# Patient Record
Sex: MALE | Race: WHITE | Employment: OTHER | URBAN - NONMETROPOLITAN AREA
[De-identification: names, ages, dates, MRNs, and addresses within clinical notes are randomized per-mention and may not be internally consistent; named-entity substitution may affect disease eponyms.]

---

## 2017-08-09 ENCOUNTER — TELEPHONE (OUTPATIENT)
Dept: NEUROLOGY | Age: 77
End: 2017-08-09

## 2017-09-26 ENCOUNTER — OFFICE VISIT (OUTPATIENT)
Dept: NEUROLOGY | Age: 77
End: 2017-09-26
Payer: MEDICARE

## 2017-09-26 VITALS
BODY MASS INDEX: 28.81 KG/M2 | WEIGHT: 217.38 LBS | DIASTOLIC BLOOD PRESSURE: 72 MMHG | OXYGEN SATURATION: 96 % | HEART RATE: 73 BPM | HEIGHT: 73 IN | SYSTOLIC BLOOD PRESSURE: 121 MMHG

## 2017-09-26 DIAGNOSIS — R20.0 NUMBNESS: ICD-10-CM

## 2017-09-26 DIAGNOSIS — M54.5 LOW BACK PAIN, UNSPECIFIED BACK PAIN LATERALITY, UNSPECIFIED CHRONICITY, WITH SCIATICA PRESENCE UNSPECIFIED: ICD-10-CM

## 2017-09-26 DIAGNOSIS — G62.9 NEUROPATHY: Primary | ICD-10-CM

## 2017-09-26 DIAGNOSIS — H51.0 GAZE PALSY: ICD-10-CM

## 2017-09-26 DIAGNOSIS — R53.1 WEAKNESS: ICD-10-CM

## 2017-09-26 PROBLEM — M54.50 LOW BACK PAIN: Status: ACTIVE | Noted: 2017-09-26

## 2017-09-26 PROCEDURE — 4040F PNEUMOC VAC/ADMIN/RCVD: CPT | Performed by: PSYCHIATRY & NEUROLOGY

## 2017-09-26 PROCEDURE — 1036F TOBACCO NON-USER: CPT | Performed by: PSYCHIATRY & NEUROLOGY

## 2017-09-26 PROCEDURE — 99204 OFFICE O/P NEW MOD 45 MIN: CPT | Performed by: PSYCHIATRY & NEUROLOGY

## 2017-09-26 PROCEDURE — G8427 DOCREV CUR MEDS BY ELIG CLIN: HCPCS | Performed by: PSYCHIATRY & NEUROLOGY

## 2017-09-26 PROCEDURE — 1123F ACP DISCUSS/DSCN MKR DOCD: CPT | Performed by: PSYCHIATRY & NEUROLOGY

## 2017-09-26 PROCEDURE — G8419 CALC BMI OUT NRM PARAM NOF/U: HCPCS | Performed by: PSYCHIATRY & NEUROLOGY

## 2017-09-26 RX ORDER — FINASTERIDE 5 MG/1
5 TABLET, FILM COATED ORAL
COMMUNITY

## 2018-02-08 ENCOUNTER — CARE COORDINATOR VISIT (OUTPATIENT)
Dept: CARE COORDINATION | Age: 78
End: 2018-02-08

## 2019-01-02 ENCOUNTER — HOSPITAL ENCOUNTER (OUTPATIENT)
Dept: GENERAL RADIOLOGY | Facility: HOSPITAL | Age: 79
Discharge: HOME | End: 2019-01-02
Attending: INTERNAL MEDICINE | Admitting: INTERNAL MEDICINE

## 2019-01-02 ENCOUNTER — TRANSCRIBE ORDERS (OUTPATIENT)
Dept: GENERAL RADIOLOGY | Facility: HOSPITAL | Age: 79
End: 2019-01-02

## 2019-01-02 DIAGNOSIS — K52.9 GASTROENTERITIS: Primary | ICD-10-CM

## 2019-01-02 DIAGNOSIS — K52.9 GASTROENTERITIS: ICD-10-CM

## 2019-01-02 PROCEDURE — 74018 RADEX ABDOMEN 1 VIEW: CPT

## 2019-02-07 ENCOUNTER — HOSPITAL ENCOUNTER (OUTPATIENT)
Dept: GENERAL RADIOLOGY | Facility: HOSPITAL | Age: 79
Discharge: HOME | End: 2019-02-07
Admitting: INTERNAL MEDICINE

## 2019-02-07 ENCOUNTER — TRANSCRIBE ORDERS (OUTPATIENT)
Dept: GENERAL RADIOLOGY | Facility: HOSPITAL | Age: 79
End: 2019-02-07

## 2019-02-07 DIAGNOSIS — M65.4 DE QUERVAIN'S TENOSYNOVITIS: Primary | ICD-10-CM

## 2019-02-07 DIAGNOSIS — M65.4 DE QUERVAIN'S TENOSYNOVITIS: ICD-10-CM

## 2019-02-07 PROCEDURE — 73100 X-RAY EXAM OF WRIST: CPT

## 2019-02-07 PROCEDURE — 73120 X-RAY EXAM OF HAND: CPT

## 2019-12-04 ENCOUNTER — TRANSCRIBE ORDERS (OUTPATIENT)
Dept: GENERAL RADIOLOGY | Facility: HOSPITAL | Age: 79
End: 2019-12-04

## 2019-12-04 ENCOUNTER — HOSPITAL ENCOUNTER (OUTPATIENT)
Dept: GENERAL RADIOLOGY | Facility: HOSPITAL | Age: 79
Discharge: HOME | End: 2019-12-04
Admitting: NURSE PRACTITIONER

## 2019-12-04 DIAGNOSIS — R07.89 CHEST WALL PAIN: ICD-10-CM

## 2019-12-04 DIAGNOSIS — R07.89 CHEST WALL PAIN: Primary | ICD-10-CM

## 2019-12-04 PROCEDURE — 71046 X-RAY EXAM CHEST 2 VIEWS: CPT

## 2020-03-10 NOTE — PROGRESS NOTES
Subjective    Mr. Graham is 79 y.o. male    Chief Complaint: Elevated PSA    History of Present Illness     Elevated PSA  Patient is here with an elevated PSA. The PSA was drawn1 month(s). He does not have a family history of prostate cancer. His AUA Symptom Score is 7 /35. Voiding symptoms include Incomplete emptying, Frequency, Intermittency, Urgency, Weakened stream and Nocturia. Denies Straining. Voiding symptoms began several years  . These have been gradual in onset. Negative biopsy X 2 > 10 years ago; history of HOLEP in past  Previous PSA values are : No results found for: PSA  6.6 with a finasteride correction of 13.2  Previously 4.36 with finasteride correction of 8.72      The following portions of the patient's history were reviewed and updated as appropriate: allergies, current medications, past family history, past medical history, past social history, past surgical history and problem list.    Review of Systems   Constitutional: Negative for appetite change, chills and fever.   HENT: Negative for hearing loss and sore throat.    Eyes: Negative for pain and redness.   Respiratory: Negative for cough and shortness of breath.    Cardiovascular: Negative for chest pain and leg swelling.   Gastrointestinal: Negative for abdominal pain, anal bleeding, blood in stool, nausea and vomiting.   Endocrine: Negative for cold intolerance and heat intolerance.   Genitourinary: Positive for frequency and urgency. Negative for dysuria, flank pain and hematuria.   Musculoskeletal: Negative for joint swelling and myalgias.   Skin: Negative for color change and rash.   Allergic/Immunologic: Negative for immunocompromised state.   Neurological: Negative for dizziness and speech difficulty.   Hematological: Negative for adenopathy. Does not bruise/bleed easily.   Psychiatric/Behavioral: Negative for dysphoric mood and suicidal ideas.         Current Outpatient Medications:   •  amLODIPine (NORVASC) 10 MG tablet, Take 10 mg  by mouth daily., Disp: , Rfl:   •  finasteride (PROSCAR) 5 MG tablet, Take 5 mg by mouth daily., Disp: , Rfl:   •  gabapentin (NEURONTIN) 300 MG capsule, Take 300 mg by mouth 3 (three) times a day., Disp: , Rfl:   •  hydroCHLOROthiazide (HYDRODIURIL) 12.5 MG tablet, Take 12.5 mg by mouth Daily., Disp: , Rfl:   •  quinapril (ACCUPRIL) 40 MG tablet, Take 40 mg by mouth daily., Disp: , Rfl:   •  dexlansoprazole (DEXILANT) 30 MG capsule, Take one by mouth daily, Disp: 30 capsule, Rfl: 6  •  diphenhydrAMINE (BENADRYL) 25 mg capsule, Take 25 mg by mouth every 6 (six) hours as needed for itching., Disp: , Rfl:   •  meclizine (ANTIVERT) 25 MG tablet, Take 25 mg by mouth 3 (three) times a day., Disp: , Rfl:   •  pantoprazole (PROTONIX) 40 MG EC tablet, Take 40 mg by mouth 2 (two) times a day., Disp: , Rfl:     Past Medical History:   Diagnosis Date   • Abdominal distention    • Abdominal pain, left upper quadrant    • Allergic rhinitis    • Arthritis    • Bloating    • Change in bowel habits    • Constipation    • Diarrhea    • Erectile dysfunction    • GERD (gastroesophageal reflux disease)    • Hypertension    • Pneumonia    • Shingles    • Weight loss        Past Surgical History:   Procedure Laterality Date   • APPENDECTOMY     • BACK SURGERY     • CARPAL TUNNEL RELEASE Right    • CHOLECYSTECTOMY     • COLONOSCOPY  09/14/2010    5 yr-complete colon not visualized   • COLONOSCOPY  07/27/2004    extremely tortuous redundant colon. BE-mild diverticulosis without diverticulitis. ? Gallstones-Dr Chery.   • ENDOSCOPY N/A 9/26/2016    Procedure: ESOPHAGOGASTRODUODENOSCOPY WITH ANESTHESIA;  Surgeon: Mani Sy DO;  Location: [facility] ENDOSCOPY;  Service:    • HIP SURGERY Right    • KNEE SURGERY     • OTHER SURGICAL HISTORY      Surgery for Spinal Stenosis   • OTHER SURGICAL HISTORY      Laser Prostate Surgery       Social History     Socioeconomic History   • Marital status:      Spouse name: Not on file   • Number  "of children: Not on file   • Years of education: Not on file   • Highest education level: Not on file   Tobacco Use   • Smoking status: Never Smoker   • Smokeless tobacco: Never Used   Substance and Sexual Activity   • Alcohol use: No   • Drug use: No       Family History   Problem Relation Age of Onset   • Cancer Neg Hx         CRD/CP       Objective    Temp 97.8 °F (36.6 °C)   Ht 185.4 cm (73\")   Wt 96.5 kg (212 lb 12.8 oz)   BMI 28.08 kg/m²     Physical Exam   Constitutional: He is oriented to person, place, and time. He appears well-developed and well-nourished. No distress.   HENT:   Nose: Nose normal.   Neck: Normal range of motion. Neck supple. No tracheal deviation present. No thyromegaly present.   Cardiovascular: Normal rate, regular rhythm and intact distal pulses.   No significant edema or tenderness    Pulmonary/Chest: Breath sounds normal. No accessory muscle usage. No respiratory distress.   Abdominal: Soft. Bowel sounds are normal. He exhibits no distension, no ascites and no mass. There is no hepatosplenomegaly. There is no tenderness. There is no rebound, no guarding and no CVA tenderness. No hernia.   Stool specimen is not indicated for my portion of the exam   Genitourinary: Testes normal and penis normal. Rectal exam shows no mass, no tenderness, anal tone normal and guaiac negative stool. Tender: no nodules. Right testis shows no mass, no swelling and no tenderness. Left testis shows no mass, no swelling and no tenderness. No penile tenderness (no lesion or deformities).   Genitourinary Comments:  The urethral meatus normal in position without evidence of stricture. Epididymis without mass or tenderness. Vas Deferens is palpably normal.Anus and perineum without mass or tenderness. The seminal vesicle are without mass or enlargement. The prostate is approximately 35 ml. It is Symmetric, with a Soft consistency. There are no nodules present. . The seminal vesicles are Not palpable due to the " size of the prostate.     Lymphadenopathy:     He has no cervical adenopathy. No inguinal adenopathy noted on the right or left side.        Right: No inguinal adenopathy present.        Left: No inguinal adenopathy present.   Neurological: He is alert and oriented to person, place, and time.   Skin: Skin is warm and dry. No rash noted. He is not diaphoretic. No pallor.   Psychiatric: He has a normal mood and affect. His behavior is normal.   Vitals reviewed.          Results for orders placed or performed in visit on 03/11/20   POC Urinalysis Dipstick, Multipro   Result Value Ref Range    Color Yellow Yellow, Straw, Dark Yellow, [place]    Clarity, UA Clear Clear    Glucose, UA Negative Negative, 1000 mg/dL (3+) mg/dL    Bilirubin Negative Negative    Ketones, UA Negative Negative    Specific Gravity  1.015 1.005 - 1.030    Blood, UA Negative Negative    pH, Urine 6.0 5.0 - 8.0    Protein, POC Negative Negative mg/dL    Urobilinogen, UA Normal Normal    Nitrite, UA Negative Negative    Leukocytes Negative Negative     Assessment and Plan    Adrián was seen today for elevated psa.    Diagnoses and all orders for this visit:    Elevated prostate specific antigen (PSA)  -     POC Urinalysis Dipstick, Multipro    BPH with urinary obstruction            I discussed the following important considerations with the patient. Current American Urologic Association prostate cancer screening recommendations include informed decision making on the pros and cons of prostate cancer between the ages of 50-> 69 years old.   They do not recommend routine PSA screening in men older than 70 or any man with less than a 10 to 15-year life expectancy.  The panel also noted that some men older than 70 years are in excellent health and may benefit from prostate cancer screening.  However the likelihood of over diagnosis increases as men age, and is particularly high for older men with low risk disease.  Men over 70 who wished to be screened  should do so after understanding that the benefit decreases while the harm increases with age, although there is evidence that men with high risk disease in this age group may benefit from early diagnosis and treatment over a decade or less.    I discussed the pros and cons of biopsy with him as follows:  -Screening does not lower the risk of having prostate cancer; it increases the chance you will find out to have it.  -PSA testing can detect early stage cancers that a digital rectal exam would miss.  -A normal PSA level of 4 or below does not guarantee a patient is cancer free; e.g. about 15% of men with a PSA below 4, a biopsy will reveal prostate cancer  -High PSA level may prompt a patient to seek treatment, resulting in possible urinary and sexual side effects.  -Conditions other than cancer-BPH and prostatitis, for example-can elevate PSA    -Screening may lead to a biopsy which may:  *provide a false negative result (approximately 10% of patients diagnosed with cancer have a history of negative biopsy)  *diagnose a prostate cancer that will never lead to symptoms or death even without treatment so some patients have unnecessary treatment  *side effects that can be severe such as sepsis from infection, bleeding, and even transient sexual dysfunction      We also discussed other societal recommendations for cancer screening such as:  -American Cancer Society recommends informed decision on asymptomatic men with greater than 10-year life expectancy.  -The American Academy of family practice recommends informed decision making with men between 55 and 69 years old but there is agreement that men age 70 or older should not be screened for prostate cancer.  -The National Cancer Care Network agrees that PSA testing should only be offered to men with  10 or more year life expectancy.  This panel supported screening in men until age 75.  Continued screening beyond 75 should be only with caution and very healthy  patients with little to no comorbidity to detect a small number of aggressive cancers that pose a significant risk if left untreated.       I reviewed 35 pages of medical records indicating a PSA of 6.6 with a finasteride correction to 13.2.  He has a history of 2- biopsies in the past.  We discussed options in depth today including discontinuing PSA screening continuing PSA screening proceed with MRI with possible fusion biopsy patient does not want to have a biopsy.  We will not proceed with MRI.  Continue finasteride and he will follow-up in 1 year for symptom check.

## 2020-03-11 ENCOUNTER — OFFICE VISIT (OUTPATIENT)
Dept: UROLOGY | Facility: CLINIC | Age: 80
End: 2020-03-11

## 2020-03-11 VITALS — BODY MASS INDEX: 28.2 KG/M2 | WEIGHT: 212.8 LBS | HEIGHT: 73 IN | TEMPERATURE: 97.8 F

## 2020-03-11 DIAGNOSIS — N40.1 BPH WITH URINARY OBSTRUCTION: ICD-10-CM

## 2020-03-11 DIAGNOSIS — N13.8 BPH WITH URINARY OBSTRUCTION: ICD-10-CM

## 2020-03-11 DIAGNOSIS — R97.20 ELEVATED PROSTATE SPECIFIC ANTIGEN (PSA): Primary | ICD-10-CM

## 2020-03-11 LAB
BILIRUB BLD-MCNC: NEGATIVE MG/DL
CLARITY, POC: CLEAR
COLOR UR: YELLOW
GLUCOSE UR STRIP-MCNC: NEGATIVE MG/DL
KETONES UR QL: NEGATIVE
LEUKOCYTE EST, POC: NEGATIVE
NITRITE UR-MCNC: NEGATIVE MG/ML
PH UR: 6 [PH] (ref 5–8)
PROT UR STRIP-MCNC: NEGATIVE MG/DL
RBC # UR STRIP: NEGATIVE /UL
SP GR UR: 1.01 (ref 1–1.03)
UROBILINOGEN UR QL: NORMAL

## 2020-03-11 PROCEDURE — 99203 OFFICE O/P NEW LOW 30 MIN: CPT | Performed by: UROLOGY

## 2020-03-11 PROCEDURE — 81003 URINALYSIS AUTO W/O SCOPE: CPT | Performed by: UROLOGY

## 2020-03-11 RX ORDER — HYDROCHLOROTHIAZIDE 12.5 MG/1
12.5 TABLET ORAL DAILY
COMMUNITY
End: 2022-01-01 | Stop reason: CLARIF

## 2020-09-01 NOTE — H&P (VIEW-ONLY)
Chief Complaint   Patient presents with   • Colonoscopy     had positive cologuard       PCP: James Mooney DO  REFER: James Mooney DO    Subjective     HPI    He presents to office with positive cologaurd test from PCP office in Aug 2020.  Coarse is constant.  Length of time test has been positive is unknown.  Location of positive test-colon/stool.  Testing performed as part of routine physical.  He denies change in bowels.  Bowels described as moving irregular.  he has started to experience diarrhea after eating breakfast.  this is not daily.  . He questions if diarrhea associated with coffee or something he eats at breakfast.  No abdominal pain.  No BRBPR.  No melena.  Weight is stable.  He has undergone previous colonoscopy evaluation.  There is not a family history of colon cancer or colon polyps.  known liver lesion to right hepatic lobe last imaged in 2016 by ultrasound.    Endoscopy (Dr Sy) 2016-normal     CScope (Dr Sy) 2010-tortuous colon - negative BE        Past Medical History:   Diagnosis Date   • Abdominal distention    • Abdominal pain, left upper quadrant    • Allergic rhinitis    • Arthritis    • Bloating    • Change in bowel habits    • Constipation    • Diarrhea    • Erectile dysfunction    • GERD (gastroesophageal reflux disease)    • Hypertension    • Pneumonia    • Shingles    • Weight loss        Past Surgical History:   Procedure Laterality Date   • APPENDECTOMY     • BACK SURGERY     • CARPAL TUNNEL RELEASE Right    • CHOLECYSTECTOMY     • COLONOSCOPY  09/14/2010    5 yr-complete colon not visualized   • COLONOSCOPY  07/27/2004    extremely tortuous redundant colon. BE-mild diverticulosis without diverticulitis. ? Gallstones-Dr Chery.   • ENDOSCOPY N/A 9/26/2016    Procedure: ESOPHAGOGASTRODUODENOSCOPY WITH ANESTHESIA;  Surgeon: Mani Sy DO;  Location: [facility] ENDOSCOPY;  Service:    • HIP SURGERY Right    • KNEE SURGERY     • OTHER SURGICAL HISTORY      Surgery for  Spinal Stenosis   • OTHER SURGICAL HISTORY      Laser Prostate Surgery       Outpatient Medications Marked as Taking for the 9/2/20 encounter (Office Visit) with Yehuda Moraes APRN   Medication Sig Dispense Refill   • amLODIPine (NORVASC) 10 MG tablet Take 10 mg by mouth daily.     • diphenhydrAMINE (BENADRYL) 25 mg capsule Take 25 mg by mouth every 6 (six) hours as needed for itching.     • finasteride (PROSCAR) 5 MG tablet Take 5 mg by mouth daily.     • gabapentin (NEURONTIN) 300 MG capsule Take 300 mg by mouth 3 (three) times a day.     • hydroCHLOROthiazide (HYDRODIURIL) 12.5 MG tablet Take 12.5 mg by mouth Daily.     • quinapril (ACCUPRIL) 40 MG tablet Take 40 mg by mouth daily.         Allergies   Allergen Reactions   • Daypro [Oxaprozin] Nausea Only       Social History     Socioeconomic History   • Marital status:      Spouse name: Not on file   • Number of children: Not on file   • Years of education: Not on file   • Highest education level: Not on file   Tobacco Use   • Smoking status: Never Smoker   • Smokeless tobacco: Never Used   Substance and Sexual Activity   • Alcohol use: No   • Drug use: No       Family History   Problem Relation Age of Onset   • Cancer Neg Hx         CRD/CP   • Colon cancer Neg Hx    • Colon polyps Neg Hx    • Esophageal cancer Neg Hx        Review of Systems   Constitutional: Negative for fatigue, fever and unexpected weight change.   HENT: Negative for hearing loss, sore throat and voice change.    Eyes: Negative for visual disturbance.   Respiratory: Negative for cough, shortness of breath and wheezing.    Cardiovascular: Negative for chest pain and palpitations.   Gastrointestinal: Positive for diarrhea. Negative for abdominal pain, blood in stool and vomiting.   Endocrine: Negative for polydipsia and polyuria.   Genitourinary: Negative for difficulty urinating, dysuria, hematuria and urgency.   Musculoskeletal: Negative for joint swelling and myalgias.  "  Skin: Negative for color change, rash and wound.   Neurological: Negative for dizziness, tremors, seizures and syncope.   Hematological: Does not bruise/bleed easily.   Psychiatric/Behavioral: Negative for agitation and confusion. The patient is not nervous/anxious.        Objective     Vitals:    09/02/20 1503   BP: 134/74   Pulse: 90   Temp: 97.1 °F (36.2 °C)   SpO2: 99%   Weight: 94.8 kg (209 lb)   Height: 185.4 cm (73\")     Body mass index is 27.57 kg/m².    Physical Exam   Constitutional: He is oriented to person, place, and time. He appears well-developed and well-nourished. He is cooperative.   HENT:   Head: Normocephalic and atraumatic.   Eyes: Pupils are equal, round, and reactive to light. Conjunctivae are normal. No scleral icterus.   Neck: Normal range of motion. Neck supple. No JVD present. No thyroid mass and no thyromegaly present.   Cardiovascular: Normal rate, regular rhythm and normal heart sounds. Exam reveals no gallop and no friction rub.   No murmur heard.  Pulmonary/Chest: Effort normal and breath sounds normal. No accessory muscle usage. No respiratory distress. He has no wheezes. He has no rales.   Abdominal: Soft. Normal appearance and bowel sounds are normal. He exhibits no distension, no ascites and no mass. There is no hepatosplenomegaly. There is no tenderness. There is no rebound and no guarding.   Musculoskeletal: Normal range of motion. He exhibits no edema or tenderness.     Vascular Status -  His right foot exhibits normal foot vasculature  and no edema. His left foot exhibits normal foot vasculature  and no edema.  Lymphadenopathy:     He has no cervical adenopathy.   Neurological: He is alert and oriented to person, place, and time. He has normal strength. Gait normal.   Skin: Skin is warm, dry and intact. No rash noted.       Imaging Results (Most Recent)     None          Body mass index is 27.57 kg/m².    Assessment/Plan     Adrián was seen today for colonoscopy.    Diagnoses " and all orders for this visit:    Positive colorectal cancer screening using DNA-based stool test  -     Case Request; Standing  -     Case Request        COLONOSCOPY WITH ANESTHESIA (N/A)    Patient is to continue all blood pressure and cardiac medications prior to procedure and has been advised to take medications morning of procedure   Pt verbalized understanding    Advised pt to stop ASA, use of NSAIDs, Fish Oil, and MV 5 days prior to procedure, per Dr yS protocol.  Tylenol based products are ok to take.  Pt verbalized understanding.    I have explained a positive cologuard indicates the presence of DNA/hgb in stool that is associated with colon polyps or colon cancer.  There is a chance the test is a false positive with that chance being higher for people over the age of 65. This is due to normal changes in DNA associated with getting older (AGA).  Due to the positive result of the Cologuard I recommend pt undergoing colonoscopy.  Colonoscopy remains the gold standard for detection of colon polyps/colon cancer.      All risks, benefits, alternatives, and indications of colonoscopy procedure have been discussed with the patient. Risks to include perforation of the colon requiring possible surgery or colostomy, risk of bleeding from biopsies or removal of colon tissue, possibility of missing a colon polyp or cancer, or adverse drug reaction.  Benefits to include the diagnosis and management of disease of the colon and rectum.  Pt verbalizes understanding and agrees to proceed with procedure.      Patient's Body mass index is 27.57 kg/m². BMI is above normal parameters. Recommendations include: no follow up.          Yehuda Moraes, APRN  09/04/20            There are no Patient Instructions on file for this visit.

## 2020-09-01 NOTE — PROGRESS NOTES
Chief Complaint   Patient presents with   • Colonoscopy     had positive cologuard       PCP: James Mooney DO  REFER: James Moonye DO    Subjective     HPI    He presents to office with positive cologaurd test from PCP office in Aug 2020.  Coarse is constant.  Length of time test has been positive is unknown.  Location of positive test-colon/stool.  Testing performed as part of routine physical.  He denies change in bowels.  Bowels described as moving irregular.  he has started to experience diarrhea after eating breakfast.  this is not daily.  . He questions if diarrhea associated with coffee or something he eats at breakfast.  No abdominal pain.  No BRBPR.  No melena.  Weight is stable.  He has undergone previous colonoscopy evaluation.  There is not a family history of colon cancer or colon polyps.  known liver lesion to right hepatic lobe last imaged in 2016 by ultrasound.    Endoscopy (Dr Sy) 2016-normal     CScope (Dr Sy) 2010-tortuous colon - negative BE        Past Medical History:   Diagnosis Date   • Abdominal distention    • Abdominal pain, left upper quadrant    • Allergic rhinitis    • Arthritis    • Bloating    • Change in bowel habits    • Constipation    • Diarrhea    • Erectile dysfunction    • GERD (gastroesophageal reflux disease)    • Hypertension    • Pneumonia    • Shingles    • Weight loss        Past Surgical History:   Procedure Laterality Date   • APPENDECTOMY     • BACK SURGERY     • CARPAL TUNNEL RELEASE Right    • CHOLECYSTECTOMY     • COLONOSCOPY  09/14/2010    5 yr-complete colon not visualized   • COLONOSCOPY  07/27/2004    extremely tortuous redundant colon. BE-mild diverticulosis without diverticulitis. ? Gallstones-Dr Chery.   • ENDOSCOPY N/A 9/26/2016    Procedure: ESOPHAGOGASTRODUODENOSCOPY WITH ANESTHESIA;  Surgeon: Mani Sy DO;  Location: [facility] ENDOSCOPY;  Service:    • HIP SURGERY Right    • KNEE SURGERY     • OTHER SURGICAL HISTORY      Surgery for  Spinal Stenosis   • OTHER SURGICAL HISTORY      Laser Prostate Surgery       Outpatient Medications Marked as Taking for the 9/2/20 encounter (Office Visit) with Yehuda Moraes APRN   Medication Sig Dispense Refill   • amLODIPine (NORVASC) 10 MG tablet Take 10 mg by mouth daily.     • diphenhydrAMINE (BENADRYL) 25 mg capsule Take 25 mg by mouth every 6 (six) hours as needed for itching.     • finasteride (PROSCAR) 5 MG tablet Take 5 mg by mouth daily.     • gabapentin (NEURONTIN) 300 MG capsule Take 300 mg by mouth 3 (three) times a day.     • hydroCHLOROthiazide (HYDRODIURIL) 12.5 MG tablet Take 12.5 mg by mouth Daily.     • quinapril (ACCUPRIL) 40 MG tablet Take 40 mg by mouth daily.         Allergies   Allergen Reactions   • Daypro [Oxaprozin] Nausea Only       Social History     Socioeconomic History   • Marital status:      Spouse name: Not on file   • Number of children: Not on file   • Years of education: Not on file   • Highest education level: Not on file   Tobacco Use   • Smoking status: Never Smoker   • Smokeless tobacco: Never Used   Substance and Sexual Activity   • Alcohol use: No   • Drug use: No       Family History   Problem Relation Age of Onset   • Cancer Neg Hx         CRD/CP   • Colon cancer Neg Hx    • Colon polyps Neg Hx    • Esophageal cancer Neg Hx        Review of Systems   Constitutional: Negative for fatigue, fever and unexpected weight change.   HENT: Negative for hearing loss, sore throat and voice change.    Eyes: Negative for visual disturbance.   Respiratory: Negative for cough, shortness of breath and wheezing.    Cardiovascular: Negative for chest pain and palpitations.   Gastrointestinal: Positive for diarrhea. Negative for abdominal pain, blood in stool and vomiting.   Endocrine: Negative for polydipsia and polyuria.   Genitourinary: Negative for difficulty urinating, dysuria, hematuria and urgency.   Musculoskeletal: Negative for joint swelling and myalgias.  "  Skin: Negative for color change, rash and wound.   Neurological: Negative for dizziness, tremors, seizures and syncope.   Hematological: Does not bruise/bleed easily.   Psychiatric/Behavioral: Negative for agitation and confusion. The patient is not nervous/anxious.        Objective     Vitals:    09/02/20 1503   BP: 134/74   Pulse: 90   Temp: 97.1 °F (36.2 °C)   SpO2: 99%   Weight: 94.8 kg (209 lb)   Height: 185.4 cm (73\")     Body mass index is 27.57 kg/m².    Physical Exam   Constitutional: He is oriented to person, place, and time. He appears well-developed and well-nourished. He is cooperative.   HENT:   Head: Normocephalic and atraumatic.   Eyes: Pupils are equal, round, and reactive to light. Conjunctivae are normal. No scleral icterus.   Neck: Normal range of motion. Neck supple. No JVD present. No thyroid mass and no thyromegaly present.   Cardiovascular: Normal rate, regular rhythm and normal heart sounds. Exam reveals no gallop and no friction rub.   No murmur heard.  Pulmonary/Chest: Effort normal and breath sounds normal. No accessory muscle usage. No respiratory distress. He has no wheezes. He has no rales.   Abdominal: Soft. Normal appearance and bowel sounds are normal. He exhibits no distension, no ascites and no mass. There is no hepatosplenomegaly. There is no tenderness. There is no rebound and no guarding.   Musculoskeletal: Normal range of motion. He exhibits no edema or tenderness.     Vascular Status -  His right foot exhibits normal foot vasculature  and no edema. His left foot exhibits normal foot vasculature  and no edema.  Lymphadenopathy:     He has no cervical adenopathy.   Neurological: He is alert and oriented to person, place, and time. He has normal strength. Gait normal.   Skin: Skin is warm, dry and intact. No rash noted.       Imaging Results (Most Recent)     None          Body mass index is 27.57 kg/m².    Assessment/Plan     Adrián was seen today for colonoscopy.    Diagnoses " and all orders for this visit:    Positive colorectal cancer screening using DNA-based stool test  -     Case Request; Standing  -     Case Request        COLONOSCOPY WITH ANESTHESIA (N/A)    Patient is to continue all blood pressure and cardiac medications prior to procedure and has been advised to take medications morning of procedure   Pt verbalized understanding    Advised pt to stop ASA, use of NSAIDs, Fish Oil, and MV 5 days prior to procedure, per Dr Sy protocol.  Tylenol based products are ok to take.  Pt verbalized understanding.    I have explained a positive cologuard indicates the presence of DNA/hgb in stool that is associated with colon polyps or colon cancer.  There is a chance the test is a false positive with that chance being higher for people over the age of 65. This is due to normal changes in DNA associated with getting older (AGA).  Due to the positive result of the Cologuard I recommend pt undergoing colonoscopy.  Colonoscopy remains the gold standard for detection of colon polyps/colon cancer.      All risks, benefits, alternatives, and indications of colonoscopy procedure have been discussed with the patient. Risks to include perforation of the colon requiring possible surgery or colostomy, risk of bleeding from biopsies or removal of colon tissue, possibility of missing a colon polyp or cancer, or adverse drug reaction.  Benefits to include the diagnosis and management of disease of the colon and rectum.  Pt verbalizes understanding and agrees to proceed with procedure.      Patient's Body mass index is 27.57 kg/m². BMI is above normal parameters. Recommendations include: no follow up.          Yehuda Moraes, APRN  09/04/20            There are no Patient Instructions on file for this visit.

## 2020-09-02 ENCOUNTER — OFFICE VISIT (OUTPATIENT)
Dept: GASTROENTEROLOGY | Facility: CLINIC | Age: 80
End: 2020-09-02

## 2020-09-02 VITALS
OXYGEN SATURATION: 99 % | WEIGHT: 209 LBS | BODY MASS INDEX: 27.7 KG/M2 | HEART RATE: 90 BPM | TEMPERATURE: 97.1 F | SYSTOLIC BLOOD PRESSURE: 134 MMHG | DIASTOLIC BLOOD PRESSURE: 74 MMHG | HEIGHT: 73 IN

## 2020-09-02 DIAGNOSIS — R19.5 POSITIVE COLORECTAL CANCER SCREENING USING DNA-BASED STOOL TEST: Primary | ICD-10-CM

## 2020-09-02 PROCEDURE — 99203 OFFICE O/P NEW LOW 30 MIN: CPT | Performed by: NURSE PRACTITIONER

## 2020-09-03 ENCOUNTER — TRANSCRIBE ORDERS (OUTPATIENT)
Dept: ADMINISTRATIVE | Facility: HOSPITAL | Age: 80
End: 2020-09-03

## 2020-09-03 DIAGNOSIS — Z01.818 PRE-OP TESTING: Primary | ICD-10-CM

## 2020-09-07 ENCOUNTER — LAB (OUTPATIENT)
Dept: LAB | Facility: HOSPITAL | Age: 80
End: 2020-09-07

## 2020-09-07 PROCEDURE — U0003 INFECTIOUS AGENT DETECTION BY NUCLEIC ACID (DNA OR RNA); SEVERE ACUTE RESPIRATORY SYNDROME CORONAVIRUS 2 (SARS-COV-2) (CORONAVIRUS DISEASE [COVID-19]), AMPLIFIED PROBE TECHNIQUE, MAKING USE OF HIGH THROUGHPUT TECHNOLOGIES AS DESCRIBED BY CMS-2020-01-R: HCPCS | Performed by: INTERNAL MEDICINE

## 2020-09-07 PROCEDURE — C9803 HOPD COVID-19 SPEC COLLECT: HCPCS | Performed by: INTERNAL MEDICINE

## 2020-09-09 LAB
COVID LABCORP PRIORITY: NORMAL
SARS-COV-2 RNA RESP QL NAA+PROBE: NOT DETECTED

## 2020-09-10 ENCOUNTER — HOSPITAL ENCOUNTER (OUTPATIENT)
Facility: HOSPITAL | Age: 80
Setting detail: HOSPITAL OUTPATIENT SURGERY
Discharge: HOME | End: 2020-09-10
Attending: INTERNAL MEDICINE | Admitting: INTERNAL MEDICINE

## 2020-09-10 ENCOUNTER — ANESTHESIA (OUTPATIENT)
Dept: GASTROENTEROLOGY | Facility: HOSPITAL | Age: 80
End: 2020-09-10

## 2020-09-10 ENCOUNTER — ANESTHESIA EVENT (OUTPATIENT)
Dept: GASTROENTEROLOGY | Facility: HOSPITAL | Age: 80
End: 2020-09-10

## 2020-09-10 VITALS
HEART RATE: 62 BPM | OXYGEN SATURATION: 100 % | SYSTOLIC BLOOD PRESSURE: 124 MMHG | DIASTOLIC BLOOD PRESSURE: 67 MMHG | RESPIRATION RATE: 16 BRPM | HEIGHT: 73 IN | WEIGHT: 205 LBS | TEMPERATURE: 96.6 F | BODY MASS INDEX: 27.17 KG/M2

## 2020-09-10 DIAGNOSIS — R19.5 POSITIVE COLORECTAL CANCER SCREENING USING DNA-BASED STOOL TEST: ICD-10-CM

## 2020-09-10 PROCEDURE — 25010000002 PROPOFOL 10 MG/ML EMULSION: Performed by: NURSE ANESTHETIST, CERTIFIED REGISTERED

## 2020-09-10 PROCEDURE — 45378 DIAGNOSTIC COLONOSCOPY: CPT | Performed by: INTERNAL MEDICINE

## 2020-09-10 RX ORDER — PROPOFOL 10 MG/ML
VIAL (ML) INTRAVENOUS AS NEEDED
Status: DISCONTINUED | OUTPATIENT
Start: 2020-09-10 | End: 2020-09-10 | Stop reason: SURG

## 2020-09-10 RX ORDER — SODIUM CHLORIDE 0.9 % (FLUSH) 0.9 %
10 SYRINGE (ML) INJECTION EVERY 12 HOURS SCHEDULED
Status: DISCONTINUED | OUTPATIENT
Start: 2020-09-10 | End: 2020-09-10 | Stop reason: HOSPADM

## 2020-09-10 RX ORDER — SODIUM CHLORIDE 9 MG/ML
100 INJECTION, SOLUTION INTRAVENOUS CONTINUOUS
Status: DISCONTINUED | OUTPATIENT
Start: 2020-09-10 | End: 2020-09-10 | Stop reason: HOSPADM

## 2020-09-10 RX ORDER — SODIUM CHLORIDE 0.9 % (FLUSH) 0.9 %
10 SYRINGE (ML) INJECTION AS NEEDED
Status: DISCONTINUED | OUTPATIENT
Start: 2020-09-10 | End: 2020-09-10 | Stop reason: HOSPADM

## 2020-09-10 RX ADMIN — PROPOFOL 350 MG: 10 INJECTION, EMULSION INTRAVENOUS at 11:13

## 2020-09-10 RX ADMIN — LIDOCAINE HYDROCHLORIDE 50 MG: 20 INJECTION, SOLUTION INTRAVENOUS at 11:05

## 2020-09-10 RX ADMIN — SODIUM CHLORIDE 100 ML/HR: 9 INJECTION, SOLUTION INTRAVENOUS at 10:35

## 2020-09-10 NOTE — ANESTHESIA POSTPROCEDURE EVALUATION
Patient: Adrián Graham    Procedure Summary     Date:  09/10/20 Room / Location:  [facility] ENDOSCOPY 2 / [facility] ENDOSCOPY    Anesthesia Start:  1104 Anesthesia Stop:  1137    Procedure:  COLONOSCOPY WITH ANESTHESIA (N/A ) Diagnosis:       Positive colorectal cancer screening using DNA-based stool test      (Positive colorectal cancer screening using DNA-based stool test [R19.5])    Surgeon:  Mani Sy DO Provider:  Adrián Yin CRNA    Anesthesia Type:  general ASA Status:  2          Anesthesia Type: general    Vitals  Vitals Value Taken Time   BP     Temp     Pulse 64 9/10/2020 11:37 AM   Resp     SpO2     Vitals shown include unvalidated device data.        Post Anesthesia Care and Evaluation    Patient location during evaluation: PACU  Patient participation: complete - patient participated  Level of consciousness: awake and awake and alert  Pain score: 0  Pain management: adequate  Airway patency: patent  Anesthetic complications: No anesthetic complications    Cardiovascular status: acceptable and stable  Respiratory status: acceptable and unassisted  Hydration status: acceptable

## 2020-09-10 NOTE — ANESTHESIA PREPROCEDURE EVALUATION
Anesthesia Evaluation     Patient summary reviewed and Nursing notes reviewed   no history of anesthetic complications:  NPO Solid Status: > 8 hours  NPO Liquid Status: > 4 hours           Airway   Mallampati: I  TM distance: >3 FB  Neck ROM: full  no difficulty expected  Dental      Comment: Edentulous on top      Pulmonary - negative pulmonary ROS and normal exam   (-) COPD, asthma, sleep apnea, not a smoker  Cardiovascular - normal exam  Exercise tolerance: good (4-7 METS)    (+) hypertension well controlled,   (-) angina, BARRETO      Neuro/Psych  (-) seizures, CVA, headaches  GI/Hepatic/Renal/Endo    (+)  GERD,  liver disease (liver cyst),   (-) diabetes, no thyroid disorder    Musculoskeletal     Abdominal    Substance History - negative use     OB/GYN          Other   arthritis,                      Anesthesia Plan    ASA 2     general     intravenous induction     Anesthetic plan, all risks, benefits, and alternatives have been provided, discussed and informed consent has been obtained with: patient.

## 2021-04-13 NOTE — PROGRESS NOTES
Subjective    Mr. Graham is 81 y.o. male    Chief Complaint: Elevated PSA.     History of Present Illness  Elevated PSA  Patient is here with an elevated PSA. The PSA was drawn1 month(s). He does not have a family history of prostate cancer. His AUA Symptom Score is 7 /35. Voiding symptoms include Incomplete emptying, Frequency, Intermittency, Urgency, Weakened stream and Nocturia. Denies Straining. Voiding symptoms began several years  . These have been gradual in onset. Negative biopsy X 2 > 10 years ago; history of HOLEP in past  Previous PSA values are : No results found for: PSA  6.6 with a finasteride correction of 13.2  Previously 4.36 with finasteride correction of 8.72     The following portions of the patient's history were reviewed and updated as appropriate: allergies, current medications, past family history, past medical history, past social history, past surgical history and problem list.    Review of Systems   Constitutional: Negative for chills and fever.   Gastrointestinal: Negative for abdominal pain, anal bleeding and blood in stool.   Genitourinary: Negative for dysuria, frequency, hematuria and urgency.         Current Outpatient Medications:   •  amLODIPine (NORVASC) 10 MG tablet, Take 10 mg by mouth daily., Disp: , Rfl:   •  diphenhydrAMINE (BENADRYL) 25 mg capsule, Take 25 mg by mouth every 6 (six) hours as needed for itching., Disp: , Rfl:   •  finasteride (PROSCAR) 5 MG tablet, Take 5 mg by mouth daily., Disp: , Rfl:   •  gabapentin (NEURONTIN) 300 MG capsule, Take 300 mg by mouth 3 (three) times a day., Disp: , Rfl:   •  hydroCHLOROthiazide (HYDRODIURIL) 12.5 MG tablet, Take 12.5 mg by mouth Daily., Disp: , Rfl:   •  quinapril (ACCUPRIL) 40 MG tablet, Take 40 mg by mouth daily., Disp: , Rfl:     Past Medical History:   Diagnosis Date   • Abdominal distention    • Abdominal pain, left upper quadrant    • Allergic rhinitis    • Arthritis    • Bloating    • Change in bowel habits    •  "Constipation    • Diarrhea    • Erectile dysfunction    • GERD (gastroesophageal reflux disease)    • Hypertension    • Pneumonia    • Shingles    • Weight loss        Past Surgical History:   Procedure Laterality Date   • APPENDECTOMY     • BACK SURGERY     • CARPAL TUNNEL RELEASE Right    • CHOLECYSTECTOMY     • COLONOSCOPY  09/14/2010    5 yr-complete colon not visualized   • COLONOSCOPY  07/27/2004    extremely tortuous redundant colon. BE-mild diverticulosis without diverticulitis. ? Gallstones-Dr Chery.   • COLONOSCOPY N/A 9/10/2020    Procedure: COLONOSCOPY WITH ANESTHESIA;  Surgeon: Mani Sy DO;  Location: [facility] ENDOSCOPY;  Service: Gastroenterology;  Laterality: N/A;  Pre: Positive Colorectal Screening  Post: Diverticulosis  Dr. Mooney  CO2 Inflation Used   • ENDOSCOPY N/A 9/26/2016    Procedure: ESOPHAGOGASTRODUODENOSCOPY WITH ANESTHESIA;  Surgeon: Mani Sy DO;  Location: [facility] ENDOSCOPY;  Service:    • HIP SURGERY Right    • KNEE SURGERY     • OTHER SURGICAL HISTORY      Surgery for Spinal Stenosis   • OTHER SURGICAL HISTORY      Laser Prostate Surgery       Social History     Socioeconomic History   • Marital status:      Spouse name: Not on file   • Number of children: Not on file   • Years of education: Not on file   • Highest education level: Not on file   Tobacco Use   • Smoking status: Never Smoker   • Smokeless tobacco: Never Used   Vaping Use   • Vaping Use: Never used   Substance and Sexual Activity   • Alcohol use: No   • Drug use: No   • Sexual activity: Defer       Family History   Problem Relation Age of Onset   • Cancer Neg Hx         CRD/CP   • Colon cancer Neg Hx    • Colon polyps Neg Hx    • Esophageal cancer Neg Hx        Objective    Temp 96.5 °F (35.8 °C) (Temporal)   Ht 185.4 cm (73\")   Wt 96.2 kg (212 lb)   BMI 27.97 kg/m²     Physical Exam  Vitals reviewed.   Constitutional:       General: He is not in acute distress.     Appearance: He is " well-developed. He is not diaphoretic.   Pulmonary:      Effort: Pulmonary effort is normal.   Abdominal:      General: There is no distension.      Palpations: Abdomen is soft. There is no mass.      Tenderness: There is no abdominal tenderness. There is no guarding or rebound.      Hernia: No hernia is present.   Genitourinary:     Comments: CAITLIN with 30 gm prostate smooth no nodules  Skin:     General: Skin is warm and dry.   Neurological:      Mental Status: He is alert and oriented to person, place, and time.             Results for orders placed or performed in visit on 04/16/21   POC Urinalysis Dipstick, Multipro    Specimen: Urine   Result Value Ref Range    Color Yellow Yellow, Straw, Dark Yellow, [place]    Clarity, UA Clear Clear    Glucose, UA Negative Negative, 1000 mg/dL (3+) mg/dL    Bilirubin Negative Negative    Ketones, UA Negative Negative    Specific Gravity  1.020 1.005 - 1.030    Blood, UA Trace (A) Negative    pH, Urine 5.0 5.0 - 8.0    Protein, POC Negative Negative mg/dL    Urobilinogen, UA Normal Normal    Nitrite, UA Negative Negative    Leukocytes Negative Negative     Assessment and Plan    Diagnoses and all orders for this visit:    1. Elevated prostate specific antigen (PSA) (Primary)  -     POC Urinalysis Dipstick, Multipro    2. BPH with urinary obstruction     PSA of 6.6 with a finasteride correction to 13.2.  He has a history of 2- biopsies in the past.  We have discussed options in past including discontinuing PSA screening continuing PSA screening proceed with MRI with possible fusion biopsy patient does not want to have a biopsy.  We will not proceed with MRI.      Continue finasteride and he will follow-up in 1 year for symptom check.

## 2021-04-16 ENCOUNTER — OFFICE VISIT (OUTPATIENT)
Dept: UROLOGY | Facility: CLINIC | Age: 81
End: 2021-04-16

## 2021-04-16 VITALS — TEMPERATURE: 96.5 F | HEIGHT: 73 IN | BODY MASS INDEX: 28.1 KG/M2 | WEIGHT: 212 LBS

## 2021-04-16 DIAGNOSIS — R97.20 ELEVATED PROSTATE SPECIFIC ANTIGEN (PSA): Primary | ICD-10-CM

## 2021-04-16 DIAGNOSIS — N13.8 BPH WITH URINARY OBSTRUCTION: ICD-10-CM

## 2021-04-16 DIAGNOSIS — N40.1 BPH WITH URINARY OBSTRUCTION: ICD-10-CM

## 2021-04-16 LAB
BILIRUB BLD-MCNC: NEGATIVE MG/DL
CLARITY, POC: CLEAR
COLOR UR: YELLOW
GLUCOSE UR STRIP-MCNC: NEGATIVE MG/DL
KETONES UR QL: NEGATIVE
LEUKOCYTE EST, POC: NEGATIVE
NITRITE UR-MCNC: NEGATIVE MG/ML
PH UR: 5 [PH] (ref 5–8)
PROT UR STRIP-MCNC: NEGATIVE MG/DL
RBC # UR STRIP: ABNORMAL /UL
SP GR UR: 1.02 (ref 1–1.03)
UROBILINOGEN UR QL: NORMAL

## 2021-04-16 PROCEDURE — 99214 OFFICE O/P EST MOD 30 MIN: CPT | Performed by: UROLOGY

## 2021-04-16 PROCEDURE — 81003 URINALYSIS AUTO W/O SCOPE: CPT | Performed by: UROLOGY

## 2021-05-24 ENCOUNTER — HOSPITAL ENCOUNTER (OUTPATIENT)
Dept: GENERAL RADIOLOGY | Facility: HOSPITAL | Age: 81
Discharge: HOME | End: 2021-05-24
Admitting: NURSE PRACTITIONER

## 2021-05-24 ENCOUNTER — TRANSCRIBE ORDERS (OUTPATIENT)
Dept: GENERAL RADIOLOGY | Facility: HOSPITAL | Age: 81
End: 2021-05-24

## 2021-05-24 DIAGNOSIS — M54.2 CERVICALGIA: ICD-10-CM

## 2021-05-24 DIAGNOSIS — M54.2 CERVICALGIA: Primary | ICD-10-CM

## 2021-05-24 PROCEDURE — 72040 X-RAY EXAM NECK SPINE 2-3 VW: CPT

## 2022-01-01 ENCOUNTER — APPOINTMENT (OUTPATIENT)
Dept: GENERAL RADIOLOGY | Facility: HOSPITAL | Age: 82
End: 2022-01-01

## 2022-01-01 ENCOUNTER — HOSPITAL ENCOUNTER (OUTPATIENT)
Dept: GENERAL RADIOLOGY | Facility: HOSPITAL | Age: 82
Discharge: HOME | End: 2022-06-07
Admitting: NURSE PRACTITIONER

## 2022-01-01 ENCOUNTER — HOSPITAL ENCOUNTER (OUTPATIENT)
Dept: ULTRASOUND IMAGING | Facility: HOSPITAL | Age: 82
Discharge: HOME | End: 2022-06-13

## 2022-01-01 ENCOUNTER — OFFICE VISIT (OUTPATIENT)
Dept: INTERNAL MEDICINE | Facility: CLINIC | Age: 82
End: 2022-01-01

## 2022-01-01 ENCOUNTER — APPOINTMENT (OUTPATIENT)
Dept: ULTRASOUND IMAGING | Facility: HOSPITAL | Age: 82
End: 2022-01-01

## 2022-01-01 ENCOUNTER — TRANSITIONAL CARE MANAGEMENT TELEPHONE ENCOUNTER (OUTPATIENT)
Dept: CALL CENTER | Facility: HOSPITAL | Age: 82
End: 2022-01-01

## 2022-01-01 ENCOUNTER — APPOINTMENT (OUTPATIENT)
Dept: CARDIOLOGY | Facility: HOSPITAL | Age: 82
End: 2022-01-01

## 2022-01-01 ENCOUNTER — HOSPITAL ENCOUNTER (OUTPATIENT)
Dept: CARDIOLOGY | Facility: HOSPITAL | Age: 82
Discharge: HOME | End: 2022-06-28

## 2022-01-01 ENCOUNTER — HOSPITAL ENCOUNTER (INPATIENT)
Facility: HOSPITAL | Age: 82
LOS: 1 days | Discharge: HOME | End: 2022-06-27
Attending: EMERGENCY MEDICINE | Admitting: INTERNAL MEDICINE

## 2022-01-01 ENCOUNTER — OFFICE VISIT (OUTPATIENT)
Dept: VASCULAR SURGERY | Facility: CLINIC | Age: 82
End: 2022-01-01

## 2022-01-01 ENCOUNTER — HOSPITAL ENCOUNTER (OUTPATIENT)
Dept: CT IMAGING | Facility: HOSPITAL | Age: 82
Discharge: HOME | End: 2022-06-14
Admitting: NURSE PRACTITIONER

## 2022-01-01 ENCOUNTER — TELEPHONE (OUTPATIENT)
Dept: VASCULAR SURGERY | Facility: CLINIC | Age: 82
End: 2022-01-01

## 2022-01-01 ENCOUNTER — TELEPHONE (OUTPATIENT)
Dept: OTOLARYNGOLOGY | Facility: CLINIC | Age: 82
End: 2022-01-01

## 2022-01-01 ENCOUNTER — READMISSION MANAGEMENT (OUTPATIENT)
Dept: CALL CENTER | Facility: HOSPITAL | Age: 82
End: 2022-01-01

## 2022-01-01 ENCOUNTER — APPOINTMENT (OUTPATIENT)
Dept: CT IMAGING | Facility: HOSPITAL | Age: 82
End: 2022-01-01

## 2022-01-01 ENCOUNTER — OFFICE VISIT (OUTPATIENT)
Dept: CARDIOLOGY | Facility: CLINIC | Age: 82
End: 2022-01-01

## 2022-01-01 ENCOUNTER — PATIENT ROUNDING (BHMG ONLY) (OUTPATIENT)
Dept: INTERNAL MEDICINE | Facility: CLINIC | Age: 82
End: 2022-01-01

## 2022-01-01 ENCOUNTER — OFFICE VISIT (OUTPATIENT)
Dept: UROLOGY | Facility: CLINIC | Age: 82
End: 2022-01-01

## 2022-01-01 ENCOUNTER — LAB (OUTPATIENT)
Dept: LAB | Facility: HOSPITAL | Age: 82
End: 2022-01-01

## 2022-01-01 ENCOUNTER — HOSPITAL ENCOUNTER (INPATIENT)
Facility: HOSPITAL | Age: 82
LOS: 18 days | Discharge: NURSING, REHAB OR LONG-TERM CARE | End: 2022-07-19
Attending: FAMILY MEDICINE | Admitting: FAMILY MEDICINE

## 2022-01-01 ENCOUNTER — LAB (OUTPATIENT)
Dept: UROLOGY | Facility: CLINIC | Age: 82
End: 2022-01-01

## 2022-01-01 ENCOUNTER — TELEPHONE (OUTPATIENT)
Dept: CARDIOLOGY | Facility: CLINIC | Age: 82
End: 2022-01-01

## 2022-01-01 ENCOUNTER — HOSPITAL ENCOUNTER (OUTPATIENT)
Dept: CT IMAGING | Facility: HOSPITAL | Age: 82
Discharge: HOME | End: 2022-06-13

## 2022-01-01 ENCOUNTER — HOSPITAL ENCOUNTER (OUTPATIENT)
Dept: CARDIOLOGY | Facility: HOSPITAL | Age: 82
Discharge: HOME | End: 2022-06-24
Admitting: INTERNAL MEDICINE

## 2022-01-01 ENCOUNTER — HOSPITAL ENCOUNTER (OUTPATIENT)
Facility: HOSPITAL | Age: 82
End: 2022-07-28
Attending: INTERNAL MEDICINE

## 2022-01-01 ENCOUNTER — NURSE TRIAGE (OUTPATIENT)
Dept: CALL CENTER | Facility: HOSPITAL | Age: 82
End: 2022-01-01

## 2022-01-01 VITALS
BODY MASS INDEX: 27.32 KG/M2 | OXYGEN SATURATION: 98 % | HEIGHT: 73 IN | WEIGHT: 206.1 LBS | DIASTOLIC BLOOD PRESSURE: 92 MMHG | TEMPERATURE: 97.6 F | HEART RATE: 97 BPM | SYSTOLIC BLOOD PRESSURE: 152 MMHG | RESPIRATION RATE: 16 BRPM

## 2022-01-01 VITALS
SYSTOLIC BLOOD PRESSURE: 122 MMHG | WEIGHT: 202 LBS | BODY MASS INDEX: 26.77 KG/M2 | HEART RATE: 90 BPM | OXYGEN SATURATION: 93 % | DIASTOLIC BLOOD PRESSURE: 74 MMHG | HEIGHT: 73 IN

## 2022-01-01 VITALS — BODY MASS INDEX: 24.77 KG/M2 | HEIGHT: 73 IN | WEIGHT: 186.9 LBS

## 2022-01-01 VITALS
SYSTOLIC BLOOD PRESSURE: 148 MMHG | HEART RATE: 72 BPM | WEIGHT: 202 LBS | HEIGHT: 73 IN | OXYGEN SATURATION: 100 % | BODY MASS INDEX: 26.77 KG/M2 | DIASTOLIC BLOOD PRESSURE: 72 MMHG

## 2022-01-01 VITALS
TEMPERATURE: 98.6 F | HEIGHT: 73 IN | DIASTOLIC BLOOD PRESSURE: 52 MMHG | WEIGHT: 194.4 LBS | BODY MASS INDEX: 25.76 KG/M2 | HEART RATE: 85 BPM | SYSTOLIC BLOOD PRESSURE: 115 MMHG | OXYGEN SATURATION: 92 % | RESPIRATION RATE: 16 BRPM

## 2022-01-01 VITALS — HEIGHT: 73 IN | TEMPERATURE: 97.8 F | BODY MASS INDEX: 27.17 KG/M2 | WEIGHT: 205 LBS

## 2022-01-01 VITALS
DIASTOLIC BLOOD PRESSURE: 87 MMHG | OXYGEN SATURATION: 93 % | RESPIRATION RATE: 29 BRPM | TEMPERATURE: 98.5 F | WEIGHT: 187.83 LBS | HEART RATE: 102 BPM | SYSTOLIC BLOOD PRESSURE: 152 MMHG | HEIGHT: 73 IN | BODY MASS INDEX: 24.89 KG/M2

## 2022-01-01 VITALS
SYSTOLIC BLOOD PRESSURE: 112 MMHG | OXYGEN SATURATION: 96 % | HEIGHT: 73 IN | DIASTOLIC BLOOD PRESSURE: 56 MMHG | WEIGHT: 198 LBS | HEART RATE: 92 BPM | BODY MASS INDEX: 26.24 KG/M2

## 2022-01-01 VITALS
BODY MASS INDEX: 26.24 KG/M2 | HEIGHT: 73 IN | WEIGHT: 198 LBS | DIASTOLIC BLOOD PRESSURE: 56 MMHG | SYSTOLIC BLOOD PRESSURE: 112 MMHG

## 2022-01-01 DIAGNOSIS — I65.23 BILATERAL CAROTID ARTERY STENOSIS: Primary | ICD-10-CM

## 2022-01-01 DIAGNOSIS — E04.1 THYROID NODULE: ICD-10-CM

## 2022-01-01 DIAGNOSIS — I77.9 BILATERAL CAROTID ARTERY DISEASE, UNSPECIFIED TYPE: ICD-10-CM

## 2022-01-01 DIAGNOSIS — R09.02 HYPOXEMIA: Primary | ICD-10-CM

## 2022-01-01 DIAGNOSIS — U07.1 PNEUMONIA DUE TO COVID-19 VIRUS: ICD-10-CM

## 2022-01-01 DIAGNOSIS — G56.02 CARPAL TUNNEL SYNDROME, LEFT: ICD-10-CM

## 2022-01-01 DIAGNOSIS — I10 ESSENTIAL HYPERTENSION: ICD-10-CM

## 2022-01-01 DIAGNOSIS — J12.82 PNEUMONIA DUE TO COVID-19 VIRUS: ICD-10-CM

## 2022-01-01 DIAGNOSIS — N40.0 BENIGN PROSTATIC HYPERPLASIA WITHOUT LOWER URINARY TRACT SYMPTOMS: ICD-10-CM

## 2022-01-01 DIAGNOSIS — R09.89 BILATERAL CAROTID BRUITS: ICD-10-CM

## 2022-01-01 DIAGNOSIS — E78.5 HYPERLIPIDEMIA, UNSPECIFIED HYPERLIPIDEMIA TYPE: ICD-10-CM

## 2022-01-01 DIAGNOSIS — I26.93 SINGLE SUBSEGMENTAL PULMONARY EMBOLISM WITHOUT ACUTE COR PULMONALE: Primary | ICD-10-CM

## 2022-01-01 DIAGNOSIS — I77.9 BILATERAL CAROTID ARTERY DISEASE, UNSPECIFIED TYPE: Primary | ICD-10-CM

## 2022-01-01 DIAGNOSIS — N40.1 BPH WITH URINARY OBSTRUCTION: ICD-10-CM

## 2022-01-01 DIAGNOSIS — Z01.810 PREOP CARDIOVASCULAR EXAM: Primary | ICD-10-CM

## 2022-01-01 DIAGNOSIS — U07.1 COVID: ICD-10-CM

## 2022-01-01 DIAGNOSIS — I26.99 PULMONARY EMBOLISM ASSOCIATED WITH COVID-19: ICD-10-CM

## 2022-01-01 DIAGNOSIS — R79.89 ELEVATED TSH: ICD-10-CM

## 2022-01-01 DIAGNOSIS — R97.20 ELEVATED PROSTATE SPECIFIC ANTIGEN (PSA): Primary | ICD-10-CM

## 2022-01-01 DIAGNOSIS — R42 DIZZINESS: Primary | ICD-10-CM

## 2022-01-01 DIAGNOSIS — R94.31 ABNORMAL ECG: ICD-10-CM

## 2022-01-01 DIAGNOSIS — Z01.810 PREOP CARDIOVASCULAR EXAM: ICD-10-CM

## 2022-01-01 DIAGNOSIS — I95.1 ORTHOSTATIC HYPOTENSION: ICD-10-CM

## 2022-01-01 DIAGNOSIS — I10 ESSENTIAL HYPERTENSION: Primary | ICD-10-CM

## 2022-01-01 DIAGNOSIS — R53.83 FATIGUE, UNSPECIFIED TYPE: ICD-10-CM

## 2022-01-01 DIAGNOSIS — E44.0 MODERATE MALNUTRITION: ICD-10-CM

## 2022-01-01 DIAGNOSIS — Z74.09 IMPAIRED MOBILITY: ICD-10-CM

## 2022-01-01 DIAGNOSIS — R97.20 ELEVATED PROSTATE SPECIFIC ANTIGEN (PSA): ICD-10-CM

## 2022-01-01 DIAGNOSIS — N13.8 BPH WITH URINARY OBSTRUCTION: ICD-10-CM

## 2022-01-01 DIAGNOSIS — Z78.9 DECREASED ACTIVITIES OF DAILY LIVING (ADL): ICD-10-CM

## 2022-01-01 DIAGNOSIS — R53.83 FATIGUE, UNSPECIFIED TYPE: Primary | ICD-10-CM

## 2022-01-01 DIAGNOSIS — R79.89 ELEVATED TSH: Primary | ICD-10-CM

## 2022-01-01 DIAGNOSIS — G60.9 IDIOPATHIC PERIPHERAL NEUROPATHY: ICD-10-CM

## 2022-01-01 DIAGNOSIS — E04.1 THYROID NODULE: Primary | ICD-10-CM

## 2022-01-01 DIAGNOSIS — I26.94 MULTIPLE SUBSEGMENTAL PULMONARY EMBOLI WITHOUT ACUTE COR PULMONALE: ICD-10-CM

## 2022-01-01 DIAGNOSIS — J06.9 ACUTE URI: ICD-10-CM

## 2022-01-01 DIAGNOSIS — U07.1 PULMONARY EMBOLISM ASSOCIATED WITH COVID-19: ICD-10-CM

## 2022-01-01 DIAGNOSIS — R91.1 LUNG NODULE SEEN ON IMAGING STUDY: ICD-10-CM

## 2022-01-01 DIAGNOSIS — R06.09 DOE (DYSPNEA ON EXERTION): ICD-10-CM

## 2022-01-01 DIAGNOSIS — I26.93 SINGLE SUBSEGMENTAL PULMONARY EMBOLISM WITHOUT ACUTE COR PULMONALE: ICD-10-CM

## 2022-01-01 DIAGNOSIS — E66.3 OVERWEIGHT (BMI 25.0-29.9): ICD-10-CM

## 2022-01-01 DIAGNOSIS — J40 BRONCHITIS: ICD-10-CM

## 2022-01-01 DIAGNOSIS — J96.01 ACUTE RESPIRATORY FAILURE WITH HYPOXIA: ICD-10-CM

## 2022-01-01 LAB
ACETONE BLD QL: NEGATIVE
ALBUMIN SERPL-MCNC: 2.2 G/DL (ref 3.5–5.2)
ALBUMIN SERPL-MCNC: 2.3 G/DL (ref 3.5–5.2)
ALBUMIN SERPL-MCNC: 2.8 G/DL (ref 3.5–5.2)
ALBUMIN SERPL-MCNC: 3.4 G/DL (ref 3.5–5.2)
ALBUMIN SERPL-MCNC: 3.7 G/DL (ref 3.5–5)
ALBUMIN SERPL-MCNC: 4.5 G/DL (ref 3.5–5.2)
ALBUMIN/GLOB SERPL: 0.5 G/DL
ALBUMIN/GLOB SERPL: 0.6 G/DL
ALBUMIN/GLOB SERPL: 0.7 G/DL
ALBUMIN/GLOB SERPL: 0.8 G/DL
ALBUMIN/GLOB SERPL: 1.2 G/DL (ref 1.1–2.5)
ALBUMIN/GLOB SERPL: 1.7 G/DL
ALP SERPL-CCNC: 68 U/L (ref 39–117)
ALP SERPL-CCNC: 71 U/L (ref 24–120)
ALP SERPL-CCNC: 72 U/L (ref 39–117)
ALP SERPL-CCNC: 73 U/L (ref 39–117)
ALP SERPL-CCNC: 73 U/L (ref 39–117)
ALP SERPL-CCNC: 76 U/L (ref 39–117)
ALT SERPL W P-5'-P-CCNC: 15 U/L (ref 1–41)
ALT SERPL W P-5'-P-CCNC: 16 U/L (ref 1–41)
ALT SERPL W P-5'-P-CCNC: 17 U/L (ref 1–41)
ALT SERPL W P-5'-P-CCNC: 17 U/L (ref 1–41)
ALT SERPL W P-5'-P-CCNC: 30 U/L (ref 0–50)
ALT SERPL W P-5'-P-CCNC: 38 U/L (ref 1–41)
ANION GAP SERPL CALCULATED.3IONS-SCNC: 11 MMOL/L (ref 5–15)
ANION GAP SERPL CALCULATED.3IONS-SCNC: 12 MMOL/L (ref 5–15)
ANION GAP SERPL CALCULATED.3IONS-SCNC: 13 MMOL/L (ref 5–15)
ANION GAP SERPL CALCULATED.3IONS-SCNC: 16 MMOL/L (ref 5–15)
ANION GAP SERPL CALCULATED.3IONS-SCNC: 3 MMOL/L (ref 5–15)
ANION GAP SERPL CALCULATED.3IONS-SCNC: 5 MMOL/L (ref 4–13)
ANION GAP SERPL CALCULATED.3IONS-SCNC: 5 MMOL/L (ref 5–15)
ANION GAP SERPL CALCULATED.3IONS-SCNC: 6 MMOL/L (ref 5–15)
ANION GAP SERPL CALCULATED.3IONS-SCNC: 7 MMOL/L (ref 5–15)
APTT PPP: 42 SECONDS (ref 24.1–35)
ARTERIAL PATENCY WRIST A: ABNORMAL
ARTERIAL PATENCY WRIST A: ABNORMAL
ARTERIAL PATENCY WRIST A: POSITIVE
AST SERPL-CCNC: 26 U/L (ref 1–40)
AST SERPL-CCNC: 27 U/L (ref 1–40)
AST SERPL-CCNC: 29 U/L (ref 7–45)
AST SERPL-CCNC: 30 U/L (ref 1–40)
AST SERPL-CCNC: 30 U/L (ref 1–40)
AST SERPL-CCNC: 41 U/L (ref 1–40)
ATMOSPHERIC PRESS: 748 MMHG
ATMOSPHERIC PRESS: 749 MMHG
ATMOSPHERIC PRESS: 749 MMHG
ATMOSPHERIC PRESS: 750 MMHG
AUTO MIXED CELLS #: 1.1 10*3/MM3 (ref 0.1–2.6)
AUTO MIXED CELLS %: 8.9 % (ref 0.1–24)
B PARAPERT DNA SPEC QL NAA+PROBE: NOT DETECTED
B PERT DNA SPEC QL NAA+PROBE: NOT DETECTED
BACTERIA SPEC AEROBE CULT: NORMAL
BACTERIA SPEC RESP CULT: NORMAL
BACTERIA UR QL AUTO: ABNORMAL /HPF
BASE EXCESS BLDA CALC-SCNC: -1 MMOL/L (ref 0–2)
BASE EXCESS BLDA CALC-SCNC: -1.5 MMOL/L (ref 0–2)
BASE EXCESS BLDA CALC-SCNC: 2.3 MMOL/L (ref 0–2)
BASE EXCESS BLDA CALC-SCNC: 6 MMOL/L (ref 0–2)
BASE EXCESS BLDA CALC-SCNC: 9.1 MMOL/L (ref 0–2)
BASE EXCESS BLDA CALC-SCNC: 9.8 MMOL/L (ref 0–2)
BASOPHILS # BLD AUTO: 0.01 10*3/MM3 (ref 0–0.2)
BASOPHILS # BLD AUTO: 0.02 10*3/MM3 (ref 0–0.2)
BASOPHILS # BLD AUTO: 0.03 10*3/MM3 (ref 0–0.2)
BASOPHILS # BLD AUTO: 0.04 10*3/MM3 (ref 0–0.2)
BASOPHILS # BLD AUTO: 0.05 10*3/MM3 (ref 0–0.2)
BASOPHILS # BLD AUTO: 0.05 10*3/MM3 (ref 0–0.2)
BASOPHILS # BLD AUTO: 0.06 10*3/MM3 (ref 0–0.2)
BASOPHILS # BLD AUTO: 0.07 10*3/MM3 (ref 0–0.2)
BASOPHILS NFR BLD AUTO: 0.1 % (ref 0–1.5)
BASOPHILS NFR BLD AUTO: 0.1 % (ref 0–1.5)
BASOPHILS NFR BLD AUTO: 0.2 % (ref 0–1.5)
BASOPHILS NFR BLD AUTO: 0.3 % (ref 0–1.5)
BASOPHILS NFR BLD AUTO: 0.3 % (ref 0–1.5)
BASOPHILS NFR BLD AUTO: 0.4 % (ref 0–1.5)
BASOPHILS NFR BLD AUTO: 0.4 % (ref 0–1.5)
BASOPHILS NFR BLD AUTO: 0.5 % (ref 0–1.5)
BDY SITE: ABNORMAL
BH CV ECHO MEAS - AO MAX PG: 4.4 MMHG
BH CV ECHO MEAS - AO MEAN PG: 2 MMHG
BH CV ECHO MEAS - AO ROOT DIAM: 3.6 CM
BH CV ECHO MEAS - AO V2 MAX: 105 CM/SEC
BH CV ECHO MEAS - AO V2 VTI: 17.9 CM
BH CV ECHO MEAS - AVA(I,D): 3 CM2
BH CV ECHO MEAS - EDV(CUBED): 68.9 ML
BH CV ECHO MEAS - EDV(CUBED): 84.6 ML
BH CV ECHO MEAS - EDV(MOD-SP4): 60 ML
BH CV ECHO MEAS - EDV(MOD-SP4): 81.5 ML
BH CV ECHO MEAS - EF(MOD-SP4): 62.9 %
BH CV ECHO MEAS - EF(MOD-SP4): 70.8 %
BH CV ECHO MEAS - ESV(CUBED): 18.8 ML
BH CV ECHO MEAS - ESV(CUBED): 23.1 ML
BH CV ECHO MEAS - ESV(MOD-SP4): 17.5 ML
BH CV ECHO MEAS - ESV(MOD-SP4): 30.2 ML
BH CV ECHO MEAS - FS: 30.5 %
BH CV ECHO MEAS - FS: 39.4 %
BH CV ECHO MEAS - IVS/LVPW: 0.73 CM
BH CV ECHO MEAS - IVS/LVPW: 0.77 CM
BH CV ECHO MEAS - IVSD: 0.61 CM
BH CV ECHO MEAS - IVSD: 0.75 CM
BH CV ECHO MEAS - LA A4C LENGTH: 39 CM
BH CV ECHO MEAS - LA DIMENSION: 2.8 CM
BH CV ECHO MEAS - LAT PEAK E' VEL: 6.6 CM/SEC
BH CV ECHO MEAS - LV DIASTOLIC VOL/BSA (35-75): 38 CM2
BH CV ECHO MEAS - LV MASS(C)D: 112.7 GRAMS
BH CV ECHO MEAS - LV MASS(C)D: 92.2 GRAMS
BH CV ECHO MEAS - LV MAX PG: 3.8 MMHG
BH CV ECHO MEAS - LV MEAN PG: 2 MMHG
BH CV ECHO MEAS - LV SYSTOLIC VOL/BSA (12-30): 14.1 CM2
BH CV ECHO MEAS - LV V1 MAX: 97.5 CM/SEC
BH CV ECHO MEAS - LV V1 VTI: 17.2 CM
BH CV ECHO MEAS - LVIDD: 4.1 CM
BH CV ECHO MEAS - LVIDD: 4.4 CM
BH CV ECHO MEAS - LVIDS: 2.7 CM
BH CV ECHO MEAS - LVIDS: 2.9 CM
BH CV ECHO MEAS - LVOT AREA: 3.1 CM2
BH CV ECHO MEAS - LVOT DIAM: 2 CM
BH CV ECHO MEAS - LVPWD: 0.79 CM
BH CV ECHO MEAS - LVPWD: 1.03 CM
BH CV ECHO MEAS - MED PEAK E' VEL: 8.1 CM/SEC
BH CV ECHO MEAS - MV A MAX VEL: 110 CM/SEC
BH CV ECHO MEAS - MV DEC SLOPE: 711 CM/SEC2
BH CV ECHO MEAS - MV DEC TIME: 0.11 MSEC
BH CV ECHO MEAS - MV E MAX VEL: 73.7 CM/SEC
BH CV ECHO MEAS - MV E/A: 0.67
BH CV ECHO MEAS - MV P1/2T: 37.1 MSEC
BH CV ECHO MEAS - MVA(P1/2T): 5.9 CM2
BH CV ECHO MEAS - PA V2 MAX: 118 CM/SEC
BH CV ECHO MEAS - RAP SYSTOLE: 5 MMHG
BH CV ECHO MEAS - RVDD: 4.6 CM
BH CV ECHO MEAS - RVSP: 35.9 MMHG
BH CV ECHO MEAS - SI(MOD-SP4): 23.9 ML/M2
BH CV ECHO MEAS - SV(LVOT): 54 ML
BH CV ECHO MEAS - SV(MOD-SP4): 42.5 ML
BH CV ECHO MEAS - SV(MOD-SP4): 51.3 ML
BH CV ECHO MEAS - TR MAX PG: 30.9 MMHG
BH CV ECHO MEAS - TR MAX VEL: 278 CM/SEC
BH CV ECHO MEASUREMENTS AVERAGE E/E' RATIO: 10.03
BILIRUB BLD-MCNC: NEGATIVE MG/DL
BILIRUB SERPL-MCNC: 0.3 MG/DL (ref 0.1–1)
BILIRUB SERPL-MCNC: 0.3 MG/DL (ref 0–1.2)
BILIRUB SERPL-MCNC: 0.3 MG/DL (ref 0–1.2)
BILIRUB SERPL-MCNC: 0.4 MG/DL (ref 0–1.2)
BILIRUB SERPL-MCNC: 0.4 MG/DL (ref 0–1.2)
BILIRUB SERPL-MCNC: 0.5 MG/DL (ref 0–1.2)
BILIRUB UR QL STRIP: NEGATIVE
BILIRUB UR QL STRIP: NEGATIVE
BODY TEMPERATURE: 37 C
BUN SERPL-MCNC: 14 MG/DL (ref 8–23)
BUN SERPL-MCNC: 16 MG/DL (ref 8–23)
BUN SERPL-MCNC: 18 MG/DL (ref 8–23)
BUN SERPL-MCNC: 20 MG/DL (ref 8–23)
BUN SERPL-MCNC: 20 MG/DL (ref 8–23)
BUN SERPL-MCNC: 22 MG/DL (ref 5–21)
BUN SERPL-MCNC: 23 MG/DL (ref 8–23)
BUN SERPL-MCNC: 25 MG/DL (ref 8–23)
BUN SERPL-MCNC: 28 MG/DL (ref 8–23)
BUN SERPL-MCNC: 28 MG/DL (ref 8–23)
BUN SERPL-MCNC: 29 MG/DL (ref 8–23)
BUN SERPL-MCNC: 30 MG/DL (ref 8–23)
BUN SERPL-MCNC: 32 MG/DL (ref 8–23)
BUN SERPL-MCNC: 34 MG/DL (ref 8–23)
BUN/CREAT SERPL: 19.2 (ref 7–25)
BUN/CREAT SERPL: 20.8 (ref 7–25)
BUN/CREAT SERPL: 21.4 (ref 7–25)
BUN/CREAT SERPL: 23.5 (ref 7–25)
BUN/CREAT SERPL: 25.3
BUN/CREAT SERPL: 25.6 (ref 7–25)
BUN/CREAT SERPL: 27.7 (ref 7–25)
BUN/CREAT SERPL: 31.6 (ref 7–25)
BUN/CREAT SERPL: 35.4 (ref 7–25)
BUN/CREAT SERPL: 39.1 (ref 7–25)
BUN/CREAT SERPL: 41.4 (ref 7–25)
BUN/CREAT SERPL: 42.9 (ref 7–25)
BUN/CREAT SERPL: 46.4 (ref 7–25)
BUN/CREAT SERPL: 51.6 (ref 7–25)
BUN/CREAT SERPL: 51.6 (ref 7–25)
BUN/CREAT SERPL: 51.9 (ref 7–25)
BURR CELLS BLD QL SMEAR: ABNORMAL
C PNEUM DNA NPH QL NAA+NON-PROBE: NOT DETECTED
CALCIUM SPEC-SCNC: 8.4 MG/DL (ref 8.6–10.5)
CALCIUM SPEC-SCNC: 8.5 MG/DL (ref 8.6–10.5)
CALCIUM SPEC-SCNC: 8.6 MG/DL (ref 8.6–10.5)
CALCIUM SPEC-SCNC: 8.7 MG/DL (ref 8.6–10.5)
CALCIUM SPEC-SCNC: 8.8 MG/DL (ref 8.6–10.5)
CALCIUM SPEC-SCNC: 8.9 MG/DL (ref 8.6–10.5)
CALCIUM SPEC-SCNC: 8.9 MG/DL (ref 8.6–10.5)
CALCIUM SPEC-SCNC: 9 MG/DL (ref 8.4–10.4)
CALCIUM SPEC-SCNC: 9 MG/DL (ref 8.6–10.5)
CALCIUM SPEC-SCNC: 9.1 MG/DL (ref 8.6–10.5)
CALCIUM SPEC-SCNC: 9.1 MG/DL (ref 8.6–10.5)
CALCIUM SPEC-SCNC: 9.3 MG/DL (ref 8.6–10.5)
CALCIUM SPEC-SCNC: 9.4 MG/DL (ref 8.6–10.5)
CHLORIDE SERPL-SCNC: 100 MMOL/L (ref 98–107)
CHLORIDE SERPL-SCNC: 101 MMOL/L (ref 98–107)
CHLORIDE SERPL-SCNC: 101 MMOL/L (ref 98–107)
CHLORIDE SERPL-SCNC: 102 MMOL/L (ref 98–107)
CHLORIDE SERPL-SCNC: 103 MMOL/L (ref 98–107)
CHLORIDE SERPL-SCNC: 103 MMOL/L (ref 98–110)
CHLORIDE SERPL-SCNC: 104 MMOL/L (ref 98–107)
CHLORIDE SERPL-SCNC: 104 MMOL/L (ref 98–107)
CHLORIDE SERPL-SCNC: 99 MMOL/L (ref 98–107)
CHOLEST SERPL-MCNC: 127 MG/DL (ref 130–200)
CHOLEST SERPL-MCNC: 202 MG/DL (ref 0–200)
CK SERPL-CCNC: 167 U/L (ref 20–200)
CLARITY UR: ABNORMAL
CLARITY UR: CLEAR
CLARITY, POC: CLEAR
CO2 SERPL-SCNC: 16 MMOL/L (ref 22–29)
CO2 SERPL-SCNC: 22 MMOL/L (ref 22–29)
CO2 SERPL-SCNC: 23 MMOL/L (ref 22–29)
CO2 SERPL-SCNC: 25 MMOL/L (ref 22–29)
CO2 SERPL-SCNC: 27 MMOL/L (ref 22–29)
CO2 SERPL-SCNC: 28 MMOL/L (ref 24–31)
CO2 SERPL-SCNC: 30 MMOL/L (ref 22–29)
CO2 SERPL-SCNC: 31 MMOL/L (ref 22–29)
CO2 SERPL-SCNC: 32 MMOL/L (ref 22–29)
CO2 SERPL-SCNC: 33 MMOL/L (ref 22–29)
CO2 SERPL-SCNC: 34 MMOL/L (ref 22–29)
CO2 SERPL-SCNC: 35 MMOL/L (ref 22–29)
CO2 SERPL-SCNC: 37 MMOL/L (ref 22–29)
CO2 SERPL-SCNC: 37 MMOL/L (ref 22–29)
COD CRY URNS QL: ABNORMAL /HPF
COLOR UR: ABNORMAL
COLOR UR: YELLOW
COLOR UR: YELLOW
CREAT BLDA-MCNC: 0.7 MG/DL (ref 0.6–1.3)
CREAT SERPL-MCNC: 0.54 MG/DL (ref 0.76–1.27)
CREAT SERPL-MCNC: 0.62 MG/DL (ref 0.76–1.27)
CREAT SERPL-MCNC: 0.62 MG/DL (ref 0.76–1.27)
CREAT SERPL-MCNC: 0.69 MG/DL (ref 0.76–1.27)
CREAT SERPL-MCNC: 0.7 MG/DL (ref 0.76–1.27)
CREAT SERPL-MCNC: 0.7 MG/DL (ref 0.76–1.27)
CREAT SERPL-MCNC: 0.73 MG/DL (ref 0.76–1.27)
CREAT SERPL-MCNC: 0.77 MG/DL (ref 0.76–1.27)
CREAT SERPL-MCNC: 0.78 MG/DL (ref 0.76–1.27)
CREAT SERPL-MCNC: 0.79 MG/DL (ref 0.76–1.27)
CREAT SERPL-MCNC: 0.79 MG/DL (ref 0.76–1.27)
CREAT SERPL-MCNC: 0.83 MG/DL (ref 0.76–1.27)
CREAT SERPL-MCNC: 0.84 MG/DL (ref 0.76–1.27)
CREAT SERPL-MCNC: 0.85 MG/DL (ref 0.76–1.27)
CREAT SERPL-MCNC: 0.87 MG/DL (ref 0.5–1.4)
CREAT SERPL-MCNC: 0.87 MG/DL (ref 0.76–1.27)
CRP SERPL-MCNC: 1.4 MG/DL (ref 0–0.5)
CRP SERPL-MCNC: 16.21 MG/DL (ref 0–0.5)
CRP SERPL-MCNC: 18.76 MG/DL (ref 0–0.5)
CRP SERPL-MCNC: 18.78 MG/DL (ref 0–0.5)
CRP SERPL-MCNC: 22.42 MG/DL (ref 0–0.5)
CRP SERPL-MCNC: 6.46 MG/DL (ref 0–0.5)
CRP SERPL-MCNC: 6.72 MG/DL (ref 0–0.5)
CRP SERPL-MCNC: 8.16 MG/DL (ref 0–0.5)
D DIMER PPP FEU-MCNC: 14.95 MCGFEU/ML (ref 0–0.5)
D DIMER PPP FEU-MCNC: 4.49 MCGFEU/ML (ref 0–0.5)
D-LACTATE SERPL-SCNC: 1.3 MMOL/L (ref 0.5–2)
D-LACTATE SERPL-SCNC: 1.8 MMOL/L (ref 0.5–2)
DEPRECATED RDW RBC AUTO: 43.1 FL (ref 37–54)
DEPRECATED RDW RBC AUTO: 43.2 FL (ref 37–54)
DEPRECATED RDW RBC AUTO: 44.4 FL (ref 37–54)
DEPRECATED RDW RBC AUTO: 45.1 FL (ref 37–54)
DEPRECATED RDW RBC AUTO: 46.7 FL (ref 37–54)
DEPRECATED RDW RBC AUTO: 47.1 FL (ref 37–54)
DEPRECATED RDW RBC AUTO: 47.4 FL (ref 37–54)
DEPRECATED RDW RBC AUTO: 47.4 FL (ref 37–54)
DEPRECATED RDW RBC AUTO: 47.5 FL (ref 37–54)
DEPRECATED RDW RBC AUTO: 48 FL (ref 37–54)
DEPRECATED RDW RBC AUTO: 48.3 FL (ref 37–54)
DEPRECATED RDW RBC AUTO: 48.6 FL (ref 37–54)
DEPRECATED RDW RBC AUTO: 49.4 FL (ref 37–54)
DEPRECATED RDW RBC AUTO: 49.7 FL (ref 37–54)
DEPRECATED RDW RBC AUTO: 52.5 FL (ref 37–54)
EGFRCR SERPLBLD CKD-EPI 2021: 85.6 ML/MIN/1.73
EGFRCR SERPLBLD CKD-EPI 2021: 85.6 ML/MIN/1.73
EGFRCR SERPLBLD CKD-EPI 2021: 86.2 ML/MIN/1.73
EGFRCR SERPLBLD CKD-EPI 2021: 86.5 ML/MIN/1.73
EGFRCR SERPLBLD CKD-EPI 2021: 86.8 ML/MIN/1.73
EGFRCR SERPLBLD CKD-EPI 2021: 88.1 ML/MIN/1.73
EGFRCR SERPLBLD CKD-EPI 2021: 88.1 ML/MIN/1.73
EGFRCR SERPLBLD CKD-EPI 2021: 88.5 ML/MIN/1.73
EGFRCR SERPLBLD CKD-EPI 2021: 88.8 ML/MIN/1.73
EGFRCR SERPLBLD CKD-EPI 2021: 91.4 ML/MIN/1.73
EGFRCR SERPLBLD CKD-EPI 2021: 91.4 ML/MIN/1.73
EGFRCR SERPLBLD CKD-EPI 2021: 91.8 ML/MIN/1.73
EGFRCR SERPLBLD CKD-EPI 2021: 94.8 ML/MIN/1.73
EGFRCR SERPLBLD CKD-EPI 2021: 94.8 ML/MIN/1.73
EGFRCR SERPLBLD CKD-EPI 2021: 98.9 ML/MIN/1.73
EOSINOPHIL # BLD AUTO: 0 10*3/MM3 (ref 0–0.4)
EOSINOPHIL # BLD AUTO: 0.01 10*3/MM3 (ref 0–0.4)
EOSINOPHIL # BLD AUTO: 0.01 10*3/MM3 (ref 0–0.4)
EOSINOPHIL # BLD AUTO: 0.04 10*3/MM3 (ref 0–0.4)
EOSINOPHIL # BLD AUTO: 0.26 10*3/MM3 (ref 0–0.4)
EOSINOPHIL # BLD AUTO: 0.48 10*3/MM3 (ref 0–0.4)
EOSINOPHIL # BLD MANUAL: 0.44 10*3/MM3 (ref 0–0.4)
EOSINOPHIL NFR BLD AUTO: 0 % (ref 0.3–6.2)
EOSINOPHIL NFR BLD AUTO: 0.1 % (ref 0.3–6.2)
EOSINOPHIL NFR BLD AUTO: 0.6 % (ref 0.3–6.2)
EOSINOPHIL NFR BLD AUTO: 1.6 % (ref 0.3–6.2)
EOSINOPHIL NFR BLD AUTO: 6.3 % (ref 0.3–6.2)
EOSINOPHIL NFR BLD MANUAL: 2 % (ref 0.3–6.2)
EPAP: 7
ERYTHROCYTE [DISTWIDTH] IN BLOOD BY AUTOMATED COUNT: 13.1 % (ref 12.3–15.4)
ERYTHROCYTE [DISTWIDTH] IN BLOOD BY AUTOMATED COUNT: 13.2 % (ref 12.3–15.4)
ERYTHROCYTE [DISTWIDTH] IN BLOOD BY AUTOMATED COUNT: 13.4 % (ref 12.3–15.4)
ERYTHROCYTE [DISTWIDTH] IN BLOOD BY AUTOMATED COUNT: 13.5 % (ref 12.3–15.4)
ERYTHROCYTE [DISTWIDTH] IN BLOOD BY AUTOMATED COUNT: 13.8 % (ref 12.3–15.4)
ERYTHROCYTE [DISTWIDTH] IN BLOOD BY AUTOMATED COUNT: 14.1 % (ref 12.3–15.4)
ERYTHROCYTE [DISTWIDTH] IN BLOOD BY AUTOMATED COUNT: 14.2 % (ref 12.3–15.4)
ERYTHROCYTE [DISTWIDTH] IN BLOOD BY AUTOMATED COUNT: 14.3 % (ref 12.3–15.4)
ERYTHROCYTE [DISTWIDTH] IN BLOOD BY AUTOMATED COUNT: 14.3 % (ref 12.3–15.4)
ERYTHROCYTE [DISTWIDTH] IN BLOOD BY AUTOMATED COUNT: 14.5 % (ref 12.3–15.4)
ERYTHROCYTE [DISTWIDTH] IN BLOOD BY AUTOMATED COUNT: 15 % (ref 12.3–15.4)
ERYTHROCYTE [DISTWIDTH] IN BLOOD BY AUTOMATED COUNT: 15 % (ref 12.3–15.4)
FERRITIN SERPL-MCNC: 1112 NG/ML (ref 30–400)
FERRITIN SERPL-MCNC: 1243 NG/ML (ref 30–400)
FLUAV RNA RESP QL NAA+PROBE: NOT DETECTED
FLUAV SUBTYP SPEC NAA+PROBE: NOT DETECTED
FLUBV RNA ISLT QL NAA+PROBE: NOT DETECTED
FLUBV RNA RESP QL NAA+PROBE: NOT DETECTED
GAS FLOW AIRWAY: 15 LPM
GAS FLOW AIRWAY: 25 LPM
GAS FLOW AIRWAY: 25 LPM
GAS FLOW AIRWAY: 40 LPM
GAS FLOW AIRWAY: 40 LPM
GFR SERPL CREATININE-BSD FRML MDRD: 103 ML/MIN/1.73
GLOBULIN UR ELPH-MCNC: 2.7 GM/DL
GLOBULIN UR ELPH-MCNC: 3.1 GM/DL
GLOBULIN UR ELPH-MCNC: 3.8 GM/DL
GLOBULIN UR ELPH-MCNC: 4.1 GM/DL
GLOBULIN UR ELPH-MCNC: 4.4 GM/DL
GLOBULIN UR ELPH-MCNC: 4.7 GM/DL
GLUCOSE BLDC GLUCOMTR-MCNC: 92 MG/DL (ref 70–130)
GLUCOSE SERPL-MCNC: 101 MG/DL (ref 65–99)
GLUCOSE SERPL-MCNC: 108 MG/DL (ref 65–99)
GLUCOSE SERPL-MCNC: 109 MG/DL (ref 65–99)
GLUCOSE SERPL-MCNC: 111 MG/DL (ref 70–100)
GLUCOSE SERPL-MCNC: 113 MG/DL (ref 65–99)
GLUCOSE SERPL-MCNC: 113 MG/DL (ref 65–99)
GLUCOSE SERPL-MCNC: 114 MG/DL (ref 65–99)
GLUCOSE SERPL-MCNC: 123 MG/DL (ref 65–99)
GLUCOSE SERPL-MCNC: 127 MG/DL (ref 65–99)
GLUCOSE SERPL-MCNC: 147 MG/DL (ref 65–99)
GLUCOSE SERPL-MCNC: 153 MG/DL (ref 65–99)
GLUCOSE SERPL-MCNC: 154 MG/DL (ref 65–99)
GLUCOSE SERPL-MCNC: 159 MG/DL (ref 65–99)
GLUCOSE SERPL-MCNC: 161 MG/DL (ref 65–99)
GLUCOSE SERPL-MCNC: 96 MG/DL (ref 65–99)
GLUCOSE SERPL-MCNC: 99 MG/DL (ref 65–99)
GLUCOSE UR STRIP-MCNC: NEGATIVE MG/DL
GRAM STN SPEC: NORMAL
HADV DNA SPEC NAA+PROBE: NOT DETECTED
HCO3 BLDA-SCNC: 22.1 MMOL/L (ref 20–26)
HCO3 BLDA-SCNC: 23.4 MMOL/L (ref 20–26)
HCO3 BLDA-SCNC: 25.5 MMOL/L (ref 20–26)
HCO3 BLDA-SCNC: 29.8 MMOL/L (ref 20–26)
HCO3 BLDA-SCNC: 33.2 MMOL/L (ref 20–26)
HCO3 BLDA-SCNC: 33.8 MMOL/L (ref 20–26)
HCOV 229E RNA SPEC QL NAA+PROBE: NOT DETECTED
HCOV HKU1 RNA SPEC QL NAA+PROBE: NOT DETECTED
HCOV NL63 RNA SPEC QL NAA+PROBE: NOT DETECTED
HCOV OC43 RNA SPEC QL NAA+PROBE: NOT DETECTED
HCT VFR BLD AUTO: 32.1 % (ref 37.5–51)
HCT VFR BLD AUTO: 32.4 % (ref 37.5–51)
HCT VFR BLD AUTO: 32.9 % (ref 37.5–51)
HCT VFR BLD AUTO: 33.3 % (ref 37.5–51)
HCT VFR BLD AUTO: 33.7 % (ref 37.5–51)
HCT VFR BLD AUTO: 34.4 % (ref 37.5–51)
HCT VFR BLD AUTO: 34.9 % (ref 37.5–51)
HCT VFR BLD AUTO: 35.4 % (ref 37.5–51)
HCT VFR BLD AUTO: 36.3 % (ref 37.5–51)
HCT VFR BLD AUTO: 39.6 % (ref 37.5–51)
HCT VFR BLD AUTO: 39.9 % (ref 37.5–51)
HCT VFR BLD AUTO: 40.9 % (ref 37.5–51)
HCT VFR BLD AUTO: 40.9 % (ref 37.5–51)
HCT VFR BLD AUTO: 42.9 % (ref 37.5–51)
HCT VFR BLD AUTO: 43.9 % (ref 37.5–51)
HCT VFR BLD AUTO: 44.8 % (ref 37.5–51)
HDLC SERPL-MCNC: 43 MG/DL
HDLC SERPL-MCNC: 56 MG/DL (ref 40–60)
HGB BLD-MCNC: 10.3 G/DL (ref 13–17.7)
HGB BLD-MCNC: 10.3 G/DL (ref 13–17.7)
HGB BLD-MCNC: 10.5 G/DL (ref 13–17.7)
HGB BLD-MCNC: 10.5 G/DL (ref 13–17.7)
HGB BLD-MCNC: 10.6 G/DL (ref 13–17.7)
HGB BLD-MCNC: 10.7 G/DL (ref 13–17.7)
HGB BLD-MCNC: 10.9 G/DL (ref 13–17.7)
HGB BLD-MCNC: 11.2 G/DL (ref 13–17.7)
HGB BLD-MCNC: 11.3 G/DL (ref 13–17.7)
HGB BLD-MCNC: 12.2 G/DL (ref 13–17.7)
HGB BLD-MCNC: 13.1 G/DL (ref 13–17.7)
HGB BLD-MCNC: 13.1 G/DL (ref 13–17.7)
HGB BLD-MCNC: 13.2 G/DL (ref 13–17.7)
HGB BLD-MCNC: 13.3 G/DL (ref 13–17.7)
HGB BLD-MCNC: 13.8 G/DL (ref 13–17.7)
HGB BLD-MCNC: 14.7 G/DL (ref 13–17.7)
HGB UR QL STRIP.AUTO: NEGATIVE
HGB UR QL STRIP.AUTO: NEGATIVE
HMPV RNA NPH QL NAA+NON-PROBE: NOT DETECTED
HOLD SPECIMEN: NORMAL
HOLD SPECIMEN: NORMAL
HPIV1 RNA ISLT QL NAA+PROBE: NOT DETECTED
HPIV2 RNA SPEC QL NAA+PROBE: NOT DETECTED
HPIV3 RNA NPH QL NAA+PROBE: NOT DETECTED
HPIV4 P GENE NPH QL NAA+PROBE: NOT DETECTED
HYALINE CASTS UR QL AUTO: ABNORMAL /LPF
IMM GRANULOCYTES # BLD AUTO: 0.03 10*3/MM3 (ref 0–0.05)
IMM GRANULOCYTES # BLD AUTO: 0.04 10*3/MM3 (ref 0–0.05)
IMM GRANULOCYTES # BLD AUTO: 0.19 10*3/MM3 (ref 0–0.05)
IMM GRANULOCYTES # BLD AUTO: 0.23 10*3/MM3 (ref 0–0.05)
IMM GRANULOCYTES # BLD AUTO: 0.31 10*3/MM3 (ref 0–0.05)
IMM GRANULOCYTES # BLD AUTO: 0.31 10*3/MM3 (ref 0–0.05)
IMM GRANULOCYTES # BLD AUTO: 0.33 10*3/MM3 (ref 0–0.05)
IMM GRANULOCYTES # BLD AUTO: 0.47 10*3/MM3 (ref 0–0.05)
IMM GRANULOCYTES # BLD AUTO: 0.55 10*3/MM3 (ref 0–0.05)
IMM GRANULOCYTES # BLD AUTO: 0.64 10*3/MM3 (ref 0–0.05)
IMM GRANULOCYTES NFR BLD AUTO: 0.4 % (ref 0–0.5)
IMM GRANULOCYTES NFR BLD AUTO: 0.6 % (ref 0–0.5)
IMM GRANULOCYTES NFR BLD AUTO: 1.1 % (ref 0–0.5)
IMM GRANULOCYTES NFR BLD AUTO: 1.4 % (ref 0–0.5)
IMM GRANULOCYTES NFR BLD AUTO: 1.5 % (ref 0–0.5)
IMM GRANULOCYTES NFR BLD AUTO: 1.5 % (ref 0–0.5)
IMM GRANULOCYTES NFR BLD AUTO: 1.6 % (ref 0–0.5)
IMM GRANULOCYTES NFR BLD AUTO: 2.5 % (ref 0–0.5)
IMM GRANULOCYTES NFR BLD AUTO: 3.3 % (ref 0–0.5)
IMM GRANULOCYTES NFR BLD AUTO: 3.7 % (ref 0–0.5)
INHALED O2 CONCENTRATION: 100 %
INHALED O2 CONCENTRATION: 40 %
INHALED O2 CONCENTRATION: 70 %
INHALED O2 CONCENTRATION: 70 %
INHALED O2 CONCENTRATION: 90 %
INR PPP: 2.16 (ref 0.91–1.09)
IPAP: 14
KETONES UR QL STRIP: NEGATIVE
KETONES UR QL STRIP: NEGATIVE
KETONES UR QL: NEGATIVE
L PNEUMO1 AG UR QL IA: NEGATIVE
LDLC SERPL CALC-MCNC: 128 MG/DL (ref 0–100)
LDLC SERPL CALC-MCNC: 61 MG/DL (ref 0–99)
LDLC/HDLC SERPL: 1.36 {RATIO}
LDLC/HDLC SERPL: 2.25 {RATIO}
LEFT ATRIUM VOLUME INDEX: 20.5 ML/M2
LEUKOCYTE EST, POC: NEGATIVE
LEUKOCYTE ESTERASE UR QL STRIP.AUTO: NEGATIVE
LEUKOCYTE ESTERASE UR QL STRIP.AUTO: NEGATIVE
LYMPHOCYTES # BLD AUTO: 0.5 10*3/MM3 (ref 0.7–3.1)
LYMPHOCYTES # BLD AUTO: 0.77 10*3/MM3 (ref 0.7–3.1)
LYMPHOCYTES # BLD AUTO: 0.81 10*3/MM3 (ref 0.7–3.1)
LYMPHOCYTES # BLD AUTO: 0.85 10*3/MM3 (ref 0.7–3.1)
LYMPHOCYTES # BLD AUTO: 0.95 10*3/MM3 (ref 0.7–3.1)
LYMPHOCYTES # BLD AUTO: 0.95 10*3/MM3 (ref 0.7–3.1)
LYMPHOCYTES # BLD AUTO: 0.99 10*3/MM3 (ref 0.7–3.1)
LYMPHOCYTES # BLD AUTO: 1.21 10*3/MM3 (ref 0.7–3.1)
LYMPHOCYTES # BLD AUTO: 1.3 10*3/MM3 (ref 0.7–3.1)
LYMPHOCYTES # BLD AUTO: 1.38 10*3/MM3 (ref 0.7–3.1)
LYMPHOCYTES # BLD AUTO: 1.6 10*3/MM3 (ref 0.7–3.1)
LYMPHOCYTES # BLD MANUAL: 0.55 10*3/MM3 (ref 0.7–3.1)
LYMPHOCYTES # BLD MANUAL: 0.78 10*3/MM3 (ref 0.7–3.1)
LYMPHOCYTES # BLD MANUAL: 3.78 10*3/MM3 (ref 0.7–3.1)
LYMPHOCYTES NFR BLD AUTO: 12.4 % (ref 19.6–45.3)
LYMPHOCYTES NFR BLD AUTO: 12.5 % (ref 19.6–45.3)
LYMPHOCYTES NFR BLD AUTO: 13.8 % (ref 19.6–45.3)
LYMPHOCYTES NFR BLD AUTO: 2.6 % (ref 19.6–45.3)
LYMPHOCYTES NFR BLD AUTO: 3.7 % (ref 19.6–45.3)
LYMPHOCYTES NFR BLD AUTO: 4.7 % (ref 19.6–45.3)
LYMPHOCYTES NFR BLD AUTO: 5 % (ref 19.6–45.3)
LYMPHOCYTES NFR BLD AUTO: 5 % (ref 19.6–45.3)
LYMPHOCYTES NFR BLD AUTO: 6.5 % (ref 19.6–45.3)
LYMPHOCYTES NFR BLD AUTO: 7.3 % (ref 19.6–45.3)
LYMPHOCYTES NFR BLD AUTO: 8 % (ref 19.6–45.3)
LYMPHOCYTES NFR BLD MANUAL: 3 % (ref 5–12)
Lab: ABNORMAL
M PNEUMO IGG SER IA-ACNC: NOT DETECTED
MAGNESIUM SERPL-MCNC: 2.3 MG/DL (ref 1.6–2.4)
MAGNESIUM SERPL-MCNC: 2.5 MG/DL (ref 1.6–2.4)
MAXIMAL PREDICTED HEART RATE: 137 BPM
MAXIMAL PREDICTED HEART RATE: 137 BPM
MCH RBC QN AUTO: 28.7 PG (ref 26.6–33)
MCH RBC QN AUTO: 28.8 PG (ref 26.6–33)
MCH RBC QN AUTO: 29.1 PG (ref 26.6–33)
MCH RBC QN AUTO: 29.1 PG (ref 26.6–33)
MCH RBC QN AUTO: 29.3 PG (ref 26.6–33)
MCH RBC QN AUTO: 29.4 PG (ref 26.6–33)
MCH RBC QN AUTO: 29.5 PG (ref 26.6–33)
MCH RBC QN AUTO: 29.6 PG (ref 26.6–33)
MCH RBC QN AUTO: 29.6 PG (ref 26.6–33)
MCH RBC QN AUTO: 29.7 PG (ref 26.6–33)
MCH RBC QN AUTO: 29.9 PG (ref 26.6–33)
MCH RBC QN AUTO: 30.1 PG (ref 26.6–33)
MCHC RBC AUTO-ENTMCNC: 30.5 G/DL (ref 31.5–35.7)
MCHC RBC AUTO-ENTMCNC: 30.5 G/DL (ref 31.5–35.7)
MCHC RBC AUTO-ENTMCNC: 30.6 G/DL (ref 31.5–35.7)
MCHC RBC AUTO-ENTMCNC: 30.6 G/DL (ref 31.5–35.7)
MCHC RBC AUTO-ENTMCNC: 31.1 G/DL (ref 31.5–35.7)
MCHC RBC AUTO-ENTMCNC: 31.2 G/DL (ref 31.5–35.7)
MCHC RBC AUTO-ENTMCNC: 31.4 G/DL (ref 31.5–35.7)
MCHC RBC AUTO-ENTMCNC: 31.6 G/DL (ref 31.5–35.7)
MCHC RBC AUTO-ENTMCNC: 31.8 G/DL (ref 31.5–35.7)
MCHC RBC AUTO-ENTMCNC: 31.8 G/DL (ref 31.5–35.7)
MCHC RBC AUTO-ENTMCNC: 32 G/DL (ref 31.5–35.7)
MCHC RBC AUTO-ENTMCNC: 32.5 G/DL (ref 31.5–35.7)
MCHC RBC AUTO-ENTMCNC: 32.5 G/DL (ref 31.5–35.7)
MCHC RBC AUTO-ENTMCNC: 32.7 G/DL (ref 31.5–35.7)
MCHC RBC AUTO-ENTMCNC: 32.8 G/DL (ref 31.5–35.7)
MCHC RBC AUTO-ENTMCNC: 33.3 G/DL (ref 31.5–35.7)
MCV RBC AUTO: 89.6 FL (ref 79–97)
MCV RBC AUTO: 89.8 FL (ref 79–97)
MCV RBC AUTO: 90.8 FL (ref 79–97)
MCV RBC AUTO: 90.9 FL (ref 79–97)
MCV RBC AUTO: 92.6 FL (ref 79–97)
MCV RBC AUTO: 93.1 FL (ref 79–97)
MCV RBC AUTO: 93.4 FL (ref 79–97)
MCV RBC AUTO: 93.6 FL (ref 79–97)
MCV RBC AUTO: 93.8 FL (ref 79–97)
MCV RBC AUTO: 93.8 FL (ref 79–97)
MCV RBC AUTO: 94.1 FL (ref 79–97)
MCV RBC AUTO: 96.1 FL (ref 79–97)
MCV RBC AUTO: 96.2 FL (ref 79–97)
MCV RBC AUTO: 98.5 FL (ref 79–97)
METAMYELOCYTES NFR BLD MANUAL: 1 % (ref 0–0)
METAMYELOCYTES NFR BLD MANUAL: 1 % (ref 0–0)
MODALITY: ABNORMAL
MONOCYTES # BLD AUTO: 0.32 10*3/MM3 (ref 0.1–0.9)
MONOCYTES # BLD AUTO: 0.41 10*3/MM3 (ref 0.1–0.9)
MONOCYTES # BLD AUTO: 0.43 10*3/MM3 (ref 0.1–0.9)
MONOCYTES # BLD AUTO: 0.46 10*3/MM3 (ref 0.1–0.9)
MONOCYTES # BLD AUTO: 0.48 10*3/MM3 (ref 0.1–0.9)
MONOCYTES # BLD AUTO: 0.57 10*3/MM3 (ref 0.1–0.9)
MONOCYTES # BLD AUTO: 0.58 10*3/MM3 (ref 0.1–0.9)
MONOCYTES # BLD AUTO: 0.61 10*3/MM3 (ref 0.1–0.9)
MONOCYTES # BLD AUTO: 0.62 10*3/MM3 (ref 0.1–0.9)
MONOCYTES # BLD AUTO: 0.65 10*3/MM3 (ref 0.1–0.9)
MONOCYTES # BLD: 0.59 10*3/MM3 (ref 0.1–0.9)
MONOCYTES NFR BLD AUTO: 1.9 % (ref 5–12)
MONOCYTES NFR BLD AUTO: 2.3 % (ref 5–12)
MONOCYTES NFR BLD AUTO: 2.5 % (ref 5–12)
MONOCYTES NFR BLD AUTO: 2.5 % (ref 5–12)
MONOCYTES NFR BLD AUTO: 2.7 % (ref 5–12)
MONOCYTES NFR BLD AUTO: 2.9 % (ref 5–12)
MONOCYTES NFR BLD AUTO: 3.7 % (ref 5–12)
MONOCYTES NFR BLD AUTO: 4 % (ref 5–12)
MONOCYTES NFR BLD AUTO: 5.7 % (ref 5–12)
MONOCYTES NFR BLD AUTO: 8.6 % (ref 5–12)
MRSA DNA SPEC QL NAA+PROBE: NORMAL
MRSA DNA SPEC QL NAA+PROBE: NORMAL
MYELOCYTES NFR BLD MANUAL: 1 % (ref 0–0)
MYELOCYTES NFR BLD MANUAL: 2.1 % (ref 0–0)
NEUTROPHILS # BLD AUTO: 17.3 10*3/MM3 (ref 1.7–7)
NEUTROPHILS # BLD AUTO: 18.14 10*3/MM3 (ref 1.7–7)
NEUTROPHILS # BLD AUTO: 5.13 10*3/MM3 (ref 1.7–7)
NEUTROPHILS NFR BLD AUTO: 10 10*3/MM3 (ref 1.7–7)
NEUTROPHILS NFR BLD AUTO: 13.72 10*3/MM3 (ref 1.7–7)
NEUTROPHILS NFR BLD AUTO: 13.8 10*3/MM3 (ref 1.7–7)
NEUTROPHILS NFR BLD AUTO: 14.75 10*3/MM3 (ref 1.7–7)
NEUTROPHILS NFR BLD AUTO: 15.86 10*3/MM3 (ref 1.7–7)
NEUTROPHILS NFR BLD AUTO: 17.28 10*3/MM3 (ref 1.7–7)
NEUTROPHILS NFR BLD AUTO: 17.58 10*3/MM3 (ref 1.7–7)
NEUTROPHILS NFR BLD AUTO: 17.77 10*3/MM3 (ref 1.7–7)
NEUTROPHILS NFR BLD AUTO: 21.01 10*3/MM3 (ref 1.7–7)
NEUTROPHILS NFR BLD AUTO: 5.42 10*3/MM3 (ref 1.7–7)
NEUTROPHILS NFR BLD AUTO: 5.68 10*3/MM3 (ref 1.7–7)
NEUTROPHILS NFR BLD AUTO: 71.7 % (ref 42.7–76)
NEUTROPHILS NFR BLD AUTO: 78.7 % (ref 42.7–76)
NEUTROPHILS NFR BLD AUTO: 78.9 % (ref 42.7–76)
NEUTROPHILS NFR BLD AUTO: 83.7 % (ref 42.7–76)
NEUTROPHILS NFR BLD AUTO: 85.2 % (ref 42.7–76)
NEUTROPHILS NFR BLD AUTO: 88.9 % (ref 42.7–76)
NEUTROPHILS NFR BLD AUTO: 89.4 % (ref 42.7–76)
NEUTROPHILS NFR BLD AUTO: 90.7 % (ref 42.7–76)
NEUTROPHILS NFR BLD AUTO: 92.2 % (ref 42.7–76)
NEUTROPHILS NFR BLD AUTO: 92.2 % (ref 42.7–76)
NEUTROPHILS NFR BLD AUTO: 92.3 % (ref 42.7–76)
NEUTROPHILS NFR BLD MANUAL: 76.8 % (ref 42.7–76)
NEUTROPHILS NFR BLD MANUAL: 85.4 % (ref 42.7–76)
NEUTROPHILS NFR BLD MANUAL: 93 % (ref 42.7–76)
NEUTS BAND NFR BLD MANUAL: 2 % (ref 0–5)
NEUTS BAND NFR BLD MANUAL: 2.1 % (ref 0–5)
NITRITE UR QL STRIP: NEGATIVE
NITRITE UR QL STRIP: NEGATIVE
NITRITE UR-MCNC: NEGATIVE MG/ML
NOTIFIED BY: ABNORMAL
NOTIFIED WHO: ABNORMAL
NRBC BLD AUTO-RTO: 0 /100 WBC (ref 0–0.2)
NT-PROBNP SERPL-MCNC: 1299 PG/ML (ref 0–1800)
NT-PROBNP SERPL-MCNC: 421.5 PG/ML (ref 0–1800)
NT-PROBNP SERPL-MCNC: 717.8 PG/ML (ref 0–1800)
PCO2 BLDA: 32.8 MM HG (ref 35–45)
PCO2 BLDA: 34.6 MM HG (ref 35–45)
PCO2 BLDA: 37 MM HG (ref 35–45)
PCO2 BLDA: 39.1 MM HG (ref 35–45)
PCO2 BLDA: 42.8 MM HG (ref 35–45)
PCO2 BLDA: 42.9 MM HG (ref 35–45)
PCO2 TEMP ADJ BLD: 32.8 MM HG (ref 35–45)
PCO2 TEMP ADJ BLD: 34.6 MM HG (ref 35–45)
PCO2 TEMP ADJ BLD: 37 MM HG (ref 35–45)
PCO2 TEMP ADJ BLD: 39.1 MM HG (ref 35–45)
PCO2 TEMP ADJ BLD: 42.8 MM HG (ref 35–45)
PCO2 TEMP ADJ BLD: 42.9 MM HG (ref 35–45)
PH BLDA: 7.41 PH UNITS (ref 7.35–7.45)
PH BLDA: 7.44 PH UNITS (ref 7.35–7.45)
PH BLDA: 7.48 PH UNITS (ref 7.35–7.45)
PH BLDA: 7.49 PH UNITS (ref 7.35–7.45)
PH BLDA: 7.5 PH UNITS (ref 7.35–7.45)
PH BLDA: 7.51 PH UNITS (ref 7.35–7.45)
PH UR STRIP.AUTO: 5.5 [PH] (ref 5–8)
PH UR STRIP.AUTO: <=5 [PH] (ref 5–8)
PH UR: 5.5 [PH] (ref 5–8)
PH, TEMP CORRECTED: 7.41 PH UNITS (ref 7.35–7.45)
PH, TEMP CORRECTED: 7.44 PH UNITS (ref 7.35–7.45)
PH, TEMP CORRECTED: 7.48 PH UNITS (ref 7.35–7.45)
PH, TEMP CORRECTED: 7.49 PH UNITS (ref 7.35–7.45)
PH, TEMP CORRECTED: 7.5 PH UNITS (ref 7.35–7.45)
PH, TEMP CORRECTED: 7.51 PH UNITS (ref 7.35–7.45)
PHOSPHATE SERPL-MCNC: 3.2 MG/DL (ref 2.5–4.5)
PHOSPHATE SERPL-MCNC: 3.7 MG/DL (ref 2.5–4.5)
PHOSPHATE SERPL-MCNC: 4.8 MG/DL (ref 2.5–4.5)
PLAT MORPH BLD: NORMAL
PLAT MORPH BLD: NORMAL
PLATELET # BLD AUTO: 214 10*3/MM3 (ref 140–450)
PLATELET # BLD AUTO: 244 10*3/MM3 (ref 140–450)
PLATELET # BLD AUTO: 291 10*3/MM3 (ref 140–450)
PLATELET # BLD AUTO: 301 10*3/MM3 (ref 140–450)
PLATELET # BLD AUTO: 302 10*3/MM3 (ref 140–450)
PLATELET # BLD AUTO: 313 10*3/MM3 (ref 140–450)
PLATELET # BLD AUTO: 317 10*3/MM3 (ref 140–450)
PLATELET # BLD AUTO: 322 10*3/MM3 (ref 140–450)
PLATELET # BLD AUTO: 322 10*3/MM3 (ref 140–450)
PLATELET # BLD AUTO: 325 10*3/MM3 (ref 140–450)
PLATELET # BLD AUTO: 329 10*3/MM3 (ref 140–450)
PLATELET # BLD AUTO: 336 10*3/MM3 (ref 140–450)
PLATELET # BLD AUTO: 360 10*3/MM3 (ref 140–450)
PLATELET # BLD AUTO: 368 10*3/MM3 (ref 140–450)
PLATELET # BLD AUTO: 407 10*3/MM3 (ref 140–450)
PLATELET # BLD AUTO: 553 10*3/MM3 (ref 140–450)
PMV BLD AUTO: 7.7 FL (ref 6–12)
PMV BLD AUTO: 8.4 FL (ref 6–12)
PMV BLD AUTO: 8.6 FL (ref 6–12)
PMV BLD AUTO: 8.7 FL (ref 6–12)
PMV BLD AUTO: 8.8 FL (ref 6–12)
PMV BLD AUTO: 9 FL (ref 6–12)
PMV BLD AUTO: 9 FL (ref 6–12)
PMV BLD AUTO: 9.1 FL (ref 6–12)
PMV BLD AUTO: 9.2 FL (ref 6–12)
PMV BLD AUTO: 9.2 FL (ref 6–12)
PMV BLD AUTO: 9.3 FL (ref 6–12)
PMV BLD AUTO: 9.4 FL (ref 6–12)
PMV BLD AUTO: 9.5 FL (ref 6–12)
PMV BLD AUTO: 9.6 FL (ref 6–12)
PO2 BLDA: 47 MM HG (ref 83–108)
PO2 BLDA: 63.6 MM HG (ref 83–108)
PO2 BLDA: 69.8 MM HG (ref 83–108)
PO2 BLDA: 80.2 MM HG (ref 83–108)
PO2 BLDA: 80.3 MM HG (ref 83–108)
PO2 BLDA: 88.1 MM HG (ref 83–108)
PO2 TEMP ADJ BLD: 47 MM HG (ref 83–108)
PO2 TEMP ADJ BLD: 63.6 MM HG (ref 83–108)
PO2 TEMP ADJ BLD: 69.8 MM HG (ref 83–108)
PO2 TEMP ADJ BLD: 80.2 MM HG (ref 83–108)
PO2 TEMP ADJ BLD: 80.3 MM HG (ref 83–108)
PO2 TEMP ADJ BLD: 88.1 MM HG (ref 83–108)
POIKILOCYTOSIS BLD QL SMEAR: ABNORMAL
POTASSIUM SERPL-SCNC: 3.5 MMOL/L (ref 3.5–5.2)
POTASSIUM SERPL-SCNC: 3.5 MMOL/L (ref 3.5–5.2)
POTASSIUM SERPL-SCNC: 3.6 MMOL/L (ref 3.5–5.2)
POTASSIUM SERPL-SCNC: 3.7 MMOL/L (ref 3.5–5.2)
POTASSIUM SERPL-SCNC: 3.8 MMOL/L (ref 3.5–5.2)
POTASSIUM SERPL-SCNC: 3.9 MMOL/L (ref 3.5–5.2)
POTASSIUM SERPL-SCNC: 4 MMOL/L (ref 3.5–5.2)
POTASSIUM SERPL-SCNC: 4.1 MMOL/L (ref 3.5–5.2)
POTASSIUM SERPL-SCNC: 4.2 MMOL/L (ref 3.5–5.2)
POTASSIUM SERPL-SCNC: 4.4 MMOL/L (ref 3.5–5.2)
POTASSIUM SERPL-SCNC: 4.4 MMOL/L (ref 3.5–5.2)
POTASSIUM SERPL-SCNC: 4.4 MMOL/L (ref 3.5–5.3)
POTASSIUM SERPL-SCNC: 4.5 MMOL/L (ref 3.5–5.2)
POTASSIUM SERPL-SCNC: 4.5 MMOL/L (ref 3.5–5.2)
POTASSIUM SERPL-SCNC: 4.6 MMOL/L (ref 3.5–5.2)
POTASSIUM SERPL-SCNC: 4.6 MMOL/L (ref 3.5–5.2)
PROCALCITONIN SERPL-MCNC: 0.08 NG/ML (ref 0–0.25)
PROCALCITONIN SERPL-MCNC: 0.17 NG/ML (ref 0–0.25)
PROCALCITONIN SERPL-MCNC: 0.2 NG/ML (ref 0–0.25)
PROCALCITONIN SERPL-MCNC: 0.21 NG/ML (ref 0–0.25)
PROCALCITONIN SERPL-MCNC: 0.43 NG/ML (ref 0–0.25)
PROCALCITONIN SERPL-MCNC: 0.77 NG/ML (ref 0–0.25)
PROT SERPL-MCNC: 6 G/DL (ref 6–8.5)
PROT SERPL-MCNC: 6.8 G/DL (ref 6.3–8.7)
PROT SERPL-MCNC: 6.9 G/DL (ref 6–8.5)
PROT SERPL-MCNC: 7 G/DL (ref 6–8.5)
PROT SERPL-MCNC: 7.2 G/DL (ref 6–8.5)
PROT SERPL-MCNC: 7.8 G/DL (ref 6–8.5)
PROT UR QL STRIP: ABNORMAL
PROT UR QL STRIP: ABNORMAL
PROT UR STRIP-MCNC: NEGATIVE MG/DL
PROTHROMBIN TIME: 23.3 SECONDS (ref 11.9–14.6)
PSA SERPL-MCNC: 4.44 NG/ML (ref 0–4)
QT INTERVAL: 364 MS
QT INTERVAL: 376 MS
QT INTERVAL: 402 MS
QTC INTERVAL: 445 MS
QTC INTERVAL: 452 MS
QTC INTERVAL: 462 MS
RBC # BLD AUTO: 3.5 10*6/MM3 (ref 4.14–5.8)
RBC # BLD AUTO: 3.53 10*6/MM3 (ref 4.14–5.8)
RBC # BLD AUTO: 3.55 10*6/MM3 (ref 4.14–5.8)
RBC # BLD AUTO: 3.58 10*6/MM3 (ref 4.14–5.8)
RBC # BLD AUTO: 3.58 10*6/MM3 (ref 4.14–5.8)
RBC # BLD AUTO: 3.62 10*6/MM3 (ref 4.14–5.8)
RBC # BLD AUTO: 3.75 10*6/MM3 (ref 4.14–5.8)
RBC # BLD AUTO: 3.88 10*6/MM3 (ref 4.14–5.8)
RBC # BLD AUTO: 3.9 10*6/MM3 (ref 4.14–5.8)
RBC # BLD AUTO: 4.05 10*6/MM3 (ref 4.14–5.8)
RBC # BLD AUTO: 4.38 10*6/MM3 (ref 4.14–5.8)
RBC # BLD AUTO: 4.41 10*6/MM3 (ref 4.14–5.8)
RBC # BLD AUTO: 4.46 10*6/MM3 (ref 4.14–5.8)
RBC # BLD AUTO: 4.5 10*6/MM3 (ref 4.14–5.8)
RBC # BLD AUTO: 4.68 10*6/MM3 (ref 4.14–5.8)
RBC # BLD AUTO: 5 10*6/MM3 (ref 4.14–5.8)
RBC # UR STRIP: ABNORMAL /HPF
RBC # UR STRIP: NEGATIVE /UL
RBC MORPH BLD: NORMAL
REF LAB TEST METHOD: ABNORMAL
RHINOVIRUS RNA SPEC NAA+PROBE: NOT DETECTED
ROULEAUX BLD QL SMEAR: ABNORMAL
ROULEAUX BLD QL SMEAR: ABNORMAL
RSV RNA NPH QL NAA+NON-PROBE: NOT DETECTED
S PNEUM AG SPEC QL LA: NEGATIVE
SAO2 % BLDCOA: 85.2 % (ref 94–99)
SAO2 % BLDCOA: 93.8 % (ref 94–99)
SAO2 % BLDCOA: 95.7 % (ref 94–99)
SAO2 % BLDCOA: 96.3 % (ref 94–99)
SAO2 % BLDCOA: 97 % (ref 94–99)
SAO2 % BLDCOA: 97.5 % (ref 94–99)
SARS-COV-2 AG RESP QL IA.RAPID: NORMAL
SARS-COV-2 RNA NPH QL NAA+NON-PROBE: DETECTED
SARS-COV-2 RNA PNL SPEC NAA+PROBE: NOT DETECTED
SARS-COV-2 RNA RESP QL NAA+PROBE: DETECTED
SET MECH RESP RATE: 16
SMALL PLATELETS BLD QL SMEAR: ABNORMAL
SODIUM SERPL-SCNC: 133 MMOL/L (ref 136–145)
SODIUM SERPL-SCNC: 134 MMOL/L (ref 136–145)
SODIUM SERPL-SCNC: 136 MMOL/L (ref 135–145)
SODIUM SERPL-SCNC: 137 MMOL/L (ref 136–145)
SODIUM SERPL-SCNC: 137 MMOL/L (ref 136–145)
SODIUM SERPL-SCNC: 138 MMOL/L (ref 136–145)
SODIUM SERPL-SCNC: 139 MMOL/L (ref 136–145)
SODIUM SERPL-SCNC: 141 MMOL/L (ref 136–145)
SODIUM SERPL-SCNC: 141 MMOL/L (ref 136–145)
SODIUM SERPL-SCNC: 142 MMOL/L (ref 136–145)
SODIUM SERPL-SCNC: 143 MMOL/L (ref 136–145)
SODIUM SERPL-SCNC: 145 MMOL/L (ref 136–145)
SP GR UR STRIP: 1.02 (ref 1–1.03)
SP GR UR STRIP: >1.03 (ref 1–1.03)
SP GR UR: 1.02 (ref 1–1.03)
SQUAMOUS #/AREA URNS HPF: ABNORMAL /HPF
STRESS TARGET HR: 116 BPM
STRESS TARGET HR: 116 BPM
T4 FREE SERPL-MCNC: 1.16 NG/DL (ref 0.93–1.7)
TRIGL SERPL-MCNC: 128 MG/DL (ref 0–149)
TRIGL SERPL-MCNC: 99 MG/DL (ref 0–150)
TROPONIN T SERPL-MCNC: <0.01 NG/ML (ref 0–0.03)
TSH SERPL DL<=0.05 MIU/L-ACNC: 5.34 UIU/ML (ref 0.27–4.2)
UROBILINOGEN UR QL STRIP: ABNORMAL
UROBILINOGEN UR QL STRIP: ABNORMAL
UROBILINOGEN UR QL: NORMAL
VARIANT LYMPHS NFR BLD MANUAL: 1 % (ref 0–5)
VARIANT LYMPHS NFR BLD MANUAL: 16.2 % (ref 19.6–45.3)
VARIANT LYMPHS NFR BLD MANUAL: 4 % (ref 19.6–45.3)
VARIANT LYMPHS NFR BLD MANUAL: 9.4 % (ref 19.6–45.3)
VENTILATOR MODE: ABNORMAL
VLDLC SERPL-MCNC: 18 MG/DL (ref 5–40)
VLDLC SERPL-MCNC: 23 MG/DL (ref 5–40)
WBC # UR STRIP: ABNORMAL /HPF
WBC MORPH BLD: NORMAL
WBC NRBC COR # BLD: 12.7 10*3/MM3 (ref 3.4–10.8)
WBC NRBC COR # BLD: 13.24 10*3/MM3 (ref 3.4–10.8)
WBC NRBC COR # BLD: 15.35 10*3/MM3 (ref 3.4–10.8)
WBC NRBC COR # BLD: 16.5 10*3/MM3 (ref 3.4–10.8)
WBC NRBC COR # BLD: 17.2 10*3/MM3 (ref 3.4–10.8)
WBC NRBC COR # BLD: 17.3 10*3/MM3 (ref 3.4–10.8)
WBC NRBC COR # BLD: 19.03 10*3/MM3 (ref 3.4–10.8)
WBC NRBC COR # BLD: 19.06 10*3/MM3 (ref 3.4–10.8)
WBC NRBC COR # BLD: 19.51 10*3/MM3 (ref 3.4–10.8)
WBC NRBC COR # BLD: 20.01 10*3/MM3 (ref 3.4–10.8)
WBC NRBC COR # BLD: 21.96 10*3/MM3 (ref 3.4–10.8)
WBC NRBC COR # BLD: 22.81 10*3/MM3 (ref 3.4–10.8)
WBC NRBC COR # BLD: 5.86 10*3/MM3 (ref 3.4–10.8)
WBC NRBC COR # BLD: 7.19 10*3/MM3 (ref 3.4–10.8)
WBC NRBC COR # BLD: 7.36 10*3/MM3 (ref 3.4–10.8)
WBC NRBC COR # BLD: 7.57 10*3/MM3 (ref 3.4–10.8)
WHOLE BLOOD HOLD COAG: NORMAL
WHOLE BLOOD HOLD SPECIMEN: NORMAL

## 2022-01-01 PROCEDURE — 94799 UNLISTED PULMONARY SVC/PX: CPT

## 2022-01-01 PROCEDURE — 25010000002 DIAZEPAM PER 5 MG: Performed by: INTERNAL MEDICINE

## 2022-01-01 PROCEDURE — 25010000002 METHYLPREDNISOLONE PER 40 MG: Performed by: INTERNAL MEDICINE

## 2022-01-01 PROCEDURE — 85027 COMPLETE CBC AUTOMATED: CPT | Performed by: INTERNAL MEDICINE

## 2022-01-01 PROCEDURE — 99233 SBSQ HOSP IP/OBS HIGH 50: CPT | Performed by: INTERNAL MEDICINE

## 2022-01-01 PROCEDURE — 25010000002 METHYLPREDNISOLONE PER 40 MG: Performed by: NURSE PRACTITIONER

## 2022-01-01 PROCEDURE — 85007 BL SMEAR W/DIFF WBC COUNT: CPT | Performed by: INTERNAL MEDICINE

## 2022-01-01 PROCEDURE — 99232 SBSQ HOSP IP/OBS MODERATE 35: CPT | Performed by: INTERNAL MEDICINE

## 2022-01-01 PROCEDURE — 94664 DEMO&/EVAL PT USE INHALER: CPT

## 2022-01-01 PROCEDURE — 97110 THERAPEUTIC EXERCISES: CPT

## 2022-01-01 PROCEDURE — 94660 CPAP INITIATION&MGMT: CPT

## 2022-01-01 PROCEDURE — 85025 COMPLETE CBC W/AUTO DIFF WBC: CPT | Performed by: EMERGENCY MEDICINE

## 2022-01-01 PROCEDURE — 63710000001 PREDNISONE PER 1 MG: Performed by: FAMILY MEDICINE

## 2022-01-01 PROCEDURE — 63710000001 DIPHENHYDRAMINE PER 50 MG: Performed by: FAMILY MEDICINE

## 2022-01-01 PROCEDURE — 80053 COMPREHEN METABOLIC PANEL: CPT

## 2022-01-01 PROCEDURE — 82009 KETONE BODYS QUAL: CPT | Performed by: INTERNAL MEDICINE

## 2022-01-01 PROCEDURE — 25010000002 MORPHINE SULFATE (PF) 2 MG/ML SOLUTION: Performed by: INTERNAL MEDICINE

## 2022-01-01 PROCEDURE — 25010000002 CEFEPIME PER 500 MG: Performed by: INTERNAL MEDICINE

## 2022-01-01 PROCEDURE — 86140 C-REACTIVE PROTEIN: CPT | Performed by: INTERNAL MEDICINE

## 2022-01-01 PROCEDURE — 25010000002 PIPERACILLIN SOD-TAZOBACTAM PER 1 G: Performed by: INTERNAL MEDICINE

## 2022-01-01 PROCEDURE — 85025 COMPLETE CBC W/AUTO DIFF WBC: CPT | Performed by: INTERNAL MEDICINE

## 2022-01-01 PROCEDURE — 36600 WITHDRAWAL OF ARTERIAL BLOOD: CPT

## 2022-01-01 PROCEDURE — 93000 ELECTROCARDIOGRAM COMPLETE: CPT | Performed by: INTERNAL MEDICINE

## 2022-01-01 PROCEDURE — 25010000002 ENOXAPARIN PER 10 MG: Performed by: INTERNAL MEDICINE

## 2022-01-01 PROCEDURE — 25010000002 AZITHROMYCIN PER 500 MG: Performed by: INTERNAL MEDICINE

## 2022-01-01 PROCEDURE — 97535 SELF CARE MNGMENT TRAINING: CPT | Performed by: OCCUPATIONAL THERAPIST

## 2022-01-01 PROCEDURE — 63710000001 DIPHENHYDRAMINE PER 50 MG: Performed by: INTERNAL MEDICINE

## 2022-01-01 PROCEDURE — 71046 X-RAY EXAM CHEST 2 VIEWS: CPT

## 2022-01-01 PROCEDURE — 0202U NFCT DS 22 TRGT SARS-COV-2: CPT | Performed by: INTERNAL MEDICINE

## 2022-01-01 PROCEDURE — 99223 1ST HOSP IP/OBS HIGH 75: CPT | Performed by: INTERNAL MEDICINE

## 2022-01-01 PROCEDURE — 83605 ASSAY OF LACTIC ACID: CPT | Performed by: EMERGENCY MEDICINE

## 2022-01-01 PROCEDURE — 80061 LIPID PANEL: CPT

## 2022-01-01 PROCEDURE — 82728 ASSAY OF FERRITIN: CPT | Performed by: NURSE PRACTITIONER

## 2022-01-01 PROCEDURE — 97530 THERAPEUTIC ACTIVITIES: CPT

## 2022-01-01 PROCEDURE — 85025 COMPLETE CBC W/AUTO DIFF WBC: CPT | Performed by: FAMILY MEDICINE

## 2022-01-01 PROCEDURE — 97535 SELF CARE MNGMENT TRAINING: CPT

## 2022-01-01 PROCEDURE — 99285 EMERGENCY DEPT VISIT HI MDM: CPT

## 2022-01-01 PROCEDURE — 81003 URINALYSIS AUTO W/O SCOPE: CPT | Performed by: UROLOGY

## 2022-01-01 PROCEDURE — 82803 BLOOD GASES ANY COMBINATION: CPT

## 2022-01-01 PROCEDURE — 85025 COMPLETE CBC W/AUTO DIFF WBC: CPT

## 2022-01-01 PROCEDURE — 85610 PROTHROMBIN TIME: CPT | Performed by: FAMILY MEDICINE

## 2022-01-01 PROCEDURE — 84100 ASSAY OF PHOSPHORUS: CPT | Performed by: INTERNAL MEDICINE

## 2022-01-01 PROCEDURE — 82550 ASSAY OF CK (CPK): CPT | Performed by: FAMILY MEDICINE

## 2022-01-01 PROCEDURE — 93306 TTE W/DOPPLER COMPLETE: CPT

## 2022-01-01 PROCEDURE — 02HV33Z INSERTION OF INFUSION DEVICE INTO SUPERIOR VENA CAVA, PERCUTANEOUS APPROACH: ICD-10-PCS | Performed by: FAMILY MEDICINE

## 2022-01-01 PROCEDURE — 84145 PROCALCITONIN (PCT): CPT | Performed by: INTERNAL MEDICINE

## 2022-01-01 PROCEDURE — 25010000002 VANCOMYCIN 10 G RECONSTITUTED SOLUTION: Performed by: FAMILY MEDICINE

## 2022-01-01 PROCEDURE — 87040 BLOOD CULTURE FOR BACTERIA: CPT | Performed by: FAMILY MEDICINE

## 2022-01-01 PROCEDURE — 93970 EXTREMITY STUDY: CPT

## 2022-01-01 PROCEDURE — 80048 BASIC METABOLIC PNL TOTAL CA: CPT | Performed by: INTERNAL MEDICINE

## 2022-01-01 PROCEDURE — 63710000001 PREDNISONE PER 5 MG: Performed by: NURSE PRACTITIONER

## 2022-01-01 PROCEDURE — 84443 ASSAY THYROID STIM HORMONE: CPT

## 2022-01-01 PROCEDURE — 99204 OFFICE O/P NEW MOD 45 MIN: CPT | Performed by: INTERNAL MEDICINE

## 2022-01-01 PROCEDURE — 97162 PT EVAL MOD COMPLEX 30 MIN: CPT

## 2022-01-01 PROCEDURE — 81001 URINALYSIS AUTO W/SCOPE: CPT | Performed by: INTERNAL MEDICINE

## 2022-01-01 PROCEDURE — 71045 X-RAY EXAM CHEST 1 VIEW: CPT

## 2022-01-01 PROCEDURE — 93308 TTE F-UP OR LMTD: CPT | Performed by: INTERNAL MEDICINE

## 2022-01-01 PROCEDURE — 63710000001 PREDNISONE PER 1 MG: Performed by: INTERNAL MEDICINE

## 2022-01-01 PROCEDURE — 93306 TTE W/DOPPLER COMPLETE: CPT | Performed by: INTERNAL MEDICINE

## 2022-01-01 PROCEDURE — 99497 ADVNCD CARE PLAN 30 MIN: CPT

## 2022-01-01 PROCEDURE — 25010000002 MORPHINE PER 10 MG: Performed by: INTERNAL MEDICINE

## 2022-01-01 PROCEDURE — 80048 BASIC METABOLIC PNL TOTAL CA: CPT | Performed by: FAMILY MEDICINE

## 2022-01-01 PROCEDURE — 87641 MR-STAPH DNA AMP PROBE: CPT | Performed by: FAMILY MEDICINE

## 2022-01-01 PROCEDURE — 71250 CT THORAX DX C-: CPT

## 2022-01-01 PROCEDURE — 82962 GLUCOSE BLOOD TEST: CPT

## 2022-01-01 PROCEDURE — 81003 URINALYSIS AUTO W/O SCOPE: CPT | Performed by: INTERNAL MEDICINE

## 2022-01-01 PROCEDURE — 63710000001 PREDNISONE PER 1 MG: Performed by: NURSE PRACTITIONER

## 2022-01-01 PROCEDURE — 80053 COMPREHEN METABOLIC PANEL: CPT | Performed by: FAMILY MEDICINE

## 2022-01-01 PROCEDURE — 80053 COMPREHEN METABOLIC PANEL: CPT | Performed by: EMERGENCY MEDICINE

## 2022-01-01 PROCEDURE — 82565 ASSAY OF CREATININE: CPT

## 2022-01-01 PROCEDURE — 25010000002 FUROSEMIDE PER 20 MG: Performed by: FAMILY MEDICINE

## 2022-01-01 PROCEDURE — 71275 CT ANGIOGRAPHY CHEST: CPT

## 2022-01-01 PROCEDURE — 25010000002 PERFLUTREN 6.52 MG/ML SUSPENSION: Performed by: INTERNAL MEDICINE

## 2022-01-01 PROCEDURE — 85027 COMPLETE CBC AUTOMATED: CPT

## 2022-01-01 PROCEDURE — 97166 OT EVAL MOD COMPLEX 45 MIN: CPT | Performed by: OCCUPATIONAL THERAPIST

## 2022-01-01 PROCEDURE — U0005 INFEC AGEN DETEC AMPLI PROBE: HCPCS | Performed by: NURSE PRACTITIONER

## 2022-01-01 PROCEDURE — 83735 ASSAY OF MAGNESIUM: CPT | Performed by: INTERNAL MEDICINE

## 2022-01-01 PROCEDURE — 93005 ELECTROCARDIOGRAM TRACING: CPT | Performed by: FAMILY MEDICINE

## 2022-01-01 PROCEDURE — 25010000002 FUROSEMIDE PER 20 MG: Performed by: INTERNAL MEDICINE

## 2022-01-01 PROCEDURE — 87641 MR-STAPH DNA AMP PROBE: CPT | Performed by: INTERNAL MEDICINE

## 2022-01-01 PROCEDURE — 87040 BLOOD CULTURE FOR BACTERIA: CPT | Performed by: INTERNAL MEDICINE

## 2022-01-01 PROCEDURE — 70498 CT ANGIOGRAPHY NECK: CPT

## 2022-01-01 PROCEDURE — 76536 US EXAM OF HEAD AND NECK: CPT

## 2022-01-01 PROCEDURE — 94761 N-INVAS EAR/PLS OXIMETRY MLT: CPT

## 2022-01-01 PROCEDURE — 85379 FIBRIN DEGRADATION QUANT: CPT | Performed by: FAMILY MEDICINE

## 2022-01-01 PROCEDURE — 93010 ELECTROCARDIOGRAM REPORT: CPT | Performed by: INTERNAL MEDICINE

## 2022-01-01 PROCEDURE — 97164 PT RE-EVAL EST PLAN CARE: CPT

## 2022-01-01 PROCEDURE — 83880 ASSAY OF NATRIURETIC PEPTIDE: CPT | Performed by: INTERNAL MEDICINE

## 2022-01-01 PROCEDURE — 83880 ASSAY OF NATRIURETIC PEPTIDE: CPT | Performed by: EMERGENCY MEDICINE

## 2022-01-01 PROCEDURE — 36415 COLL VENOUS BLD VENIPUNCTURE: CPT

## 2022-01-01 PROCEDURE — 93005 ELECTROCARDIOGRAM TRACING: CPT | Performed by: EMERGENCY MEDICINE

## 2022-01-01 PROCEDURE — 25010000002 METHYLPREDNISOLONE PER 125 MG: Performed by: INTERNAL MEDICINE

## 2022-01-01 PROCEDURE — 93308 TTE F-UP OR LMTD: CPT

## 2022-01-01 PROCEDURE — 97168 OT RE-EVAL EST PLAN CARE: CPT | Performed by: OCCUPATIONAL THERAPIST

## 2022-01-01 PROCEDURE — 83605 ASSAY OF LACTIC ACID: CPT | Performed by: INTERNAL MEDICINE

## 2022-01-01 PROCEDURE — 87205 SMEAR GRAM STAIN: CPT | Performed by: INTERNAL MEDICINE

## 2022-01-01 PROCEDURE — 97116 GAIT TRAINING THERAPY: CPT

## 2022-01-01 PROCEDURE — 94618 PULMONARY STRESS TESTING: CPT

## 2022-01-01 PROCEDURE — 87636 SARSCOV2 & INF A&B AMP PRB: CPT | Performed by: EMERGENCY MEDICINE

## 2022-01-01 PROCEDURE — 93970 EXTREMITY STUDY: CPT | Performed by: SURGERY

## 2022-01-01 PROCEDURE — 80053 COMPREHEN METABOLIC PANEL: CPT | Performed by: INTERNAL MEDICINE

## 2022-01-01 PROCEDURE — 99213 OFFICE O/P EST LOW 20 MIN: CPT | Performed by: UROLOGY

## 2022-01-01 PROCEDURE — 63710000001 DEXAMETHASONE PER 0.25 MG: Performed by: INTERNAL MEDICINE

## 2022-01-01 PROCEDURE — 93880 EXTRACRANIAL BILAT STUDY: CPT

## 2022-01-01 PROCEDURE — 93321 DOPPLER ECHO F-UP/LMTD STD: CPT | Performed by: INTERNAL MEDICINE

## 2022-01-01 PROCEDURE — 86140 C-REACTIVE PROTEIN: CPT | Performed by: NURSE PRACTITIONER

## 2022-01-01 PROCEDURE — 93010 ELECTROCARDIOGRAM REPORT: CPT | Performed by: EMERGENCY MEDICINE

## 2022-01-01 PROCEDURE — 85379 FIBRIN DEGRADATION QUANT: CPT | Performed by: EMERGENCY MEDICINE

## 2022-01-01 PROCEDURE — 0 IOPAMIDOL PER 1 ML: Performed by: FAMILY MEDICINE

## 2022-01-01 PROCEDURE — 87899 AGENT NOS ASSAY W/OPTIC: CPT | Performed by: INTERNAL MEDICINE

## 2022-01-01 PROCEDURE — 85007 BL SMEAR W/DIFF WBC COUNT: CPT | Performed by: FAMILY MEDICINE

## 2022-01-01 PROCEDURE — 87426 SARSCOV CORONAVIRUS AG IA: CPT | Performed by: FAMILY MEDICINE

## 2022-01-01 PROCEDURE — 25010000002 MORPHINE (PF) 10 MG/ML SOLUTION: Performed by: INTERNAL MEDICINE

## 2022-01-01 PROCEDURE — 36415 COLL VENOUS BLD VENIPUNCTURE: CPT | Performed by: INTERNAL MEDICINE

## 2022-01-01 PROCEDURE — 99222 1ST HOSP IP/OBS MODERATE 55: CPT

## 2022-01-01 PROCEDURE — 94762 N-INVAS EAR/PLS OXIMTRY CONT: CPT

## 2022-01-01 PROCEDURE — 84484 ASSAY OF TROPONIN QUANT: CPT | Performed by: FAMILY MEDICINE

## 2022-01-01 PROCEDURE — 70450 CT HEAD/BRAIN W/O DYE: CPT

## 2022-01-01 PROCEDURE — 99214 OFFICE O/P EST MOD 30 MIN: CPT | Performed by: NURSE PRACTITIONER

## 2022-01-01 PROCEDURE — 94640 AIRWAY INHALATION TREATMENT: CPT

## 2022-01-01 PROCEDURE — U0004 COV-19 TEST NON-CDC HGH THRU: HCPCS | Performed by: NURSE PRACTITIONER

## 2022-01-01 PROCEDURE — 84484 ASSAY OF TROPONIN QUANT: CPT | Performed by: EMERGENCY MEDICINE

## 2022-01-01 PROCEDURE — 87449 NOS EACH ORGANISM AG IA: CPT | Performed by: INTERNAL MEDICINE

## 2022-01-01 PROCEDURE — 87040 BLOOD CULTURE FOR BACTERIA: CPT | Performed by: EMERGENCY MEDICINE

## 2022-01-01 PROCEDURE — 0 IOPAMIDOL PER 1 ML: Performed by: NURSE PRACTITIONER

## 2022-01-01 PROCEDURE — 93321 DOPPLER ECHO F-UP/LMTD STD: CPT

## 2022-01-01 PROCEDURE — 0 IOPAMIDOL PER 1 ML: Performed by: EMERGENCY MEDICINE

## 2022-01-01 PROCEDURE — 25010000002 MORPHINE SULFATE (PF) 2 MG/ML SOLUTION: Performed by: FAMILY MEDICINE

## 2022-01-01 PROCEDURE — 97110 THERAPEUTIC EXERCISES: CPT | Performed by: OCCUPATIONAL THERAPIST

## 2022-01-01 PROCEDURE — 85730 THROMBOPLASTIN TIME PARTIAL: CPT | Performed by: FAMILY MEDICINE

## 2022-01-01 PROCEDURE — 82728 ASSAY OF FERRITIN: CPT | Performed by: INTERNAL MEDICINE

## 2022-01-01 PROCEDURE — 84439 ASSAY OF FREE THYROXINE: CPT

## 2022-01-01 PROCEDURE — 99204 OFFICE O/P NEW MOD 45 MIN: CPT | Performed by: SURGERY

## 2022-01-01 PROCEDURE — 87635 SARS-COV-2 COVID-19 AMP PRB: CPT | Performed by: FAMILY MEDICINE

## 2022-01-01 RX ORDER — BUSPIRONE HYDROCHLORIDE 10 MG/1
10 TABLET ORAL 3 TIMES DAILY
Status: DISCONTINUED | OUTPATIENT
Start: 2022-01-01 | End: 2022-01-01 | Stop reason: HOSPADM

## 2022-01-01 RX ORDER — PREDNISONE 20 MG/1
20 TABLET ORAL
Status: DISCONTINUED | OUTPATIENT
Start: 2022-01-01 | End: 2022-01-01

## 2022-01-01 RX ORDER — FLUTICASONE PROPIONATE 50 MCG
2 SPRAY, SUSPENSION (ML) NASAL DAILY
Qty: 16 G | Refills: 0 | Status: SHIPPED | OUTPATIENT
Start: 2022-01-01

## 2022-01-01 RX ORDER — GUAIFENESIN 600 MG/1
1200 TABLET, EXTENDED RELEASE ORAL 2 TIMES DAILY
Start: 2022-01-01

## 2022-01-01 RX ORDER — FINASTERIDE 5 MG/1
5 TABLET, FILM COATED ORAL DAILY
Status: DISCONTINUED | OUTPATIENT
Start: 2022-01-01 | End: 2022-07-29 | Stop reason: HOSPADM

## 2022-01-01 RX ORDER — FUROSEMIDE 20 MG/1
20 TABLET ORAL
Status: DISCONTINUED | OUTPATIENT
Start: 2022-01-01 | End: 2022-01-01 | Stop reason: HOSPADM

## 2022-01-01 RX ORDER — QUINAPRIL 40 MG/1
40 TABLET ORAL DAILY
Qty: 90 TABLET | Refills: 1 | Status: CANCELLED | OUTPATIENT
Start: 2022-01-01

## 2022-01-01 RX ORDER — SODIUM CHLORIDE 0.9 % (FLUSH) 0.9 %
10 SYRINGE (ML) INJECTION AS NEEDED
Status: DISCONTINUED | OUTPATIENT
Start: 2022-01-01 | End: 2022-01-01 | Stop reason: HOSPADM

## 2022-01-01 RX ORDER — ASPIRIN 81 MG/1
81 TABLET ORAL DAILY
Qty: 30 TABLET | Refills: 1 | Status: SHIPPED | OUTPATIENT
Start: 2022-01-01

## 2022-01-01 RX ORDER — METHYLPREDNISOLONE SODIUM SUCCINATE 40 MG/ML
40 INJECTION, POWDER, LYOPHILIZED, FOR SOLUTION INTRAMUSCULAR; INTRAVENOUS EVERY 12 HOURS
Status: DISCONTINUED | OUTPATIENT
Start: 2022-01-01 | End: 2022-01-01

## 2022-01-01 RX ORDER — FLUTICASONE PROPIONATE 50 MCG
2 SPRAY, SUSPENSION (ML) NASAL DAILY
Status: DISCONTINUED | OUTPATIENT
Start: 2022-01-01 | End: 2022-01-01 | Stop reason: HOSPADM

## 2022-01-01 RX ORDER — DEXAMETHASONE 2 MG/1
6 TABLET ORAL
Status: DISCONTINUED | OUTPATIENT
Start: 2022-01-01 | End: 2022-01-01

## 2022-01-01 RX ORDER — SODIUM CHLORIDE 0.9 % (FLUSH) 0.9 %
10 SYRINGE (ML) INJECTION EVERY 12 HOURS SCHEDULED
Status: DISCONTINUED | OUTPATIENT
Start: 2022-01-01 | End: 2022-01-01 | Stop reason: HOSPADM

## 2022-01-01 RX ORDER — SODIUM CHLORIDE 9 MG/ML
125 INJECTION, SOLUTION INTRAVENOUS CONTINUOUS
Status: DISCONTINUED | OUTPATIENT
Start: 2022-01-01 | End: 2022-01-01

## 2022-01-01 RX ORDER — METHYLPREDNISOLONE SODIUM SUCCINATE 125 MG/2ML
60 INJECTION, POWDER, LYOPHILIZED, FOR SOLUTION INTRAMUSCULAR; INTRAVENOUS EVERY 8 HOURS
Status: DISCONTINUED | OUTPATIENT
Start: 2022-01-01 | End: 2022-01-01

## 2022-01-01 RX ORDER — GUAIFENESIN 600 MG/1
1200 TABLET, EXTENDED RELEASE ORAL 2 TIMES DAILY
Status: DISCONTINUED | OUTPATIENT
Start: 2022-01-01 | End: 2022-07-29 | Stop reason: HOSPADM

## 2022-01-01 RX ORDER — METHYLPREDNISOLONE SODIUM SUCCINATE 40 MG/ML
40 INJECTION, POWDER, LYOPHILIZED, FOR SOLUTION INTRAMUSCULAR; INTRAVENOUS EVERY 8 HOURS
Status: DISCONTINUED | OUTPATIENT
Start: 2022-01-01 | End: 2022-01-01

## 2022-01-01 RX ORDER — IPRATROPIUM BROMIDE AND ALBUTEROL SULFATE 2.5; .5 MG/3ML; MG/3ML
3 SOLUTION RESPIRATORY (INHALATION)
Qty: 360 ML
Start: 2022-01-01

## 2022-01-01 RX ORDER — ENOXAPARIN SODIUM 100 MG/ML
1 INJECTION SUBCUTANEOUS EVERY 12 HOURS
Status: DISCONTINUED | OUTPATIENT
Start: 2022-01-01 | End: 2022-01-01

## 2022-01-01 RX ORDER — ONDANSETRON 2 MG/ML
4 INJECTION INTRAMUSCULAR; INTRAVENOUS EVERY 6 HOURS PRN
Status: DISCONTINUED | OUTPATIENT
Start: 2022-01-01 | End: 2022-01-01 | Stop reason: HOSPADM

## 2022-01-01 RX ORDER — METHYLPREDNISOLONE SODIUM SUCCINATE 125 MG/2ML
125 INJECTION, POWDER, LYOPHILIZED, FOR SOLUTION INTRAMUSCULAR; INTRAVENOUS ONCE
Status: COMPLETED | OUTPATIENT
Start: 2022-01-01 | End: 2022-01-01

## 2022-01-01 RX ORDER — DIPHENHYDRAMINE HCL 25 MG
25 CAPSULE ORAL NIGHTLY
Start: 2022-01-01

## 2022-01-01 RX ORDER — ASPIRIN 81 MG/1
81 TABLET ORAL DAILY
Status: DISCONTINUED | OUTPATIENT
Start: 2022-01-01 | End: 2022-01-01 | Stop reason: HOSPADM

## 2022-01-01 RX ORDER — AZITHROMYCIN 250 MG/1
TABLET, FILM COATED ORAL
Qty: 6 TABLET | Refills: 0 | Status: SHIPPED | OUTPATIENT
Start: 2022-01-01 | End: 2022-01-01

## 2022-01-01 RX ORDER — DOCUSATE SODIUM 100 MG/1
100 CAPSULE, LIQUID FILLED ORAL 2 TIMES DAILY
Status: DISCONTINUED | OUTPATIENT
Start: 2022-01-01 | End: 2022-07-29 | Stop reason: HOSPADM

## 2022-01-01 RX ORDER — ONDANSETRON 2 MG/ML
4 INJECTION INTRAMUSCULAR; INTRAVENOUS EVERY 6 HOURS PRN
Start: 2022-01-01

## 2022-01-01 RX ORDER — TRAMADOL HYDROCHLORIDE 50 MG/1
50 TABLET ORAL EVERY 8 HOURS PRN
Status: DISCONTINUED | OUTPATIENT
Start: 2022-01-01 | End: 2022-07-29 | Stop reason: HOSPADM

## 2022-01-01 RX ORDER — TRAMADOL HYDROCHLORIDE 50 MG/1
50 TABLET ORAL EVERY 8 HOURS PRN
Status: DISCONTINUED | OUTPATIENT
Start: 2022-01-01 | End: 2022-01-01 | Stop reason: HOSPADM

## 2022-01-01 RX ORDER — DEXAMETHASONE 6 MG/1
6 TABLET ORAL
Start: 2022-01-01

## 2022-01-01 RX ORDER — ASPIRIN 81 MG/1
81 TABLET ORAL DAILY
Status: DISCONTINUED | OUTPATIENT
Start: 2022-01-01 | End: 2022-07-29 | Stop reason: HOSPADM

## 2022-01-01 RX ORDER — GABAPENTIN 400 MG/1
400 CAPSULE ORAL EVERY 12 HOURS SCHEDULED
Start: 2022-01-01

## 2022-01-01 RX ORDER — ACETAMINOPHEN 325 MG/1
650 TABLET ORAL EVERY 4 HOURS PRN
Status: DISCONTINUED | OUTPATIENT
Start: 2022-01-01 | End: 2022-01-01 | Stop reason: HOSPADM

## 2022-01-01 RX ORDER — CETIRIZINE HYDROCHLORIDE 10 MG/1
10 TABLET ORAL DAILY
Status: DISCONTINUED | OUTPATIENT
Start: 2022-01-01 | End: 2022-07-29 | Stop reason: HOSPADM

## 2022-01-01 RX ORDER — BUSPIRONE HYDROCHLORIDE 10 MG/1
10 TABLET ORAL 3 TIMES DAILY
Status: DISCONTINUED | OUTPATIENT
Start: 2022-01-01 | End: 2022-07-29 | Stop reason: HOSPADM

## 2022-01-01 RX ORDER — DIPHENHYDRAMINE HCL 25 MG
25 CAPSULE ORAL NIGHTLY
Status: DISCONTINUED | OUTPATIENT
Start: 2022-01-01 | End: 2022-07-29 | Stop reason: HOSPADM

## 2022-01-01 RX ORDER — FAMOTIDINE 20 MG/1
20 TABLET, FILM COATED ORAL
Status: DISCONTINUED | OUTPATIENT
Start: 2022-01-01 | End: 2022-01-01 | Stop reason: HOSPADM

## 2022-01-01 RX ORDER — MECLIZINE HCL 12.5 MG/1
12.5 TABLET ORAL 3 TIMES DAILY PRN
Qty: 30 TABLET | Refills: 0 | Status: SHIPPED | OUTPATIENT
Start: 2022-01-01 | End: 2022-01-01 | Stop reason: HOSPADM

## 2022-01-01 RX ORDER — DIAZEPAM 5 MG/1
2.5 TABLET ORAL EVERY 6 HOURS PRN
Status: DISCONTINUED | OUTPATIENT
Start: 2022-01-01 | End: 2022-01-01 | Stop reason: HOSPADM

## 2022-01-01 RX ORDER — GABAPENTIN 400 MG/1
400 CAPSULE ORAL EVERY 12 HOURS SCHEDULED
Status: DISCONTINUED | OUTPATIENT
Start: 2022-01-01 | End: 2022-07-29 | Stop reason: HOSPADM

## 2022-01-01 RX ORDER — ALBUTEROL SULFATE 2.5 MG/3ML
2.5 SOLUTION RESPIRATORY (INHALATION) EVERY 6 HOURS PRN
Status: DISCONTINUED | OUTPATIENT
Start: 2022-01-01 | End: 2022-01-01 | Stop reason: HOSPADM

## 2022-01-01 RX ORDER — MECLIZINE HYDROCHLORIDE 25 MG/1
12.5 TABLET ORAL 3 TIMES DAILY PRN
Status: DISCONTINUED | OUTPATIENT
Start: 2022-01-01 | End: 2022-01-01 | Stop reason: HOSPADM

## 2022-01-01 RX ORDER — ALBUTEROL SULFATE 2.5 MG/3ML
2.5 SOLUTION RESPIRATORY (INHALATION) EVERY 6 HOURS PRN
Status: DISCONTINUED | OUTPATIENT
Start: 2022-01-01 | End: 2022-07-29 | Stop reason: HOSPADM

## 2022-01-01 RX ORDER — CETIRIZINE HYDROCHLORIDE 10 MG/1
10 TABLET ORAL DAILY
Start: 2022-01-01

## 2022-01-01 RX ORDER — FINASTERIDE 5 MG/1
5 TABLET, FILM COATED ORAL DAILY
Status: DISCONTINUED | OUTPATIENT
Start: 2022-01-01 | End: 2022-01-01 | Stop reason: HOSPADM

## 2022-01-01 RX ORDER — MORPHINE SULFATE 1 MG/ML
1 INJECTION INTRAVENOUS CONTINUOUS
Status: DISCONTINUED | OUTPATIENT
Start: 2022-01-01 | End: 2022-07-29 | Stop reason: HOSPADM

## 2022-01-01 RX ORDER — FUROSEMIDE 10 MG/ML
40 INJECTION INTRAMUSCULAR; INTRAVENOUS ONCE
Status: DISCONTINUED | OUTPATIENT
Start: 2022-01-01 | End: 2022-01-01

## 2022-01-01 RX ORDER — POTASSIUM CHLORIDE 750 MG/1
40 CAPSULE, EXTENDED RELEASE ORAL ONCE
Status: COMPLETED | OUTPATIENT
Start: 2022-01-01 | End: 2022-01-01

## 2022-01-01 RX ORDER — ACETAMINOPHEN 325 MG/1
650 TABLET ORAL EVERY 4 HOURS PRN
Status: DISCONTINUED | OUTPATIENT
Start: 2022-01-01 | End: 2022-07-29 | Stop reason: HOSPADM

## 2022-01-01 RX ORDER — FUROSEMIDE 10 MG/ML
20 INJECTION INTRAMUSCULAR; INTRAVENOUS ONCE
Status: COMPLETED | OUTPATIENT
Start: 2022-01-01 | End: 2022-01-01

## 2022-01-01 RX ORDER — POLYETHYLENE GLYCOL 3350 17 G/17G
17 POWDER, FOR SOLUTION ORAL DAILY
Status: DISCONTINUED | OUTPATIENT
Start: 2022-01-01 | End: 2022-07-29 | Stop reason: HOSPADM

## 2022-01-01 RX ORDER — ASPIRIN 81 MG/1
81 TABLET ORAL DAILY
Qty: 90 TABLET | Refills: 3 | OUTPATIENT
Start: 2022-01-01

## 2022-01-01 RX ORDER — LANOLIN ALCOHOL/MO/W.PET/CERES
3 CREAM (GRAM) TOPICAL NIGHTLY
Status: DISCONTINUED | OUTPATIENT
Start: 2022-01-01 | End: 2022-01-01

## 2022-01-01 RX ORDER — ZINC SULFATE 50(220)MG
220 CAPSULE ORAL DAILY
Start: 2022-01-01

## 2022-01-01 RX ORDER — FAMOTIDINE 20 MG/1
20 TABLET, FILM COATED ORAL
Start: 2022-01-01

## 2022-01-01 RX ORDER — ATORVASTATIN CALCIUM 10 MG/1
20 TABLET, FILM COATED ORAL DAILY
Status: DISCONTINUED | OUTPATIENT
Start: 2022-01-01 | End: 2022-07-29 | Stop reason: HOSPADM

## 2022-01-01 RX ORDER — FLUTICASONE PROPIONATE 50 MCG
2 SPRAY, SUSPENSION (ML) NASAL DAILY
Status: DISCONTINUED | OUTPATIENT
Start: 2022-01-01 | End: 2022-07-29 | Stop reason: HOSPADM

## 2022-01-01 RX ORDER — ACETAMINOPHEN 650 MG/1
650 SUPPOSITORY RECTAL EVERY 4 HOURS PRN
Status: DISCONTINUED | OUTPATIENT
Start: 2022-01-01 | End: 2022-01-01 | Stop reason: HOSPADM

## 2022-01-01 RX ORDER — ATORVASTATIN CALCIUM 20 MG/1
20 TABLET, FILM COATED ORAL DAILY
Qty: 30 TABLET | Refills: 2 | Status: SHIPPED | OUTPATIENT
Start: 2022-01-01

## 2022-01-01 RX ORDER — IPRATROPIUM BROMIDE AND ALBUTEROL SULFATE 2.5; .5 MG/3ML; MG/3ML
3 SOLUTION RESPIRATORY (INHALATION)
Status: DISCONTINUED | OUTPATIENT
Start: 2022-01-01 | End: 2022-07-29 | Stop reason: HOSPADM

## 2022-01-01 RX ORDER — ENOXAPARIN SODIUM 100 MG/ML
1 INJECTION SUBCUTANEOUS EVERY 12 HOURS
Status: DISCONTINUED | OUTPATIENT
Start: 2022-01-01 | End: 2022-01-01 | Stop reason: ALTCHOICE

## 2022-01-01 RX ORDER — ATORVASTATIN CALCIUM 10 MG/1
20 TABLET, FILM COATED ORAL DAILY
Status: DISCONTINUED | OUTPATIENT
Start: 2022-01-01 | End: 2022-01-01 | Stop reason: HOSPADM

## 2022-01-01 RX ORDER — ZINC SULFATE 50(220)MG
220 CAPSULE ORAL DAILY
Status: DISCONTINUED | OUTPATIENT
Start: 2022-01-01 | End: 2022-01-01 | Stop reason: HOSPADM

## 2022-01-01 RX ORDER — GUAIFENESIN 600 MG/1
1200 TABLET, EXTENDED RELEASE ORAL 2 TIMES DAILY
Status: DISCONTINUED | OUTPATIENT
Start: 2022-01-01 | End: 2022-01-01 | Stop reason: HOSPADM

## 2022-01-01 RX ORDER — OLMESARTAN MEDOXOMIL, AMLODIPINE AND HYDROCHLOROTHIAZIDE TABLET 40/5/12.5 MG 40; 5; 12.5 MG/1; MG/1; MG/1
1 TABLET ORAL DAILY
Qty: 90 TABLET | Refills: 1 | Status: SHIPPED | OUTPATIENT
Start: 2022-01-01 | End: 2022-01-01

## 2022-01-01 RX ORDER — FINASTERIDE 5 MG/1
5 TABLET, FILM COATED ORAL DAILY
Qty: 90 TABLET | Refills: 3 | Status: SHIPPED | OUTPATIENT
Start: 2022-01-01

## 2022-01-01 RX ORDER — CETIRIZINE HYDROCHLORIDE 10 MG/1
10 TABLET ORAL DAILY
Status: DISCONTINUED | OUTPATIENT
Start: 2022-01-01 | End: 2022-01-01 | Stop reason: HOSPADM

## 2022-01-01 RX ORDER — MONTELUKAST SODIUM 10 MG/1
10 TABLET ORAL NIGHTLY
Status: DISCONTINUED | OUTPATIENT
Start: 2022-01-01 | End: 2022-07-29 | Stop reason: HOSPADM

## 2022-01-01 RX ORDER — SODIUM CHLORIDE 9 MG/ML
20 INJECTION, SOLUTION INTRAVENOUS CONTINUOUS
Status: DISCONTINUED | OUTPATIENT
Start: 2022-01-01 | End: 2022-07-29 | Stop reason: HOSPADM

## 2022-01-01 RX ORDER — DIPHENHYDRAMINE HCL 25 MG
25 CAPSULE ORAL NIGHTLY
Status: DISCONTINUED | OUTPATIENT
Start: 2022-01-01 | End: 2022-01-01 | Stop reason: HOSPADM

## 2022-01-01 RX ORDER — ACETAMINOPHEN 650 MG/1
650 SUPPOSITORY RECTAL EVERY 4 HOURS PRN
Status: DISCONTINUED | OUTPATIENT
Start: 2022-01-01 | End: 2022-07-29 | Stop reason: HOSPADM

## 2022-01-01 RX ORDER — ACETAMINOPHEN 325 MG/1
650 TABLET ORAL EVERY 4 HOURS PRN
Start: 2022-01-01

## 2022-01-01 RX ORDER — ENOXAPARIN SODIUM 100 MG/ML
1 INJECTION SUBCUTANEOUS EVERY 12 HOURS
Start: 2022-01-01

## 2022-01-01 RX ORDER — ALBUTEROL SULFATE 2.5 MG/3ML
2.5 SOLUTION RESPIRATORY (INHALATION) EVERY 6 HOURS PRN
Refills: 12
Start: 2022-01-01

## 2022-01-01 RX ORDER — MONTELUKAST SODIUM 10 MG/1
10 TABLET ORAL NIGHTLY
Status: DISCONTINUED | OUTPATIENT
Start: 2022-01-01 | End: 2022-01-01 | Stop reason: HOSPADM

## 2022-01-01 RX ORDER — MORPHINE SULFATE 2 MG/ML
1 INJECTION, SOLUTION INTRAMUSCULAR; INTRAVENOUS EVERY 4 HOURS PRN
Refills: 0
Start: 2022-01-01 | End: 2022-01-01

## 2022-01-01 RX ORDER — DEXAMETHASONE 2 MG/1
6 TABLET ORAL 2 TIMES DAILY WITH MEALS
Status: DISCONTINUED | OUTPATIENT
Start: 2022-01-01 | End: 2022-07-29 | Stop reason: HOSPADM

## 2022-01-01 RX ORDER — MORPHINE SULFATE 2 MG/ML
1 INJECTION, SOLUTION INTRAMUSCULAR; INTRAVENOUS EVERY 4 HOURS PRN
Status: DISCONTINUED | OUTPATIENT
Start: 2022-01-01 | End: 2022-01-01

## 2022-01-01 RX ORDER — ONDANSETRON 4 MG/1
4 TABLET, FILM COATED ORAL EVERY 6 HOURS PRN
Status: DISCONTINUED | OUTPATIENT
Start: 2022-01-01 | End: 2022-07-29 | Stop reason: HOSPADM

## 2022-01-01 RX ORDER — MONTELUKAST SODIUM 10 MG/1
10 TABLET ORAL NIGHTLY
Start: 2022-01-01

## 2022-01-01 RX ORDER — DIAZEPAM 5 MG/1
2.5 TABLET ORAL EVERY 6 HOURS PRN
Start: 2022-01-01 | End: 2022-01-01

## 2022-01-01 RX ORDER — DIAZEPAM 5 MG/ML
2.5 INJECTION, SOLUTION INTRAMUSCULAR; INTRAVENOUS
Status: DISCONTINUED | OUTPATIENT
Start: 2022-01-01 | End: 2022-01-01

## 2022-01-01 RX ORDER — ENOXAPARIN SODIUM 100 MG/ML
1 INJECTION SUBCUTANEOUS EVERY 12 HOURS
Status: DISCONTINUED | OUTPATIENT
Start: 2022-01-01 | End: 2022-01-01 | Stop reason: HOSPADM

## 2022-01-01 RX ORDER — SODIUM BICARBONATE 650 MG/1
325 TABLET ORAL ONCE
Status: COMPLETED | OUTPATIENT
Start: 2022-01-01 | End: 2022-01-01

## 2022-01-01 RX ORDER — MORPHINE SULFATE 2 MG/ML
1 INJECTION, SOLUTION INTRAMUSCULAR; INTRAVENOUS EVERY 4 HOURS PRN
Status: DISCONTINUED | OUTPATIENT
Start: 2022-01-01 | End: 2022-01-01 | Stop reason: HOSPADM

## 2022-01-01 RX ORDER — GABAPENTIN 400 MG/1
400 CAPSULE ORAL 3 TIMES DAILY
Qty: 270 CAPSULE | Refills: 1 | Status: SHIPPED | OUTPATIENT
Start: 2022-01-01 | End: 2022-01-01 | Stop reason: HOSPADM

## 2022-01-01 RX ORDER — ASCORBIC ACID 500 MG
500 TABLET ORAL DAILY
Start: 2022-01-01

## 2022-01-01 RX ORDER — MELATONIN
1000 DAILY
Status: DISCONTINUED | OUTPATIENT
Start: 2022-01-01 | End: 2022-01-01 | Stop reason: HOSPADM

## 2022-01-01 RX ORDER — GUAIFENESIN 600 MG/1
1200 TABLET, EXTENDED RELEASE ORAL EVERY 12 HOURS SCHEDULED
Status: DISCONTINUED | OUTPATIENT
Start: 2022-01-01 | End: 2022-01-01 | Stop reason: HOSPADM

## 2022-01-01 RX ORDER — LOPERAMIDE HYDROCHLORIDE 2 MG/1
2 CAPSULE ORAL 3 TIMES DAILY PRN
Status: DISPENSED | OUTPATIENT
Start: 2022-01-01 | End: 2022-01-01

## 2022-01-01 RX ORDER — OLMESARTAN MEDOXOMIL / AMLODIPINE BESYLATE / HYDROCHLOROTHIAZIDE 40; 10; 12.5 MG/1; MG/1; MG/1
1 TABLET, FILM COATED ORAL DAILY
Qty: 90 TABLET | Refills: 3 | Status: SHIPPED | OUTPATIENT
Start: 2022-01-01 | End: 2022-01-01 | Stop reason: CLARIF

## 2022-01-01 RX ORDER — FINASTERIDE 5 MG/1
5 TABLET, FILM COATED ORAL DAILY
Qty: 90 TABLET | Refills: 3 | Status: SHIPPED | OUTPATIENT
Start: 2022-01-01 | End: 2022-01-01 | Stop reason: SDUPTHER

## 2022-01-01 RX ORDER — ASCORBIC ACID 500 MG
500 TABLET ORAL DAILY
Status: DISCONTINUED | OUTPATIENT
Start: 2022-01-01 | End: 2022-07-29 | Stop reason: HOSPADM

## 2022-01-01 RX ORDER — DIAZEPAM 5 MG/1
2.5 TABLET ORAL EVERY 6 HOURS PRN
Status: DISCONTINUED | OUTPATIENT
Start: 2022-01-01 | End: 2022-01-01 | Stop reason: SDUPTHER

## 2022-01-01 RX ORDER — TRAMADOL HYDROCHLORIDE 50 MG/1
50 TABLET ORAL 2 TIMES DAILY
Status: DISCONTINUED | OUTPATIENT
Start: 2022-01-01 | End: 2022-01-01 | Stop reason: HOSPADM

## 2022-01-01 RX ORDER — ZINC SULFATE 50(220)MG
220 CAPSULE ORAL DAILY
Status: DISCONTINUED | OUTPATIENT
Start: 2022-01-01 | End: 2022-07-29 | Stop reason: HOSPADM

## 2022-01-01 RX ORDER — FAMOTIDINE 20 MG/1
20 TABLET, FILM COATED ORAL
Status: DISCONTINUED | OUTPATIENT
Start: 2022-01-01 | End: 2022-07-29 | Stop reason: HOSPADM

## 2022-01-01 RX ORDER — GABAPENTIN 400 MG/1
400 CAPSULE ORAL 3 TIMES DAILY
Status: DISCONTINUED | OUTPATIENT
Start: 2022-01-01 | End: 2022-01-01 | Stop reason: HOSPADM

## 2022-01-01 RX ORDER — ACETAMINOPHEN 160 MG/5ML
650 SOLUTION ORAL EVERY 4 HOURS PRN
Status: DISCONTINUED | OUTPATIENT
Start: 2022-01-01 | End: 2022-01-01 | Stop reason: HOSPADM

## 2022-01-01 RX ORDER — DIAZEPAM 5 MG/1
2.5 TABLET ORAL EVERY 6 HOURS PRN
Status: DISCONTINUED | OUTPATIENT
Start: 2022-01-01 | End: 2022-07-29 | Stop reason: HOSPADM

## 2022-01-01 RX ORDER — SODIUM CHLORIDE 0.9 % (FLUSH) 0.9 %
10 SYRINGE (ML) INJECTION EVERY 12 HOURS
Status: DISCONTINUED | OUTPATIENT
Start: 2022-01-01 | End: 2022-07-29 | Stop reason: HOSPADM

## 2022-01-01 RX ORDER — IPRATROPIUM BROMIDE AND ALBUTEROL SULFATE 2.5; .5 MG/3ML; MG/3ML
3 SOLUTION RESPIRATORY (INHALATION)
Status: DISCONTINUED | OUTPATIENT
Start: 2022-01-01 | End: 2022-01-01 | Stop reason: HOSPADM

## 2022-01-01 RX ORDER — PREDNISONE 20 MG/1
40 TABLET ORAL
Status: DISCONTINUED | OUTPATIENT
Start: 2022-01-01 | End: 2022-01-01

## 2022-01-01 RX ORDER — OLMESARTAN MEDOXOMIL / AMLODIPINE BESYLATE / HYDROCHLOROTHIAZIDE 40; 10; 12.5 MG/1; MG/1; MG/1
1 TABLET, FILM COATED ORAL DAILY
Qty: 90 TABLET | Refills: 3 | Status: SHIPPED | OUTPATIENT
Start: 2022-01-01 | End: 2022-01-01 | Stop reason: HOSPADM

## 2022-01-01 RX ORDER — ASCORBIC ACID 500 MG
500 TABLET ORAL DAILY
Status: DISCONTINUED | OUTPATIENT
Start: 2022-01-01 | End: 2022-01-01 | Stop reason: HOSPADM

## 2022-01-01 RX ORDER — FUROSEMIDE 10 MG/ML
40 INJECTION INTRAMUSCULAR; INTRAVENOUS ONCE
Status: COMPLETED | OUTPATIENT
Start: 2022-01-01 | End: 2022-01-01

## 2022-01-01 RX ORDER — ONDANSETRON 2 MG/ML
4 INJECTION INTRAMUSCULAR; INTRAVENOUS EVERY 6 HOURS PRN
Status: DISCONTINUED | OUTPATIENT
Start: 2022-01-01 | End: 2022-07-29 | Stop reason: HOSPADM

## 2022-01-01 RX ORDER — BUSPIRONE HYDROCHLORIDE 10 MG/1
10 TABLET ORAL 3 TIMES DAILY
Start: 2022-01-01

## 2022-01-01 RX ORDER — FUROSEMIDE 20 MG/1
20 TABLET ORAL 2 TIMES DAILY
Start: 2022-01-01

## 2022-01-01 RX ORDER — ALBUTEROL SULFATE 90 UG/1
2 AEROSOL, METERED RESPIRATORY (INHALATION)
Status: DISCONTINUED | OUTPATIENT
Start: 2022-01-01 | End: 2022-01-01

## 2022-01-01 RX ORDER — FUROSEMIDE 20 MG/1
20 TABLET ORAL
Status: DISCONTINUED | OUTPATIENT
Start: 2022-01-01 | End: 2022-07-29 | Stop reason: HOSPADM

## 2022-01-01 RX ORDER — SODIUM CHLORIDE 0.9 % (FLUSH) 0.9 %
10 SYRINGE (ML) INJECTION AS NEEDED
Status: DISCONTINUED | OUTPATIENT
Start: 2022-01-01 | End: 2022-07-29 | Stop reason: HOSPADM

## 2022-01-01 RX ADMIN — GUAIFENESIN 1200 MG: 600 TABLET, EXTENDED RELEASE ORAL at 08:14

## 2022-01-01 RX ADMIN — TAZOBACTAM SODIUM AND PIPERACILLIN SODIUM 4.5 G: 500; 4 INJECTION, SOLUTION INTRAVENOUS at 05:02

## 2022-01-01 RX ADMIN — BUSPIRONE HYDROCHLORIDE 10 MG: 10 TABLET ORAL at 17:20

## 2022-01-01 RX ADMIN — FAMOTIDINE 20 MG: 20 TABLET, FILM COATED ORAL at 17:02

## 2022-01-01 RX ADMIN — FAMOTIDINE 20 MG: 20 TABLET, FILM COATED ORAL at 17:42

## 2022-01-01 RX ADMIN — ALBUTEROL SULFATE 2 PUFF: 90 AEROSOL, METERED RESPIRATORY (INHALATION) at 14:16

## 2022-01-01 RX ADMIN — METHYLPREDNISOLONE SODIUM SUCCINATE 125 MG: 125 INJECTION, POWDER, FOR SOLUTION INTRAMUSCULAR; INTRAVENOUS at 17:34

## 2022-01-01 RX ADMIN — OXYCODONE HYDROCHLORIDE AND ACETAMINOPHEN 500 MG: 500 TABLET ORAL at 08:41

## 2022-01-01 RX ADMIN — IPRATROPIUM BROMIDE AND ALBUTEROL SULFATE 3 ML: .5; 3 SOLUTION RESPIRATORY (INHALATION) at 11:53

## 2022-01-01 RX ADMIN — FINASTERIDE 5 MG: 5 TABLET, FILM COATED ORAL at 08:53

## 2022-01-01 RX ADMIN — ZINC SULFATE 220 MG (50 MG) CAPSULE 220 MG: CAPSULE at 08:34

## 2022-01-01 RX ADMIN — GABAPENTIN 400 MG: 400 CAPSULE ORAL at 08:12

## 2022-01-01 RX ADMIN — Medication 3 MG: at 21:10

## 2022-01-01 RX ADMIN — GUAIFENESIN 1200 MG: 600 TABLET, EXTENDED RELEASE ORAL at 09:14

## 2022-01-01 RX ADMIN — Medication 10 ML: at 20:10

## 2022-01-01 RX ADMIN — Medication 10 ML: at 09:17

## 2022-01-01 RX ADMIN — TAZOBACTAM SODIUM AND PIPERACILLIN SODIUM 4.5 G: 500; 4 INJECTION, SOLUTION INTRAVENOUS at 05:48

## 2022-01-01 RX ADMIN — GABAPENTIN 400 MG: 400 CAPSULE ORAL at 09:19

## 2022-01-01 RX ADMIN — BUSPIRONE HYDROCHLORIDE 10 MG: 10 TABLET ORAL at 20:04

## 2022-01-01 RX ADMIN — ZINC SULFATE 220 MG (50 MG) CAPSULE 220 MG: CAPSULE at 09:19

## 2022-01-01 RX ADMIN — FAMOTIDINE 20 MG: 20 TABLET, FILM COATED ORAL at 06:33

## 2022-01-01 RX ADMIN — TAZOBACTAM SODIUM AND PIPERACILLIN SODIUM 4.5 G: 500; 4 INJECTION, SOLUTION INTRAVENOUS at 13:13

## 2022-01-01 RX ADMIN — BUSPIRONE HYDROCHLORIDE 10 MG: 10 TABLET ORAL at 16:46

## 2022-01-01 RX ADMIN — ALBUTEROL SULFATE 2 PUFF: 90 AEROSOL, METERED RESPIRATORY (INHALATION) at 10:23

## 2022-01-01 RX ADMIN — FAMOTIDINE 20 MG: 20 TABLET, FILM COATED ORAL at 08:06

## 2022-01-01 RX ADMIN — Medication 10 ML: at 21:10

## 2022-01-01 RX ADMIN — ENOXAPARIN SODIUM 90 MG: 100 INJECTION SUBCUTANEOUS at 17:16

## 2022-01-01 RX ADMIN — OXYCODONE HYDROCHLORIDE AND ACETAMINOPHEN 500 MG: 500 TABLET ORAL at 09:18

## 2022-01-01 RX ADMIN — CEFEPIME HYDROCHLORIDE 2 G: 2 INJECTION, POWDER, FOR SOLUTION INTRAVENOUS at 21:24

## 2022-01-01 RX ADMIN — PREDNISONE 30 MG: 20 TABLET ORAL at 09:37

## 2022-01-01 RX ADMIN — ACETAMINOPHEN 650 MG: 325 TABLET, FILM COATED ORAL at 21:32

## 2022-01-01 RX ADMIN — ALBUTEROL SULFATE 2 PUFF: 90 AEROSOL, METERED RESPIRATORY (INHALATION) at 06:16

## 2022-01-01 RX ADMIN — OXYCODONE HYDROCHLORIDE AND ACETAMINOPHEN 500 MG: 500 TABLET ORAL at 09:15

## 2022-01-01 RX ADMIN — MORPHINE SULFATE 1 MG: 2 INJECTION, SOLUTION INTRAMUSCULAR; INTRAVENOUS at 16:51

## 2022-01-01 RX ADMIN — GUAIFENESIN 1200 MG: 600 TABLET, EXTENDED RELEASE ORAL at 20:17

## 2022-01-01 RX ADMIN — ALBUTEROL SULFATE 2 PUFF: 90 AEROSOL, METERED RESPIRATORY (INHALATION) at 10:00

## 2022-01-01 RX ADMIN — GABAPENTIN 400 MG: 400 CAPSULE ORAL at 21:32

## 2022-01-01 RX ADMIN — GABAPENTIN 400 MG: 400 CAPSULE ORAL at 09:08

## 2022-01-01 RX ADMIN — ACETAMINOPHEN 650 MG: 325 TABLET, FILM COATED ORAL at 09:49

## 2022-01-01 RX ADMIN — ALBUTEROL SULFATE 2 PUFF: 90 AEROSOL, METERED RESPIRATORY (INHALATION) at 06:55

## 2022-01-01 RX ADMIN — IPRATROPIUM BROMIDE AND ALBUTEROL SULFATE 3 ML: .5; 3 SOLUTION RESPIRATORY (INHALATION) at 06:12

## 2022-01-01 RX ADMIN — OXYCODONE HYDROCHLORIDE AND ACETAMINOPHEN 500 MG: 500 TABLET ORAL at 09:37

## 2022-01-01 RX ADMIN — ENOXAPARIN SODIUM 90 MG: 100 INJECTION SUBCUTANEOUS at 17:46

## 2022-01-01 RX ADMIN — FAMOTIDINE 20 MG: 20 TABLET, FILM COATED ORAL at 18:45

## 2022-01-01 RX ADMIN — ASPIRIN 81 MG: 81 TABLET, COATED ORAL at 08:10

## 2022-01-01 RX ADMIN — GABAPENTIN 400 MG: 400 CAPSULE ORAL at 08:32

## 2022-01-01 RX ADMIN — GABAPENTIN 400 MG: 400 CAPSULE ORAL at 08:42

## 2022-01-01 RX ADMIN — Medication 10 ML: at 22:21

## 2022-01-01 RX ADMIN — ENOXAPARIN SODIUM 90 MG: 100 INJECTION SUBCUTANEOUS at 06:07

## 2022-01-01 RX ADMIN — ENOXAPARIN SODIUM 90 MG: 100 INJECTION SUBCUTANEOUS at 05:13

## 2022-01-01 RX ADMIN — BUSPIRONE HYDROCHLORIDE 10 MG: 10 TABLET ORAL at 16:49

## 2022-01-01 RX ADMIN — FAMOTIDINE 20 MG: 20 TABLET, FILM COATED ORAL at 09:16

## 2022-01-01 RX ADMIN — GUAIFENESIN 1200 MG: 600 TABLET, EXTENDED RELEASE ORAL at 08:22

## 2022-01-01 RX ADMIN — ASPIRIN 81 MG: 81 TABLET, COATED ORAL at 08:08

## 2022-01-01 RX ADMIN — IPRATROPIUM BROMIDE AND ALBUTEROL SULFATE 3 ML: .5; 3 SOLUTION RESPIRATORY (INHALATION) at 14:22

## 2022-01-01 RX ADMIN — FINASTERIDE 5 MG: 5 TABLET, FILM COATED ORAL at 08:36

## 2022-01-01 RX ADMIN — TRAMADOL HYDROCHLORIDE 50 MG: 50 TABLET, COATED ORAL at 20:11

## 2022-01-01 RX ADMIN — Medication 10 ML: at 21:25

## 2022-01-01 RX ADMIN — ENOXAPARIN SODIUM 90 MG: 100 INJECTION SUBCUTANEOUS at 17:42

## 2022-01-01 RX ADMIN — OXYCODONE HYDROCHLORIDE AND ACETAMINOPHEN 500 MG: 500 TABLET ORAL at 08:53

## 2022-01-01 RX ADMIN — Medication 5000 UNITS: at 08:53

## 2022-01-01 RX ADMIN — ENOXAPARIN SODIUM 90 MG: 100 INJECTION SUBCUTANEOUS at 18:45

## 2022-01-01 RX ADMIN — MONTELUKAST SODIUM 10 MG: 10 TABLET, FILM COATED ORAL at 21:32

## 2022-01-01 RX ADMIN — ATORVASTATIN CALCIUM 20 MG: 10 TABLET, FILM COATED ORAL at 08:36

## 2022-01-01 RX ADMIN — ENOXAPARIN SODIUM 90 MG: 100 INJECTION SUBCUTANEOUS at 17:35

## 2022-01-01 RX ADMIN — TRAMADOL HYDROCHLORIDE 50 MG: 50 TABLET, COATED ORAL at 23:43

## 2022-01-01 RX ADMIN — Medication 5000 UNITS: at 10:27

## 2022-01-01 RX ADMIN — IPRATROPIUM BROMIDE AND ALBUTEROL SULFATE 3 ML: .5; 3 SOLUTION RESPIRATORY (INHALATION) at 15:10

## 2022-01-01 RX ADMIN — ALBUTEROL SULFATE 2 PUFF: 90 AEROSOL, METERED RESPIRATORY (INHALATION) at 15:25

## 2022-01-01 RX ADMIN — CEFEPIME 2 G: 2 INJECTION, POWDER, FOR SOLUTION INTRAVENOUS at 06:36

## 2022-01-01 RX ADMIN — ENOXAPARIN SODIUM 90 MG: 100 INJECTION SUBCUTANEOUS at 05:30

## 2022-01-01 RX ADMIN — FUROSEMIDE 20 MG: 20 TABLET ORAL at 17:04

## 2022-01-01 RX ADMIN — CEFEPIME HYDROCHLORIDE 2 G: 2 INJECTION, POWDER, FOR SOLUTION INTRAVENOUS at 15:41

## 2022-01-01 RX ADMIN — BUSPIRONE HYDROCHLORIDE 10 MG: 10 TABLET ORAL at 20:09

## 2022-01-01 RX ADMIN — ENOXAPARIN SODIUM 90 MG: 100 INJECTION SUBCUTANEOUS at 17:47

## 2022-01-01 RX ADMIN — ALBUTEROL SULFATE 2 PUFF: 90 AEROSOL, METERED RESPIRATORY (INHALATION) at 06:47

## 2022-01-01 RX ADMIN — BUSPIRONE HYDROCHLORIDE 10 MG: 10 TABLET ORAL at 16:19

## 2022-01-01 RX ADMIN — ATORVASTATIN CALCIUM 20 MG: 10 TABLET, FILM COATED ORAL at 08:11

## 2022-01-01 RX ADMIN — Medication 10 ML: at 22:27

## 2022-01-01 RX ADMIN — FUROSEMIDE 20 MG: 20 TABLET ORAL at 17:16

## 2022-01-01 RX ADMIN — Medication 3 MG: at 21:24

## 2022-01-01 RX ADMIN — FUROSEMIDE 20 MG: 20 TABLET ORAL at 08:11

## 2022-01-01 RX ADMIN — DIPHENHYDRAMINE HYDROCHLORIDE 25 MG: 25 CAPSULE ORAL at 20:51

## 2022-01-01 RX ADMIN — FINASTERIDE 5 MG: 5 TABLET, FILM COATED ORAL at 09:19

## 2022-01-01 RX ADMIN — FUROSEMIDE 20 MG: 20 TABLET ORAL at 17:38

## 2022-01-01 RX ADMIN — GABAPENTIN 400 MG: 400 CAPSULE ORAL at 20:28

## 2022-01-01 RX ADMIN — GUAIFENESIN 1200 MG: 600 TABLET, EXTENDED RELEASE ORAL at 08:54

## 2022-01-01 RX ADMIN — CEFEPIME 2 G: 2 INJECTION, POWDER, FOR SOLUTION INTRAVENOUS at 05:08

## 2022-01-01 RX ADMIN — GABAPENTIN 400 MG: 400 CAPSULE ORAL at 09:14

## 2022-01-01 RX ADMIN — GUAIFENESIN 1200 MG: 600 TABLET, EXTENDED RELEASE ORAL at 21:54

## 2022-01-01 RX ADMIN — FAMOTIDINE 20 MG: 20 TABLET, FILM COATED ORAL at 17:35

## 2022-01-01 RX ADMIN — IPRATROPIUM BROMIDE AND ALBUTEROL SULFATE 3 ML: .5; 3 SOLUTION RESPIRATORY (INHALATION) at 07:17

## 2022-01-01 RX ADMIN — IPRATROPIUM BROMIDE AND ALBUTEROL SULFATE 3 ML: .5; 3 SOLUTION RESPIRATORY (INHALATION) at 18:40

## 2022-01-01 RX ADMIN — GABAPENTIN 400 MG: 400 CAPSULE ORAL at 21:09

## 2022-01-01 RX ADMIN — FLUTICASONE PROPIONATE 2 SPRAY: 50 SPRAY, METERED NASAL at 08:33

## 2022-01-01 RX ADMIN — ZINC SULFATE 220 MG (50 MG) CAPSULE 220 MG: CAPSULE at 08:21

## 2022-01-01 RX ADMIN — ATORVASTATIN CALCIUM 20 MG: 10 TABLET, FILM COATED ORAL at 08:32

## 2022-01-01 RX ADMIN — ENOXAPARIN SODIUM 90 MG: 100 INJECTION SUBCUTANEOUS at 05:57

## 2022-01-01 RX ADMIN — Medication 10 ML: at 08:12

## 2022-01-01 RX ADMIN — ALBUTEROL SULFATE 2 PUFF: 90 AEROSOL, METERED RESPIRATORY (INHALATION) at 14:21

## 2022-01-01 RX ADMIN — IPRATROPIUM BROMIDE AND ALBUTEROL SULFATE 3 ML: .5; 3 SOLUTION RESPIRATORY (INHALATION) at 14:23

## 2022-01-01 RX ADMIN — AZITHROMYCIN DIHYDRATE 500 MG: 500 INJECTION, POWDER, LYOPHILIZED, FOR SOLUTION INTRAVENOUS at 08:42

## 2022-01-01 RX ADMIN — FUROSEMIDE 20 MG: 20 TABLET ORAL at 08:15

## 2022-01-01 RX ADMIN — GABAPENTIN 400 MG: 400 CAPSULE ORAL at 20:18

## 2022-01-01 RX ADMIN — ATORVASTATIN CALCIUM 20 MG: 10 TABLET, FILM COATED ORAL at 08:53

## 2022-01-01 RX ADMIN — ALBUTEROL SULFATE 2 PUFF: 90 AEROSOL, METERED RESPIRATORY (INHALATION) at 11:14

## 2022-01-01 RX ADMIN — ENOXAPARIN SODIUM 90 MG: 100 INJECTION SUBCUTANEOUS at 17:56

## 2022-01-01 RX ADMIN — IPRATROPIUM BROMIDE AND ALBUTEROL SULFATE 3 ML: .5; 3 SOLUTION RESPIRATORY (INHALATION) at 05:55

## 2022-01-01 RX ADMIN — PERFLUTREN 8.48 MG: 6.52 INJECTION, SUSPENSION INTRAVENOUS at 15:55

## 2022-01-01 RX ADMIN — GUAIFENESIN 1200 MG: 600 TABLET, EXTENDED RELEASE ORAL at 20:10

## 2022-01-01 RX ADMIN — FUROSEMIDE 20 MG: 20 TABLET ORAL at 08:27

## 2022-01-01 RX ADMIN — CETIRIZINE HYDROCHLORIDE 10 MG: 10 TABLET ORAL at 20:35

## 2022-01-01 RX ADMIN — IPRATROPIUM BROMIDE AND ALBUTEROL SULFATE 3 ML: .5; 3 SOLUTION RESPIRATORY (INHALATION) at 14:15

## 2022-01-01 RX ADMIN — Medication 10 ML: at 12:20

## 2022-01-01 RX ADMIN — DIAZEPAM 2.5 MG: 5 INJECTION, SOLUTION INTRAMUSCULAR; INTRAVENOUS at 20:09

## 2022-01-01 RX ADMIN — FAMOTIDINE 20 MG: 20 TABLET, FILM COATED ORAL at 08:22

## 2022-01-01 RX ADMIN — GABAPENTIN 400 MG: 400 CAPSULE ORAL at 08:13

## 2022-01-01 RX ADMIN — Medication 10 ML: at 09:38

## 2022-01-01 RX ADMIN — ATORVASTATIN CALCIUM 20 MG: 10 TABLET, FILM COATED ORAL at 09:18

## 2022-01-01 RX ADMIN — CEFEPIME HYDROCHLORIDE 2 G: 2 INJECTION, POWDER, FOR SOLUTION INTRAVENOUS at 14:04

## 2022-01-01 RX ADMIN — TRAMADOL HYDROCHLORIDE 50 MG: 50 TABLET, COATED ORAL at 21:36

## 2022-01-01 RX ADMIN — ENOXAPARIN SODIUM 80 MG: 100 INJECTION SUBCUTANEOUS at 05:27

## 2022-01-01 RX ADMIN — TAZOBACTAM SODIUM AND PIPERACILLIN SODIUM 4.5 G: 500; 4 INJECTION, SOLUTION INTRAVENOUS at 21:18

## 2022-01-01 RX ADMIN — TAZOBACTAM SODIUM AND PIPERACILLIN SODIUM 4.5 G: 500; 4 INJECTION, SOLUTION INTRAVENOUS at 20:10

## 2022-01-01 RX ADMIN — GABAPENTIN 400 MG: 400 CAPSULE ORAL at 21:17

## 2022-01-01 RX ADMIN — FLUTICASONE PROPIONATE 2 SPRAY: 50 SPRAY, METERED NASAL at 09:07

## 2022-01-01 RX ADMIN — GUAIFENESIN 1200 MG: 600 TABLET, EXTENDED RELEASE ORAL at 08:06

## 2022-01-01 RX ADMIN — CETIRIZINE HYDROCHLORIDE 10 MG: 10 TABLET ORAL at 10:27

## 2022-01-01 RX ADMIN — ALBUTEROL SULFATE 2 PUFF: 90 AEROSOL, METERED RESPIRATORY (INHALATION) at 06:48

## 2022-01-01 RX ADMIN — FAMOTIDINE 20 MG: 20 TABLET, FILM COATED ORAL at 08:41

## 2022-01-01 RX ADMIN — ACETAMINOPHEN 650 MG: 325 TABLET, FILM COATED ORAL at 07:48

## 2022-01-01 RX ADMIN — TAZOBACTAM SODIUM AND PIPERACILLIN SODIUM 4.5 G: 500; 4 INJECTION, SOLUTION INTRAVENOUS at 14:17

## 2022-01-01 RX ADMIN — METHYLPREDNISOLONE SODIUM SUCCINATE 40 MG: 40 INJECTION, POWDER, FOR SOLUTION INTRAMUSCULAR; INTRAVENOUS at 17:16

## 2022-01-01 RX ADMIN — ASPIRIN 81 MG: 81 TABLET, COATED ORAL at 08:40

## 2022-01-01 RX ADMIN — BUSPIRONE HYDROCHLORIDE 10 MG: 10 TABLET ORAL at 21:33

## 2022-01-01 RX ADMIN — TRAMADOL HYDROCHLORIDE 50 MG: 50 TABLET, COATED ORAL at 21:35

## 2022-01-01 RX ADMIN — Medication 3 MG: at 21:32

## 2022-01-01 RX ADMIN — ASPIRIN 81 MG: 81 TABLET, COATED ORAL at 08:53

## 2022-01-01 RX ADMIN — CEFEPIME 2 G: 2 INJECTION, POWDER, FOR SOLUTION INTRAVENOUS at 05:24

## 2022-01-01 RX ADMIN — GUAIFENESIN 1200 MG: 600 TABLET, EXTENDED RELEASE ORAL at 21:32

## 2022-01-01 RX ADMIN — ENOXAPARIN SODIUM 90 MG: 100 INJECTION SUBCUTANEOUS at 06:38

## 2022-01-01 RX ADMIN — OXYCODONE HYDROCHLORIDE AND ACETAMINOPHEN 500 MG: 500 TABLET ORAL at 08:54

## 2022-01-01 RX ADMIN — FUROSEMIDE 20 MG: 20 TABLET ORAL at 08:09

## 2022-01-01 RX ADMIN — FAMOTIDINE 20 MG: 20 TABLET, FILM COATED ORAL at 08:40

## 2022-01-01 RX ADMIN — DIPHENHYDRAMINE HYDROCHLORIDE 25 MG: 25 CAPSULE ORAL at 20:28

## 2022-01-01 RX ADMIN — TAZOBACTAM SODIUM AND PIPERACILLIN SODIUM 4.5 G: 500; 4 INJECTION, SOLUTION INTRAVENOUS at 06:07

## 2022-01-01 RX ADMIN — AZITHROMYCIN DIHYDRATE 500 MG: 500 INJECTION, POWDER, LYOPHILIZED, FOR SOLUTION INTRAVENOUS at 15:17

## 2022-01-01 RX ADMIN — FINASTERIDE 5 MG: 5 TABLET, FILM COATED ORAL at 08:10

## 2022-01-01 RX ADMIN — GABAPENTIN 400 MG: 400 CAPSULE ORAL at 10:27

## 2022-01-01 RX ADMIN — CEFEPIME HYDROCHLORIDE 2 G: 2 INJECTION, POWDER, FOR SOLUTION INTRAVENOUS at 21:16

## 2022-01-01 RX ADMIN — CEFEPIME 2 G: 2 INJECTION, POWDER, FOR SOLUTION INTRAVENOUS at 22:49

## 2022-01-01 RX ADMIN — FAMOTIDINE 20 MG: 20 TABLET, FILM COATED ORAL at 17:04

## 2022-01-01 RX ADMIN — FINASTERIDE 5 MG: 5 TABLET, FILM COATED ORAL at 08:44

## 2022-01-01 RX ADMIN — METHYLPREDNISOLONE SODIUM SUCCINATE 125 MG: 125 INJECTION, POWDER, FOR SOLUTION INTRAMUSCULAR; INTRAVENOUS at 10:56

## 2022-01-01 RX ADMIN — FLUTICASONE PROPIONATE 2 SPRAY: 50 SPRAY, METERED NASAL at 10:27

## 2022-01-01 RX ADMIN — FLUTICASONE PROPIONATE 2 SPRAY: 50 SPRAY, METERED NASAL at 08:53

## 2022-01-01 RX ADMIN — FLUTICASONE PROPIONATE 2 SPRAY: 50 SPRAY, METERED NASAL at 09:18

## 2022-01-01 RX ADMIN — TAZOBACTAM SODIUM AND PIPERACILLIN SODIUM 4.5 G: 500; 4 INJECTION, SOLUTION INTRAVENOUS at 14:20

## 2022-01-01 RX ADMIN — BUSPIRONE HYDROCHLORIDE 10 MG: 10 TABLET ORAL at 08:07

## 2022-01-01 RX ADMIN — GABAPENTIN 400 MG: 400 CAPSULE ORAL at 21:16

## 2022-01-01 RX ADMIN — GABAPENTIN 400 MG: 400 CAPSULE ORAL at 20:37

## 2022-01-01 RX ADMIN — IOPAMIDOL 100 ML: 755 INJECTION, SOLUTION INTRAVENOUS at 13:42

## 2022-01-01 RX ADMIN — GUAIFENESIN 1200 MG: 600 TABLET, EXTENDED RELEASE ORAL at 21:09

## 2022-01-01 RX ADMIN — CEFEPIME HYDROCHLORIDE 2 G: 2 INJECTION, POWDER, FOR SOLUTION INTRAVENOUS at 06:34

## 2022-01-01 RX ADMIN — SODIUM CHLORIDE 125 ML/HR: 9 INJECTION, SOLUTION INTRAVENOUS at 14:42

## 2022-01-01 RX ADMIN — MONTELUKAST SODIUM 10 MG: 10 TABLET, FILM COATED ORAL at 21:24

## 2022-01-01 RX ADMIN — FINASTERIDE 5 MG: 5 TABLET, FILM COATED ORAL at 09:37

## 2022-01-01 RX ADMIN — GABAPENTIN 400 MG: 400 CAPSULE ORAL at 20:10

## 2022-01-01 RX ADMIN — IPRATROPIUM BROMIDE AND ALBUTEROL SULFATE 3 ML: .5; 3 SOLUTION RESPIRATORY (INHALATION) at 15:01

## 2022-01-01 RX ADMIN — ENOXAPARIN SODIUM 80 MG: 100 INJECTION SUBCUTANEOUS at 06:33

## 2022-01-01 RX ADMIN — OXYCODONE HYDROCHLORIDE AND ACETAMINOPHEN 500 MG: 500 TABLET ORAL at 10:27

## 2022-01-01 RX ADMIN — FINASTERIDE 5 MG: 5 TABLET, FILM COATED ORAL at 09:17

## 2022-01-01 RX ADMIN — FINASTERIDE 5 MG: 5 TABLET, FILM COATED ORAL at 08:14

## 2022-01-01 RX ADMIN — ACETAMINOPHEN 650 MG: 325 TABLET, FILM COATED ORAL at 10:14

## 2022-01-01 RX ADMIN — Medication 10 ML: at 20:43

## 2022-01-01 RX ADMIN — ACETAMINOPHEN 650 MG: 325 TABLET, FILM COATED ORAL at 12:19

## 2022-01-01 RX ADMIN — GABAPENTIN 400 MG: 400 CAPSULE ORAL at 21:35

## 2022-01-01 RX ADMIN — CETIRIZINE HYDROCHLORIDE 10 MG: 10 TABLET ORAL at 09:08

## 2022-01-01 RX ADMIN — ENOXAPARIN SODIUM 80 MG: 100 INJECTION SUBCUTANEOUS at 18:10

## 2022-01-01 RX ADMIN — GABAPENTIN 400 MG: 400 CAPSULE ORAL at 20:04

## 2022-01-01 RX ADMIN — ENOXAPARIN SODIUM 90 MG: 100 INJECTION SUBCUTANEOUS at 16:51

## 2022-01-01 RX ADMIN — Medication 5000 UNITS: at 09:08

## 2022-01-01 RX ADMIN — ALBUTEROL SULFATE 2 PUFF: 90 AEROSOL, METERED RESPIRATORY (INHALATION) at 18:30

## 2022-01-01 RX ADMIN — AZITHROMYCIN DIHYDRATE 500 MG: 500 INJECTION, POWDER, LYOPHILIZED, FOR SOLUTION INTRAVENOUS at 10:26

## 2022-01-01 RX ADMIN — ASPIRIN 81 MG: 81 TABLET, COATED ORAL at 09:16

## 2022-01-01 RX ADMIN — BUSPIRONE HYDROCHLORIDE 10 MG: 10 TABLET ORAL at 21:09

## 2022-01-01 RX ADMIN — BUSPIRONE HYDROCHLORIDE 10 MG: 10 TABLET ORAL at 21:35

## 2022-01-01 RX ADMIN — ENOXAPARIN SODIUM 90 MG: 100 INJECTION SUBCUTANEOUS at 17:04

## 2022-01-01 RX ADMIN — FAMOTIDINE 20 MG: 20 TABLET, FILM COATED ORAL at 16:50

## 2022-01-01 RX ADMIN — Medication 10 ML: at 20:18

## 2022-01-01 RX ADMIN — PREDNISONE 40 MG: 20 TABLET ORAL at 08:14

## 2022-01-01 RX ADMIN — ZINC SULFATE 220 MG (50 MG) CAPSULE 220 MG: CAPSULE at 17:35

## 2022-01-01 RX ADMIN — Medication 10 ML: at 20:25

## 2022-01-01 RX ADMIN — IPRATROPIUM BROMIDE AND ALBUTEROL SULFATE 3 ML: .5; 3 SOLUTION RESPIRATORY (INHALATION) at 06:29

## 2022-01-01 RX ADMIN — ENOXAPARIN SODIUM 90 MG: 100 INJECTION SUBCUTANEOUS at 05:49

## 2022-01-01 RX ADMIN — Medication 1000 UNITS: at 17:46

## 2022-01-01 RX ADMIN — TRAMADOL HYDROCHLORIDE 50 MG: 50 TABLET ORAL at 20:17

## 2022-01-01 RX ADMIN — METHYLPREDNISOLONE SODIUM SUCCINATE 60 MG: 125 INJECTION, POWDER, FOR SOLUTION INTRAMUSCULAR; INTRAVENOUS at 11:11

## 2022-01-01 RX ADMIN — CEFEPIME 2 G: 2 INJECTION, POWDER, FOR SOLUTION INTRAVENOUS at 14:44

## 2022-01-01 RX ADMIN — DIPHENHYDRAMINE HYDROCHLORIDE 25 MG: 25 CAPSULE ORAL at 21:41

## 2022-01-01 RX ADMIN — ATORVASTATIN CALCIUM 20 MG: 10 TABLET, FILM COATED ORAL at 08:27

## 2022-01-01 RX ADMIN — ATORVASTATIN CALCIUM 20 MG: 10 TABLET, FILM COATED ORAL at 10:27

## 2022-01-01 RX ADMIN — TAZOBACTAM SODIUM AND PIPERACILLIN SODIUM 4.5 G: 500; 4 INJECTION, SOLUTION INTRAVENOUS at 06:09

## 2022-01-01 RX ADMIN — ALBUTEROL SULFATE 2 PUFF: 90 AEROSOL, METERED RESPIRATORY (INHALATION) at 06:34

## 2022-01-01 RX ADMIN — FAMOTIDINE 20 MG: 20 TABLET, FILM COATED ORAL at 08:42

## 2022-01-01 RX ADMIN — MONTELUKAST SODIUM 10 MG: 10 TABLET, FILM COATED ORAL at 21:17

## 2022-01-01 RX ADMIN — CEFEPIME 2 G: 2 INJECTION, POWDER, FOR SOLUTION INTRAVENOUS at 13:34

## 2022-01-01 RX ADMIN — CEFEPIME 2 G: 2 INJECTION, POWDER, FOR SOLUTION INTRAVENOUS at 15:20

## 2022-01-01 RX ADMIN — IPRATROPIUM BROMIDE AND ALBUTEROL SULFATE 3 ML: .5; 3 SOLUTION RESPIRATORY (INHALATION) at 10:26

## 2022-01-01 RX ADMIN — GABAPENTIN 400 MG: 400 CAPSULE ORAL at 21:10

## 2022-01-01 RX ADMIN — FUROSEMIDE 20 MG: 20 TABLET ORAL at 16:43

## 2022-01-01 RX ADMIN — GUAIFENESIN 1200 MG: 600 TABLET, EXTENDED RELEASE ORAL at 08:53

## 2022-01-01 RX ADMIN — DIAZEPAM 2.5 MG: 5 INJECTION, SOLUTION INTRAMUSCULAR; INTRAVENOUS at 00:38

## 2022-01-01 RX ADMIN — GABAPENTIN 400 MG: 400 CAPSULE ORAL at 17:46

## 2022-01-01 RX ADMIN — GUAIFENESIN 1200 MG: 600 TABLET, EXTENDED RELEASE ORAL at 08:40

## 2022-01-01 RX ADMIN — ASPIRIN 81 MG: 81 TABLET, COATED ORAL at 08:33

## 2022-01-01 RX ADMIN — BUSPIRONE HYDROCHLORIDE 10 MG: 10 TABLET ORAL at 08:36

## 2022-01-01 RX ADMIN — DIPHENHYDRAMINE HYDROCHLORIDE 25 MG: 25 CAPSULE ORAL at 21:32

## 2022-01-01 RX ADMIN — ASPIRIN 81 MG: 81 TABLET, COATED ORAL at 08:11

## 2022-01-01 RX ADMIN — IPRATROPIUM BROMIDE AND ALBUTEROL SULFATE 3 ML: .5; 3 SOLUTION RESPIRATORY (INHALATION) at 06:39

## 2022-01-01 RX ADMIN — IPRATROPIUM BROMIDE AND ALBUTEROL SULFATE 3 ML: .5; 3 SOLUTION RESPIRATORY (INHALATION) at 19:24

## 2022-01-01 RX ADMIN — ENOXAPARIN SODIUM 90 MG: 100 INJECTION SUBCUTANEOUS at 05:04

## 2022-01-01 RX ADMIN — FAMOTIDINE 20 MG: 20 TABLET, FILM COATED ORAL at 06:36

## 2022-01-01 RX ADMIN — FUROSEMIDE 20 MG: 20 TABLET ORAL at 08:08

## 2022-01-01 RX ADMIN — IPRATROPIUM BROMIDE AND ALBUTEROL SULFATE 3 ML: .5; 3 SOLUTION RESPIRATORY (INHALATION) at 07:05

## 2022-01-01 RX ADMIN — CETIRIZINE HYDROCHLORIDE 10 MG: 10 TABLET ORAL at 08:10

## 2022-01-01 RX ADMIN — CETIRIZINE HYDROCHLORIDE 10 MG: 10 TABLET ORAL at 08:36

## 2022-01-01 RX ADMIN — TRAMADOL HYDROCHLORIDE 50 MG: 50 TABLET, COATED ORAL at 08:41

## 2022-01-01 RX ADMIN — MONTELUKAST SODIUM 10 MG: 10 TABLET, FILM COATED ORAL at 20:25

## 2022-01-01 RX ADMIN — FAMOTIDINE 20 MG: 20 TABLET, FILM COATED ORAL at 17:22

## 2022-01-01 RX ADMIN — FAMOTIDINE 20 MG: 20 TABLET, FILM COATED ORAL at 16:46

## 2022-01-01 RX ADMIN — GABAPENTIN 400 MG: 400 CAPSULE ORAL at 09:35

## 2022-01-01 RX ADMIN — ALBUTEROL SULFATE 2 PUFF: 90 AEROSOL, METERED RESPIRATORY (INHALATION) at 19:52

## 2022-01-01 RX ADMIN — TRAMADOL HYDROCHLORIDE 50 MG: 50 TABLET, COATED ORAL at 09:14

## 2022-01-01 RX ADMIN — Medication 5000 UNITS: at 08:14

## 2022-01-01 RX ADMIN — FUROSEMIDE 20 MG: 10 INJECTION, SOLUTION INTRAMUSCULAR; INTRAVENOUS at 12:38

## 2022-01-01 RX ADMIN — CEFEPIME HYDROCHLORIDE 2 G: 2 INJECTION, POWDER, FOR SOLUTION INTRAVENOUS at 05:13

## 2022-01-01 RX ADMIN — ATORVASTATIN CALCIUM 20 MG: 10 TABLET, FILM COATED ORAL at 09:08

## 2022-01-01 RX ADMIN — Medication 10 ML: at 09:15

## 2022-01-01 RX ADMIN — ENOXAPARIN SODIUM 90 MG: 100 INJECTION SUBCUTANEOUS at 17:20

## 2022-01-01 RX ADMIN — IPRATROPIUM BROMIDE AND ALBUTEROL SULFATE 3 ML: .5; 3 SOLUTION RESPIRATORY (INHALATION) at 19:22

## 2022-01-01 RX ADMIN — TAZOBACTAM SODIUM AND PIPERACILLIN SODIUM 4.5 G: 500; 4 INJECTION, SOLUTION INTRAVENOUS at 21:54

## 2022-01-01 RX ADMIN — ZINC SULFATE 220 MG (50 MG) CAPSULE 220 MG: CAPSULE at 08:36

## 2022-01-01 RX ADMIN — FUROSEMIDE 20 MG: 20 TABLET ORAL at 17:02

## 2022-01-01 RX ADMIN — CEFEPIME 2 G: 2 INJECTION, POWDER, FOR SOLUTION INTRAVENOUS at 21:10

## 2022-01-01 RX ADMIN — ENOXAPARIN SODIUM 90 MG: 100 INJECTION SUBCUTANEOUS at 05:09

## 2022-01-01 RX ADMIN — GABAPENTIN 400 MG: 400 CAPSULE ORAL at 20:45

## 2022-01-01 RX ADMIN — ZINC SULFATE 220 MG (50 MG) CAPSULE 220 MG: CAPSULE at 09:17

## 2022-01-01 RX ADMIN — ALBUTEROL SULFATE 2 PUFF: 90 AEROSOL, METERED RESPIRATORY (INHALATION) at 19:48

## 2022-01-01 RX ADMIN — Medication 10 ML: at 20:51

## 2022-01-01 RX ADMIN — ENOXAPARIN SODIUM 90 MG: 100 INJECTION SUBCUTANEOUS at 05:18

## 2022-01-01 RX ADMIN — Medication 5000 UNITS: at 08:09

## 2022-01-01 RX ADMIN — Medication 5000 UNITS: at 08:21

## 2022-01-01 RX ADMIN — ENOXAPARIN SODIUM 90 MG: 100 INJECTION SUBCUTANEOUS at 05:02

## 2022-01-01 RX ADMIN — FAMOTIDINE 20 MG: 20 TABLET, FILM COATED ORAL at 09:08

## 2022-01-01 RX ADMIN — FINASTERIDE 5 MG: 5 TABLET, FILM COATED ORAL at 09:08

## 2022-01-01 RX ADMIN — TRAMADOL HYDROCHLORIDE 50 MG: 50 TABLET, COATED ORAL at 15:40

## 2022-01-01 RX ADMIN — IPRATROPIUM BROMIDE AND ALBUTEROL SULFATE 3 ML: .5; 3 SOLUTION RESPIRATORY (INHALATION) at 14:17

## 2022-01-01 RX ADMIN — FAMOTIDINE 20 MG: 20 TABLET, FILM COATED ORAL at 09:18

## 2022-01-01 RX ADMIN — TAZOBACTAM SODIUM AND PIPERACILLIN SODIUM 4.5 G: 500; 4 INJECTION, SOLUTION INTRAVENOUS at 05:33

## 2022-01-01 RX ADMIN — FINASTERIDE 5 MG: 5 TABLET, FILM COATED ORAL at 08:21

## 2022-01-01 RX ADMIN — GABAPENTIN 400 MG: 400 CAPSULE ORAL at 08:21

## 2022-01-01 RX ADMIN — IPRATROPIUM BROMIDE AND ALBUTEROL SULFATE 3 ML: .5; 3 SOLUTION RESPIRATORY (INHALATION) at 14:21

## 2022-01-01 RX ADMIN — DIPHENHYDRAMINE HYDROCHLORIDE 25 MG: 25 CAPSULE ORAL at 21:09

## 2022-01-01 RX ADMIN — ASPIRIN 81 MG: 81 TABLET, COATED ORAL at 08:23

## 2022-01-01 RX ADMIN — DEXAMETHASONE 6 MG: 4 TABLET ORAL at 20:44

## 2022-01-01 RX ADMIN — POTASSIUM CHLORIDE 40 MEQ: 10 CAPSULE, COATED, EXTENDED RELEASE ORAL at 11:05

## 2022-01-01 RX ADMIN — CETIRIZINE HYDROCHLORIDE 10 MG: 10 TABLET ORAL at 08:14

## 2022-01-01 RX ADMIN — ENOXAPARIN SODIUM 90 MG: 100 INJECTION SUBCUTANEOUS at 05:24

## 2022-01-01 RX ADMIN — FINASTERIDE 5 MG: 5 TABLET, FILM COATED ORAL at 10:54

## 2022-01-01 RX ADMIN — FLUTICASONE PROPIONATE 2 SPRAY: 50 SPRAY, METERED NASAL at 09:16

## 2022-01-01 RX ADMIN — GUAIFENESIN 1200 MG: 600 TABLET, EXTENDED RELEASE ORAL at 08:09

## 2022-01-01 RX ADMIN — TRAMADOL HYDROCHLORIDE 50 MG: 50 TABLET, COATED ORAL at 05:48

## 2022-01-01 RX ADMIN — FAMOTIDINE 20 MG: 20 TABLET, FILM COATED ORAL at 16:45

## 2022-01-01 RX ADMIN — ALBUTEROL SULFATE 2 PUFF: 90 AEROSOL, METERED RESPIRATORY (INHALATION) at 19:14

## 2022-01-01 RX ADMIN — ATORVASTATIN CALCIUM 20 MG: 10 TABLET, FILM COATED ORAL at 08:23

## 2022-01-01 RX ADMIN — MONTELUKAST SODIUM 10 MG: 10 TABLET, FILM COATED ORAL at 21:35

## 2022-01-01 RX ADMIN — Medication 10 ML: at 21:42

## 2022-01-01 RX ADMIN — IPRATROPIUM BROMIDE AND ALBUTEROL SULFATE 3 ML: .5; 3 SOLUTION RESPIRATORY (INHALATION) at 06:36

## 2022-01-01 RX ADMIN — CETIRIZINE HYDROCHLORIDE 10 MG: 10 TABLET ORAL at 08:23

## 2022-01-01 RX ADMIN — IPRATROPIUM BROMIDE AND ALBUTEROL SULFATE 3 ML: .5; 3 SOLUTION RESPIRATORY (INHALATION) at 19:50

## 2022-01-01 RX ADMIN — Medication 5000 UNITS: at 09:14

## 2022-01-01 RX ADMIN — Medication 10 ML: at 08:23

## 2022-01-01 RX ADMIN — ASPIRIN 81 MG: 81 TABLET, COATED ORAL at 08:36

## 2022-01-01 RX ADMIN — OXYCODONE HYDROCHLORIDE AND ACETAMINOPHEN 500 MG: 500 TABLET ORAL at 08:33

## 2022-01-01 RX ADMIN — FAMOTIDINE 20 MG: 20 TABLET, FILM COATED ORAL at 09:35

## 2022-01-01 RX ADMIN — AZITHROMYCIN DIHYDRATE 500 MG: 500 INJECTION, POWDER, LYOPHILIZED, FOR SOLUTION INTRAVENOUS at 09:16

## 2022-01-01 RX ADMIN — IPRATROPIUM BROMIDE AND ALBUTEROL SULFATE 3 ML: .5; 3 SOLUTION RESPIRATORY (INHALATION) at 10:48

## 2022-01-01 RX ADMIN — FUROSEMIDE 20 MG: 20 TABLET ORAL at 17:20

## 2022-01-01 RX ADMIN — BUSPIRONE HYDROCHLORIDE 10 MG: 10 TABLET ORAL at 08:31

## 2022-01-01 RX ADMIN — Medication 5000 UNITS: at 09:38

## 2022-01-01 RX ADMIN — OXYCODONE HYDROCHLORIDE AND ACETAMINOPHEN 500 MG: 500 TABLET ORAL at 08:14

## 2022-01-01 RX ADMIN — FLUTICASONE PROPIONATE 2 SPRAY: 50 SPRAY, METERED NASAL at 08:09

## 2022-01-01 RX ADMIN — ACETAMINOPHEN 650 MG: 325 TABLET, FILM COATED ORAL at 02:07

## 2022-01-01 RX ADMIN — METHYLPREDNISOLONE SODIUM SUCCINATE 40 MG: 40 INJECTION, POWDER, FOR SOLUTION INTRAMUSCULAR; INTRAVENOUS at 02:24

## 2022-01-01 RX ADMIN — FINASTERIDE 5 MG: 5 TABLET, FILM COATED ORAL at 09:14

## 2022-01-01 RX ADMIN — TRAMADOL HYDROCHLORIDE 50 MG: 50 TABLET, COATED ORAL at 21:09

## 2022-01-01 RX ADMIN — TAZOBACTAM SODIUM AND PIPERACILLIN SODIUM 4.5 G: 500; 4 INJECTION, SOLUTION INTRAVENOUS at 12:38

## 2022-01-01 RX ADMIN — ATORVASTATIN CALCIUM 20 MG: 10 TABLET, FILM COATED ORAL at 08:41

## 2022-01-01 RX ADMIN — IPRATROPIUM BROMIDE AND ALBUTEROL SULFATE 3 ML: .5; 3 SOLUTION RESPIRATORY (INHALATION) at 10:35

## 2022-01-01 RX ADMIN — MONTELUKAST SODIUM 10 MG: 10 TABLET, FILM COATED ORAL at 20:10

## 2022-01-01 RX ADMIN — METHYLPREDNISOLONE SODIUM SUCCINATE 40 MG: 40 INJECTION, POWDER, FOR SOLUTION INTRAMUSCULAR; INTRAVENOUS at 11:27

## 2022-01-01 RX ADMIN — ALBUTEROL SULFATE 2 PUFF: 90 AEROSOL, METERED RESPIRATORY (INHALATION) at 11:07

## 2022-01-01 RX ADMIN — FLUTICASONE PROPIONATE 2 SPRAY: 50 SPRAY, METERED NASAL at 08:23

## 2022-01-01 RX ADMIN — FAMOTIDINE 20 MG: 20 TABLET, FILM COATED ORAL at 08:35

## 2022-01-01 RX ADMIN — FAMOTIDINE 20 MG: 20 TABLET, FILM COATED ORAL at 08:39

## 2022-01-01 RX ADMIN — TAZOBACTAM SODIUM AND PIPERACILLIN SODIUM 4.5 G: 500; 4 INJECTION, SOLUTION INTRAVENOUS at 14:34

## 2022-01-01 RX ADMIN — FINASTERIDE 5 MG: 5 TABLET, FILM COATED ORAL at 08:42

## 2022-01-01 RX ADMIN — FLUTICASONE PROPIONATE 2 SPRAY: 50 SPRAY, METERED NASAL at 11:53

## 2022-01-01 RX ADMIN — BUSPIRONE HYDROCHLORIDE 10 MG: 10 TABLET ORAL at 20:42

## 2022-01-01 RX ADMIN — ATORVASTATIN CALCIUM 20 MG: 10 TABLET, FILM COATED ORAL at 09:17

## 2022-01-01 RX ADMIN — CETIRIZINE HYDROCHLORIDE 10 MG: 10 TABLET ORAL at 08:53

## 2022-01-01 RX ADMIN — PREDNISONE 40 MG: 20 TABLET ORAL at 08:40

## 2022-01-01 RX ADMIN — ZINC SULFATE 220 MG (50 MG) CAPSULE 220 MG: CAPSULE at 08:42

## 2022-01-01 RX ADMIN — ACETAMINOPHEN 650 MG: 325 TABLET, FILM COATED ORAL at 15:27

## 2022-01-01 RX ADMIN — MONTELUKAST SODIUM 10 MG: 10 TABLET, FILM COATED ORAL at 20:28

## 2022-01-01 RX ADMIN — FINASTERIDE 5 MG: 5 TABLET, FILM COATED ORAL at 08:09

## 2022-01-01 RX ADMIN — IPRATROPIUM BROMIDE AND ALBUTEROL SULFATE 3 ML: .5; 3 SOLUTION RESPIRATORY (INHALATION) at 18:18

## 2022-01-01 RX ADMIN — FAMOTIDINE 20 MG: 20 TABLET, FILM COATED ORAL at 17:20

## 2022-01-01 RX ADMIN — METHYLPREDNISOLONE SODIUM SUCCINATE 40 MG: 40 INJECTION, POWDER, LYOPHILIZED, FOR SOLUTION INTRAMUSCULAR; INTRAVENOUS at 04:17

## 2022-01-01 RX ADMIN — Medication 10 ML: at 08:36

## 2022-01-01 RX ADMIN — TRAMADOL HYDROCHLORIDE 50 MG: 50 TABLET, COATED ORAL at 21:50

## 2022-01-01 RX ADMIN — FAMOTIDINE 20 MG: 20 TABLET, FILM COATED ORAL at 17:47

## 2022-01-01 RX ADMIN — GUAIFENESIN 1200 MG: 600 TABLET, EXTENDED RELEASE ORAL at 21:35

## 2022-01-01 RX ADMIN — FUROSEMIDE 20 MG: 20 TABLET ORAL at 11:58

## 2022-01-01 RX ADMIN — OXYCODONE HYDROCHLORIDE AND ACETAMINOPHEN 500 MG: 500 TABLET ORAL at 20:35

## 2022-01-01 RX ADMIN — GUAIFENESIN 1200 MG: 600 TABLET, EXTENDED RELEASE ORAL at 08:36

## 2022-01-01 RX ADMIN — ENOXAPARIN SODIUM 90 MG: 100 INJECTION SUBCUTANEOUS at 06:06

## 2022-01-01 RX ADMIN — TRAMADOL HYDROCHLORIDE 50 MG: 50 TABLET, COATED ORAL at 20:40

## 2022-01-01 RX ADMIN — DEXAMETHASONE 6 MG: 4 TABLET ORAL at 10:54

## 2022-01-01 RX ADMIN — ATORVASTATIN CALCIUM 20 MG: 10 TABLET, FILM COATED ORAL at 08:14

## 2022-01-01 RX ADMIN — PREDNISONE 40 MG: 20 TABLET ORAL at 08:54

## 2022-01-01 RX ADMIN — GUAIFENESIN 1200 MG: 600 TABLET, EXTENDED RELEASE ORAL at 21:16

## 2022-01-01 RX ADMIN — BUSPIRONE HYDROCHLORIDE 10 MG: 10 TABLET ORAL at 08:23

## 2022-01-01 RX ADMIN — Medication 3 MG: at 21:17

## 2022-01-01 RX ADMIN — Medication 5000 UNITS: at 08:32

## 2022-01-01 RX ADMIN — ZINC SULFATE 220 MG (50 MG) CAPSULE 220 MG: CAPSULE at 08:11

## 2022-01-01 RX ADMIN — CETIRIZINE HYDROCHLORIDE 10 MG: 10 TABLET ORAL at 08:33

## 2022-01-01 RX ADMIN — PREDNISONE 40 MG: 20 TABLET ORAL at 08:11

## 2022-01-01 RX ADMIN — ALBUTEROL SULFATE 2 PUFF: 90 AEROSOL, METERED RESPIRATORY (INHALATION) at 14:51

## 2022-01-01 RX ADMIN — SODIUM CHLORIDE 500 ML: 9 INJECTION, SOLUTION INTRAVENOUS at 12:15

## 2022-01-01 RX ADMIN — TRAMADOL HYDROCHLORIDE 50 MG: 50 TABLET, COATED ORAL at 06:10

## 2022-01-01 RX ADMIN — BUSPIRONE HYDROCHLORIDE 10 MG: 10 TABLET ORAL at 17:04

## 2022-01-01 RX ADMIN — Medication 10 ML: at 08:53

## 2022-01-01 RX ADMIN — FLUTICASONE PROPIONATE 2 SPRAY: 50 SPRAY, METERED NASAL at 09:38

## 2022-01-01 RX ADMIN — GABAPENTIN 400 MG: 400 CAPSULE ORAL at 08:09

## 2022-01-01 RX ADMIN — ZINC SULFATE 220 MG (50 MG) CAPSULE 220 MG: CAPSULE at 08:44

## 2022-01-01 RX ADMIN — ZINC SULFATE 220 MG (50 MG) CAPSULE 220 MG: CAPSULE at 08:31

## 2022-01-01 RX ADMIN — IOPAMIDOL 100 ML: 755 INJECTION, SOLUTION INTRAVENOUS at 13:54

## 2022-01-01 RX ADMIN — DIAZEPAM 2.5 MG: 5 INJECTION, SOLUTION INTRAMUSCULAR; INTRAVENOUS at 22:27

## 2022-01-01 RX ADMIN — CEFEPIME 2 G: 2 INJECTION, POWDER, FOR SOLUTION INTRAVENOUS at 15:09

## 2022-01-01 RX ADMIN — FAMOTIDINE 20 MG: 20 TABLET, FILM COATED ORAL at 08:08

## 2022-01-01 RX ADMIN — GABAPENTIN 400 MG: 400 CAPSULE ORAL at 09:17

## 2022-01-01 RX ADMIN — METHYLPREDNISOLONE SODIUM SUCCINATE 40 MG: 40 INJECTION, POWDER, FOR SOLUTION INTRAMUSCULAR; INTRAVENOUS at 11:05

## 2022-01-01 RX ADMIN — Medication 10 ML: at 06:34

## 2022-01-01 RX ADMIN — FAMOTIDINE 20 MG: 20 TABLET, FILM COATED ORAL at 16:49

## 2022-01-01 RX ADMIN — ENOXAPARIN SODIUM 90 MG: 100 INJECTION SUBCUTANEOUS at 17:21

## 2022-01-01 RX ADMIN — Medication 10 ML: at 20:45

## 2022-01-01 RX ADMIN — Medication 10 ML: at 08:42

## 2022-01-01 RX ADMIN — GABAPENTIN 400 MG: 400 CAPSULE ORAL at 21:24

## 2022-01-01 RX ADMIN — IOPAMIDOL 100 ML: 755 INJECTION, SOLUTION INTRAVENOUS at 12:56

## 2022-01-01 RX ADMIN — Medication 10 ML: at 20:05

## 2022-01-01 RX ADMIN — METHYLPREDNISOLONE SODIUM SUCCINATE 40 MG: 40 INJECTION, POWDER, FOR SOLUTION INTRAMUSCULAR; INTRAVENOUS at 03:19

## 2022-01-01 RX ADMIN — MONTELUKAST SODIUM 10 MG: 10 TABLET, FILM COATED ORAL at 20:35

## 2022-01-01 RX ADMIN — CETIRIZINE HYDROCHLORIDE 10 MG: 10 TABLET ORAL at 08:41

## 2022-01-01 RX ADMIN — GABAPENTIN 400 MG: 400 CAPSULE ORAL at 08:53

## 2022-01-01 RX ADMIN — ENOXAPARIN SODIUM 90 MG: 100 INJECTION SUBCUTANEOUS at 17:22

## 2022-01-01 RX ADMIN — TAZOBACTAM SODIUM AND PIPERACILLIN SODIUM 4.5 G: 500; 4 INJECTION, SOLUTION INTRAVENOUS at 20:51

## 2022-01-01 RX ADMIN — FLUTICASONE PROPIONATE 2 SPRAY: 50 SPRAY, METERED NASAL at 09:17

## 2022-01-01 RX ADMIN — DIAZEPAM 2.5 MG: 5 INJECTION, SOLUTION INTRAMUSCULAR; INTRAVENOUS at 22:20

## 2022-01-01 RX ADMIN — GABAPENTIN 400 MG: 400 CAPSULE ORAL at 08:39

## 2022-01-01 RX ADMIN — GUAIFENESIN 1200 MG: 600 TABLET, EXTENDED RELEASE ORAL at 20:44

## 2022-01-01 RX ADMIN — BUSPIRONE HYDROCHLORIDE 10 MG: 10 TABLET ORAL at 08:09

## 2022-01-01 RX ADMIN — FLUTICASONE PROPIONATE 2 SPRAY: 50 SPRAY, METERED NASAL at 08:13

## 2022-01-01 RX ADMIN — FINASTERIDE 5 MG: 5 TABLET, FILM COATED ORAL at 10:28

## 2022-01-01 RX ADMIN — FAMOTIDINE 20 MG: 20 TABLET, FILM COATED ORAL at 08:07

## 2022-01-01 RX ADMIN — METHYLPREDNISOLONE SODIUM SUCCINATE 40 MG: 40 INJECTION, POWDER, FOR SOLUTION INTRAMUSCULAR; INTRAVENOUS at 02:50

## 2022-01-01 RX ADMIN — PREDNISONE 30 MG: 20 TABLET ORAL at 08:35

## 2022-01-01 RX ADMIN — FAMOTIDINE 20 MG: 20 TABLET, FILM COATED ORAL at 17:16

## 2022-01-01 RX ADMIN — ATORVASTATIN CALCIUM 20 MG: 10 TABLET, FILM COATED ORAL at 08:09

## 2022-01-01 RX ADMIN — GUAIFENESIN 1200 MG: 600 TABLET, EXTENDED RELEASE ORAL at 08:31

## 2022-01-01 RX ADMIN — DIAZEPAM 2.5 MG: 5 INJECTION, SOLUTION INTRAMUSCULAR; INTRAVENOUS at 21:08

## 2022-01-01 RX ADMIN — CETIRIZINE HYDROCHLORIDE 10 MG: 10 TABLET ORAL at 08:27

## 2022-01-01 RX ADMIN — FAMOTIDINE 20 MG: 20 TABLET, FILM COATED ORAL at 08:59

## 2022-01-01 RX ADMIN — MONTELUKAST SODIUM 10 MG: 10 TABLET, FILM COATED ORAL at 21:09

## 2022-01-01 RX ADMIN — GUAIFENESIN 1200 MG: 600 TABLET, EXTENDED RELEASE ORAL at 21:40

## 2022-01-01 RX ADMIN — GUAIFENESIN 1200 MG: 600 TABLET, EXTENDED RELEASE ORAL at 20:08

## 2022-01-01 RX ADMIN — GUAIFENESIN 1200 MG: 600 TABLET, EXTENDED RELEASE ORAL at 20:27

## 2022-01-01 RX ADMIN — Medication 10 ML: at 21:33

## 2022-01-01 RX ADMIN — CEFEPIME 2 G: 2 INJECTION, POWDER, FOR SOLUTION INTRAVENOUS at 21:11

## 2022-01-01 RX ADMIN — GABAPENTIN 400 MG: 400 CAPSULE ORAL at 08:31

## 2022-01-01 RX ADMIN — ASPIRIN 81 MG: 81 TABLET, COATED ORAL at 09:35

## 2022-01-01 RX ADMIN — FAMOTIDINE 20 MG: 20 TABLET, FILM COATED ORAL at 08:32

## 2022-01-01 RX ADMIN — ALBUTEROL SULFATE 2 PUFF: 90 AEROSOL, METERED RESPIRATORY (INHALATION) at 18:40

## 2022-01-01 RX ADMIN — CETIRIZINE HYDROCHLORIDE 10 MG: 10 TABLET ORAL at 08:09

## 2022-01-01 RX ADMIN — METHYLPREDNISOLONE SODIUM SUCCINATE 60 MG: 125 INJECTION, POWDER, FOR SOLUTION INTRAMUSCULAR; INTRAVENOUS at 20:37

## 2022-01-01 RX ADMIN — FLUTICASONE PROPIONATE 2 SPRAY: 50 SPRAY, METERED NASAL at 08:37

## 2022-01-01 RX ADMIN — OXYCODONE HYDROCHLORIDE AND ACETAMINOPHEN 500 MG: 500 TABLET ORAL at 08:09

## 2022-01-01 RX ADMIN — GABAPENTIN 400 MG: 400 CAPSULE ORAL at 20:51

## 2022-01-01 RX ADMIN — ENOXAPARIN SODIUM 90 MG: 100 INJECTION SUBCUTANEOUS at 05:16

## 2022-01-01 RX ADMIN — MONTELUKAST SODIUM 10 MG: 10 TABLET, FILM COATED ORAL at 21:16

## 2022-01-01 RX ADMIN — IPRATROPIUM BROMIDE AND ALBUTEROL SULFATE 3 ML: .5; 3 SOLUTION RESPIRATORY (INHALATION) at 10:54

## 2022-01-01 RX ADMIN — Medication 10 ML: at 20:36

## 2022-01-01 RX ADMIN — OXYCODONE HYDROCHLORIDE AND ACETAMINOPHEN 500 MG: 500 TABLET ORAL at 08:30

## 2022-01-01 RX ADMIN — IPRATROPIUM BROMIDE AND ALBUTEROL SULFATE 3 ML: .5; 3 SOLUTION RESPIRATORY (INHALATION) at 14:04

## 2022-01-01 RX ADMIN — TAZOBACTAM SODIUM AND PIPERACILLIN SODIUM 4.5 G: 500; 4 INJECTION, SOLUTION INTRAVENOUS at 21:19

## 2022-01-01 RX ADMIN — GABAPENTIN 400 MG: 400 CAPSULE ORAL at 21:54

## 2022-01-01 RX ADMIN — CEFEPIME 2 G: 2 INJECTION, POWDER, FOR SOLUTION INTRAVENOUS at 23:04

## 2022-01-01 RX ADMIN — MORPHINE SULFATE 1 MG: 2 INJECTION, SOLUTION INTRAMUSCULAR; INTRAVENOUS at 10:54

## 2022-01-01 RX ADMIN — FUROSEMIDE 20 MG: 20 TABLET ORAL at 08:36

## 2022-01-01 RX ADMIN — ALBUTEROL SULFATE 2 PUFF: 90 AEROSOL, METERED RESPIRATORY (INHALATION) at 07:07

## 2022-01-01 RX ADMIN — SODIUM BICARBONATE 325 MG: 650 TABLET ORAL at 14:19

## 2022-01-01 RX ADMIN — FAMOTIDINE 20 MG: 20 TABLET, FILM COATED ORAL at 17:37

## 2022-01-01 RX ADMIN — Medication 10 ML: at 06:36

## 2022-01-01 RX ADMIN — FUROSEMIDE 20 MG: 20 TABLET ORAL at 08:41

## 2022-01-01 RX ADMIN — ENOXAPARIN SODIUM 90 MG: 100 INJECTION SUBCUTANEOUS at 05:33

## 2022-01-01 RX ADMIN — TAZOBACTAM SODIUM AND PIPERACILLIN SODIUM 4.5 G: 500; 4 INJECTION, SOLUTION INTRAVENOUS at 05:18

## 2022-01-01 RX ADMIN — GUAIFENESIN 1200 MG: 600 TABLET, EXTENDED RELEASE ORAL at 20:25

## 2022-01-01 RX ADMIN — IPRATROPIUM BROMIDE AND ALBUTEROL SULFATE 3 ML: .5; 3 SOLUTION RESPIRATORY (INHALATION) at 14:08

## 2022-01-01 RX ADMIN — IPRATROPIUM BROMIDE AND ALBUTEROL SULFATE 3 ML: .5; 3 SOLUTION RESPIRATORY (INHALATION) at 07:35

## 2022-01-01 RX ADMIN — ACETAMINOPHEN 650 MG: 325 TABLET, FILM COATED ORAL at 14:27

## 2022-01-01 RX ADMIN — FINASTERIDE 5 MG: 5 TABLET, FILM COATED ORAL at 08:31

## 2022-01-01 RX ADMIN — CETIRIZINE HYDROCHLORIDE 10 MG: 10 TABLET ORAL at 09:18

## 2022-01-01 RX ADMIN — IPRATROPIUM BROMIDE AND ALBUTEROL SULFATE 3 ML: .5; 3 SOLUTION RESPIRATORY (INHALATION) at 10:47

## 2022-01-01 RX ADMIN — Medication 10 ML: at 20:08

## 2022-01-01 RX ADMIN — TRAMADOL HYDROCHLORIDE 50 MG: 50 TABLET, COATED ORAL at 20:09

## 2022-01-01 RX ADMIN — ATORVASTATIN CALCIUM 20 MG: 10 TABLET, FILM COATED ORAL at 09:15

## 2022-01-01 RX ADMIN — OXYCODONE HYDROCHLORIDE AND ACETAMINOPHEN 500 MG: 500 TABLET ORAL at 09:08

## 2022-01-01 RX ADMIN — IPRATROPIUM BROMIDE AND ALBUTEROL SULFATE 3 ML: .5; 3 SOLUTION RESPIRATORY (INHALATION) at 19:30

## 2022-01-01 RX ADMIN — IPRATROPIUM BROMIDE AND ALBUTEROL SULFATE 3 ML: .5; 3 SOLUTION RESPIRATORY (INHALATION) at 10:11

## 2022-01-01 RX ADMIN — CEFEPIME HYDROCHLORIDE 2 G: 2 INJECTION, POWDER, FOR SOLUTION INTRAVENOUS at 05:02

## 2022-01-01 RX ADMIN — FUROSEMIDE 40 MG: 10 INJECTION, SOLUTION INTRAVENOUS at 10:56

## 2022-01-01 RX ADMIN — BUSPIRONE HYDROCHLORIDE 10 MG: 10 TABLET ORAL at 20:29

## 2022-01-01 RX ADMIN — METHYLPREDNISOLONE SODIUM SUCCINATE 40 MG: 40 INJECTION, POWDER, FOR SOLUTION INTRAMUSCULAR; INTRAVENOUS at 17:42

## 2022-01-01 RX ADMIN — ZINC SULFATE 220 MG (50 MG) CAPSULE 220 MG: CAPSULE at 09:08

## 2022-01-01 RX ADMIN — BUSPIRONE HYDROCHLORIDE 10 MG: 10 TABLET ORAL at 21:38

## 2022-01-01 RX ADMIN — Medication 3 MG: at 20:35

## 2022-01-01 RX ADMIN — ZINC SULFATE 220 MG (50 MG) CAPSULE 220 MG: CAPSULE at 10:29

## 2022-01-01 RX ADMIN — DEXAMETHASONE 6 MG: 4 TABLET ORAL at 08:35

## 2022-01-01 RX ADMIN — Medication 10 ML: at 11:29

## 2022-01-01 RX ADMIN — BUSPIRONE HYDROCHLORIDE 10 MG: 10 TABLET ORAL at 15:28

## 2022-01-01 RX ADMIN — PREDNISONE 40 MG: 20 TABLET ORAL at 08:07

## 2022-01-01 RX ADMIN — TRAMADOL HYDROCHLORIDE 50 MG: 50 TABLET, COATED ORAL at 20:25

## 2022-01-01 RX ADMIN — FUROSEMIDE 20 MG: 20 TABLET ORAL at 18:10

## 2022-01-01 RX ADMIN — FINASTERIDE 5 MG: 5 TABLET, FILM COATED ORAL at 08:11

## 2022-01-01 RX ADMIN — GUAIFENESIN 1200 MG: 600 TABLET, EXTENDED RELEASE ORAL at 09:17

## 2022-01-01 RX ADMIN — GUAIFENESIN 1200 MG: 600 TABLET, EXTENDED RELEASE ORAL at 20:51

## 2022-01-01 RX ADMIN — DIPHENHYDRAMINE HYDROCHLORIDE 25 MG: 25 CAPSULE ORAL at 20:10

## 2022-01-01 RX ADMIN — TAZOBACTAM SODIUM AND PIPERACILLIN SODIUM 4.5 G: 500; 4 INJECTION, SOLUTION INTRAVENOUS at 21:20

## 2022-01-01 RX ADMIN — ACETAMINOPHEN 650 MG: 325 TABLET, FILM COATED ORAL at 10:13

## 2022-01-01 RX ADMIN — IPRATROPIUM BROMIDE AND ALBUTEROL SULFATE 3 ML: .5; 3 SOLUTION RESPIRATORY (INHALATION) at 10:34

## 2022-01-01 RX ADMIN — Medication 10 ML: at 21:17

## 2022-01-01 RX ADMIN — Medication 3 MG: at 21:54

## 2022-01-01 RX ADMIN — GABAPENTIN 400 MG: 400 CAPSULE ORAL at 08:36

## 2022-01-01 RX ADMIN — CETIRIZINE HYDROCHLORIDE 10 MG: 10 TABLET ORAL at 08:54

## 2022-01-01 RX ADMIN — FAMOTIDINE 20 MG: 20 TABLET, FILM COATED ORAL at 17:38

## 2022-01-01 RX ADMIN — Medication 5000 UNITS: at 08:44

## 2022-01-01 RX ADMIN — ALBUTEROL SULFATE 2 PUFF: 90 AEROSOL, METERED RESPIRATORY (INHALATION) at 21:56

## 2022-01-01 RX ADMIN — MONTELUKAST SODIUM 10 MG: 10 TABLET, FILM COATED ORAL at 21:54

## 2022-01-01 RX ADMIN — METHYLPREDNISOLONE SODIUM SUCCINATE 40 MG: 40 INJECTION, POWDER, FOR SOLUTION INTRAMUSCULAR; INTRAVENOUS at 10:01

## 2022-01-01 RX ADMIN — ENOXAPARIN SODIUM 80 MG: 100 INJECTION SUBCUTANEOUS at 17:22

## 2022-01-01 RX ADMIN — OXYCODONE HYDROCHLORIDE AND ACETAMINOPHEN 500 MG: 500 TABLET ORAL at 08:36

## 2022-01-01 RX ADMIN — OXYCODONE HYDROCHLORIDE AND ACETAMINOPHEN 500 MG: 500 TABLET ORAL at 08:08

## 2022-01-01 RX ADMIN — ALBUTEROL SULFATE 2 PUFF: 90 AEROSOL, METERED RESPIRATORY (INHALATION) at 21:11

## 2022-01-01 RX ADMIN — CEFEPIME 2 G: 2 INJECTION, POWDER, FOR SOLUTION INTRAVENOUS at 22:20

## 2022-01-01 RX ADMIN — MONTELUKAST SODIUM 10 MG: 10 TABLET, FILM COATED ORAL at 20:51

## 2022-01-01 RX ADMIN — MONTELUKAST SODIUM 10 MG: 10 TABLET, FILM COATED ORAL at 21:40

## 2022-01-01 RX ADMIN — FLUTICASONE PROPIONATE 2 SPRAY: 50 SPRAY, METERED NASAL at 08:19

## 2022-01-01 RX ADMIN — ALBUTEROL SULFATE 2 PUFF: 90 AEROSOL, METERED RESPIRATORY (INHALATION) at 10:48

## 2022-01-01 RX ADMIN — ASPIRIN 81 MG: 81 TABLET, COATED ORAL at 10:27

## 2022-01-01 RX ADMIN — BUSPIRONE HYDROCHLORIDE 10 MG: 10 TABLET ORAL at 08:11

## 2022-01-01 RX ADMIN — Medication 5000 UNITS: at 11:39

## 2022-01-01 RX ADMIN — DEXAMETHASONE 6 MG: 4 TABLET ORAL at 20:09

## 2022-01-01 RX ADMIN — FUROSEMIDE 20 MG: 10 INJECTION, SOLUTION INTRAMUSCULAR; INTRAVENOUS at 17:35

## 2022-01-01 RX ADMIN — ZINC SULFATE 220 MG (50 MG) CAPSULE 220 MG: CAPSULE at 08:53

## 2022-01-01 RX ADMIN — IPRATROPIUM BROMIDE AND ALBUTEROL SULFATE 3 ML: .5; 3 SOLUTION RESPIRATORY (INHALATION) at 18:16

## 2022-01-01 RX ADMIN — ASPIRIN 81 MG: 81 TABLET, COATED ORAL at 08:54

## 2022-01-01 RX ADMIN — MONTELUKAST SODIUM 10 MG: 10 TABLET, FILM COATED ORAL at 21:10

## 2022-01-01 RX ADMIN — Medication 10 ML: at 21:35

## 2022-01-01 RX ADMIN — TAZOBACTAM SODIUM AND PIPERACILLIN SODIUM 4.5 G: 500; 4 INJECTION, SOLUTION INTRAVENOUS at 12:42

## 2022-01-01 RX ADMIN — ATORVASTATIN CALCIUM 20 MG: 10 TABLET, FILM COATED ORAL at 09:36

## 2022-01-01 RX ADMIN — ALBUTEROL SULFATE 2 PUFF: 90 AEROSOL, METERED RESPIRATORY (INHALATION) at 14:39

## 2022-01-01 RX ADMIN — Medication 5000 UNITS: at 08:36

## 2022-01-01 RX ADMIN — DIPHENHYDRAMINE HYDROCHLORIDE 25 MG: 25 CAPSULE ORAL at 20:09

## 2022-01-01 RX ADMIN — Medication 10 ML: at 21:54

## 2022-01-01 RX ADMIN — OXYCODONE HYDROCHLORIDE AND ACETAMINOPHEN 500 MG: 500 TABLET ORAL at 08:11

## 2022-01-01 RX ADMIN — BUSPIRONE HYDROCHLORIDE 10 MG: 10 TABLET ORAL at 08:14

## 2022-01-01 RX ADMIN — MORPHINE SULFATE 1 MG: 2 INJECTION, SOLUTION INTRAMUSCULAR; INTRAVENOUS at 10:16

## 2022-01-01 RX ADMIN — AZITHROMYCIN DIHYDRATE 500 MG: 500 INJECTION, POWDER, LYOPHILIZED, FOR SOLUTION INTRAVENOUS at 09:40

## 2022-01-01 RX ADMIN — OXYCODONE HYDROCHLORIDE AND ACETAMINOPHEN 500 MG: 500 TABLET ORAL at 08:43

## 2022-01-01 RX ADMIN — GUAIFENESIN 1200 MG: 600 TABLET, EXTENDED RELEASE ORAL at 12:43

## 2022-01-01 RX ADMIN — Medication 10 ML: at 21:19

## 2022-01-01 RX ADMIN — CEFEPIME 2 G: 2 INJECTION, POWDER, FOR SOLUTION INTRAVENOUS at 05:30

## 2022-01-01 RX ADMIN — MONTELUKAST SODIUM 10 MG: 10 TABLET, FILM COATED ORAL at 21:33

## 2022-01-01 RX ADMIN — TRAMADOL HYDROCHLORIDE 50 MG: 50 TABLET, COATED ORAL at 06:32

## 2022-01-01 RX ADMIN — BUSPIRONE HYDROCHLORIDE 10 MG: 10 TABLET ORAL at 08:21

## 2022-01-01 RX ADMIN — VANCOMYCIN HYDROCHLORIDE 1500 MG: 10 INJECTION, POWDER, LYOPHILIZED, FOR SOLUTION INTRAVENOUS at 11:10

## 2022-01-01 RX ADMIN — GABAPENTIN 400 MG: 400 CAPSULE ORAL at 20:09

## 2022-01-01 RX ADMIN — ZINC SULFATE 220 MG (50 MG) CAPSULE 220 MG: CAPSULE at 09:36

## 2022-01-01 RX ADMIN — FUROSEMIDE 20 MG: 20 TABLET ORAL at 17:22

## 2022-01-01 RX ADMIN — ZINC SULFATE 220 MG (50 MG) CAPSULE 220 MG: CAPSULE at 08:14

## 2022-01-01 RX ADMIN — ENOXAPARIN SODIUM 90 MG: 100 INJECTION SUBCUTANEOUS at 16:49

## 2022-01-01 RX ADMIN — PREDNISONE 20 MG: 20 TABLET ORAL at 09:16

## 2022-01-01 RX ADMIN — FUROSEMIDE 20 MG: 20 TABLET ORAL at 08:23

## 2022-01-01 RX ADMIN — FUROSEMIDE 20 MG: 20 TABLET ORAL at 08:53

## 2022-01-01 RX ADMIN — ASPIRIN 81 MG: 81 TABLET, COATED ORAL at 08:41

## 2022-01-01 RX ADMIN — ASPIRIN 81 MG: 81 TABLET, COATED ORAL at 09:08

## 2022-01-01 RX ADMIN — GABAPENTIN 400 MG: 400 CAPSULE ORAL at 21:50

## 2022-01-01 RX ADMIN — ASPIRIN 81 MG: 81 TABLET, COATED ORAL at 08:15

## 2022-01-01 RX ADMIN — IPRATROPIUM BROMIDE AND ALBUTEROL SULFATE 3 ML: .5; 3 SOLUTION RESPIRATORY (INHALATION) at 14:59

## 2022-01-01 RX ADMIN — OXYCODONE HYDROCHLORIDE AND ACETAMINOPHEN 500 MG: 500 TABLET ORAL at 08:23

## 2022-01-01 RX ADMIN — GABAPENTIN 400 MG: 400 CAPSULE ORAL at 08:14

## 2022-01-01 RX ADMIN — FAMOTIDINE 20 MG: 20 TABLET, FILM COATED ORAL at 17:57

## 2022-01-01 RX ADMIN — Medication 10 ML: at 08:15

## 2022-01-01 RX ADMIN — Medication 10 ML: at 08:45

## 2022-01-01 RX ADMIN — ATORVASTATIN CALCIUM 20 MG: 10 TABLET, FILM COATED ORAL at 08:40

## 2022-01-01 RX ADMIN — METHYLPREDNISOLONE SODIUM SUCCINATE 40 MG: 40 INJECTION, POWDER, FOR SOLUTION INTRAMUSCULAR; INTRAVENOUS at 18:26

## 2022-01-01 RX ADMIN — Medication 5000 UNITS: at 20:35

## 2022-01-01 RX ADMIN — Medication 5000 UNITS: at 09:19

## 2022-01-01 RX ADMIN — ASPIRIN 81 MG: 81 TABLET, COATED ORAL at 09:19

## 2022-01-01 RX ADMIN — ALBUTEROL SULFATE 2 PUFF: 90 AEROSOL, METERED RESPIRATORY (INHALATION) at 15:09

## 2022-01-01 RX ADMIN — ENOXAPARIN SODIUM 90 MG: 100 INJECTION SUBCUTANEOUS at 17:37

## 2022-01-01 RX ADMIN — DIPHENHYDRAMINE HYDROCHLORIDE 25 MG: 25 CAPSULE ORAL at 20:42

## 2022-01-01 RX ADMIN — ASPIRIN 81 MG: 81 TABLET, COATED ORAL at 08:31

## 2022-01-01 RX ADMIN — ZINC SULFATE 220 MG (50 MG) CAPSULE 220 MG: CAPSULE at 17:46

## 2022-01-01 RX ADMIN — CETIRIZINE HYDROCHLORIDE 10 MG: 10 TABLET ORAL at 08:40

## 2022-01-01 RX ADMIN — GUAIFENESIN 1200 MG: 600 TABLET, EXTENDED RELEASE ORAL at 20:04

## 2022-01-01 RX ADMIN — BUSPIRONE HYDROCHLORIDE 10 MG: 10 TABLET ORAL at 16:43

## 2022-01-01 RX ADMIN — MONTELUKAST SODIUM 10 MG: 10 TABLET, FILM COATED ORAL at 20:04

## 2022-01-01 RX ADMIN — IPRATROPIUM BROMIDE AND ALBUTEROL SULFATE 3 ML: .5; 3 SOLUTION RESPIRATORY (INHALATION) at 18:23

## 2022-01-01 RX ADMIN — Medication 10 ML: at 08:43

## 2022-01-01 RX ADMIN — PREDNISONE 30 MG: 20 TABLET ORAL at 11:38

## 2022-01-01 RX ADMIN — ALBUTEROL SULFATE 2 PUFF: 90 AEROSOL, METERED RESPIRATORY (INHALATION) at 11:46

## 2022-01-01 RX ADMIN — FUROSEMIDE 20 MG: 20 TABLET ORAL at 08:32

## 2022-01-01 RX ADMIN — Medication 10 ML: at 21:21

## 2022-01-01 RX ADMIN — DEXAMETHASONE 6 MG: 4 TABLET ORAL at 11:30

## 2022-01-01 RX ADMIN — FLUTICASONE PROPIONATE 2 SPRAY: 50 SPRAY, METERED NASAL at 08:36

## 2022-01-01 RX ADMIN — Medication 10 ML: at 10:27

## 2022-01-01 RX ADMIN — CETIRIZINE HYDROCHLORIDE 10 MG: 10 TABLET ORAL at 09:15

## 2022-01-01 RX ADMIN — Medication 10 ML: at 08:11

## 2022-01-01 RX ADMIN — METHYLPREDNISOLONE SODIUM SUCCINATE 40 MG: 40 INJECTION, POWDER, LYOPHILIZED, FOR SOLUTION INTRAMUSCULAR; INTRAVENOUS at 18:45

## 2022-01-01 RX ADMIN — ALBUTEROL SULFATE 2 PUFF: 90 AEROSOL, METERED RESPIRATORY (INHALATION) at 10:10

## 2022-01-01 RX ADMIN — MONTELUKAST SODIUM 10 MG: 10 TABLET, FILM COATED ORAL at 20:44

## 2022-01-01 RX ADMIN — IPRATROPIUM BROMIDE AND ALBUTEROL SULFATE 3 ML: .5; 3 SOLUTION RESPIRATORY (INHALATION) at 10:36

## 2022-01-01 RX ADMIN — METHYLPREDNISOLONE SODIUM SUCCINATE 60 MG: 125 INJECTION, POWDER, FOR SOLUTION INTRAMUSCULAR; INTRAVENOUS at 03:59

## 2022-01-01 RX ADMIN — Medication 5000 UNITS: at 08:12

## 2022-01-01 RX ADMIN — GUAIFENESIN 1200 MG: 600 TABLET, EXTENDED RELEASE ORAL at 08:30

## 2022-01-01 RX ADMIN — IPRATROPIUM BROMIDE AND ALBUTEROL SULFATE 3 ML: .5; 3 SOLUTION RESPIRATORY (INHALATION) at 19:14

## 2022-01-01 RX ADMIN — ATORVASTATIN CALCIUM 20 MG: 10 TABLET, FILM COATED ORAL at 08:54

## 2022-01-01 RX ADMIN — FLUTICASONE PROPIONATE 2 SPRAY: 50 SPRAY, METERED NASAL at 08:42

## 2022-01-01 RX ADMIN — GABAPENTIN 400 MG: 400 CAPSULE ORAL at 20:25

## 2022-01-01 RX ADMIN — MONTELUKAST SODIUM 10 MG: 10 TABLET, FILM COATED ORAL at 20:08

## 2022-01-01 RX ADMIN — IPRATROPIUM BROMIDE AND ALBUTEROL SULFATE 3 ML: .5; 3 SOLUTION RESPIRATORY (INHALATION) at 06:48

## 2022-01-01 RX ADMIN — DEXAMETHASONE 6 MG: 4 TABLET ORAL at 08:09

## 2022-01-01 RX ADMIN — ASPIRIN 81 MG: 81 TABLET, COATED ORAL at 09:18

## 2022-01-01 RX ADMIN — Medication 5000 UNITS: at 08:34

## 2022-01-01 RX ADMIN — ENOXAPARIN SODIUM 90 MG: 100 INJECTION SUBCUTANEOUS at 06:09

## 2022-01-01 RX ADMIN — TAZOBACTAM SODIUM AND PIPERACILLIN SODIUM 4.5 G: 500; 4 INJECTION, SOLUTION INTRAVENOUS at 06:06

## 2022-01-01 RX ADMIN — TAZOBACTAM SODIUM AND PIPERACILLIN SODIUM 4.5 G: 500; 4 INJECTION, SOLUTION INTRAVENOUS at 21:32

## 2022-01-01 RX ADMIN — Medication 10 ML: at 09:08

## 2022-01-01 RX ADMIN — IPRATROPIUM BROMIDE AND ALBUTEROL SULFATE 3 ML: .5; 3 SOLUTION RESPIRATORY (INHALATION) at 06:33

## 2022-01-01 RX ADMIN — ENOXAPARIN SODIUM 90 MG: 100 INJECTION SUBCUTANEOUS at 06:19

## 2022-01-01 RX ADMIN — FLUTICASONE PROPIONATE 2 SPRAY: 50 SPRAY, METERED NASAL at 08:11

## 2022-01-01 RX ADMIN — METHYLPREDNISOLONE SODIUM SUCCINATE 40 MG: 40 INJECTION, POWDER, FOR SOLUTION INTRAMUSCULAR; INTRAVENOUS at 21:23

## 2022-01-01 RX ADMIN — CEFEPIME 2 G: 2 INJECTION, POWDER, FOR SOLUTION INTRAVENOUS at 14:39

## 2022-01-01 RX ADMIN — GUAIFENESIN 1200 MG: 600 TABLET, EXTENDED RELEASE ORAL at 08:11

## 2022-01-01 RX ADMIN — FUROSEMIDE 20 MG: 20 TABLET ORAL at 16:49

## 2022-01-01 RX ADMIN — ZINC SULFATE 220 MG (50 MG) CAPSULE 220 MG: CAPSULE at 08:09

## 2022-01-01 RX ADMIN — IPRATROPIUM BROMIDE AND ALBUTEROL SULFATE 3 ML: .5; 3 SOLUTION RESPIRATORY (INHALATION) at 09:56

## 2022-01-01 RX ADMIN — TAZOBACTAM SODIUM AND PIPERACILLIN SODIUM 4.5 G: 500; 4 INJECTION, SOLUTION INTRAVENOUS at 14:27

## 2022-01-01 RX ADMIN — BUSPIRONE HYDROCHLORIDE 10 MG: 10 TABLET ORAL at 17:21

## 2022-01-01 RX ADMIN — TRAMADOL HYDROCHLORIDE 50 MG: 50 TABLET, COATED ORAL at 19:52

## 2022-01-01 RX ADMIN — IPRATROPIUM BROMIDE AND ALBUTEROL SULFATE 3 ML: .5; 3 SOLUTION RESPIRATORY (INHALATION) at 10:50

## 2022-01-01 RX ADMIN — CETIRIZINE HYDROCHLORIDE 10 MG: 10 TABLET ORAL at 09:36

## 2022-02-11 PROBLEM — G56.02 CARPAL TUNNEL SYNDROME, LEFT: Status: ACTIVE | Noted: 2022-01-01

## 2022-02-11 PROBLEM — R19.5 POSITIVE COLORECTAL CANCER SCREENING USING DNA-BASED STOOL TEST: Status: RESOLVED | Noted: 2020-09-02 | Resolved: 2022-01-01

## 2022-02-11 PROBLEM — E66.3 OVERWEIGHT (BMI 25.0-29.9): Status: ACTIVE | Noted: 2022-01-01

## 2022-02-11 NOTE — PROGRESS NOTES
CC: establish care for hypertension    History:  Adrián Graham is a 81 y.o. male who presents today for evaluation of the above problems.  Records provided from previous PCP are reviewed and high points have been selected for scanning into the chart including his most recent office visits, annual wellness visit, laboratories, and pertinent imaging, though much of his imaging is reviewed and is present within our [facility] medical record.  He notes history of hypertension, for which she has been on 3 medications, but he does wish to try to cut down on the number of pills he is taking.  He has idiopathic peripheral neuropathy and has been on gabapentin, but he does not always take it 3 times daily.  BPH is well controlled on Proscar per Dr. Angulo.  He is also seeing pain management with prescription of tramadol, but he maintains his function well enough and he is not sure he wants to continue taking this in the long run.  He is still grieving the death of his wife in November, but feels he has been progressing through this fairly well.      ROS:  Review of Systems   Constitutional: Negative for chills and fever.   HENT: Negative for congestion and sore throat.    Eyes: Negative for visual disturbance.   Respiratory: Negative for cough and shortness of breath.    Cardiovascular: Negative for chest pain and palpitations.   Gastrointestinal: Negative for abdominal pain, constipation and nausea.   Endocrine: Negative for cold intolerance and heat intolerance.   Genitourinary: Negative for difficulty urinating and frequency.   Musculoskeletal: Negative for arthralgias and back pain.   Skin: Negative for rash.   Neurological: Negative for dizziness and headaches.   Psychiatric/Behavioral: Negative for dysphoric mood. The patient is not nervous/anxious.        No Known Allergies  Past Medical History:   Diagnosis Date   • Abdominal distention    • Abdominal pain, left upper quadrant    • Allergic rhinitis    • Arthritis    •  Bloating    • BPH (benign prostatic hyperplasia)    • Carpal tunnel syndrome    • Change in bowel habits    • Constipation    • Diarrhea    • Erectile dysfunction    • GERD (gastroesophageal reflux disease)    • History of shingles    • Hypertension    • Idiopathic peripheral neuropathy    • Neuropathy    • Pneumonia    • Shingles    • Weight loss      Past Surgical History:   Procedure Laterality Date   • APPENDECTOMY     • BACK SURGERY     • CARPAL TUNNEL RELEASE Right    • CHOLECYSTECTOMY     • COLONOSCOPY  09/14/2010    5 yr-complete colon not visualized   • COLONOSCOPY  07/27/2004    extremely tortuous redundant colon. BE-mild diverticulosis without diverticulitis. ? Gallstones-Dr Chery.   • COLONOSCOPY N/A 9/10/2020    Procedure: COLONOSCOPY WITH ANESTHESIA;  Surgeon: Mani Sy DO;  Location: [facility] ENDOSCOPY;  Service: Gastroenterology;  Laterality: N/A;  Pre: Positive Colorectal Screening  Post: Diverticulosis  Dr. Mooney  CO2 Inflation Used   • ENDOSCOPY N/A 9/26/2016    Procedure: ESOPHAGOGASTRODUODENOSCOPY WITH ANESTHESIA;  Surgeon: Mani Sy DO;  Location: [facility] ENDOSCOPY;  Service:    • HIP SURGERY Right    • KNEE SURGERY     • OTHER SURGICAL HISTORY      Surgery for Spinal Stenosis   • OTHER SURGICAL HISTORY      Laser Prostate Surgery     Family History   Problem Relation Age of Onset   • Diabetes Mother    • Cancer Mother    • Osteoporosis Mother    • Heart disease Brother    • Colon cancer Neg Hx    • Colon polyps Neg Hx    • Esophageal cancer Neg Hx       reports that he has never smoked. He has never used smokeless tobacco. He reports that he does not drink alcohol and does not use drugs.      Current Outpatient Medications:   •  diphenhydrAMINE (BENADRYL) 25 mg capsule, Take 25 mg by mouth every 6 (six) hours as needed for itching., Disp: , Rfl:   •  finasteride (PROSCAR) 5 MG tablet, Take 5 mg by mouth daily., Disp: , Rfl:   •  gabapentin (NEURONTIN) 400 MG capsule, Take 1  "capsule by mouth 3 (Three) Times a Day., Disp: 270 capsule, Rfl: 1  •  traMADol (ULTRAM) 50 MG tablet, 2 (Two) Times a Day., Disp: , Rfl:   Quinapril 40mg daily  HCTZ 12.5mg daily  Amlodipine 10mg daily    OBJECTIVE:  /92 (BP Location: Left arm, Patient Position: Sitting, Cuff Size: Adult)   Pulse 97   Temp 97.6 °F (36.4 °C)   Resp 16   Ht 185.4 cm (73\")   Wt 93.5 kg (206 lb 1.6 oz)   SpO2 98%   BMI 27.19 kg/m²    Physical Exam  Constitutional:       General: He is not in acute distress.     Appearance: Normal appearance.   HENT:      Head: Normocephalic and atraumatic.      Right Ear: External ear normal.      Left Ear: External ear normal.   Eyes:      General: No scleral icterus.     Extraocular Movements: Extraocular movements intact.   Cardiovascular:      Rate and Rhythm: Normal rate and regular rhythm.      Heart sounds: Normal heart sounds. No murmur heard.      Pulmonary:      Effort: Pulmonary effort is normal. No respiratory distress.      Breath sounds: Normal breath sounds. No wheezing.   Abdominal:      General: There is no distension.      Palpations: Abdomen is soft.      Tenderness: There is no abdominal tenderness.   Musculoskeletal:         General: Normal range of motion.      Cervical back: Normal range of motion and neck supple.      Right lower leg: No edema.      Left lower leg: No edema.   Skin:     General: Skin is warm and dry.   Neurological:      Mental Status: He is alert and oriented to person, place, and time.      Gait: Gait normal.   Psychiatric:         Mood and Affect: Mood normal.         Behavior: Behavior normal.         Assessment/Plan     Diagnoses and all orders for this visit:    1. Essential hypertension (Primary)  -     Olmesartan-amLODIPine-HCTZ 40-10-12.5 MG tablet; Take 1 tablet by mouth Daily.  Dispense: 90 tablet; Refill: 1  -     Comprehensive Metabolic Panel; Future  -     Lipid Panel; Future  -     CBC (No Diff); Future  Fair control, BP goal for age " is <150/90 per JNC 8 guidelines and combine medicaitons into single pill if covered by insurance.    2. Idiopathic peripheral neuropathy  -     gabapentin (NEURONTIN) 400 MG capsule; Take 1 capsule by mouth 3 (Three) Times a Day.  Dispense: 270 capsule; Refill: 1  PDMP data reviewed and gabapentin is refilled. Could consider dose decrease if needed or desired in the future.     3. Benign prostatic hyperplasia without lower urinary tract symptoms  Fair control on Proscar and followed by Dr. Angulo.     4. Overweight (BMI 25.0-29.9)  -     Lipid Panel; Future  Patient's Body mass index is 27.19 kg/m². indicating that he is overweight (BMI 25-29.9). Patient's (Body mass index is 27.19 kg/m².) indicates that they are overweight with health conditions that include hypertension . Weight is newly identified. BMI is is above average; BMI management plan is completed. We discussed portion control and increasing exercise. .         An After Visit Summary was printed and given to the patient at discharge.  Return in about 6 months (around 8/11/2022) for Medicare Wellness.         Mauro Irizarry D.O. 2/11/2022   Electronically signed.

## 2022-02-11 NOTE — PROGRESS NOTES
February 11, 2022    Hello, may I speak with Adrián Graham?    My name is ARABELLA    I am  with [facility]  [address], [address]  [place] [address] [phone].    Before we get started may I verify your date of birth? 1940    I am calling to officially welcome you to our practice and ask about your recent visit. Is this a good time to talk? yes    Tell me about your visit with us. What things went well?  he is a great doctor he took care of my wife       We're always looking for ways to make our patients' experiences even better. Do you have recommendations on ways we may improve?  no    Overall were you satisfied with your first visit to our practice? yes       I appreciate you taking the time to speak with me today. Is there anything else I can do for you? no      Thank you, and have a great day.

## 2022-02-11 NOTE — PATIENT INSTRUCTIONS
Carpal Tunnel Syndrome    Carpal tunnel syndrome is a condition that causes pain, numbness, and weakness in your hand and fingers. The carpal tunnel is a narrow area located on the palm side of your wrist. Repeated wrist motion or certain diseases may cause swelling within the tunnel. This swelling pinches the main nerve in the wrist. The main nerve in the wrist is called the median nerve.  What are the causes?  This condition may be caused by:  · Repeated and forceful wrist and hand motions.  · Wrist injuries.  · Arthritis.  · A cyst or tumor in the carpal tunnel.  · Fluid buildup during pregnancy.  · Use of tools that vibrate.  Sometimes the cause of this condition is not known.  What increases the risk?  The following factors may make you more likely to develop this condition:  · Having a job that requires you to repeatedly or forcefully move your wrist or hand or requires you to use tools that vibrate. This may include jobs that involve using computers, working on an assembly line, or working with power tools such as drills or lyn.  · Being a woman.  · Having certain conditions, such as:  ? Diabetes.  ? Obesity.  ? An underactive thyroid (hypothyroidism).  ? Kidney failure.  ? Rheumatoid arthritis.  What are the signs or symptoms?  Symptoms of this condition include:  · A tingling feeling in your fingers, especially in your thumb, index, and middle fingers.  · Tingling or numbness in your hand.  · An aching feeling in your entire arm, especially when your wrist and elbow are bent for a long time.  · Wrist pain that goes up your arm to your shoulder.  · Pain that goes down into your palm or fingers.  · A weak feeling in your hands. You may have trouble grabbing and holding items.  Your symptoms may feel worse during the night.  How is this diagnosed?  This condition is diagnosed with a medical history and physical exam. You may also have tests, including:  · Electromyogram (EMG). This test measures electrical  signals sent by your nerves into the muscles.  · Nerve conduction study. This test measures how well electrical signals pass through your nerves.  · Imaging tests, such as X-rays, ultrasound, and MRI. These tests check for possible causes of your condition.  How is this treated?  This condition may be treated with:  · Lifestyle changes. It is important to stop or change the activity that caused your condition.  · Doing exercise and activities to strengthen and stretch your muscles and tendons (physical therapy).  · Making lifestyle changes to help with your condition and learning how to do your daily activities safely (occupational therapy).  · Medicines for pain and inflammation. This may include medicine that is injected into your wrist.  · A wrist splint or brace.  · Surgery.  Follow these instructions at home:  If you have a splint or brace:  · Wear the splint or brace as told by your health care provider. Remove it only as told by your health care provider.  · Loosen the splint or brace if your fingers tingle, become numb, or turn cold and blue.  · Keep the splint or brace clean.  · If the splint or brace is not waterproof:  ? Do not let it get wet.  ? Cover it with a watertight covering when you take a bath or shower.  Managing pain, stiffness, and swelling  If directed, put ice on the painful area. To do this:  · If you have a removeable splint or brace, remove it as told by your health care provider.  · Put ice in a plastic bag.  · Place a towel between your skin and the bag or between the splint or brace and the bag.  · Leave the ice on for 20 minutes, 2-3 times a day. Do not fall asleep with the cold pack on your skin.  · Remove the ice if your skin turns bright red. This is very important. If you cannot feel pain, heat, or cold, you have a greater risk of damage to the area.  Move your fingers often to reduce stiffness and swelling.  General instructions  · Take over-the-counter and prescription medicines  only as told by your health care provider.  · Rest your wrist and hand from any activity that may be causing your pain. If your condition is work related, talk with your employer about changes that can be made, such as getting a wrist pad to use while typing.  · Do any exercises as told by your health care provider, physical therapist, or occupational therapist.  · Keep all follow-up visits. This is important.  Contact a health care provider if:  · You have new symptoms.  · Your pain is not controlled with medicines.  · Your symptoms get worse.  Get help right away if:  · You have severe numbness or tingling in your wrist or hand.  Summary  · Carpal tunnel syndrome is a condition that causes pain, numbness, and weakness in your hand and fingers.  · It is usually caused by repeated wrist motions.  · Lifestyle changes and medicines are used to treat carpal tunnel syndrome. Surgery may be recommended.  · Follow your health care provider's instructions about wearing a splint, resting from activity, keeping follow-up visits, and calling for help.  This information is not intended to replace advice given to you by your health care provider. Make sure you discuss any questions you have with your health care provider.  Document Revised: 2021 Document Reviewed: 2021  [employer] Patient Education ©  [employer]    Nonsurgical Treatment for Carpal Tunnel Syndrome, Adult  Patient will learn several ways to treat Carpal Tunnel in its early stages: splints, edema glove, re-posturing.  To view the content, go to this web address:  [email or web address]    This video will  on: 2023. If you need access to this video following this date, please reach out to the healthcare provider who assigned it to you.  This information is not intended to replace advice given to you by your health care provider. Make sure you discuss any questions you have with your health care provider.  [employer]’s editorial  and clinical teams regularly review and update content to ensure it is up-to-date with changing practice standards and recognized medical guidelines.  Document Revised: 03/19/2021  Elsevier Patient Education © 2021 Elsevier Inc.

## 2022-04-05 NOTE — TELEPHONE ENCOUNTER
Caller: Adrián Graham    Relationship: Self    Best call back number:[phone]  Requested Prescriptions:   Requested Prescriptions     Pending Prescriptions Disp Refills   • finasteride (PROSCAR) 5 MG tablet       Sig: Take 1 tablet by mouth Daily.        Pharmacy where request should be sent: [facility]/PHARMACY #[id number] - [place], [place] - [address] AT ACROSS FROM [facility] [phone] [facility] [phone]      Additional details provided by patient: PATIENT WAS TOLD BY THE  PHARMACY THAT THEY FAXED A PAPER OVER STATING MEDICATION IS OUT OF REFILLS. PATIENT WAS CHECKING TO SEE IF WAS RECEIVED     Does the patient have less than a 3 day supply:  [x] Yes  [] No    Benito Lindsay Rep   04/05/22 09:34 CDT

## 2022-04-19 NOTE — PROGRESS NOTES
Subjective    Mr. Graham is 82 y.o. male    Chief Complaint: Elevated PSA    History of Present Illness    Elevated PSA  Patient is here with an elevated PSA. The PSA was drawn1 month(s). He does not have a family history of prostate cancer. His AUA Symptom Score is 12 /35. Voiding symptoms include Incomplete emptying, Frequency, Intermittency, Urgency, Weakened stream and Nocturia. Denies Straining. Voiding symptoms began several years  . These have been gradual in onset. Negative biopsy X 2 > 10 years ago; history of HOLEP in past  Previous PSA values are : 4.4 correction for Finasteride 8.8    Lab Results   Component Value Date    PSA 4.440 (H) 04/19/2022         The following portions of the patient's history were reviewed and updated as appropriate: allergies, current medications, past family history, past medical history, past social history, past surgical history and problem list.    Review of Systems   Constitutional: Negative for chills and fever.   Gastrointestinal: Negative for abdominal pain, anal bleeding and blood in stool.   Genitourinary: Positive for frequency and urgency. Negative for dysuria and hematuria.         Current Outpatient Medications:   •  diphenhydrAMINE (BENADRYL) 25 mg capsule, Take 25 mg by mouth every 6 (six) hours as needed for itching., Disp: , Rfl:   •  finasteride (PROSCAR) 5 MG tablet, Take 1 tablet by mouth Daily., Disp: 90 tablet, Rfl: 3  •  gabapentin (NEURONTIN) 400 MG capsule, Take 1 capsule by mouth 3 (Three) Times a Day., Disp: 270 capsule, Rfl: 1  •  Olmesartan-amLODIPine-HCTZ 40-10-12.5 MG tablet, Take 1 tablet by mouth Daily., Disp: 90 tablet, Rfl: 3  •  traMADol (ULTRAM) 50 MG tablet, 2 (Two) Times a Day., Disp: , Rfl:     Past Medical History:   Diagnosis Date   • Abdominal distention    • Abdominal pain, left upper quadrant    • Allergic rhinitis    • Arthritis    • Bloating    • BPH (benign prostatic hyperplasia)    • Carpal tunnel syndrome    • Change in bowel  "habits    • Constipation    • Diarrhea    • Erectile dysfunction    • GERD (gastroesophageal reflux disease)    • History of shingles    • Hypertension    • Idiopathic peripheral neuropathy    • Neuropathy    • Pneumonia    • Shingles    • Weight loss        Past Surgical History:   Procedure Laterality Date   • APPENDECTOMY     • BACK SURGERY     • CARPAL TUNNEL RELEASE Right    • CHOLECYSTECTOMY     • COLONOSCOPY  09/14/2010    5 yr-complete colon not visualized   • COLONOSCOPY  07/27/2004    extremely tortuous redundant colon. BE-mild diverticulosis without diverticulitis. ? Gallstones-Dr Chery.   • COLONOSCOPY N/A 9/10/2020    Procedure: COLONOSCOPY WITH ANESTHESIA;  Surgeon: Mani Sy DO;  Location: [facility] ENDOSCOPY;  Service: Gastroenterology;  Laterality: N/A;  Pre: Positive Colorectal Screening  Post: Diverticulosis  Dr. Mooney  CO2 Inflation Used   • ENDOSCOPY N/A 9/26/2016    Procedure: ESOPHAGOGASTRODUODENOSCOPY WITH ANESTHESIA;  Surgeon: Mani Sy DO;  Location:  [facility] ENDOSCOPY;  Service:    • HIP SURGERY Right    • KNEE SURGERY     • OTHER SURGICAL HISTORY      Surgery for Spinal Stenosis   • OTHER SURGICAL HISTORY      Laser Prostate Surgery       Social History     Socioeconomic History   • Marital status:    Tobacco Use   • Smoking status: Never Smoker   • Smokeless tobacco: Never Used   Vaping Use   • Vaping Use: Never used   Substance and Sexual Activity   • Alcohol use: No   • Drug use: No   • Sexual activity: Defer       Family History   Problem Relation Age of Onset   • Diabetes Mother    • Cancer Mother    • Osteoporosis Mother    • Heart disease Brother    • Colon cancer Neg Hx    • Colon polyps Neg Hx    • Esophageal cancer Neg Hx        Objective    Temp 97.8 °F (36.6 °C) (Temporal)   Ht 185.4 cm (73\")   Wt 93 kg (205 lb)   BMI 27.05 kg/m²     Physical Exam        Results for orders placed or performed in visit on 04/20/22   POC Urinalysis Dipstick, Multipro    " Specimen: Urine   Result Value Ref Range    Color Yellow Yellow, Straw, Dark Yellow, [place]    Clarity, UA Clear Clear    Glucose, UA Negative Negative, 1000 mg/dL (3+) mg/dL    Bilirubin Negative Negative    Ketones, UA Negative Negative    Specific Gravity  1.020 1.005 - 1.030    Blood, UA Negative Negative    pH, Urine 5.5 5.0 - 8.0    Protein, POC Negative Negative mg/dL    Urobilinogen, UA Normal Normal    Nitrite, UA Negative Negative    Leukocytes Negative Negative     Assessment and Plan    Diagnoses and all orders for this visit:    1. Elevated prostate specific antigen (PSA) (Primary)  -     POC Urinalysis Dipstick, Multipro    2. BPH with urinary obstruction  -     finasteride (PROSCAR) 5 MG tablet; Take 1 tablet by mouth Daily.  Dispense: 90 tablet; Refill: 3  -     PSA DIAGNOSTIC; Future     PSA of 4.4 with a finasteride correction to 8.8.  He has a history of 2- biopsies in the past.  We have discussed options in past including discontinuing PSA screening continuing PSA screening proceed with MRI with possible fusion biopsy patient does not want to have a biopsy.       Continue finasteride and he will follow-up in 1 year for symptom check.

## 2022-06-07 NOTE — PROGRESS NOTES
"Chief Complaint   Patient presents with   • Results     Pt has questions about chest XR results   • Dizziness     For \"several weeks\"         History:  Adrián Graham is a 82 y.o. male who presents today for evaluation of the above problems.          1) Cough and congestion 1 month.  Seen in UC this AM and ill-defined nodular density noted RLL, new since CXR 2019.  I have reviewed the UC note and the xray report from today. Getting thick mucous from nose and chest.  Was given Augmentin and medrol dose pack today but hasn't gotten yet.     2) Dizzy. Off and on for years, but worse over last 3 weeks. Worse with turning head side to side and also with standing up.  Having to hold on to daughter over the last week when walking to keep balance.  No TIA/CVA symptoms. No pre-syncope or syncope.    3)Reviewed MRI C-spine because he tells me he has been dealing with neck pain and seeing pain management.  Incidental note of right thyroid nodule on last year's MRI.      ROS:  Review of Systems   as above      No Known Allergies  Past Medical History:   Diagnosis Date   • Abdominal distention    • Abdominal pain, left upper quadrant    • Allergic rhinitis    • Arthritis    • Bloating    • BPH (benign prostatic hyperplasia)    • Carpal tunnel syndrome    • Change in bowel habits    • Constipation    • Diarrhea    • Erectile dysfunction    • GERD (gastroesophageal reflux disease)    • History of shingles    • Hypertension    • Idiopathic peripheral neuropathy    • Neuropathy    • Pneumonia    • Shingles    • Weight loss      Past Surgical History:   Procedure Laterality Date   • APPENDECTOMY     • BACK SURGERY     • CARPAL TUNNEL RELEASE Right    • CHOLECYSTECTOMY     • COLONOSCOPY  09/14/2010    5 yr-complete colon not visualized   • COLONOSCOPY  07/27/2004    extremely tortuous redundant colon. BE-mild diverticulosis without diverticulitis. ? Gallstones-Dr Chery.   • COLONOSCOPY N/A 9/10/2020    Procedure: COLONOSCOPY WITH " ANESTHESIA;  Surgeon: Mani Sy DO;  Location: [facility] ENDOSCOPY;  Service: Gastroenterology;  Laterality: N/A;  Pre: Positive Colorectal Screening  Post: Diverticulosis  Dr. Mooney  CO2 Inflation Used   • ENDOSCOPY N/A 9/26/2016    Procedure: ESOPHAGOGASTRODUODENOSCOPY WITH ANESTHESIA;  Surgeon: Mani Sy DO;  Location: [facility] ENDOSCOPY;  Service:    • HIP SURGERY Right    • KNEE SURGERY     • OTHER SURGICAL HISTORY      Surgery for Spinal Stenosis   • OTHER SURGICAL HISTORY      Laser Prostate Surgery     Family History   Problem Relation Age of Onset   • Diabetes Mother    • Cancer Mother    • Osteoporosis Mother    • Heart disease Brother    • Colon cancer Neg Hx    • Colon polyps Neg Hx    • Esophageal cancer Neg Hx      Adrián  reports that he has never smoked. He has never used smokeless tobacco. He reports that he does not drink alcohol and does not use drugs.    I have reviewed and updated the above documentation (if necessary) including but not limited to chief complaint, ROS, PFSH, allergies and medications        Current Outpatient Medications:   •  diphenhydrAMINE (BENADRYL) 25 mg capsule, Take 25 mg by mouth every 6 (six) hours as needed for itching., Disp: , Rfl:   •  finasteride (PROSCAR) 5 MG tablet, Take 1 tablet by mouth Daily., Disp: 90 tablet, Rfl: 3  •  gabapentin (NEURONTIN) 400 MG capsule, Take 1 capsule by mouth 3 (Three) Times a Day., Disp: 270 capsule, Rfl: 1  •  Olmesartan-amLODIPine-HCTZ 40-10-12.5 MG tablet, Take 1 tablet by mouth Daily., Disp: 90 tablet, Rfl: 3  •  traMADol (ULTRAM) 50 MG tablet, 2 (Two) Times a Day., Disp: , Rfl:   •  amoxicillin-clavulanate (AUGMENTIN) 875-125 MG per tablet, Take 1 tablet by mouth 2 (Two) Times a Day for 10 days., Disp: 20 tablet, Rfl: 0  •  fluticasone (Flonase) 50 MCG/ACT nasal spray, 2 sprays into the nostril(s) as directed by provider Daily., Disp: 16 g, Rfl: 0  •  meclizine (ANTIVERT) 12.5 MG tablet, Take 1 tablet by mouth 3 (Three)  "Times a Day As Needed for Dizziness., Disp: 30 tablet, Rfl: 0  •  methylPREDNISolone (MEDROL) 4 MG dose pack, Take as directed on package instructions., Disp: 1 each, Rfl: 0  No current facility-administered medications for this visit.    OBJECTIVE:  Visit Vitals  /72 (BP Location: Right arm, Patient Position: Sitting, Cuff Size: Adult)   Pulse 72   Ht 185.4 cm (73\")   Wt 91.6 kg (202 lb)   SpO2 100%   BMI 26.65 kg/m²      Physical Exam  Vitals and nursing note reviewed.   Constitutional:       General: He is not in acute distress.     Appearance: Normal appearance. He is not ill-appearing, toxic-appearing or diaphoretic.      Comments: Daughter here with patient.  He appears tired.   HENT:      Head: Normocephalic and atraumatic.      Right Ear: Ear canal and external ear normal. There is no impacted cerumen.      Left Ear: Ear canal and external ear normal. There is no impacted cerumen.      Ears:      Comments: TM's dull bilaterally     Nose: Nose normal. No congestion or rhinorrhea.      Mouth/Throat:      Mouth: Mucous membranes are moist.      Pharynx: Oropharynx is clear. No oropharyngeal exudate or posterior oropharyngeal erythema.   Eyes:      General: No scleral icterus.        Right eye: No discharge.         Left eye: No discharge.      Conjunctiva/sclera: Conjunctivae normal.      Pupils: Pupils are equal, round, and reactive to light.   Neck:      Vascular: Carotid bruit (bilateral carotid bruit noted) present.   Cardiovascular:      Rate and Rhythm: Normal rate and regular rhythm.      Heart sounds: Normal heart sounds. No murmur (I cannot appreciate murmur on today's exam) heard.  Pulmonary:      Effort: Pulmonary effort is normal. No respiratory distress.      Breath sounds: Normal breath sounds. No wheezing.   Abdominal:      General: There is no distension.      Palpations: Abdomen is soft.      Tenderness: There is no abdominal tenderness.   Musculoskeletal:      Right lower leg: No edema. "      Left lower leg: No edema.   Skin:     General: Skin is warm and dry.   Neurological:      Mental Status: He is alert and oriented to person, place, and time.   Psychiatric:         Mood and Affect: Mood normal.         Behavior: Behavior normal.         Thought Content: Thought content normal.         Judgment: Judgment normal.         [facility]    Assessment/Plan    Diagnoses and all orders for this visit:    1. Dizziness (Primary)  -     meclizine (ANTIVERT) 12.5 MG tablet; Take 1 tablet by mouth 3 (Three) Times a Day As Needed for Dizziness.  Dispense: 30 tablet; Refill: 0    2. Bilateral carotid bruits  -     US Carotid Bilateral; Future    3. Lung nodule seen on imaging study  -     CT Chest Without Contrast; Future    4. Acute URI  -     fluticasone (Flonase) 50 MCG/ACT nasal spray; 2 sprays into the nostril(s) as directed by provider Daily.  Dispense: 16 g; Refill: 0    5. Bronchitis    6. Thyroid nodule  -     US Thyroid    Work up as above.  Trial of Antivert for dizziness with change of position.  Reviewed importance of staying well-hydrated.    Education materials and an After Visit Summary were printed and given to the patient at discharge.  Return for Next scheduled follow up. Sooner if dizziness is worsening.         MEKHI Tatum   14:43 CDT  6/7/2022

## 2022-06-13 PROBLEM — E78.5 HYPERLIPIDEMIA: Status: ACTIVE | Noted: 2022-01-01

## 2022-06-13 PROBLEM — I77.9 BILATERAL CAROTID ARTERY DISEASE (HCC): Status: ACTIVE | Noted: 2022-01-01

## 2022-06-14 NOTE — PROGRESS NOTES
I have several labs in for him to come check when he can. I have also sent in a Z-pack to his pharmacy. I know we have been working up several things.  Let's get him on the schedule for a follow up with Dr. Irizarry in 1-2 weeks if possible. Thanks!

## 2022-06-15 NOTE — TELEPHONE ENCOUNTER
Left message reminding Mr Graham of his appointment for Thursday, June 16th, 2022 at 915 am with Dr Brown.

## 2022-06-16 NOTE — PROGRESS NOTES
06/16/2022      Fernanda Lockhart APRN  [address]  [address]  [place] [address]    Adrián Graham  1940    Chief Complaint   Patient presents with   • Establish Care     Referred over by Fernanda GAMING for Bilateral Carotid Artery Stenosis. Test [id number] CT pad angiogram carotids. Patient denies any stroke like symptoms.    • NOn Smoker     Patient is a Non Smoker    • Med Management     Verbally verified medications with patient        Dear MEKHI Tatum    HPI  I had the pleasure of seeing your patient Adrián Graham in the office today.  Thank you kindly for this consultation.  As you recall, Adrián Graham is a 82 y.o.  male who you are currently following for routine health maintenance.  Upon exam, he ws found to have carotid bruit.  He also had complaints of dizziness, which is not new.  He denies any strokelike symptoms.  He is maintained on aspirin and Lipitor.  He did have noninvasive testing performed today, which I did review in office.       Past Medical History:   Diagnosis Date   • Abdominal distention    • Abdominal pain, left upper quadrant    • Allergic rhinitis    • Arthritis    • Bloating    • BPH (benign prostatic hyperplasia)    • Carpal tunnel syndrome    • Change in bowel habits    • Constipation    • Diarrhea    • Erectile dysfunction    • GERD (gastroesophageal reflux disease)    • History of shingles    • Hypertension    • Idiopathic peripheral neuropathy    • Neuropathy    • Pneumonia    • Shingles    • Weight loss        Past Surgical History:   Procedure Laterality Date   • APPENDECTOMY     • BACK SURGERY     • CARPAL TUNNEL RELEASE Right    • CARPAL TUNNEL RELEASE Left 06/2022   • CHOLECYSTECTOMY     • COLONOSCOPY  09/14/2010    5 yr-complete colon not visualized   • COLONOSCOPY  07/27/2004    extremely tortuous redundant colon. BE-mild diverticulosis without diverticulitis. ? Gallstones-Dr Chery.   • COLONOSCOPY N/A 09/10/2020    Procedure: COLONOSCOPY WITH  ANESTHESIA;  Surgeon: Mani Sy DO;  Location: [facility] ENDOSCOPY;  Service: Gastroenterology;  Laterality: N/A;  Pre: Positive Colorectal Screening  Post: Diverticulosis  Dr. Mooney  CO2 Inflation Used   • ENDOSCOPY N/A 09/26/2016    Procedure: ESOPHAGOGASTRODUODENOSCOPY WITH ANESTHESIA;  Surgeon: Mani Sy DO;  Location: [facility] ENDOSCOPY;  Service:    • HIP SURGERY Right    • KNEE SURGERY     • OTHER SURGICAL HISTORY      Surgery for Spinal Stenosis   • OTHER SURGICAL HISTORY      Laser Prostate Surgery       Family History   Problem Relation Age of Onset   • Diabetes Mother    • Cancer Mother    • Osteoporosis Mother    • Heart disease Brother    • Colon cancer Neg Hx    • Colon polyps Neg Hx    • Esophageal cancer Neg Hx        Social History     Socioeconomic History   • Marital status:    Tobacco Use   • Smoking status: Never Smoker   • Smokeless tobacco: Never Used   Vaping Use   • Vaping Use: Never used   Substance and Sexual Activity   • Alcohol use: No   • Drug use: No   • Sexual activity: Defer       No Known Allergies      Current Outpatient Medications:   •  amoxicillin-clavulanate (AUGMENTIN) 875-125 MG per tablet, Take 1 tablet by mouth 2 (Two) Times a Day for 10 days., Disp: 20 tablet, Rfl: 0  •  aspirin (aspirin) 81 MG EC tablet, Take 1 tablet by mouth Daily., Disp: 30 tablet, Rfl: 1  •  atorvastatin (Lipitor) 20 MG tablet, Take 1 tablet by mouth Daily., Disp: 30 tablet, Rfl: 2  •  diphenhydrAMINE (BENADRYL) 25 mg capsule, Take 25 mg by mouth every 6 (six) hours as needed for itching., Disp: , Rfl:   •  finasteride (PROSCAR) 5 MG tablet, Take 1 tablet by mouth Daily., Disp: 90 tablet, Rfl: 3  •  fluticasone (Flonase) 50 MCG/ACT nasal spray, 2 sprays into the nostril(s) as directed by provider Daily., Disp: 16 g, Rfl: 0  •  gabapentin (NEURONTIN) 400 MG capsule, Take 1 capsule by mouth 3 (Three) Times a Day., Disp: 270 capsule, Rfl: 1  •  meclizine (ANTIVERT) 12.5 MG tablet, Take  "1 tablet by mouth 3 (Three) Times a Day As Needed for Dizziness., Disp: 30 tablet, Rfl: 0  •  Olmesartan-amLODIPine-HCTZ 40-10-12.5 MG tablet, Take 1 tablet by mouth Daily., Disp: 90 tablet, Rfl: 3  •  traMADol (ULTRAM) 50 MG tablet, 2 (Two) Times a Day., Disp: , Rfl:     Review of Systems   Constitutional: Negative.    HENT: Negative.    Eyes: Negative.    Respiratory: Negative.    Cardiovascular: Negative.    Gastrointestinal: Negative.    Endocrine: Negative.    Genitourinary: Negative.    Musculoskeletal: Negative.    Skin: Negative.    Allergic/Immunologic: Negative.    Neurological: Positive for dizziness.   Hematological: Negative.    Psychiatric/Behavioral: Negative.    All other systems reviewed and are negative.    /74 (BP Location: Right arm, Patient Position: Sitting, Cuff Size: Adult)   Pulse 90   Ht 185.4 cm (73\")   Wt 91.6 kg (202 lb)   SpO2 93%   BMI 26.65 kg/m²     Physical Exam  Vitals and nursing note reviewed.   Constitutional:       Appearance: Normal appearance. He is well-developed.   HENT:      Head: Normocephalic and atraumatic.   Eyes:      General: No scleral icterus.     Pupils: Pupils are equal, round, and reactive to light.   Neck:      Thyroid: No thyromegaly.      Vascular: No carotid bruit or JVD.   Cardiovascular:      Rate and Rhythm: Normal rate and regular rhythm.      Pulses:           Carotid pulses are 2+ on the right side and 2+ on the left side.       Femoral pulses are 2+ on the right side and 2+ on the left side.       Popliteal pulses are 2+ on the right side and 2+ on the left side.        Dorsalis pedis pulses are 2+ on the right side and 2+ on the left side.        Posterior tibial pulses are 2+ on the right side and 2+ on the left side.      Heart sounds: Normal heart sounds.   Pulmonary:      Effort: Pulmonary effort is normal.      Breath sounds: Normal breath sounds.   Abdominal:      General: Bowel sounds are normal. There is no distension or abdominal " bruit.      Palpations: Abdomen is soft. There is no mass.      Tenderness: There is no abdominal tenderness.   Musculoskeletal:         General: Normal range of motion.      Cervical back: Neck supple.   Lymphadenopathy:      Cervical: No cervical adenopathy.   Skin:     General: Skin is warm and dry.   Neurological:      General: No focal deficit present.      Mental Status: He is alert and oriented to person, place, and time.      Cranial Nerves: No cranial nerve deficit.      Sensory: No sensory deficit.   Psychiatric:         Mood and Affect: Mood normal.         Behavior: Behavior normal.         Thought Content: Thought content normal.         Judgment: Judgment normal.       Diagnostic Data:  XR Chest 2 View    Result Date: 6/7/2022  Narrative: EXAMINATION: XR CHEST 2 VW-  6/7/2022 8:02 AM CDT  HISTORY: cough and congestion x 1 month, worsening; R05.3-Chronic cough  The frontal and lateral views of the chest are obtained. Comparison is made with the previous study dated 12/4/2019.  The lungs are moderately well-expanded.  An ill-defined nodular density seen in the right lower lung laterally which was not noted in the previous study.  There is no pleural effusion, pulmonary congestion or pneumothorax.  Heart size is within normal range. Atheromatous change of the thoracic aorta are noted.  No acute bony abnormality.      Impression: 1. Small ill-defined nodular density in the right lower lung laterally may need further evaluation. A follow-up examination or a CT scan of the chest may be obtained for further evaluation. 2. No pulmonary congestion or pneumothorax.   This report was finalized on 06/07/2022 08:11 by Dr. Colton Helms MD.    CT Chest Without Contrast Diagnostic    Result Date: 6/13/2022  Narrative: EXAMINATION:  CT CHEST WO CONTRAST DIAGNOSTIC-  6/13/2022 11:42 AM CDT  HISTORY: RLL nodularity on CXR; R91.1-Solitary pulmonary nodule.  COMPARISON : Chest x-ray on 6/7/2022.  DLP: 410 mGy-cm.  Automated dosage reduction technique was utilized to reduce patient dosage.  TECHNIQUE: Spiral CT was performed of the chest without contrast. Sagittal and coronal images were reconstructed.  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 There is atheromatous disease of the thoracic aorta. Heart size is normal. Right pulmonary artery measures 3 cm and left pulmonary artery measures 2.6 cm. There are small mediastinal lymph nodes that are not pathologically enlarged.  LUNGS: There are patchy groundglass infiltrates in both lungs. The infiltrates are predominantly peripherally oriented. No pulmonary nodules are appreciated.  UPPER ABDOMEN: Prior cholecystectomy. Densely calcified lesion in the right hepatic lobe of the liver. Unenhanced spleen unremarkable. Unenhanced pancreas and visualized portions of the kidneys are unremarkable. Adrenal glands normal in size.  BONES: There are degenerative changes of the spine with syndesmophytes.      Impression: 1. Patchy groundglass parenchymal infiltrates bilaterally. Covid 19 infection is in the differential. Other infectious and inflammatory etiologies are also included in the differential diagnosis. Interstitial lung disease is felt to be less likely. 2. Atheromatous disease of the thoracic aorta. Aberrant origin of the right subclavian artery. Dilated pulmonary arteries. 3. No other acute findings.    This report was finalized on 06/13/2022 15:08 by Dr. Hardik Batres MD.    US Thyroid    Result Date: 6/13/2022  Narrative: US THYROID- 6/13/2022 10:53 AM CDT  REASON FOR EXAM: thyroid nodule on MRI c-spine; E04.1-Nontoxic single thyroid nodule   COMPARISON: NONE.  TECHNIQUE: Multiple longitudinal and transverse real-time sonographic images of the thyroid are obtained.  FINDINGS: The right lobe of the thyroid measures 3.9 x 1.5 x 1.1 cm. The left lobe of the thyroid measures 4.1 x 1.4 x 1.1 cm. The thyroid isthmus  measures 0.3 cm in thickness.  Normal background thyroid echotexture. There is a 1 cm round isoechoic solid nodule identified in the right lobe. There are 2 additional benign-appearing right lobe subcentimeter cystic nodules. There are 2 benign-appearing left lobe subcentimeter cystic nodules as well.      Impression: 1. 1 cm round isoechoic right lobe solid nodule, TI RADS category 3 (mildly suspicious). No indication for needle aspiration of this time. 2. Additional bilateral subcentimeter benign-appearing cystic nodules, TI RADS category 1 (benign).  This report was finalized on 06/13/2022 15:22 by Dr Prem Mclaughlin, .    US Carotid Bilateral    Result Date: 6/13/2022  Narrative: EXAMINATION: US CAROTID BILATERAL- 6/13/2022 12:33 PM CDT  HISTORY: bilateral carotid bruit, dizziness; R09.89-Other specified symptoms and signs involving the circulatory and respiratory systems.  REPORT: Sonographic images of the carotid arteries were obtained bilaterally, using color, gray scale and Doppler technique for a duplex study.  COMPARISON: There are no correlative imaging studies for comparison.  On the right, mixed calcified noncalcified plaque is seen at the carotid bulb, the peak systolic velocity at the origin of the right ICA measures 198 cm/s, with an end-diastolic velocity of 72 cm/s. The right maximum ICA/CCA ratio equals 2.5. There is antegrade flow in the right vertebral artery. The right external carotid artery is patent.  On the left, there is mixed calcified and noncalcified plaque at the carotid bifurcation, the PSV at the origin of the left ICA measures 289 cm/s with an EDV of 75 cm/s. At the left mid ICA the peak systolic velocity measures 350 cm/s with an EDV of 128 cm/s. The maximum left ICA/CCA ratio equals 3.9. There is antegrade flow in the left vertebral artery. The left external carotid artery is patent.      Impression: 1. Greater than 70% stenosis of the left internal carotid artery based on flow  velocities, follow-up with CTA is suggested. 2. 50-69% stenosis of the right ICA. 3. Antegrade flow is demonstrated within both vertebral arteries. This report was finalized on 06/13/2022 12:38 by Dr. Aureliano Fallon MD.    CT Angiogram Carotids    Result Date: 6/14/2022  Narrative: EXAMINATION: CT ANGIOGRAM CAROTIDS-   6/14/2022 1:25 PM CDT  HISTORY: stenosis noted on US; I77.9-Disorder of arteries and arterioles, unspecified  In order to have a CT radiation dose as low as reasonably achievable Automated Exposure Control was utilized for adjustment of the mA and/or KV according to patient size.  DLP in mGycm= 323  The CT angiography of the neck is performed after intravenous contrast enhancement. Images are acquired in axial plane and subsequent 2-D reconstructions in coronal and sagittal planes and 3-D maximum intensity projection reconstruction.  There is no previous study for comparison.  Correlation made with CT scan of the chest dated 6/13/2022.  Limited visualized thoracic aorta show atheromatous changes. No aneurysmal dilatation or dissection.  There is aberrant origin of the right subclavian artery from the left side of the aortic arch distal to the origin of the left subclavian artery. It passes posterior to the esophagus and subsequent normal course in the right side of the base of the neck and axilla.  Normal origin of the left common carotid artery. Normal origin of the left subclavian artery.  There is a large calcified plaque in the distal right common carotid artery without significant stenosis. The plaque extends into the carotid bulb and proximal right internal carotid artery with a high-grade, 80% stenosis. There is subsequent normal size right common carotid artery towards the base of the skull.  There is a large calcific plaque in the left distal common carotid artery with 50% diameter stenosis. The plaque extends into the internal and external carotid arteries with high-grade, 70% stenosis of  the left internal carotid artery. There is less than 50% stenosis of the left external carotid artery. The remaining left internal carotid artery is significantly smaller and attenuated throughout the neck.  Normal size right internal carotid artery seen in the right carotid canal, right cavernous sinus and right suprasellar region. A tiny an attenuated left internal carotid artery seen in the left carotid canal and cavernous sinus. It is small and attenuated left internal carotid artery seen in the suprasellar region.  The right internal carotid artery divides into normal-appearing anterior middle cerebral arteries. A small and attenuated left internal carotid artery divides into normal-appearing anterior middle cerebral arteries. There is subsequent normal insular and opercular branches.  Normal sized vertebral arteries are seen throughout the neck. Right-sided basal dominant. Both enter the foramen magnum. The left vertebral artery gets significantly attenuated after origin of PICA. Subsequently both join to make a normal-sized basilar artery which divides into normal-appearing posterior cerebral arteries. No focal stenosis or aneurysmal dilatation.  The soft tissues of the neck appear unremarkable. Thyroid gland appear normal. No mass or lymphadenopathy.  Chronic degenerative changes of the cervical spine are seen more superiorly involving the lower cervical spine. No focal bony lesion or acute abnormality.  Limited visualized lung show multifocal groundglass infiltrate in both lungs which was also noted in the previous study dated 6/13/2022.      Impression: 1. The high-grade, stenosis of the proximal internal carotid arteries bilaterally, right worse than the left. 2. Diffusely attenuated left internal carotid artery in the neck, carotid canal and the left cavernous sinus. Normal size middle and anterior cerebral arteries bilaterally. 3. The NASCET criteria was utilized for estimation of the carotid arterial  stenosis.      This report was finalized on 06/14/2022 14:44 by Dr. Colton Helms MD.         Patient Active Problem List   Diagnosis   • Idiopathic peripheral neuropathy   • Essential hypertension   • Benign prostatic hyperplasia without lower urinary tract symptoms   • Overweight (BMI 25.0-29.9)   • Carpal tunnel syndrome, left   • Bilateral carotid artery disease (HCC)   • Hyperlipidemia        Diagnosis Plan   1. Bilateral carotid artery stenosis  Ambulatory Referral to Cardiology   2. Essential hypertension     3. Hyperlipidemia, unspecified hyperlipidemia type         Plan: After thoroughly evaluating Adrián Graham, I believe the best course of action is to proceed with a left carotid endarterectomy followed by the right shortly after.  I did review his testing closely which does show about 90% stenosis bilaterally.  Risks of carotid endarterectomy include, but are not limited to, bleeding, infection, vessel damage, nerve damage, MI, stroke, and death.  The patient understands these risks and would like to proceed with the procedure.  I will refer him to cardiology for cardiac risk stratification.  Once I receive clearance, I will schedule him appropriately.  The patient is to continue taking their medications as previously discussed.   This was all discussed in full with complete understanding.  Thank you for allowing me to participate in the care of your patient.  Please do not hesitate to call with any questions or concerns.  We will keep you aware of any further encounters with Adrián Graham.        Sincerely yours,         Albert Brown, Mauro Beasley, DO

## 2022-06-17 NOTE — TELEPHONE ENCOUNTER
I left message for the patient with details of his referral appt on 8-15-22t 3:15 with Dr Thurston

## 2022-06-22 PROBLEM — Z01.810 PREOP CARDIOVASCULAR EXAM: Status: ACTIVE | Noted: 2022-01-01

## 2022-06-22 NOTE — PROGRESS NOTES
Adrián Graham  [id number]  1940  82 y.o.  male    Referring Provider: Mauro Irizarry DO    Reason for  Visit:  Initial visit for  preoperative cardiovascular clearance under general anesthesia for carotid endarterectomy   under follow up Dr Brown for carotid stenosis       Subjective    Moderate  chronic exertional shortness of breath on exertion relieved with rest  No significant cough or wheezing    No palpitations  Has been told irregular heart beats   No associated chest pain  No significant pedal edema    No fever or chills  No significant expectoration    No hemoptysis  No presyncope or syncope    Tolerating current medications well with no untoward side effects   Compliant with prescribed medication regimen. Tries to adhere to cardiac diet.     Joint pain in small, medium and large joints   No bleeding, excessive bruising, gait instability or fall risks      Neuropathy both feet   Non smoker   Strong family history of early coronary artery disease        History of present illness:  Adrián Graham is a 82 y.o. yo male with carotid artery stenosis    who presents today for   Chief Complaint   Patient presents with   • Establish Care     PATIENT NEEDS A CARDIAC CLEARANCE FOR     .    History  Past Medical History:   Diagnosis Date   • Abdominal distention    • Abdominal pain, left upper quadrant    • Allergic rhinitis    • Arthritis    • Bloating    • BPH (benign prostatic hyperplasia)    • Carpal tunnel syndrome    • Change in bowel habits    • Constipation    • Diarrhea    • Erectile dysfunction    • GERD (gastroesophageal reflux disease)    • History of shingles    • Hypertension    • Idiopathic peripheral neuropathy    • Neuropathy    • Pneumonia    • Shingles    • Weight loss    ,   Past Surgical History:   Procedure Laterality Date   • APPENDECTOMY     • BACK SURGERY     • CARPAL TUNNEL RELEASE Right    • CARPAL TUNNEL RELEASE Left 06/2022   • CHOLECYSTECTOMY     • COLONOSCOPY   09/14/2010    5 yr-complete colon not visualized   • COLONOSCOPY  07/27/2004    extremely tortuous redundant colon. BE-mild diverticulosis without diverticulitis. ? Gallstones-Dr Chery.   • COLONOSCOPY N/A 09/10/2020    Procedure: COLONOSCOPY WITH ANESTHESIA;  Surgeon: Mani Sy DO;  Location: [facility] ENDOSCOPY;  Service: Gastroenterology;  Laterality: N/A;  Pre: Positive Colorectal Screening  Post: Diverticulosis  Dr. Mooney  CO2 Inflation Used   • ENDOSCOPY N/A 09/26/2016    Procedure: ESOPHAGOGASTRODUODENOSCOPY WITH ANESTHESIA;  Surgeon: Mani Sy DO;  Location: [facility] ENDOSCOPY;  Service:    • HIP SURGERY Right    • KNEE SURGERY     • OTHER SURGICAL HISTORY      Surgery for Spinal Stenosis   • OTHER SURGICAL HISTORY      Laser Prostate Surgery   ,   Family History   Problem Relation Age of Onset   • Diabetes Mother    • Cancer Mother    • Osteoporosis Mother    • Heart disease Brother    • Colon cancer Neg Hx    • Colon polyps Neg Hx    • Esophageal cancer Neg Hx    ,   Social History     Tobacco Use   • Smoking status: Never Smoker   • Smokeless tobacco: Never Used   Vaping Use   • Vaping Use: Never used   Substance Use Topics   • Alcohol use: No   • Drug use: No   ,     Medications  Current Outpatient Medications   Medication Sig Dispense Refill   • aspirin (aspirin) 81 MG EC tablet Take 1 tablet by mouth Daily. 30 tablet 1   • atorvastatin (Lipitor) 20 MG tablet Take 1 tablet by mouth Daily. 30 tablet 2   • diphenhydrAMINE (BENADRYL) 25 mg capsule Take 25 mg by mouth every 6 (six) hours as needed for itching.     • finasteride (PROSCAR) 5 MG tablet Take 1 tablet by mouth Daily. 90 tablet 3   • fluticasone (Flonase) 50 MCG/ACT nasal spray 2 sprays into the nostril(s) as directed by provider Daily. 16 g 0   • gabapentin (NEURONTIN) 400 MG capsule Take 1 capsule by mouth 3 (Three) Times a Day. 270 capsule 1   • meclizine (ANTIVERT) 12.5 MG tablet Take 1 tablet by mouth 3 (Three) Times a Day As  "Needed for Dizziness. 30 tablet 0   • Olmesartan-amLODIPine-HCTZ 40-10-12.5 MG tablet Take 1 tablet by mouth Daily. 90 tablet 3   • traMADol (ULTRAM) 50 MG tablet 2 (Two) Times a Day.       No current facility-administered medications for this visit.       Allergies:  Patient has no known allergies.    Review of Systems  Review of Systems   Constitutional: Negative.   HENT: Negative.    Eyes: Negative.    Cardiovascular: Positive for dyspnea on exertion. Negative for chest pain, claudication, cyanosis, irregular heartbeat, leg swelling, near-syncope, orthopnea, palpitations, paroxysmal nocturnal dyspnea and syncope.   Respiratory: Negative.    Endocrine: Negative.    Hematologic/Lymphatic: Negative.    Skin: Negative.    Gastrointestinal: Negative for anorexia.   Genitourinary: Negative.    Neurological: Positive for dizziness and loss of balance.   Psychiatric/Behavioral: Negative.        Objective     Physical Exam:  /56   Pulse 92   Ht 185.4 cm (73\")   Wt 89.8 kg (198 lb)   SpO2 96%   BMI 26.12 kg/m²     Physical Exam  Constitutional:       Appearance: He is well-developed.   HENT:      Head: Normocephalic.   Neck:      Vascular: Normal carotid pulses. No carotid bruit or JVD.      Trachea: No tracheal tenderness or tracheal deviation.   Cardiovascular:      Rate and Rhythm: Regular rhythm.      Pulses: Normal pulses.      Heart sounds: Murmur heard.    Systolic murmur is present with a grade of 2/6.  Pulmonary:      Effort: Pulmonary effort is normal.      Breath sounds: No stridor.   Abdominal:      Palpations: Abdomen is soft.   Musculoskeletal:      Cervical back: No edema.   Skin:     General: Skin is warm.   Neurological:      Mental Status: He is alert.      Cranial Nerves: No cranial nerve deficit.      Sensory: No sensory deficit.   Psychiatric:         Speech: Speech normal.         Behavior: Behavior normal.         Results Review:        "   ____________________________________________________________________________________________________________________________________________  Health maintenance and recommendations    Low salt/ HTN/ Heart healthy carbohydrate restricted cardiac diet   The patient is advised to reduce or avoid caffeine or other cardiac stimulants.   Minimize or avoid  NSAID-type medications      Monitor for any signs of bleeding including red or dark stools. Fall precautions.   Advised staying uptodate with immunizations per established standard guidelines.    Offered to give patient  a copy of my notes     Questions were encouraged, asked and answered to the patient's  understanding and satisfaction. Questions if any regarding current medications and side effects, need for refills and importance of compliance to medications stressed.    Reviewed available prior notes, consults, prior visits, laboratory findings, radiology and cardiology relevant reports. Updated chart as applicable. I have reviewed the patient's medical history in detail and updated the computerized patient record as relevant.      Updated patient regarding any new or relevant abnormalities on review of records or any new findings on physical exam. Mentioned to patient about purpose of visit and desirable health short and long term goals and objectives.    Primary to monitor CBC CMP Lipid panel and TSH as applicable    ___________________________________________________________________________________________________________________________________________     ECG 12 Lead    Date/Time: 6/22/2022 9:41 AM  Performed by: Ruben Moncada MD  Authorized by: Ruben Moncada MD   Comparison: not compared with previous ECG   Rhythm: sinus rhythm  Conduction: incomplete right bundle branch block and 1st degree AV block  Other findings: low voltage    Clinical impression: abnormal EKG            Assessment & Plan   Diagnoses and all orders for this visit:    1. Preop  "cardiovascular exam [facility] Dr Brown (Primary)    2. Hyperlipidemia, unspecified hyperlipidemia type    3. Essential hypertension    4. Bilateral carotid artery disease, unspecified type (HCC)    5. BARRETO (dyspnea on exertion)  -     Stress Test With Myocardial Perfusion (1 Day); Future    6. Abnormal ECG  -     Adult Transthoracic Echo Complete w/ Color, Spectral and Contrast if necessary per protocol; Future          Plan    Orders Placed This Encounter   Procedures   • Stress Test With Myocardial Perfusion (1 Day)     Standing Status:   Future     Standing Expiration Date:   6/22/2023     Order Specific Question:   What stress agent will be used?     Answer:   Regadenoson (Lexiscan)     Order Specific Question:   Difficulty walking criteria?     Answer:   Musculoskeletal (hips, knees, feet, back, amputee)     Order Specific Question:   Reason for exam?     Answer:   Preoperative Cardiac Assessmemt for Vascular Surgery     Order Specific Question:   Release to patient     Answer:   Immediate   • Adult Transthoracic Echo Complete w/ Color, Spectral and Contrast if necessary per protocol     Standing Status:   Future     Standing Expiration Date:   6/22/2023     Scheduling Instructions:      Myocardial strain to be performed during echocardiogram as long as technically feasible     Order Specific Question:   Reason for exam?     Answer:   Dyspnea     Order Specific Question:   Release to patient     Answer:   Immediate      Call for results of cardiac tests after done for clearance for surgery     He declined cardiac monitor   He states has had \"irregular heart beats in the past but no palpitations\"     Keep LDL below 70 mg/dl. Monitor liver and renal functions.   Monitor CBC, CMP, TSH (as indicated) and Lipid Panel by primary     Check BP and heart rates twice daily initially till blood pressures and heart rates under good control and then at least 3x / week,   If blood pressures continue to be well-controlled then can " check week a month  at home and bring a recording for review next visit  If BP >130/85 or < 100/60 persistently over 3 reading 30 mins apart or if heart rates persistently above 100 bpm or less than 55 bpm call sooner for evaluation and advise     Monitor for any signs of bleeding including red or dark stools as well as easy bruisabilty. Fall precautions.       Follow up with  Maite Oliveira PA-C             Return in about 2 months (around 8/22/2022).

## 2022-06-26 PROBLEM — I26.93 SINGLE SUBSEGMENTAL PULMONARY EMBOLISM WITHOUT ACUTE COR PULMONALE (HCC): Status: ACTIVE | Noted: 2022-01-01

## 2022-06-27 PROBLEM — U07.1 COVID-19 VIRUS DETECTED: Status: ACTIVE | Noted: 2022-01-01

## 2022-06-27 NOTE — TELEPHONE ENCOUNTER
Provider: DR. MORA   Caller: SANKET ANDRADE   Relationship to Patient: SELF  Phone Number: [phone]  Reason for Call: PT WAS SCHEDULED FOR SCAN, LABS AND STRESS TEST FOR 6.28.22 BUT IS CURRENTLY IN THE HOSPITAL WITH COVID AND WOULD LIKE TO RESCHEDULE WHEN HE'S OUT OF THE HOSPITAL.

## 2022-06-27 NOTE — OUTREACH NOTE
Prep Survey    Flowsheet Row Responses   [facility] facility patient discharged from? [place]   Is LACE score < 7 ? Yes   Emergency Room discharge w/ pulse ox? No   Eligibility [facility]   Date of Admission 06/26/22   Date of Discharge 06/27/22   Discharge Disposition Home or Self Care   Discharge diagnosis pulmonary embolism, recent COVID-19   Does the patient have one of the following disease processes/diagnoses(primary or secondary)? Other   Does the patient have Home health ordered? No   Is there a DME ordered? No   Prep survey completed? Yes          MINO ARRIETA - Registered Nurse

## 2022-06-28 NOTE — OUTREACH NOTE
Call Center TCM Note    Flowsheet Row Responses   [facility] patient discharged from? [place]   Does the patient have one of the following disease processes/diagnoses(primary or secondary)? Other   TCM attempt successful? Yes  [daughter Pat on release]   Call start time 1402   Call end time 1418   Discharge diagnosis pulmonary embolism, recent COVID-19   Meds reviewed with patient/caregiver? Yes   Is the patient having any side effects they believe may be caused by any medication additions or changes? No   Does the patient have all medications ordered at discharge? Yes   Is the patient taking all medications as directed (includes completed medication regime)? Yes   Does the patient have a primary care provider?  Yes   Does the patient have an appointment with their PCP within 7 days of discharge? Greater than 7 days   Comments regarding PCP Has a hospital  followup with Dr Irizarry on 7/6/2022   What is preventing the patient from scheduling follow up appointments within 7 days of discharge? Earlier appointment not available   Nursing Interventions Advised patient to make appointment, Verified appointment date/time/provider   Has the patient kept scheduled appointments due by today? N/A   Psychosocial issues? No   Comments Patient does not have a pulse ox. Gets winded with activity.    Did the patient receive a copy of their discharge instructions? Yes   Nursing interventions Reviewed instructions with patient   What is the patient's perception of their health status since discharge? Same   Is the patient/caregiver able to teach back signs and symptoms related to disease process for when to call PCP? Yes   Is the patient/caregiver able to teach back signs and symptoms related to disease process for when to call 911? Yes   Is the patient/caregiver able to teach back the hierarchy of who to call/visit for symptoms/problems? PCP, Specialist, Home health nurse, Urgent Care, ED, 911 Yes   If the patient is a current  smoker, are they able to teach back resources for cessation? Not a smoker   TCM call completed? Yes   Wrap up additional comments Patient is very weak. He also had some dizzy periods when he first woke up today. He reports he is not eating or drinking well.  Encouraged patient to monitor blood presssure daily, rise slowly and call MD to report low readings and hold dosage for further instructions if reading low .  Also encouraged him to increase fluids. He verbalized understanding.           Juan Mccoy RN    6/28/2022, 14:18 CDT

## 2022-06-30 NOTE — TELEPHONE ENCOUNTER
Patient encouraged to drink fluids and to take his time when going from sitting to standing position to avoid falls.  Patient verbalized understanding.  There is not anyone in the home that can help him if needed but he does have a daughter who checks on him.  He said he will be careful until his OV next week.      Patient reported the meclizine does not work at all.

## 2022-06-30 NOTE — TELEPHONE ENCOUNTER
"Reviewed guideline with caller, advises he go to ED for evaluation of his dizziness. States he has been to ED and they were unable to help. Advised to call his PCP about this. States he has an appointment next week.   Reason for Disposition  • [1] Dizziness (vertigo) present now AND [2] one or more STROKE RISK FACTORS (i.e., hypertension, diabetes, prior stroke/TIA, heart attack)  (Exception: prior physician evaluation for this AND no different/worse than usual)    Additional Information  • Negative: [1] Weakness (i.e., paralysis, loss of muscle strength) of the face, arm or leg on one side of the body AND [2] sudden onset AND [3] present now  • Negative: [1] Numbness (i.e., loss of sensation) of the face, arm or leg on one side of the body AND [2] sudden onset AND [3] present now  • Negative: [1] Loss of speech or garbled speech AND [2] sudden onset AND [3] present now  • Negative: Difficult to awaken or acting confused (e.g., disoriented, slurred speech)  • Negative: Sounds like a life-threatening emergency to the triager  • Negative: Followed a head injury  • Negative: Followed an ear injury  • Negative: Localized weakness or numbness is main symptom  • Negative: Dizziness relates to riding in a car, going to an amusement park, etc.  • Negative: [1] Dizziness is main symptom AND [2] NO spinning sensation (i.e., vertigo)  • Negative: SEVERE dizziness (vertigo) (e.g., unable to walk without assistance)  • Negative: Severe headache (e.g., excruciating)  (Exception: similar to previous migraines)    Answer Assessment - Initial Assessment Questions  1. DESCRIPTION: \"Describe your dizziness.\"      Can't stand up due to dizziness  2. VERTIGO: \"Do you feel like either you or the room is spinning or tilting?\"       no  3. LIGHTHEADED: \"Do you feel lightheaded?\" (e.g., somewhat faint, woozy, weak upon standing)      weak  4. SEVERITY: \"How bad is it?\"  \"Can you walk?\"    - MILD: Feels unsteady but walking normally.    - " "MODERATE: Feels very unsteady when walking, but not falling; interferes with normal activities (e.g., school, work) .    - SEVERE: Unable to walk without falling, or requires assistance to walk without falling.      moderate  5. ONSET:  \"When did the dizziness begin?\"      Last 3-4 days  6. AGGRAVATING FACTORS: \"Does anything make it worse?\" (e.g., standing, change in head position)      Standing up  7. CAUSE: \"What do you think is causing the dizziness?\"      COVID  8. RECURRENT SYMPTOM: \"Have you had dizziness before?\" If Yes, ask: \"When was the last time?\" \"What happened that time?\"      Yes, not this bad  9. OTHER SYMPTOMS: \"Do you have any other symptoms?\" (e.g., headache, weakness, numbness, vomiting, earache)      no  10. PREGNANCY: \"Is there any chance you are pregnant?\" \"When was your last menstrual period?\"        na    Protocols used: DIZZINESS - VERTIGO-ADULT-AH      "

## 2022-07-01 PROBLEM — J96.01 ACUTE RESPIRATORY FAILURE WITH HYPOXIA (HCC): Status: ACTIVE | Noted: 2022-01-01

## 2022-07-01 PROBLEM — R09.02 HYPOXEMIA: Status: ACTIVE | Noted: 2022-01-01

## 2022-07-01 PROBLEM — I26.99 PULMONARY EMBOLISM ASSOCIATED WITH COVID-19 (HCC): Status: ACTIVE | Noted: 2022-01-01

## 2022-07-01 NOTE — H&P
[facility] Medicine Services  HISTORY AND PHYSICAL    Date of Admission: 7/1/2022  Primary Care Physician: Mauro Irizarry DO    Subjective     Chief Complaint:   Fall, sob    History of Present Illness  Patient is an 82-year-old male with known history of carotid disease has been having intermittent episodes of dizziness as an outpatient.  He is following with vascular surgery.  He was also recently admitted to the hospital on 6/26 and discharged on 6/27 and at that time was positive for COVID-19 with right lower lobe segmental and subsegmental pulmonary embolisms.  He had been sick prior to the hospitalization for several weeks failing outpatient therapies and was felt to likely have prior COVID and subsequent PE. He was discharged on Eliquis starter pack which he states he has been taking since discharge.  He was on room air at time of discharge.  Other medical history includes hypertension and BPH.  Last evening he had a dizzy spell and fell on the floor and could not get up.  Denies head strike.  Was also feeling a little short of breath.  He arrives to the ER here for evaluation and was found to be hypoxic and put on 2 L O2.  Eventually titrated up to Vapotherm which she was on when I saw him.  25 L 40%.  His COVID swab is now negative but he had a repeat CTA of his chest which showed questionable mild increase in his prior PEs but diffuse bilateral worsening groundglass opacities consistent with worsening COVID-pneumonia.  He is afebrile.  He has normal CBC.  He was given a small fluid bolus and we were asked to admit for further care.  He was seen with daughter at bedside.  Again looks very comfortable on Vapotherm.  Denies any other recent chest pain, nausea, vomiting, diarrhea.  No focal numbness tingling or weakness.  No urinary symptoms.      Review of Systems   Otherwise complete ROS reviewed and negative except as mentioned in the HPI.    Past Medical History:    Past Medical History:   Diagnosis Date   • Abdominal distention    • Abdominal pain, left upper quadrant    • Allergic rhinitis    • Arthritis    • Bloating    • BPH (benign prostatic hyperplasia)    • Carpal tunnel syndrome    • Change in bowel habits    • Constipation    • Diarrhea    • Erectile dysfunction    • GERD (gastroesophageal reflux disease)    • History of shingles    • Hypertension    • Idiopathic peripheral neuropathy    • Neuropathy    • Pneumonia    • Shingles    • Weight loss      Past Surgical History:  Past Surgical History:   Procedure Laterality Date   • APPENDECTOMY     • BACK SURGERY     • CARPAL TUNNEL RELEASE Right    • CARPAL TUNNEL RELEASE Left 06/2022   • CHOLECYSTECTOMY     • COLONOSCOPY  09/14/2010    5 yr-complete colon not visualized   • COLONOSCOPY  07/27/2004    extremely tortuous redundant colon. BE-mild diverticulosis without diverticulitis. ? Gallstones-Dr Chery.   • COLONOSCOPY N/A 09/10/2020    Procedure: COLONOSCOPY WITH ANESTHESIA;  Surgeon: Mani Sy DO;  Location: [facility] ENDOSCOPY;  Service: Gastroenterology;  Laterality: N/A;  Pre: Positive Colorectal Screening  Post: Diverticulosis  Dr. Mooney  CO2 Inflation Used   • ENDOSCOPY N/A 09/26/2016    Procedure: ESOPHAGOGASTRODUODENOSCOPY WITH ANESTHESIA;  Surgeon: Mani Sy DO;  Location: [facility] ENDOSCOPY;  Service:    • HIP SURGERY Right    • JOINT REPLACEMENT     • KNEE SURGERY     • OTHER SURGICAL HISTORY      Surgery for Spinal Stenosis   • OTHER SURGICAL HISTORY      Laser Prostate Surgery     Social History:  reports that he has never smoked. He has never used smokeless tobacco. He reports that he does not drink alcohol and does not use drugs.    Family History: family history includes Cancer in his mother; Diabetes in his mother; Heart disease in his brother; Osteoporosis in his mother.      Allergies:  No Known Allergies    Medications:  Prior to Admission medications    Medication Sig Start Date End  "Date Taking? Authorizing Provider   Apixaban Starter Pack tablet therapy pack Take two 5 mg tablets by mouth every 12 hours for 7 days. Followed by one 5 mg tablet every 12 hours. (Dispense starter pack if available) 6/27/22  Yes Steven Ross DO   aspirin (aspirin) 81 MG EC tablet Take 1 tablet by mouth Daily. 6/13/22  Yes Fernanda Lockhart APRN   atorvastatin (Lipitor) 20 MG tablet Take 1 tablet by mouth Daily. 6/15/22  Yes Fernanda Lockhart APRN   finasteride (PROSCAR) 5 MG tablet Take 1 tablet by mouth Daily. 4/20/22  Yes Steven Angulo MD   fluticasone (Flonase) 50 MCG/ACT nasal spray 2 sprays into the nostril(s) as directed by provider Daily. 6/7/22  Yes Fernanda Lockhart APRN   gabapentin (NEURONTIN) 400 MG capsule Take 1 capsule by mouth 3 (Three) Times a Day. 2/11/22  Yes Mauro Irizarry DO   meclizine (ANTIVERT) 12.5 MG tablet Take 1 tablet by mouth 3 (Three) Times a Day As Needed for Dizziness. 6/7/22  Yes Fernanda Lockhart APRN   Olmesartan-amLODIPine-HCTZ 40-10-12.5 MG tablet Take 1 tablet by mouth Daily. 5/15/22  Yes Mauro Irizarry DO   traMADol (ULTRAM) 50 MG tablet Take 50 mg by mouth Every 8 (Eight) Hours As Needed. 10/23/21  Yes Emergency, Nurse Emma, RN   acetaminophen (TYLENOL) 325 MG tablet Take 2 tablets by mouth Every 4 (Four) Hours As Needed for Mild Pain  or Fever. 6/27/22   Steven Ross DO     I have utilized all available immediate resources to obtain, update, and review the patient's current medications.    Objective     Vital Signs: /61 (BP Location: Left arm, Patient Position: Sitting)   Pulse 88   Temp 97.1 °F (36.2 °C) (Oral)   Resp 24   Ht 185.4 cm (73\")   Wt 86.2 kg (190 lb)   SpO2 100%   BMI 25.07 kg/m²   Physical Exam   GEN: Awake, alert, interactive, in NAD  HEENT: PERRLA, EOMI, Anicteric, Trachea midline  Lungs: normal respiratory effort, normal chest rise, no distress  Heart: RRR, +S1/s2, no rub  ABD: soft, nt/nd, +BS, no " guarding/rebound  Extremities: atraumatic, no cyanosis, no edema  Skin: no rashes or petechiae  Neuro: AAOx3, no focal deficits  Psych: normal mood & affect        Results Reviewed:  Lab Results (last 24 hours)     Procedure Component Value Units Date/Time    BNP [[id number]] Collected: 07/01/22 1429    Specimen: Blood Updated: 07/01/22 1437    Troponin [[id number]] Collected: 07/01/22 1429    Specimen: Blood Updated: 07/01/22 1435    COVID-19,[facility] IN-HOUSE,Nasal Swab No Transport Media 3-4 HR TAT - Swab, Nasal Cavity [[id number]]  (Normal) Collected: 07/01/22 1214    Specimen: Swab from Nasal Cavity Updated: 07/01/22 1343     COVID19 Not Detected    Narrative:      Fact sheet for providers: https://www.fda.gov/media/264805/download     Fact sheet for patients: https://www.fda.gov/media/999922/download    Test performed by PCR.    Consider negative results in combination with clinical observations, patient history, and epidemiological information.    [place] Draw [[id number]] Collected: 07/01/22 1134    Specimen: Blood Updated: 07/01/22 1247    Narrative:      The following orders were created for panel order [place] Draw.  Procedure                               Abnormality         Status                     ---------                               -----------         ------                     Green Top (Gel)[[id number]]                                  Final result               Lavender Top[[id number]]                                     Final result               Prince Top[[id number]]                                         In process                 Light Blue Top[[id number]]                                   Final result                 Please view results for these tests on the individual orders.    Green Top (Gel) [[id number]] Collected: 07/01/22 1134    Specimen: Blood Updated: 07/01/22 1247     Extra Tube Hold for add-ons.     Comment: Auto resulted.       Lavender Top [[id number]] Collected: 07/01/22 1134     Specimen: Blood Updated: 07/01/22 1247     Extra Tube hold for add-on     Comment: Auto resulted       Light Blue Top [[id number]] Collected: 07/01/22 1134    Specimen: Blood Updated: 07/01/22 1247     Extra Tube Hold for add-ons.     Comment: Auto resulted       D-dimer, Quantitative [[id number]]  (Abnormal) Collected: 07/01/22 1134    Specimen: Blood Updated: 07/01/22 1230     D-Dimer, Quantitative 4.49 MCGFEU/mL     Narrative:      Reference Range is 0-0.50 MCGFEU/mL. However, results <0.50 MCGFEU/mL tends to rule out DVT or PE. Results >0.50 MCGFEU/mL are not useful in predicting absence or presence of DVT or PE.      Troponin [[id number]]  (Normal) Collected: 07/01/22 1134    Specimen: Blood Updated: 07/01/22 1229     Troponin T <0.010 ng/mL     Narrative:      Troponin T Reference Range:  <= 0.03 ng/mL-   Negative for AMI  >0.03 ng/mL-     Abnormal for myocardial necrosis.  Clinicians would have to utilize clinical acumen, EKG, Troponin and serial changes to determine if it is an Acute Myocardial Infarction or myocardial injury due to an underlying chronic condition.       Results may be falsely decreased if patient taking Biotin.      Comprehensive Metabolic Panel [[id number]]  (Abnormal) Collected: 07/01/22 1134    Specimen: Blood Updated: 07/01/22 1209     Glucose 123 mg/dL      BUN 20 mg/dL      Creatinine 0.85 mg/dL      Sodium 133 mmol/L      Potassium 3.8 mmol/L      Chloride 100 mmol/L      CO2 22.0 mmol/L      Calcium 8.4 mg/dL      Total Protein 6.9 g/dL      Albumin 2.80 g/dL      ALT (SGPT) 17 U/L      AST (SGOT) 41 U/L      Alkaline Phosphatase 73 U/L      Total Bilirubin 0.4 mg/dL      Globulin 4.1 gm/dL      A/G Ratio 0.7 g/dL      BUN/Creatinine Ratio 23.5     Anion Gap 11.0 mmol/L      eGFR 86.2 mL/min/1.73      Comment: National Kidney Foundation and American Society of Nephrology (ASN) Task Force recommended calculation based on the Chronic Kidney Disease Epidemiology Collaboration (CKD-EPI)  equation refit without adjustment for race.       Narrative:      GFR Normal >60  Chronic Kidney Disease <60  Kidney Failure <15      CK [[id number]]  (Normal) Collected: 07/01/22 1134    Specimen: Blood Updated: 07/01/22 1208     Creatine Kinase 167 U/L     aPTT [[id number]]  (Abnormal) Collected: 07/01/22 1134    Specimen: Blood Updated: 07/01/22 1204     PTT 42.0 seconds     Protime-INR [[id number]]  (Abnormal) Collected: 07/01/22 1134    Specimen: Blood Updated: 07/01/22 1204     Protime 23.3 Seconds      INR 2.16    CBC & Differential [[id number]]  (Abnormal) Collected: 07/01/22 1134    Specimen: Blood Updated: 07/01/22 1156    Narrative:      The following orders were created for panel order CBC & Differential.  Procedure                               Abnormality         Status                     ---------                               -----------         ------                     CBC Auto Differential[[id number]]        Abnormal            Final result                 Please view results for these tests on the individual orders.    CBC Auto Differential [[id number]]  (Abnormal) Collected: 07/01/22 1134    Specimen: Blood Updated: 07/01/22 1156     WBC 7.19 10*3/mm3      RBC 3.90 10*6/mm3      Hemoglobin 11.2 g/dL      Hematocrit 35.4 %      MCV 90.8 fL      MCH 28.7 pg      MCHC 31.6 g/dL      RDW 13.2 %      RDW-SD 43.1 fl      MPV 8.7 fL      Platelets 302 10*3/mm3      Neutrophil % 78.9 %      Lymphocyte % 13.8 %      Monocyte % 5.7 %      Eosinophil % 0.6 %      Basophil % 0.4 %      Immature Grans % 0.6 %      Neutrophils, Absolute 5.68 10*3/mm3      Lymphocytes, Absolute 0.99 10*3/mm3      Monocytes, Absolute 0.41 10*3/mm3      Eosinophils, Absolute 0.04 10*3/mm3      Basophils, Absolute 0.03 10*3/mm3      Immature Grans, Absolute 0.04 10*3/mm3      nRBC 0.0 /100 WBC     Prince Top [[id number]] Collected: 07/01/22 1134    Specimen: Blood Updated: 07/01/22 1138        Imaging Results (Last 24 Hours)      Procedure Component Value Units Date/Time    CT Angiogram Chest [[id number]] Collected: 07/01/22 1402     Updated: 07/01/22 1411    Narrative:      CT ANGIOGRAM CHEST- 7/1/2022 1:40 PM CDT      HISTORY: Recent history of pulmonary embolus now with syncope      COMPARISON: CT scan dated 6/26/2022.      DOSE LENGTH PRODUCT: 763 mGy cm. Automated exposure control was also  utilized to decrease patient radiation dose.     TECHNIQUE: Helical tomographic images of the chest were obtained after  the administration of intravenous contrast following angiogram protocol.  Additionally, 3D and multiplanar reformatted images were provided.        FINDINGS:    Pulmonary arteries: There is adequate enhancement of the pulmonary  arteries to evaluate for central and segmental pulmonary emboli. There  are multiple pulmonary emboli identified in the segmental and  subsegmental pulmonary arteries of the right lower lobe.     Aorta and great vessels: Thoracic aorta is normal in caliber. No  dissection identified. Minimal calcified plaque in the arch.  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      Visualized neck base: The imaged portion of the base of the neck appears  unremarkable.      Lungs: Increasing multifocal bilateral airspace opacities, mixed  groundglass and consolidative and most notably in the lung periphery and  bases. This is increased from the recent CT scan from June 26. No  pleural effusion. Airways are clear.     Heart: The heart is normal in size. There is no pericardial effusion.     Mediastinum and lymph nodes: Mild reactive adenopathy in the tonya and  mediastinum..     Skeletal and soft tissues: Chest wall soft tissues are unremarkable. No  acute bony abnormality. Multilevel bridging spinal osteophytes.      Upper abdomen: The imaged portion of the upper abdomen demonstrates no  acute process. Prior cholecystectomy.       Impression:      1. Worsening Covid pneumonia with increasing bilateral  mixed groundglass  and consolidative pulmonary infiltrates.  2. Acute pulmonary embolus identified in the segmental and subsegmental  pulmonary arteries in the right lower lobe. This is mildly increased as  compared with the recent CT scan. No evidence of right heart strain.     This report was finalized on 07/01/2022 14:08 by Dr Prem Mclaughlin, .    XR Chest 1 View [[id number]] Collected: 07/01/22 1250     Updated: 07/01/22 1254    Narrative:      XR CHEST 1 VW- 7/1/2022 12:39 PM CDT     HISTORY: Hypoxemia     COMPARISON: Chest exam dated 6/26/2022.     FINDINGS:      Nearly diffuse opacification of the left lung. Right lung is mostly  completely clear. There is no mediastinal shift noted. No pleural  effusion. Cardiac silhouette is obscured by the left lung consolidation.  Pulmonary vasculature are nondilated. No acute bony abnormality.       Impression:      1. Bilateral, asymmetric pulmonary infiltrates. Left lung in particular  is near completely opacified, much worse than was seen on the June 26th  exam. Appearance favors a worsening atypical pneumonia.  2. No pleural effusion or pneumothorax.     This report was finalized on 07/01/2022 12:51 by Dr Prem Mclaughlin, .    CT Head Without Contrast [[id number]] Collected: 07/01/22 1155     Updated: 07/01/22 1201    Narrative:      EXAMINATION:   CT HEAD WO CONTRAST-  7/1/2022 11:55 AM CDT     HISTORY: CT BRAIN without contrast 7/1/2022 11:47 AM CDT     HISTORY: Patient fell     COMPARISON: None      DLP: 924 mGy cm. In order to have a CT radiation dose as low as  reasonably achievable, Automated Exposure Control was utilized for  adjustment of the mA and/or KV according to patient size.     TECHNIQUE: Serial axial tomographic images of the brain were obtained  without the use of intravenous contrast.      FINDINGS:   The midline structures are nondisplaced. There is mild cerebral and  cerebellar volume loss, with an associated increase in the prominence of  the  ventricles and sulci. The basilar cisterns are normal in size and  configuration. There is no evidence of intracranial hemorrhage or  mass-effect. There is low attenuation in the periventricular white  matter, consistent with chronic ischemic change. There are no abnormal  extra-axial fluid collections. There is no evidence of tonsillar  herniation.      The included orbits and their contents are unremarkable. A mucous cyst  is present left maxillary antrum. Mastoid air cells are pneumatized..  The visualized osseous structures and overlying soft tissues of the  skull and face are intact.        Impression:      Mild cerebral and cerebellar volume loss with chronic microvascular  disease but no evidence of acute intracranial process.  2. Mucous cyst left maxillary sinus        This report was finalized on 07/01/2022 11:58 by Dr. Don Delacruz MD.        I have personally reviewed and interpreted the radiology studies and ECG obtained at time of admission.     Assessment / Plan     Assessment:   Active Hospital Problems    Diagnosis    • Acute respiratory failure with hypoxia (HCC)    • Pulmonary embolism associated with COVID-19 (HCC)    • Bilateral carotid artery disease (HCC)    • Hyperlipidemia    • Benign prostatic hyperplasia without lower urinary tract symptoms    • Essential hypertension      Plan:   #1 acute respiratory failure with hypoxia -interesting case this time.  CAT scan shows diffuse worsening airway disease.  He has no white count or fever.  COVID swab is now negative.  Pulmonary embolism was read as potentially slightly worse but this just may be variation in the imaging.  We will check a BNP, CRP, Pro-Robert.  Suspect potential cryptogenic organizing pneumonitis post viral infection or COVID fibrosis.  No hemoptysis to suggest alveolar hemorrhage on blood thinner.  We will start the patient on Solu-Medrol 40 every 12.  Albuterol inhaler.  Symptom spirometry.  Supportive care.  We will transition  Eliquis to Lovenox for now.  We will consult pulmonary for recommendations and assistance.    #2 pulmonary embolism -again CT is questioning possible slight worsening.  May just be the way the image was taken.  Prior.  He has been taking the Eliquis since discharge.  INR is up which would suggest this is true.  For now we will put him on Lovenox.  Last hospital stay his lower extremity Dopplers were negative for DVT.  We will go ahead and we get Dopplers to rule out new disease.  We will also get a limited echo to rule out any changes in EF.    #3 COVID-19 -again was positive on last hospital stay but on room air and suspicion was that he had earlier in the month through the course of his disease illness.  He is now actually negative.  Discussed with infection control.  We will put him in isolation for 1 day and get another COVID swab tomorrow at 24-hour darryn to ensure it is still negative before moving from isolation.    #4 hypertension -some orthostasis on arrival.  Hold blood pressure meds for now monitor closely.    #5 hyperlipidemia -statin    #6 BPH -Proscar    Code Status/Advanced Care Plan: DNR/DNI    The patient's surrogate decision maker is his daughter Pat.     I discussed my findings and recommendations with the patient and daughter at bedside.  Case was also discussed with Dr. Smith of pulmonology.     Estimated length of stay is 2+ days.     The patient was seen and examined by me on 7/1/2022 at 2:55 PM.    Electronically signed by Steven Ross DO, 07/01/22, 15:48 CDT.

## 2022-07-01 NOTE — CONSULTS
PULMONARY & CRITICAL CARE CONSULT - [facility]    22, 17:29 CDT  Patient Care Team:  Mauro Irizarry DO as PCP - General (Internal Medicine)  Steven Angulo MD as Consulting Physician (Urology)  Name: Adrián Graham  : 1940  MRN: [id number]  Contact Serial Number [id number]    Chief complaint: Shortness of breath, acute hypoxic respiratory failure, fall,  bilateral pulm infiltrate, recent COVID infection, right-sided subsegmental pulmonary embolism    HPI:  We have been consulted by Steven Ross DO to see this 82 y.o. male born on 1940.  Patient was seen in COVID isolation in medical floor today with PPE protection to avoid contamination and cross infection.    Patient is a very pleasant gentleman with history of carotid vessel disease and was followed by vascular surgery.  He was a non-smoker and did not have any prior lung problem and was not on any inhalers or home oxygen.  He reportedly started feeling unwell in early May and had several visits with the primary care provider and was treated for bronchitis with antibiotics and steroids.  He apparently received 3 doses of antibiotics and also was treated with steroids with some improvement but he was never feeling very good.  Apparently tested negative for COVID in early May and he was already vaccinated for COVID with 3 dose of COVID-vaccine.  He eventually came to the hospital on  and was diagnosed with COVID.  He also had a right lower lobe segmental and subsegmental pulmonary embolism noted during this hospitalization which was thought to be secondary to COVID infection.  He was discharged home on Eliquis.    At home he had a fall last night and he was unable to get back in the bed and could not ask for help and eventually this morning he managed to call his daughter and was taken to the ER by EMS.  On arrival he was very hypoxic and was started including Suboxone and eventually he was transitioned  to Vapotherm and is currently on 25 L with 40% FiO2.  Eliquis has been continued.  He had a CT angiogram of the chest done today and the repeat CTA showed possible mild increase in the prior pulmonary embolism but there was diffuse worsening bilateral groundglass opacities noted which is consistent with the COVID-pneumonia.  However he is still tested positive for COVID and admitted on the hospitalist team with COVID isolation.  He is tolerating Vapotherm well.  He did not have any chest pain nausea vomiting or diarrhea.  He has nonproductive cough and feeling very tired and exhausted and told me he is extremely weak.  He was afebrile.  Lab work showed normal CBC without any leukocytosis BMP was unremarkable.    Patient was admitted in medical floor with COVID isolation.  Respiratory panel is positive for COVID and viral panel has also been ordered.  He was started on Solu-Medrol and routine bronchodilator treatment and pulmonary team was consulted.  Patient reported he was fairly active although he is 82 years old he lives at home alone and independently.  He did have COVID-vaccine and had no other recent hospitalizations or ER visit or any urgent care visit.  He was mentally clear and was able to provide a good medical history and was not having any acute shortness of breath at time of my visit.    Past Medical History:   has a past medical history of Abdominal distention, Abdominal pain, left upper quadrant, Allergic rhinitis, Arthritis, Bloating, BPH (benign prostatic hyperplasia), Carpal tunnel syndrome, Change in bowel habits, Constipation, Diarrhea, Erectile dysfunction, GERD (gastroesophageal reflux disease), History of shingles, Hypertension, Idiopathic peripheral neuropathy, Neuropathy, Pneumonia, Shingles, and Weight loss.   has a past surgical history that includes Cholecystectomy; Colonoscopy (09/14/2010); Other surgical history; Colonoscopy (07/27/2004); Other surgical history; Knee surgery;  Appendectomy; Hip surgery (Right); Carpal tunnel release (Right); Back surgery; Esophagogastroduodenoscopy (N/A, 09/26/2016); Colonoscopy (N/A, 09/10/2020); Carpal tunnel release (Left, 06/2022); and Joint replacement.  No Known Allergies  Medications:  albuterol sulfate HFA, 2 puff, Inhalation, 4x Daily - RT  [START ON 7/2/2022] aspirin, 81 mg, Oral, Daily  [START ON 7/2/2022] atorvastatin, 20 mg, Oral, Daily  enoxaparin, 1 mg/kg, Subcutaneous, Q12H  famotidine, 20 mg, Oral, BID AC  [START ON 7/2/2022] finasteride, 5 mg, Oral, Daily  [START ON 7/2/2022] fluticasone, 2 spray, Nasal, Daily  furosemide, 20 mg, Intravenous, Once  gabapentin, 400 mg, Oral, Q12H  methylPREDNISolone sodium succinate, 125 mg, Intravenous, Once  [START ON 7/2/2022] methylPREDNISolone sodium succinate, 40 mg, Intravenous, Q12H  sodium chloride, 10 mL, Intravenous, Q12H  zinc sulfate, 220 mg, Oral, Daily         Family History:  Family History   Problem Relation Age of Onset   • Diabetes Mother    • Cancer Mother    • Osteoporosis Mother    • Heart disease Brother    • Colon cancer Neg Hx    • Colon polyps Neg Hx    • Esophageal cancer Neg Hx      Social History:   reports that he has never smoked. He has never used smokeless tobacco. He reports that he does not drink alcohol and does not use drugs.  Review of Systems:  Review of Systems   Constitutional: Positive for fatigue. Negative for fever.   HENT: Positive for congestion and postnasal drip.    Eyes: Negative.    Respiratory: Positive for cough, chest tightness and shortness of breath.    Gastrointestinal: Negative.    Endocrine: Negative.    Genitourinary: Negative.    Musculoskeletal: Negative.    Allergic/Immunologic: Positive for environmental allergies.   Neurological: Positive for weakness.   Hematological: Negative.    Psychiatric/Behavioral: Negative.       Physical Exam:    Temp:  [97.1 °F (36.2 °C)] 97.1 °F (36.2 °C)  Heart Rate:  [74-88] 88  Resp:  [18-32] 24  BP:  ()/(32-61) 119/61    Intake/Output Summary (Last 24 hours) at 7/1/2022 1729  Last data filed at 7/1/2022 1400  Gross per 24 hour   Intake 500 ml   Output 300 ml   Net 200 ml         07/01/22  1121   Weight: 86.2 kg (190 lb)     SpO2 Percentage    07/01/22 1429 07/01/22 1434 07/01/22 1438   SpO2: 93% 96% 100%     Body mass index is 25.07 kg/m².   Physical Exam  Vitals and nursing note reviewed.   Constitutional:       General: He is not in acute distress.     Appearance: Normal appearance. He is not ill-appearing or diaphoretic.      Comments: Well-nourished well-developed elderly  male looking stated age on Vapotherm 25 L 140% FiO2 with oxygen saturation 98%.   HENT:      Head: Normocephalic and atraumatic.      Right Ear: Tympanic membrane normal. There is no impacted cerumen.      Left Ear: Tympanic membrane normal. There is no impacted cerumen.      Nose: Nose normal. No congestion or rhinorrhea.      Mouth/Throat:      Mouth: Mucous membranes are moist.      Pharynx: No oropharyngeal exudate or posterior oropharyngeal erythema.   Eyes:      General: No scleral icterus.        Right eye: No discharge.         Left eye: No discharge.      Extraocular Movements: Extraocular movements intact.      Conjunctiva/sclera: Conjunctivae normal.      Pupils: Pupils are equal, round, and reactive to light.   Cardiovascular:      Rate and Rhythm: Regular rhythm. Tachycardia present.      Pulses: Normal pulses.      Heart sounds: Normal heart sounds. No murmur heard.    No friction rub. No gallop.   Pulmonary:      Effort: Pulmonary effort is normal. No respiratory distress.      Breath sounds: Wheezing and rales present.      Comments: Decreased breath sound bilaterally.  Abdominal:      General: Abdomen is flat. Bowel sounds are normal. There is no distension.      Palpations: There is no mass.      Tenderness: There is no abdominal tenderness. There is no guarding.   Genitourinary:     Comments: Not  examined.  Musculoskeletal:         General: Tenderness present. No swelling or deformity. Normal range of motion.      Cervical back: Normal range of motion and neck supple. No rigidity or tenderness.      Right lower leg: No edema.   Lymphadenopathy:      Cervical: No cervical adenopathy.   Skin:     General: Skin is warm and dry.      Capillary Refill: Capillary refill takes less than 2 seconds.      Coloration: Skin is not jaundiced.      Findings: Lesion present. No bruising, erythema or rash.   Neurological:      General: No focal deficit present.      Mental Status: He is alert. Mental status is at baseline.      Cranial Nerves: No cranial nerve deficit.      Sensory: No sensory deficit.      Motor: Weakness present.      Deep Tendon Reflexes: Reflexes normal.   Psychiatric:         Mood and Affect: Mood normal.         Behavior: Behavior normal.         Thought Content: Thought content normal.       Results from last 7 days   Lab Units 07/01/22  1134 06/26/22  1137   WBC 10*3/mm3 7.19 7.57   HEMOGLOBIN g/dL 11.2* 13.3   PLATELETS 10*3/mm3 302 214     Results from last 7 days   Lab Units 07/01/22  1134 06/26/22  1137   SODIUM mmol/L 133* 137   POTASSIUM mmol/L 3.8 4.6   CO2 mmol/L 22.0 25.0   BUN mg/dL 20 18   CREATININE mg/dL 0.85 0.84   GLUCOSE mg/dL 123* 99     Results from last 7 days   Lab Units 07/01/22  1515   PH, ARTERIAL pH units 7.437   PCO2, ARTERIAL mm Hg 32.8*   PO2 ART mm Hg 80.3*   FIO2 % 40     Lab Results   Component Value Date    PROBNP 1,299.0 07/01/2022     Blood Culture   Date Value Ref Range Status   06/26/2022 No growth at 5 days  Final   06/26/2022 No growth at 5 days  Final     Recent radiology:   Imaging Results (Last 72 Hours)     Procedure Component Value Units Date/Time    US Venous Doppler Lower Extremity Bilateral (duplex) [[id number]] Resulted: 07/01/22 1547     Updated: 07/01/22 1559    CT Angiogram Chest [[id number]] Collected: 07/01/22 1402     Updated: 07/01/22 1411     Narrative:      CT ANGIOGRAM CHEST- 7/1/2022 1:40 PM CDT      HISTORY: Recent history of pulmonary embolus now with syncope      COMPARISON: CT scan dated 6/26/2022.      DOSE LENGTH PRODUCT: 763 mGy cm. Automated exposure control was also  utilized to decrease patient radiation dose.     TECHNIQUE: Helical tomographic images of the chest were obtained after  the administration of intravenous contrast following angiogram protocol.  Additionally, 3D and multiplanar reformatted images were provided.        FINDINGS:    Pulmonary arteries: There is adequate enhancement of the pulmonary  arteries to evaluate for central and segmental pulmonary emboli. There  are multiple pulmonary emboli identified in the segmental and  subsegmental pulmonary arteries of the right lower lobe.     Aorta and great vessels: Thoracic aorta is normal in caliber. No  dissection identified. Minimal calcified plaque in the arch.  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      Visualized neck base: The imaged portion of the base of the neck appears  unremarkable.      Lungs: Increasing multifocal bilateral airspace opacities, mixed  groundglass and consolidative and most notably in the lung periphery and  bases. This is increased from the recent CT scan from June 26. No  pleural effusion. Airways are clear.     Heart: The heart is normal in size. There is no pericardial effusion.     Mediastinum and lymph nodes: Mild reactive adenopathy in the tonya and  mediastinum..     Skeletal and soft tissues: Chest wall soft tissues are unremarkable. No  acute bony abnormality. Multilevel bridging spinal osteophytes.      Upper abdomen: The imaged portion of the upper abdomen demonstrates no  acute process. Prior cholecystectomy.       Impression:      1. Worsening Covid pneumonia with increasing bilateral mixed groundglass  and consolidative pulmonary infiltrates.  2. Acute pulmonary embolus identified in the segmental and  subsegmental  pulmonary arteries in the right lower lobe. This is mildly increased as  compared with the recent CT scan. No evidence of right heart strain.     This report was finalized on 07/01/2022 14:08 by Dr Prem Mclaughlin, .    XR Chest 1 View [[id number]] Collected: 07/01/22 1250     Updated: 07/01/22 1254    Narrative:      XR CHEST 1 VW- 7/1/2022 12:39 PM CDT     HISTORY: Hypoxemia     COMPARISON: Chest exam dated 6/26/2022.     FINDINGS:      Nearly diffuse opacification of the left lung. Right lung is mostly  completely clear. There is no mediastinal shift noted. No pleural  effusion. Cardiac silhouette is obscured by the left lung consolidation.  Pulmonary vasculature are nondilated. No acute bony abnormality.       Impression:      1. Bilateral, asymmetric pulmonary infiltrates. Left lung in particular  is near completely opacified, much worse than was seen on the June 26th  exam. Appearance favors a worsening atypical pneumonia.  2. No pleural effusion or pneumothorax.     This report was finalized on 07/01/2022 12:51 by Dr Prem Mclaughlin, .    CT Head Without Contrast [[id number]] Collected: 07/01/22 1155     Updated: 07/01/22 1201    Narrative:      EXAMINATION:   CT HEAD WO CONTRAST-  7/1/2022 11:55 AM CDT     HISTORY: CT BRAIN without contrast 7/1/2022 11:47 AM CDT     HISTORY: Patient fell     COMPARISON: None      DLP: 924 mGy cm. In order to have a CT radiation dose as low as  reasonably achievable, Automated Exposure Control was utilized for  adjustment of the mA and/or KV according to patient size.     TECHNIQUE: Serial axial tomographic images of the brain were obtained  without the use of intravenous contrast.      FINDINGS:   The midline structures are nondisplaced. There is mild cerebral and  cerebellar volume loss, with an associated increase in the prominence of  the ventricles and sulci. The basilar cisterns are normal in size and  configuration. There is no evidence of intracranial  hemorrhage or  mass-effect. There is low attenuation in the periventricular white  matter, consistent with chronic ischemic change. There are no abnormal  extra-axial fluid collections. There is no evidence of tonsillar  herniation.      The included orbits and their contents are unremarkable. A mucous cyst  is present left maxillary antrum. Mastoid air cells are pneumatized..  The visualized osseous structures and overlying soft tissues of the  skull and face are intact.        Impression:      Mild cerebral and cerebellar volume loss with chronic microvascular  disease but no evidence of acute intracranial process.  2. Mucous cyst left maxillary sinus        This report was finalized on 07/01/2022 11:58 by Dr. Don Delacruz MD.        My radiograph interpretation/independent review of imaging: Reviewed and agree with current interpretation  Other test results (not lab or imaging): Results for orders placed during the hospital encounter of 06/24/22    Adult Transthoracic Echo Complete w/ Color, Spectral and Contrast if necessary per protocol    Interpretation Summary  · Left ventricular ejection fraction appears to be 56 - 60%. Left ventricular systolic function is normal.  · Left ventricular diastolic function was indeterminate.  · Mild pulmonary hypertension is present.  Reviewed  Independent review of ekg: Done  Problem List as identified by Epic (may contain historical, inactive problems)  Patient Active Problem List   Diagnosis   • Idiopathic peripheral neuropathy   • Essential hypertension   • Benign prostatic hyperplasia without lower urinary tract symptoms   • Overweight (BMI 25.0-29.9)   • Carpal tunnel syndrome, left   • Bilateral carotid artery disease (HCC)   • Hyperlipidemia   • Preop cardiovascular exam [facility] Dr Brown   • Single subsegmental pulmonary embolism without acute cor pulmonale (HCC)   • Pulmonary embolism associated with COVID-19 (HCC)   • Acute respiratory failure with hypoxia (HCC)      Pulmonary Assessment:  New problem (to me), with additional workup planned: Acute hypoxic respiratory failure, worsening COVID-19 pneumonia with bilateral pulmonary infiltrates, increasing oxygen requirement, small subsegmental pulmonary embolism following COVID infection on anticoagulation, recurrent bronchitis treated with antibiotics and steroids,  New problem (to me), no additional workup planned: Bilateral carotid vessel disease, hyperlipidemia, hypertension  Other problems either stable, failing to improve or worsenin. Acute hypoxic respiratory failure,  2. Bilateral groundglass pulmonary infiltrate  3. Worsening COVID-19 pneumonia  4. Possible acute lung injury or cryptogenic organizing pneumonia  5. Pulmonary embolism single subsegmental on anticoagulation  6. Essential hypertension  7. Hyperlipidemia  8. Carotid artery disease  9. Recurrent bronchitis  10. S/p COVID vaccinated status  11. Severe weakness and deconditioning  12. History of fall    Recommend/plan:   · Patient is already vaccinated for COVID.  He received 2 dose of Moderna COVID-vaccine  · He did not get the booster dose.  He presented with recurrent respiratory illness and later diagnosed with COVID  · He had a pulmonary embolism which is single subsegmental pulmonary embolism without any hemodynamic compromise and he is already on Eliquis.  · He presented with fall and later noted to have worsening bilateral pulm infiltrate which could be from the COVID-pneumonia or acute lung injury from cryptogenic organizing pneumonia  · He was given Solu-Medrol 125 mg and I would recommend to start him on Solu-Medrol 60 mg IV every 8 hours.  · Due to acute lung injury which could be from cardiology pneumonia Solu-Medrol was chosen instead of Decadron.  · Continue zinc vitamin C D and melatonin and he will be added on Singulair and fluticasone with Zyrtec for nasal congestion as it with COVID.  He is not a candidate for remdesivir or baricitinib.   He is not a candidate for proning.  · Continue oxygen with Vapotherm.  Arterial blood gas ordered.  Titrate oxygen and try to switch back to high flow oxygen regular oxygen when possible.  · Encourage incentive spirometry and flutter valve to improve pulmonary compliance and clearance.  · Routine bronchodilator treatment with albuterol HFA.  Continue contact and respiratory solutions per protocol.  · Patient did not have any history of tobacco use but may need pulmonary function test and further work-up after his recovery for possibility of COVID-related lung damage.  Fortunately he is vaccinated with 3 dose of Moderna vaccine.  · DVT prophylaxis already covered.  He was recently started on Eliquis for recent PE.    · His PE is most likely COVID-related.  However he is currently on full dose of Lovenox which could be switched to oral Eliquis at the time of discharge  · Start on Pepcid for stress ulcer prophylaxis.  · Pain and anxiety control.  Blood pressure control.  Glycemic control may be needed for high-dose of steroids with insulin sliding scale.  He had no history of diabetes mellitus.  · Nutritional support, physical activity as tolerated  · Repeat labs.  Monitor arterial blood gas.  Monitor inflammatory markers including D-dimer and ferritin.  · We will check procalcitonin.  He had no fever and already received multiple course of antibiotics.  Avoid overuse of antibiotics due to risk of C. difficile colitis or other complications.  · CODE STATUS: Full.  Overall prognosis: Guarded.  · We appreciate the consult and we will follow.  · Total time spent in seeing this patient as pulmonary consult was 45 minutes  · Case discussed with Dr. Ross.    Thank you for this consult.  We will follow along.    Electronically signed by     Miguel Smith MD,  Pulmonologist/Intensivist   07/01/22, 5:29 PM CDT.

## 2022-07-01 NOTE — ED PROVIDER NOTES
Subjective   Mr. Graham is an 82-year-old gentleman who was recently diagnosed with COVID-pneumonia and pulmonary emboli.  He was discharged on Xarelto.  He returns today because his dizziness which has been steadily getting worse over the last few weeks was much worse last night and he ended up falling to the floor and spending the night on the floor.  He presents complaining of weakness, dizziness and inability to ambulate.  He is got mild shortness of breath.  He denies chest pain.  He states he has been compliant with his Xarelto medication.          Review of Systems   Respiratory: Positive for shortness of breath.    Neurological: Positive for syncope and weakness.   All other systems reviewed and are negative.      Past Medical History:   Diagnosis Date   • Abdominal distention    • Abdominal pain, left upper quadrant    • Allergic rhinitis    • Arthritis    • Bloating    • BPH (benign prostatic hyperplasia)    • Carpal tunnel syndrome    • Change in bowel habits    • Constipation    • Diarrhea    • Erectile dysfunction    • GERD (gastroesophageal reflux disease)    • History of shingles    • Hypertension    • Idiopathic peripheral neuropathy    • Neuropathy    • Pneumonia    • Shingles    • Weight loss        No Known Allergies    Past Surgical History:   Procedure Laterality Date   • APPENDECTOMY     • BACK SURGERY     • CARPAL TUNNEL RELEASE Right    • CARPAL TUNNEL RELEASE Left 06/2022   • CHOLECYSTECTOMY     • COLONOSCOPY  09/14/2010    5 yr-complete colon not visualized   • COLONOSCOPY  07/27/2004    extremely tortuous redundant colon. BE-mild diverticulosis without diverticulitis. ? Gallstones-Dr Chery.   • COLONOSCOPY N/A 09/10/2020    Procedure: COLONOSCOPY WITH ANESTHESIA;  Surgeon: aMni Sy DO;  Location: [facility] ENDOSCOPY;  Service: Gastroenterology;  Laterality: N/A;  Pre: Positive Colorectal Screening  Post: Diverticulosis  Dr. Mooney  CO2 Inflation Used   • ENDOSCOPY N/A 09/26/2016     Procedure: ESOPHAGOGASTRODUODENOSCOPY WITH ANESTHESIA;  Surgeon: Mani Sy DO;  Location: [facility] ENDOSCOPY;  Service:    • HIP SURGERY Right    • JOINT REPLACEMENT     • KNEE SURGERY     • OTHER SURGICAL HISTORY      Surgery for Spinal Stenosis   • OTHER SURGICAL HISTORY      Laser Prostate Surgery       Family History   Problem Relation Age of Onset   • Diabetes Mother    • Cancer Mother    • Osteoporosis Mother    • Heart disease Brother    • Colon cancer Neg Hx    • Colon polyps Neg Hx    • Esophageal cancer Neg Hx        Social History     Socioeconomic History   • Marital status:    Tobacco Use   • Smoking status: Never Smoker   • Smokeless tobacco: Never Used   Vaping Use   • Vaping Use: Never used   Substance and Sexual Activity   • Alcohol use: No   • Drug use: No   • Sexual activity: Defer           Objective   Physical Exam  Vitals and nursing note reviewed.   Constitutional:       Appearance: He is well-developed.   HENT:      Head: Normocephalic and atraumatic.      Right Ear: External ear normal.      Left Ear: External ear normal.      Nose: Nose normal.   Eyes:      Extraocular Movements: Extraocular movements intact.      Conjunctiva/sclera: Conjunctivae normal.   Cardiovascular:      Rate and Rhythm: Normal rate and regular rhythm.      Pulses: Normal pulses.      Heart sounds: Normal heart sounds.   Pulmonary:      Effort: Pulmonary effort is normal.      Breath sounds: Normal breath sounds.   Abdominal:      General: Bowel sounds are normal.      Palpations: Abdomen is soft.   Musculoskeletal:         General: Normal range of motion.      Cervical back: Normal range of motion and neck supple.   Skin:     General: Skin is warm and dry.      Capillary Refill: Capillary refill takes less than 2 seconds.   Neurological:      General: No focal deficit present.      Mental Status: He is alert and oriented to person, place, and time.   Psychiatric:         Behavior: Behavior normal.          Thought Content: Thought content normal.         Judgment: Judgment normal.         Procedures           ED Course                                           MDM  Number of Diagnoses or Management Options     Amount and/or Complexity of Data Reviewed  Clinical lab tests: reviewed and ordered  Tests in the radiology section of CPT®: reviewed and ordered  Tests in the medicine section of CPT®: reviewed and ordered  Decide to obtain previous medical records or to obtain history from someone other than the patient: yes    Patient Progress  Patient progress: stable      Final diagnoses:   Hypoxemia   Orthostatic hypotension   Pneumonia due to COVID-19 virus   Multiple subsegmental pulmonary emboli without acute cor pulmonale (HCC)       ED Disposition  ED Disposition     ED Disposition   Decision to Admit    Condition   --    Comment   Level of Care: Telemetry [5]   Diagnosis: Hypoxemia [799.02.ICD-9-CM]   Admitting Physician: KIANNA ROSS [[id number]]   Attending Physician: KIANNA ROSS [[id number]]   Certification: I Certify That Inpatient Hospital Services Are Medically Necessary For Greater Than 2 Midnights               No follow-up provider specified.       Medication List      No changes were made to your prescriptions during this visit.       The patient's COVID-19 pneumonia appears to be much worse with his entire left lung being suzanne out.  His pulmonary emboli also appear to have progressed.  He is markedly orthostatic.  I discussed the case with Dr. Ross kindly agreed to admit the patient under his care.  The patient is currently stable in the ED.  He was hypoxemic on room air so nasal cannula oxygen was initiated.  I asked RT to evaluate him in terms of whether he needs Vapotherm.     Tk Main MD  07/01/22 9760

## 2022-07-02 NOTE — PLAN OF CARE
Goal Outcome Evaluation: Patient has been changed from vapotherm to 4 liters per nasal cannula.  O2 sat is staying between 90-94%. No complaints of pain this shift. Fall risk protocol is in place. Patient safety has been maintained this shift, continue to monitor and report abnormal to provider.

## 2022-07-02 NOTE — PLAN OF CARE
Goal Outcome Evaluation:           Progress: no change  Outcome Evaluation: Pt has been Aox4 this shift. Remains on vapotherm, maintaining sats. Continous pulse ox and tele monitoring in use. Purewick external cath placed due to incontinence and urgency. Pt up x1 to BSC to have bowel movement, maintained sats. Skin tears noted to bilat elbows. Safety maintained. Call light in reach.

## 2022-07-02 NOTE — SIGNIFICANT NOTE
07/02/22 1026   Oxygen Therapy   SpO2 93 %   Device (Oxygen Therapy) nasal cannula   Flow (L/min) 4

## 2022-07-02 NOTE — PROGRESS NOTES
PULMONARY AND CRITICAL CARE PROGRESS NOTE - [facility]    Patient: Adrián Graham  1940   MR# [id number]   Acct# [id number]  07/02/22   11:40 CDT  Referring Provider: Steven Ross DO    Chief Complaint: Covid-19     Interval history: Patient is seen from the door in an effort to preserve PPE and reduce exposure. He is observed laying in bed in no distress. He states he is just not feeling good. He is on HF 25L with FIO2 .35 with sat of 92%.     Meds:  albuterol sulfate HFA, 2 puff, Inhalation, 4x Daily - RT  ascorbic acid, 500 mg, Oral, Daily  aspirin, 81 mg, Oral, Daily  atorvastatin, 20 mg, Oral, Daily  cetirizine, 10 mg, Oral, Daily  enoxaparin, 1 mg/kg, Subcutaneous, Q12H  famotidine, 20 mg, Oral, BID AC  finasteride, 5 mg, Oral, Daily  fluticasone, 2 spray, Nasal, Daily  gabapentin, 400 mg, Oral, Q12H  melatonin, 3 mg, Oral, Nightly  methylPREDNISolone sodium succinate, 60 mg, Intravenous, Q8H  montelukast, 10 mg, Oral, Nightly  sodium chloride, 10 mL, Intravenous, Q12H  vitamin D3, 5,000 Units, Oral, Daily  zinc sulfate, 220 mg, Oral, Daily         Review of Systems:   Review of Systems   Constitutional: Positive for fatigue. Negative for fever.   HENT: Positive for congestion and postnasal drip.    Eyes: Negative.    Respiratory: Positive for cough, chest tightness and shortness of breath.    Gastrointestinal: Negative.    Endocrine: Negative.    Genitourinary: Negative.    Musculoskeletal: Negative.    Allergic/Immunologic: Positive for environmental allergies.   Neurological: Positive for weakness.   Hematological: Negative.    Psychiatric/Behavioral: Negative.     Physical Exam:  SpO2 Percentage    07/02/22 0813 07/02/22 1023 07/02/22 1026   SpO2: 92% 98% 93%     Temp:  [96.2 °F (35.7 °C)-99.7 °F (37.6 °C)] 97.5 °F (36.4 °C)  Heart Rate:  [69-90] 78  Resp:  [16-32] 16  BP: ()/(32-61) 142/58    Intake/Output Summary (Last 24 hours) at 7/2/2022 1140  Last data filed at  7/2/2022 0600  Gross per 24 hour   Intake 620 ml   Output 300 ml   Net 320 ml     Body mass index is 25.07 kg/m².   GENERAL/CONSTITUTIONAL: no distress.   HEENT: atraumatic, normocephalic. Extraocular movements normal  NOSE: normal  NECK: jugular veins nondistended  CHEST: no paradox, no retractions.  No respiratory distress.   CARDIAC: regular rhythm, rate 75  ABDOMEN: nondistended  : deferred  EXTREMITIES: no visible edema.  NEURO:  Awake and alert   PSYCH: no agitation  SKIN: no jaundice.  No rash    Electronically signed by MEKHI Williamson, 7/2/2022, 11:40 CDT        Physician Substantive Portion: Medical Decision Making    Laboratory Data:  Results from last 7 days   Lab Units 07/02/22  0647 07/01/22  1134 06/26/22  1137   WBC 10*3/mm3 5.86 7.19 7.57   HEMOGLOBIN g/dL 12.2* 11.2* 13.3   PLATELETS 10*3/mm3 291 302 214     Results from last 7 days   Lab Units 07/02/22  0647 07/01/22  1134 06/26/22  1137   SODIUM mmol/L 134* 133* 137   POTASSIUM mmol/L 4.0 3.8 4.6   BUN mg/dL 25* 20 18   CREATININE mg/dL 0.79 0.85 0.84   INR   --  2.16*  --      Results from last 7 days   Lab Units 07/02/22  0417 07/01/22  1515   PH, ARTERIAL pH units 7.409 7.437   PCO2, ARTERIAL mm Hg 37.0 32.8*   PO2 ART mm Hg 80.2* 80.3*   FIO2 % 70 40     Blood Culture   Date Value Ref Range Status   06/26/2022 No growth at 5 days  Final   06/26/2022 No growth at 5 days  Final     Results from last 7 days   Lab Units 07/02/22  0647 07/01/22  1429 07/01/22  1134   CRP mg/dL 22.42* 18.78*  --    PROCALCITONIN ng/mL  --  0.17  --    CK TOTAL U/L  --   --  167      Recent films:  CT Head Without Contrast    Result Date: 7/1/2022  EXAMINATION:   CT HEAD WO CONTRAST-  7/1/2022 11:55 AM CDT  HISTORY: CT BRAIN without contrast 7/1/2022 11:47 AM CDT  HISTORY: Patient fell  COMPARISON: None  DLP: 924 mGy cm. In order to have a CT radiation dose as low as reasonably achievable, Automated Exposure Control was utilized for adjustment of the mA  and/or KV according to patient size.  TECHNIQUE: Serial axial tomographic images of the brain were obtained without the use of intravenous contrast.  FINDINGS: The midline structures are nondisplaced. There is mild cerebral and cerebellar volume loss, with an associated increase in the prominence of the ventricles and sulci. The basilar cisterns are normal in size and configuration. There is no evidence of intracranial hemorrhage or mass-effect. There is low attenuation in the periventricular white matter, consistent with chronic ischemic change. There are no abnormal extra-axial fluid collections. There is no evidence of tonsillar herniation.  The included orbits and their contents are unremarkable. A mucous cyst is present left maxillary antrum. Mastoid air cells are pneumatized.. The visualized osseous structures and overlying soft tissues of the skull and face are intact.      Impression: Mild cerebral and cerebellar volume loss with chronic microvascular disease but no evidence of acute intracranial process. 2. Mucous cyst left maxillary sinus   This report was finalized on 07/01/2022 11:58 by Dr. Don Delacruz MD.    XR Chest 1 View    Result Date: 7/1/2022  XR CHEST 1 VW- 7/1/2022 12:39 PM CDT  HISTORY: Hypoxemia  COMPARISON: Chest exam dated 6/26/2022.  FINDINGS:  Nearly diffuse opacification of the left lung. Right lung is mostly completely clear. There is no mediastinal shift noted. No pleural effusion. Cardiac silhouette is obscured by the left lung consolidation. Pulmonary vasculature are nondilated. No acute bony abnormality.      Impression: 1. Bilateral, asymmetric pulmonary infiltrates. Left lung in particular is near completely opacified, much worse than was seen on the June 26th exam. Appearance favors a worsening atypical pneumonia. 2. No pleural effusion or pneumothorax.  This report was finalized on 07/01/2022 12:51 by Dr Prem Mclaughlin, .    Adult Transthoracic Echo Limited W/ Cont if Necessary  Per Protocol    Result Date: 7/2/2022  · This is a limited echocardiogram. · Left ventricular systolic function is normal. Left ventricular ejection fraction appears to be 61 - 65%. · The right ventricular cavity is mildly dilated. Normal right ventricular systolic function noted.      CT Angiogram Chest    Result Date: 7/1/2022  CT ANGIOGRAM CHEST- 7/1/2022 1:40 PM CDT  HISTORY: Recent history of pulmonary embolus now with syncope  COMPARISON: CT scan dated 6/26/2022.  DOSE LENGTH PRODUCT: 763 mGy cm. Automated exposure control was also utilized to decrease patient radiation dose.  TECHNIQUE: Helical tomographic images of the chest were obtained after the administration of intravenous contrast following angiogram protocol. Additionally, 3D and multiplanar reformatted images were provided.    FINDINGS:  Pulmonary arteries: There is adequate enhancement of the pulmonary arteries to evaluate for central and segmental pulmonary emboli. There are multiple pulmonary emboli identified in the segmental and subsegmental pulmonary arteries of the right lower lobe.  Aorta and great vessels: Thoracic aorta is normal in caliber. No dissection identified. Minimal calcified plaque in the arch. 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  Visualized neck base: The imaged portion of the base of the neck appears unremarkable.  Lungs: Increasing multifocal bilateral airspace opacities, mixed groundglass and consolidative and most notably in the lung periphery and bases. This is increased from the recent CT scan from June 26. No pleural effusion. Airways are clear.  Heart: The heart is normal in size. There is no pericardial effusion.  Mediastinum and lymph nodes: Mild reactive adenopathy in the tonya and mediastinum..  Skeletal and soft tissues: Chest wall soft tissues are unremarkable. No acute bony abnormality. Multilevel bridging spinal osteophytes.  Upper abdomen: The imaged portion of the upper abdomen  demonstrates no acute process. Prior cholecystectomy.      Impression: 1. Worsening Covid pneumonia with increasing bilateral mixed groundglass and consolidative pulmonary infiltrates. 2. Acute pulmonary embolus identified in the segmental and subsegmental pulmonary arteries in the right lower lobe. This is mildly increased as compared with the recent CT scan. No evidence of right heart strain.  This report was finalized on 07/01/2022 14:08 by Dr Prem Mclaughlin, .     Physician Substantive Portion: Medical Decision Making    Results from last 7 days   Lab Units 07/02/22  0647 07/01/22  1134 06/26/22  1137   WBC 10*3/mm3 5.86 7.19 7.57   HEMOGLOBIN g/dL 12.2* 11.2* 13.3   PLATELETS 10*3/mm3 291 302 214     Results from last 7 days   Lab Units 07/02/22  0647 07/01/22  1134 06/26/22  1137   SODIUM mmol/L 134* 133* 137   POTASSIUM mmol/L 4.0 3.8 4.6   CO2 mmol/L 16.0* 22.0 25.0   BUN mg/dL 25* 20 18   CREATININE mg/dL 0.79 0.85 0.84   MAGNESIUM mg/dL 2.3  --   --    PHOSPHORUS mg/dL 4.8*  --   --    GLUCOSE mg/dL 153* 123* 99     Results from last 7 days   Lab Units 07/02/22  0417 07/01/22  1515   PH, ARTERIAL pH units 7.409 7.437   PCO2, ARTERIAL mm Hg 37.0 32.8*   PO2 ART mm Hg 80.2* 80.3*   FIO2 % 70 40     Lab Results   Component Value Date    PROBNP 1,299.0 07/01/2022     Blood Culture   Date Value Ref Range Status   06/26/2022 No growth at 5 days  Final   06/26/2022 No growth at 5 days  Final       Recent radiology:   Imaging Results (Last 72 Hours)     Procedure Component Value Units Date/Time    US Venous Doppler Lower Extremity Bilateral (duplex) [[id number]] Resulted: 07/01/22 1547     Updated: 07/01/22 1559    CT Angiogram Chest [[id number]] Collected: 07/01/22 1402     Updated: 07/01/22 1411    Narrative:      CT ANGIOGRAM CHEST- 7/1/2022 1:40 PM CDT      HISTORY: Recent history of pulmonary embolus now with syncope      COMPARISON: CT scan dated 6/26/2022.      DOSE LENGTH PRODUCT: 763 mGy cm. Automated  exposure control was also  utilized to decrease patient radiation dose.     TECHNIQUE: Helical tomographic images of the chest were obtained after  the administration of intravenous contrast following angiogram protocol.  Additionally, 3D and multiplanar reformatted images were provided.        FINDINGS:    Pulmonary arteries: There is adequate enhancement of the pulmonary  arteries to evaluate for central and segmental pulmonary emboli. There  are multiple pulmonary emboli identified in the segmental and  subsegmental pulmonary arteries of the right lower lobe.     Aorta and great vessels: Thoracic aorta is normal in caliber. No  dissection identified. Minimal calcified plaque in the arch.  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      Visualized neck base: The imaged portion of the base of the neck appears  unremarkable.      Lungs: Increasing multifocal bilateral airspace opacities, mixed  groundglass and consolidative and most notably in the lung periphery and  bases. This is increased from the recent CT scan from June 26. No  pleural effusion. Airways are clear.     Heart: The heart is normal in size. There is no pericardial effusion.     Mediastinum and lymph nodes: Mild reactive adenopathy in the tonya and  mediastinum..     Skeletal and soft tissues: Chest wall soft tissues are unremarkable. No  acute bony abnormality. Multilevel bridging spinal osteophytes.      Upper abdomen: The imaged portion of the upper abdomen demonstrates no  acute process. Prior cholecystectomy.       Impression:      1. Worsening Covid pneumonia with increasing bilateral mixed groundglass  and consolidative pulmonary infiltrates.  2. Acute pulmonary embolus identified in the segmental and subsegmental  pulmonary arteries in the right lower lobe. This is mildly increased as  compared with the recent CT scan. No evidence of right heart strain.     This report was finalized on 07/01/2022 14:08 by Dr Prem Mclaughlin, .     XR Chest 1 View [[id number]] Collected: 07/01/22 1250     Updated: 07/01/22 1254    Narrative:      XR CHEST 1 VW- 7/1/2022 12:39 PM CDT     HISTORY: Hypoxemia     COMPARISON: Chest exam dated 6/26/2022.     FINDINGS:      Nearly diffuse opacification of the left lung. Right lung is mostly  completely clear. There is no mediastinal shift noted. No pleural  effusion. Cardiac silhouette is obscured by the left lung consolidation.  Pulmonary vasculature are nondilated. No acute bony abnormality.       Impression:      1. Bilateral, asymmetric pulmonary infiltrates. Left lung in particular  is near completely opacified, much worse than was seen on the June 26th  exam. Appearance favors a worsening atypical pneumonia.  2. No pleural effusion or pneumothorax.     This report was finalized on 07/01/2022 12:51 by Dr Prem Mclaughlin, .    CT Head Without Contrast [[id number]] Collected: 07/01/22 1155     Updated: 07/01/22 1201    Narrative:      EXAMINATION:   CT HEAD WO CONTRAST-  7/1/2022 11:55 AM CDT     HISTORY: CT BRAIN without contrast 7/1/2022 11:47 AM CDT     HISTORY: Patient fell     COMPARISON: None      DLP: 924 mGy cm. In order to have a CT radiation dose as low as  reasonably achievable, Automated Exposure Control was utilized for  adjustment of the mA and/or KV according to patient size.     TECHNIQUE: Serial axial tomographic images of the brain were obtained  without the use of intravenous contrast.      FINDINGS:   The midline structures are nondisplaced. There is mild cerebral and  cerebellar volume loss, with an associated increase in the prominence of  the ventricles and sulci. The basilar cisterns are normal in size and  configuration. There is no evidence of intracranial hemorrhage or  mass-effect. There is low attenuation in the periventricular white  matter, consistent with chronic ischemic change. There are no abnormal  extra-axial fluid collections. There is no evidence of tonsillar  herniation.       The included orbits and their contents are unremarkable. A mucous cyst  is present left maxillary antrum. Mastoid air cells are pneumatized..  The visualized osseous structures and overlying soft tissues of the  skull and face are intact.        Impression:      Mild cerebral and cerebellar volume loss with chronic microvascular  disease but no evidence of acute intracranial process.  2. Mucous cyst left maxillary sinus        This report was finalized on 07/01/2022 11:58 by Dr. Don Delacruz MD.        My radiograph interpretation/independent review of imaging: infiltrate L>R  Other test results (not lab or imaging): Results for orders placed during the hospital encounter of 07/01/22    Adult Transthoracic Echo Limited W/ Cont if Necessary Per Protocol    Interpretation Summary  · This is a limited echocardiogram.  · Left ventricular systolic function is normal. Left ventricular ejection fraction appears to be 61 - 65%.  · The right ventricular cavity is mildly dilated. Normal right ventricular systolic function noted.       1. Pulmonary infiltrates with acute resp failure with hypoxia, in setting of Omicron Covid subvariant; may be residual from Covid or organizing pneumonia. Risk high  2. Covid infection  3. Hx PE  4. Metabolic acidosis with anion gap and nonanion gap components, uncertain etiology but abrupt onset.    Recommend/plan:   · Titrate oxygen, transition to conventional oxygen as tolerated  · Continue anticoagulation  · Continue steroids, on solumedrol for organizing pneumonia, higher dose than the conventional dexamethasone Covid protocol.  Taper steroids  · Check lactate, ketones, blood cultures, resp culture, ua uc, procalcitonin  · Begin antibiotics; need to consider nosocomial pathogens given recent hospitalization    This visit was performed by both a physician and an [facility] RN.  I personally evaluated and examined the patient.  I performed all aspects of the medical decision making  as documented.  Electronically signed by Adilson Cazares MD, 7/2/2022, 12:03 CDT

## 2022-07-02 NOTE — PROGRESS NOTES
[facility] Medicine Services  INPATIENT PROGRESS NOTE    Patient Name: Adrián Graham  Date of Admission: 7/1/2022  Today's Date: 07/02/22  Length of Stay: 1  Primary Care Physician: Mauro Irizarry DO    Subjective   Chief Complaint: f/u acute respiratory failure with hypoxia'    HPI   Patient doing a little better today.  He was just recently taken off Vapotherm from 4 L nasal cannula.  Looks comfortable.  In general he still feels pretty fatigued and weak.  Still with cough.  Appetite is poor.  No chest pain.  No nausea.    Review of Systems   All pertinent negatives and positives are as above. All other systems have been reviewed and are negative unless otherwise stated.     Objective    Temp:  [96.2 °F (35.7 °C)-99.7 °F (37.6 °C)] 97.5 °F (36.4 °C)  Heart Rate:  [69-90] 78  Resp:  [16-32] 16  BP: ()/(32-61) 103/46  Physical Exam  GEN: Awake, alert, interactive, in NAD  HEENT: PERRLA, EOMI, Anicteric, Trachea midline  Lungs: normal respiratory effort, normal chest rise, no distress  Heart: RRR, +S1/s2, no rub  ABD: soft, nt/nd, +BS, no guarding/rebound  Extremities: atraumatic, no cyanosis  Skin: no rashes or petechiae  Neuro: AAOx3, no focal deficits  Psych: normal mood & affect      Results Review:  I have reviewed the labs, radiology results, and diagnostic studies.    Laboratory Data:   Results from last 7 days   Lab Units 07/02/22  0647 07/01/22  1134 06/26/22  1137   WBC 10*3/mm3 5.86 7.19 7.57   HEMOGLOBIN g/dL 12.2* 11.2* 13.3   HEMATOCRIT % 39.9 35.4* 40.9   PLATELETS 10*3/mm3 291 302 214        Results from last 7 days   Lab Units 07/02/22  0647 07/01/22  1134 06/26/22  1137   SODIUM mmol/L 134* 133* 137   POTASSIUM mmol/L 4.0 3.8 4.6   CHLORIDE mmol/L 102 100 100   CO2 mmol/L 16.0* 22.0 25.0   BUN mg/dL 25* 20 18   CREATININE mg/dL 0.79 0.85 0.84   CALCIUM mg/dL 8.5* 8.4* 9.1   BILIRUBIN mg/dL 0.4 0.4 0.5   ALK PHOS U/L 73 73 72   ALT (SGPT) U/L 15 17 16    AST (SGOT) U/L 30 41* 26   GLUCOSE mg/dL 153* 123* 99       Culture Data:   Blood Culture   Date Value Ref Range Status   06/26/2022 No growth at 5 days  Final   06/26/2022 No growth at 5 days  Final       Radiology Data:   Imaging Results (Last 24 Hours)     Procedure Component Value Units Date/Time    US Venous Doppler Lower Extremity Bilateral (duplex) [[id number]] Resulted: 07/01/22 1547     Updated: 07/01/22 1559    CT Angiogram Chest [[id number]] Collected: 07/01/22 1402     Updated: 07/01/22 1411    Narrative:      CT ANGIOGRAM CHEST- 7/1/2022 1:40 PM CDT      HISTORY: Recent history of pulmonary embolus now with syncope      COMPARISON: CT scan dated 6/26/2022.      DOSE LENGTH PRODUCT: 763 mGy cm. Automated exposure control was also  utilized to decrease patient radiation dose.     TECHNIQUE: Helical tomographic images of the chest were obtained after  the administration of intravenous contrast following angiogram protocol.  Additionally, 3D and multiplanar reformatted images were provided.        FINDINGS:    Pulmonary arteries: There is adequate enhancement of the pulmonary  arteries to evaluate for central and segmental pulmonary emboli. There  are multiple pulmonary emboli identified in the segmental and  subsegmental pulmonary arteries of the right lower lobe.     Aorta and great vessels: Thoracic aorta is normal in caliber. No  dissection identified. Minimal calcified plaque in the arch.  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      Visualized neck base: The imaged portion of the base of the neck appears  unremarkable.      Lungs: Increasing multifocal bilateral airspace opacities, mixed  groundglass and consolidative and most notably in the lung periphery and  bases. This is increased from the recent CT scan from June 26. No  pleural effusion. Airways are clear.     Heart: The heart is normal in size. There is no pericardial effusion.     Mediastinum and lymph nodes: Mild reactive  adenopathy in the tonya and  mediastinum..     Skeletal and soft tissues: Chest wall soft tissues are unremarkable. No  acute bony abnormality. Multilevel bridging spinal osteophytes.      Upper abdomen: The imaged portion of the upper abdomen demonstrates no  acute process. Prior cholecystectomy.       Impression:      1. Worsening Covid pneumonia with increasing bilateral mixed groundglass  and consolidative pulmonary infiltrates.  2. Acute pulmonary embolus identified in the segmental and subsegmental  pulmonary arteries in the right lower lobe. This is mildly increased as  compared with the recent CT scan. No evidence of right heart strain.     This report was finalized on 07/01/2022 14:08 by Dr Prem Mclaughlin, .    XR Chest 1 View [[id number]] Collected: 07/01/22 1250     Updated: 07/01/22 1254    Narrative:      XR CHEST 1 VW- 7/1/2022 12:39 PM CDT     HISTORY: Hypoxemia     COMPARISON: Chest exam dated 6/26/2022.     FINDINGS:      Nearly diffuse opacification of the left lung. Right lung is mostly  completely clear. There is no mediastinal shift noted. No pleural  effusion. Cardiac silhouette is obscured by the left lung consolidation.  Pulmonary vasculature are nondilated. No acute bony abnormality.       Impression:      1. Bilateral, asymmetric pulmonary infiltrates. Left lung in particular  is near completely opacified, much worse than was seen on the June 26th  exam. Appearance favors a worsening atypical pneumonia.  2. No pleural effusion or pneumothorax.     This report was finalized on 07/01/2022 12:51 by Dr Prem Mclaughlin, .    CT Head Without Contrast [[id number]] Collected: 07/01/22 1155     Updated: 07/01/22 1201    Narrative:      EXAMINATION:   CT HEAD WO CONTRAST-  7/1/2022 11:55 AM CDT     HISTORY: CT BRAIN without contrast 7/1/2022 11:47 AM CDT     HISTORY: Patient fell     COMPARISON: None      DLP: 924 mGy cm. In order to have a CT radiation dose as low as  reasonably achievable, Automated  Exposure Control was utilized for  adjustment of the mA and/or KV according to patient size.     TECHNIQUE: Serial axial tomographic images of the brain were obtained  without the use of intravenous contrast.      FINDINGS:   The midline structures are nondisplaced. There is mild cerebral and  cerebellar volume loss, with an associated increase in the prominence of  the ventricles and sulci. The basilar cisterns are normal in size and  configuration. There is no evidence of intracranial hemorrhage or  mass-effect. There is low attenuation in the periventricular white  matter, consistent with chronic ischemic change. There are no abnormal  extra-axial fluid collections. There is no evidence of tonsillar  herniation.      The included orbits and their contents are unremarkable. A mucous cyst  is present left maxillary antrum. Mastoid air cells are pneumatized..  The visualized osseous structures and overlying soft tissues of the  skull and face are intact.        Impression:      Mild cerebral and cerebellar volume loss with chronic microvascular  disease but no evidence of acute intracranial process.  2. Mucous cyst left maxillary sinus        This report was finalized on 07/01/2022 11:58 by Dr. Don Delacruz MD.          I have reviewed the patient's current medications.     Assessment/Plan     Active Hospital Problems    Diagnosis    • Acute respiratory failure with hypoxia (HCC)    • Pulmonary embolism associated with COVID-19 (HCC)    • Bilateral carotid artery disease (HCC)    • Hyperlipidemia    • Benign prostatic hyperplasia without lower urinary tract symptoms    • Essential hypertension        Plan:  #1 acute respiratory failure with hypoxia -with a recent history of COVID.  Should be outside the original window for infection and this may just represent viral shedding but hard to know.  Also concern for cryptogenic organizing pneumonia.  He is on high-dose steroids.  Improving and O2 weaning.  His white  count and Pro-Robert are normal.  No fever.  Antibiotics were started however given recent hospital stay today by pulm.  Appreciate their input.  Supportive care.    #2 pulmonary embolism -was discharged on Eliquis starter pack last hospital stay.  New CT questions possible worsening but may just be different slice.  Not sure if this represents a failure of therapy.  Doubtful.  Repeat lower extremity Dopplers are still negative for DVT.  Currently covering with Lovenox.    #3 COVID-19 -as above recent COVID infection.  His first swab here was negative but second still positive.  Unclear if this represents active disease or not but we are currently covering with isolation and supportive care and steroids given his imaging.    #4 hypertension -has been having some orthostasis.  Monitoring off meds currently    #5 hyperlipidemia -statin    #6 BPH -Proscar    Discharge Planning: Ongoing.  Continue current treatments and continue to wean oxygen as able.  Will likely be here for several days.    Electronically signed by Steven Ross DO, 07/02/22, 10:28 CDT.

## 2022-07-03 NOTE — PLAN OF CARE
Goal Outcome Evaluation:             Outcome Evaluation: Pt has been Aox4 this shift. Maintaining sats on 4L o2. Continous pulse ox and tele monitoring in use. Purewick external cath placed due to incontinence and urgency. Pt up x1 to stand at EOB, maintained sats. Skin tears noted to bilat elbows. Safety maintained. Encouraged PO intake. Call light in reach.

## 2022-07-03 NOTE — PROGRESS NOTES
[facility] Medicine Services  INPATIENT PROGRESS NOTE    Patient Name: Adrián Graham  Date of Admission: 7/1/2022  Today's Date: 07/03/22  Length of Stay: 2  Primary Care Physician: Mauro Irizarry DO    Subjective   Chief Complaint: f/u acute respiratory failure with hypoxia    HPI   Patient resting in bed.  He still feels weak but actually feels a little better from a breathing standpoint today.  No chest pain.  Still poor appetite but says he is forcing down some food and tolerating p.o. No nausea or vomiting.    Review of Systems   All pertinent negatives and positives are as above. All other systems have been reviewed and are negative unless otherwise stated.     Objective    Temp:  [96.5 °F (35.8 °C)-97.4 °F (36.3 °C)] 96.5 °F (35.8 °C)  Heart Rate:  [72-79] 74  Resp:  [16-18] 18  BP: ()/(40-58) 96/54  Physical Exam  GEN: Awake, alert, interactive, in NAD  HEENT: PERRLA, EOMI, Anicteric, Trachea midline  Lungs: normal respiratory effort, normal chest rise, no distress  Heart: RRR, +S1/s2, no rub  ABD: soft, nt/nd, +BS, no guarding/rebound  Extremities: atraumatic, no cyanosis  Skin: no rashes or petechiae  Neuro: AAOx3, no focal deficits  Psych: normal mood & affect      Results Review:  I have reviewed the labs, radiology results, and diagnostic studies.    Laboratory Data:   Results from last 7 days   Lab Units 07/02/22  0647 07/01/22  1134 06/26/22  1137   WBC 10*3/mm3 5.86 7.19 7.57   HEMOGLOBIN g/dL 12.2* 11.2* 13.3   HEMATOCRIT % 39.9 35.4* 40.9   PLATELETS 10*3/mm3 291 302 214        Results from last 7 days   Lab Units 07/02/22  0647 07/01/22  1134 06/26/22  1137   SODIUM mmol/L 134* 133* 137   POTASSIUM mmol/L 4.0 3.8 4.6   CHLORIDE mmol/L 102 100 100   CO2 mmol/L 16.0* 22.0 25.0   BUN mg/dL 25* 20 18   CREATININE mg/dL 0.79 0.85 0.84   CALCIUM mg/dL 8.5* 8.4* 9.1   BILIRUBIN mg/dL 0.4 0.4 0.5   ALK PHOS U/L 73 73 72   ALT (SGPT) U/L 15 17 16   AST  (SGOT) U/L 30 41* 26   GLUCOSE mg/dL 153* 123* 99       Culture Data:   Blood Culture   Date Value Ref Range Status   06/26/2022 No growth at 5 days  Final   06/26/2022 No growth at 5 days  Final       Radiology Data:   Imaging Results (Last 24 Hours)     ** No results found for the last 24 hours. **          I have reviewed the patient's current medications.     Assessment/Plan     Active Hospital Problems    Diagnosis    • Acute respiratory failure with hypoxia (HCC)    • Pulmonary embolism associated with COVID-19 (HCC)    • Bilateral carotid artery disease (HCC)    • Hyperlipidemia    • Benign prostatic hyperplasia without lower urinary tract symptoms    • Essential hypertension        Plan:  #1 acute respiratory failure with hypoxia -with a recent history of COVID.  Should be outside the original window for infection and this may just represent viral shedding but hard to know.  Also concern for cryptogenic organizing pneumonia.  He is on high-dose steroids.  Improving and O2 weaning.  His white count and Pro-Robert are normal.  No fever.  Antibiotics were started however given recent hospital stay today by pulm.  Appreciate their input.  Supportive care.    #2 pulmonary embolism -was discharged on Eliquis starter pack last hospital stay.  New CT questions possible worsening but may just be different slice.  Not sure if this represents a failure of therapy.  Doubtful.  Repeat lower extremity Dopplers are still negative for DVT.  Currently covering with Lovenox.    #3 COVID-19 -as above recent COVID infection.  His first swab here was negative but second still positive.  Unclear if this represents active disease or not but we are currently covering with isolation and supportive care and steroids given his imaging.    #4 hypertension -has been having some orthostasis.  Monitoring off meds currently    #5 hyperlipidemia -statin    #6 BPH -Proscar    Discharge Planning: Ongoing.  Continue current treatments and  continue to wean oxygen as able.  Will likely be here for several days.    Electronically signed by Steven Ross DO, 07/03/22, 11:06 CDT.

## 2022-07-03 NOTE — PROGRESS NOTES
PULMONARY AND CRITICAL CARE PROGRESS NOTE - [facility]    Patient: Adrián Graham  1940   MR# [id number]   Acct# [id number]  07/03/22   06:55 CDT  Referring Provider: Steven Ross DO    Chief Complaint: Covid-19     Interval history: Patient is seen from the door in an effort to preserve PPE and reduce exposure. He is observed laying in bed in no distress. He states he is weak. He is now on 4L NC with sat of 94%. He would like to get out of the bed to the chair. Order placed for up to chair. His labs were negative Acetone, Lactate, procalcitonin were all normal.     Meds:  albuterol sulfate HFA, 2 puff, Inhalation, 4x Daily - RT  ascorbic acid, 500 mg, Oral, Daily  aspirin, 81 mg, Oral, Daily  atorvastatin, 20 mg, Oral, Daily  azithromycin, 500 mg, Intravenous, Daily  cetirizine, 10 mg, Oral, Daily  enoxaparin, 1 mg/kg, Subcutaneous, Q12H  famotidine, 20 mg, Oral, BID AC  finasteride, 5 mg, Oral, Daily  fluticasone, 2 spray, Nasal, Daily  gabapentin, 400 mg, Oral, Q12H  melatonin, 3 mg, Oral, Nightly  methylPREDNISolone sodium succinate, 40 mg, Intravenous, Q8H  montelukast, 10 mg, Oral, Nightly  piperacillin-tazobactam, 4.5 g, Intravenous, Q8H  sodium chloride, 10 mL, Intravenous, Q12H  vitamin D3, 5,000 Units, Oral, Daily  zinc sulfate, 220 mg, Oral, Daily         Review of Systems:   Review of Systems   Constitutional: Positive for fatigue. Negative for fever.   HENT: Positive for congestion and postnasal drip.    Eyes: Negative.    Respiratory: Positive for cough, chest tightness and shortness of breath.    Gastrointestinal: Negative.    Endocrine: Negative.    Genitourinary: Negative.    Musculoskeletal: Negative.    Allergic/Immunologic: Positive for environmental allergies.   Neurological: Positive for weakness.   Hematological: Negative.    Psychiatric/Behavioral: Negative.     Physical Exam:  SpO2 Percentage    07/03/22 0300 07/03/22 0419 07/03/22 0644   SpO2: 95% 94% 94%      Temp:  [96.5 °F (35.8 °C)-97.5 °F (36.4 °C)] 96.5 °F (35.8 °C)  Heart Rate:  [72-79] 76  Resp:  [16-18] 18  BP: ()/(40-58) 96/54    Intake/Output Summary (Last 24 hours) at 7/3/2022 0655  Last data filed at 7/3/2022 0400  Gross per 24 hour   Intake 120 ml   Output 400 ml   Net -280 ml     Body mass index is 25.6 kg/m².   GENERAL/CONSTITUTIONAL: no distress.   HEENT: atraumatic, normocephalic. Extraocular movements normal  NOSE: normal  NECK: jugular veins nondistended  CHEST: no paradox, no retractions.  No respiratory distress.   CARDIAC: regular rhythm, rate 74  ABDOMEN: nondistended  : deferred  EXTREMITIES: no visible edema.  NEURO:  Awake and alert   PSYCH: no agitation  SKIN: no jaundice.  No rash    Electronically signed by MEKHI Williamson, 7/3/2022, 06:55 CDT        Physician Substantive Portion: Medical Decision Making    Laboratory Data:  Results from last 7 days   Lab Units 07/02/22  0647 07/01/22  1134 06/26/22  1137   WBC 10*3/mm3 5.86 7.19 7.57   HEMOGLOBIN g/dL 12.2* 11.2* 13.3   PLATELETS 10*3/mm3 291 302 214     Results from last 7 days   Lab Units 07/02/22  0647 07/01/22  1134 06/26/22  1137   SODIUM mmol/L 134* 133* 137   POTASSIUM mmol/L 4.0 3.8 4.6   BUN mg/dL 25* 20 18   CREATININE mg/dL 0.79 0.85 0.84   INR   --  2.16*  --      Results from last 7 days   Lab Units 07/02/22  0417 07/01/22  1515   PH, ARTERIAL pH units 7.409 7.437   PCO2, ARTERIAL mm Hg 37.0 32.8*   PO2 ART mm Hg 80.2* 80.3*   FIO2 % 70 40     Blood Culture   Date Value Ref Range Status   06/26/2022 No growth at 5 days  Final   06/26/2022 No growth at 5 days  Final     Results from last 7 days   Lab Units 07/02/22  1248 07/02/22  0647 07/01/22  1429 07/01/22  1134   CRP mg/dL  --  22.42* 18.78*  --    PROCALCITONIN ng/mL 0.21  --  0.17  --    CK TOTAL U/L  --   --   --  167      Recent films:  CT Head Without Contrast    Result Date: 7/1/2022  EXAMINATION:   CT HEAD WO CONTRAST-  7/1/2022 11:55 AM CDT   HISTORY: CT BRAIN without contrast 7/1/2022 11:47 AM CDT  HISTORY: Patient fell  COMPARISON: None  DLP: 924 mGy cm. In order to have a CT radiation dose as low as reasonably achievable, Automated Exposure Control was utilized for adjustment of the mA and/or KV according to patient size.  TECHNIQUE: Serial axial tomographic images of the brain were obtained without the use of intravenous contrast.  FINDINGS: The midline structures are nondisplaced. There is mild cerebral and cerebellar volume loss, with an associated increase in the prominence of the ventricles and sulci. The basilar cisterns are normal in size and configuration. There is no evidence of intracranial hemorrhage or mass-effect. There is low attenuation in the periventricular white matter, consistent with chronic ischemic change. There are no abnormal extra-axial fluid collections. There is no evidence of tonsillar herniation.  The included orbits and their contents are unremarkable. A mucous cyst is present left maxillary antrum. Mastoid air cells are pneumatized.. The visualized osseous structures and overlying soft tissues of the skull and face are intact.      Impression: Mild cerebral and cerebellar volume loss with chronic microvascular disease but no evidence of acute intracranial process. 2. Mucous cyst left maxillary sinus   This report was finalized on 07/01/2022 11:58 by Dr. Don Delacruz MD.    XR Chest 1 View    Result Date: 7/1/2022  XR CHEST 1 VW- 7/1/2022 12:39 PM CDT  HISTORY: Hypoxemia  COMPARISON: Chest exam dated 6/26/2022.  FINDINGS:  Nearly diffuse opacification of the left lung. Right lung is mostly completely clear. There is no mediastinal shift noted. No pleural effusion. Cardiac silhouette is obscured by the left lung consolidation. Pulmonary vasculature are nondilated. No acute bony abnormality.      Impression: 1. Bilateral, asymmetric pulmonary infiltrates. Left lung in particular is near completely opacified, much worse  than was seen on the June 26th exam. Appearance favors a worsening atypical pneumonia. 2. No pleural effusion or pneumothorax.  This report was finalized on 07/01/2022 12:51 by Dr Prem Mclaughlin, .    Adult Transthoracic Echo Limited W/ Cont if Necessary Per Protocol    Result Date: 7/2/2022  · This is a limited echocardiogram. · Left ventricular systolic function is normal. Left ventricular ejection fraction appears to be 61 - 65%. · The right ventricular cavity is mildly dilated. Normal right ventricular systolic function noted.      CT Angiogram Chest    Result Date: 7/1/2022  CT ANGIOGRAM CHEST- 7/1/2022 1:40 PM CDT  HISTORY: Recent history of pulmonary embolus now with syncope  COMPARISON: CT scan dated 6/26/2022.  DOSE LENGTH PRODUCT: 763 mGy cm. Automated exposure control was also utilized to decrease patient radiation dose.  TECHNIQUE: Helical tomographic images of the chest were obtained after the administration of intravenous contrast following angiogram protocol. Additionally, 3D and multiplanar reformatted images were provided.    FINDINGS:  Pulmonary arteries: There is adequate enhancement of the pulmonary arteries to evaluate for central and segmental pulmonary emboli. There are multiple pulmonary emboli identified in the segmental and subsegmental pulmonary arteries of the right lower lobe.  Aorta and great vessels: Thoracic aorta is normal in caliber. No dissection identified. Minimal calcified plaque in the arch. 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  Visualized neck base: The imaged portion of the base of the neck appears unremarkable.  Lungs: Increasing multifocal bilateral airspace opacities, mixed groundglass and consolidative and most notably in the lung periphery and bases. This is increased from the recent CT scan from June 26. No pleural effusion. Airways are clear.  Heart: The heart is normal in size. There is no pericardial effusion.  Mediastinum and lymph nodes: Mild  reactive adenopathy in the tonya and mediastinum..  Skeletal and soft tissues: Chest wall soft tissues are unremarkable. No acute bony abnormality. Multilevel bridging spinal osteophytes.  Upper abdomen: The imaged portion of the upper abdomen demonstrates no acute process. Prior cholecystectomy.      Impression: 1. Worsening Covid pneumonia with increasing bilateral mixed groundglass and consolidative pulmonary infiltrates. 2. Acute pulmonary embolus identified in the segmental and subsegmental pulmonary arteries in the right lower lobe. This is mildly increased as compared with the recent CT scan. No evidence of right heart strain.  This report was finalized on 07/01/2022 14:08 by Dr Prem Mclaughlin, .     Physician Substantive Portion: Medical Decision Making    Results from last 7 days   Lab Units 07/02/22  0647 07/01/22  1134 06/26/22  1137   WBC 10*3/mm3 5.86 7.19 7.57   HEMOGLOBIN g/dL 12.2* 11.2* 13.3   PLATELETS 10*3/mm3 291 302 214     Results from last 7 days   Lab Units 07/02/22  0647 07/01/22  1134 06/26/22  1137   SODIUM mmol/L 134* 133* 137   POTASSIUM mmol/L 4.0 3.8 4.6   CO2 mmol/L 16.0* 22.0 25.0   BUN mg/dL 25* 20 18   CREATININE mg/dL 0.79 0.85 0.84   MAGNESIUM mg/dL 2.3  --   --    PHOSPHORUS mg/dL 4.8*  --   --    GLUCOSE mg/dL 153* 123* 99     Results from last 7 days   Lab Units 07/02/22  0417 07/01/22  1515   PH, ARTERIAL pH units 7.409 7.437   PCO2, ARTERIAL mm Hg 37.0 32.8*   PO2 ART mm Hg 80.2* 80.3*   FIO2 % 70 40     Lab Results   Component Value Date    PROBNP 1,299.0 07/01/2022     Blood Culture   Date Value Ref Range Status   06/26/2022 No growth at 5 days  Final   06/26/2022 No growth at 5 days  Final       Recent radiology:   Imaging Results (Last 72 Hours)     Procedure Component Value Units Date/Time    US Venous Doppler Lower Extremity Bilateral (duplex) [[id number]] Resulted: 07/01/22 1547     Updated: 07/01/22 1559    CT Angiogram Chest [[id number]] Collected: 07/01/22 1402      Updated: 07/01/22 1411    Narrative:      CT ANGIOGRAM CHEST- 7/1/2022 1:40 PM CDT      HISTORY: Recent history of pulmonary embolus now with syncope      COMPARISON: CT scan dated 6/26/2022.      DOSE LENGTH PRODUCT: 763 mGy cm. Automated exposure control was also  utilized to decrease patient radiation dose.     TECHNIQUE: Helical tomographic images of the chest were obtained after  the administration of intravenous contrast following angiogram protocol.  Additionally, 3D and multiplanar reformatted images were provided.        FINDINGS:    Pulmonary arteries: There is adequate enhancement of the pulmonary  arteries to evaluate for central and segmental pulmonary emboli. There  are multiple pulmonary emboli identified in the segmental and  subsegmental pulmonary arteries of the right lower lobe.     Aorta and great vessels: Thoracic aorta is normal in caliber. No  dissection identified. Minimal calcified plaque in the arch.  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      Visualized neck base: The imaged portion of the base of the neck appears  unremarkable.      Lungs: Increasing multifocal bilateral airspace opacities, mixed  groundglass and consolidative and most notably in the lung periphery and  bases. This is increased from the recent CT scan from June 26. No  pleural effusion. Airways are clear.     Heart: The heart is normal in size. There is no pericardial effusion.     Mediastinum and lymph nodes: Mild reactive adenopathy in the tonya and  mediastinum..     Skeletal and soft tissues: Chest wall soft tissues are unremarkable. No  acute bony abnormality. Multilevel bridging spinal osteophytes.      Upper abdomen: The imaged portion of the upper abdomen demonstrates no  acute process. Prior cholecystectomy.       Impression:      1. Worsening Covid pneumonia with increasing bilateral mixed groundglass  and consolidative pulmonary infiltrates.  2. Acute pulmonary embolus identified in the  segmental and subsegmental  pulmonary arteries in the right lower lobe. This is mildly increased as  compared with the recent CT scan. No evidence of right heart strain.     This report was finalized on 07/01/2022 14:08 by Dr Prem Mclaughlin, .    XR Chest 1 View [[id number]] Collected: 07/01/22 1250     Updated: 07/01/22 1254    Narrative:      XR CHEST 1 VW- 7/1/2022 12:39 PM CDT     HISTORY: Hypoxemia     COMPARISON: Chest exam dated 6/26/2022.     FINDINGS:      Nearly diffuse opacification of the left lung. Right lung is mostly  completely clear. There is no mediastinal shift noted. No pleural  effusion. Cardiac silhouette is obscured by the left lung consolidation.  Pulmonary vasculature are nondilated. No acute bony abnormality.       Impression:      1. Bilateral, asymmetric pulmonary infiltrates. Left lung in particular  is near completely opacified, much worse than was seen on the June 26th  exam. Appearance favors a worsening atypical pneumonia.  2. No pleural effusion or pneumothorax.     This report was finalized on 07/01/2022 12:51 by Dr Prem Mclaughlin, .    CT Head Without Contrast [[id number]] Collected: 07/01/22 1155     Updated: 07/01/22 1201    Narrative:      EXAMINATION:   CT HEAD WO CONTRAST-  7/1/2022 11:55 AM CDT     HISTORY: CT BRAIN without contrast 7/1/2022 11:47 AM CDT     HISTORY: Patient fell     COMPARISON: None      DLP: 924 mGy cm. In order to have a CT radiation dose as low as  reasonably achievable, Automated Exposure Control was utilized for  adjustment of the mA and/or KV according to patient size.     TECHNIQUE: Serial axial tomographic images of the brain were obtained  without the use of intravenous contrast.      FINDINGS:   The midline structures are nondisplaced. There is mild cerebral and  cerebellar volume loss, with an associated increase in the prominence of  the ventricles and sulci. The basilar cisterns are normal in size and  configuration. There is no evidence of  intracranial hemorrhage or  mass-effect. There is low attenuation in the periventricular white  matter, consistent with chronic ischemic change. There are no abnormal  extra-axial fluid collections. There is no evidence of tonsillar  herniation.      The included orbits and their contents are unremarkable. A mucous cyst  is present left maxillary antrum. Mastoid air cells are pneumatized..  The visualized osseous structures and overlying soft tissues of the  skull and face are intact.        Impression:      Mild cerebral and cerebellar volume loss with chronic microvascular  disease but no evidence of acute intracranial process.  2. Mucous cyst left maxillary sinus        This report was finalized on 07/01/2022 11:58 by Dr. Don Delacruz MD.        My radiograph interpretation/independent review of imaging: infiltrate L>R  Other test results (not lab or imaging): Results for orders placed during the hospital encounter of 07/01/22    Adult Transthoracic Echo Limited W/ Cont if Necessary Per Protocol    Interpretation Summary  · This is a limited echocardiogram.  · Left ventricular systolic function is normal. Left ventricular ejection fraction appears to be 61 - 65%.  · The right ventricular cavity is mildly dilated. Normal right ventricular systolic function noted.       My radiograph interpretation/independent review of imaging: no new cxr    Pulmonary Assessment:    1. 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 Hypoxic resp failure stable, compensated on lower flow oxygen  3. Weakness and deconditioning    Recommend/plan:   · Taper steroids today  · Continue oxygen and titrate  · Mobilize out of bed      This visit was performed by both a physician and an [facility] RN.  I personally evaluated and examined the patient.  I performed all aspects of the medical decision making as documented.  Electronically signed by Adilson Cazares MD, 7/3/2022, 11:51 CDT

## 2022-07-04 NOTE — THERAPY EVALUATION
Patient Name: Adrián Graham  : 1940    MRN: [id number]                              Today's Date: 2022       Admit Date: 2022    Visit Dx:     ICD-10-CM ICD-9-CM   1. Hypoxemia  R09.02 799.02   2. Orthostatic hypotension  I95.1 458.0   3. Pneumonia due to COVID-19 virus  U07.1 480.8    J12.82 079.89   4. Multiple subsegmental pulmonary emboli without acute cor pulmonale (HCC)  I26.94 415.19   5. Impaired mobility  Z74.09 799.89     Patient Active Problem List   Diagnosis   • Idiopathic peripheral neuropathy   • Essential hypertension   • Benign prostatic hyperplasia without lower urinary tract symptoms   • Overweight (BMI 25.0-29.9)   • Carpal tunnel syndrome, left   • Bilateral carotid artery disease (HCC)   • Hyperlipidemia   • Preop cardiovascular exam [facility] Dr Brown   • Single subsegmental pulmonary embolism without acute cor pulmonale (HCC)   • Pulmonary embolism associated with COVID-19 (HCC)   • Acute respiratory failure with hypoxia (HCC)     Past Medical History:   Diagnosis Date   • Abdominal distention    • Abdominal pain, left upper quadrant    • Allergic rhinitis    • Arthritis    • Bloating    • BPH (benign prostatic hyperplasia)    • Carpal tunnel syndrome    • Change in bowel habits    • Constipation    • Diarrhea    • Erectile dysfunction    • GERD (gastroesophageal reflux disease)    • History of shingles    • Hypertension    • Idiopathic peripheral neuropathy    • Neuropathy    • Pneumonia    • Shingles    • Weight loss      Past Surgical History:   Procedure Laterality Date   • APPENDECTOMY     • BACK SURGERY     • CARPAL TUNNEL RELEASE Right    • CARPAL TUNNEL RELEASE Left 2022   • CHOLECYSTECTOMY     • COLONOSCOPY  2010    5 yr-complete colon not visualized   • COLONOSCOPY  2004    extremely tortuous redundant colon. BE-mild diverticulosis without diverticulitis. ? Gallstones-Dr Chery.   • COLONOSCOPY N/A 09/10/2020    Procedure: COLONOSCOPY WITH ANESTHESIA;   Surgeon: Mani Sy DO;  Location: [facility] ENDOSCOPY;  Service: Gastroenterology;  Laterality: N/A;  Pre: Positive Colorectal Screening  Post: Diverticulosis  Dr. Mooney  CO2 Inflation Used   • ENDOSCOPY N/A 09/26/2016    Procedure: ESOPHAGOGASTRODUODENOSCOPY WITH ANESTHESIA;  Surgeon: Mani Sy DO;  Location: [facility] ENDOSCOPY;  Service:    • HIP SURGERY Right    • JOINT REPLACEMENT     • KNEE SURGERY     • OTHER SURGICAL HISTORY      Surgery for Spinal Stenosis   • OTHER SURGICAL HISTORY      Laser Prostate Surgery      General Information     Row Name 07/04/22 1015          OT Time and Intention    Document Type evaluation  -     Mode of Treatment occupational therapy  -     Row Name 07/04/22 1015          General Information    Patient Profile Reviewed yes  -     Prior Level of Function independent:;ADL's;all household mobility  -     Existing Precautions/Restrictions fall;oxygen therapy device and L/min  -     Barriers to Rehab medically complex  -     Row Name 07/04/22 1015          Occupational Profile    Environmental Supports and Barriers (Occupational Profile) walk in shower with seat, pt owns rwx & wc  -     Row Name 07/04/22 1015          Living Environment    People in Home alone  -     Row Name 07/04/22 1015          Home Main Entrance    Number of Stairs, Main Entrance one  -     Stair Railings, Main Entrance none  -     Row Name 07/04/22 1015          Stairs Within Home, Primary    Number of Stairs, Within Home, Primary none  -     Stair Railings, Within Home, Primary none  -     Row Name 07/04/22 1015          Cognition    Orientation Status (Cognition) oriented x 4  -     Row Name 07/04/22 1015          Safety Issues, Functional Mobility    Impairments Affecting Function (Mobility) endurance/activity tolerance;strength;shortness of breath;balance  -           User Key  (r) = Recorded By, (t) = Taken By, (c) = Cosigned By    Initials Name Provider Type      Niru Zuniga, OTR/L Occupational Therapist                 Mobility/ADL's     Row Name 07/04/22 1015          Bed Mobility    Bed Mobility supine-sit  -     Supine-Sit [place] (Bed Mobility) contact guard  -     Assistive Device (Bed Mobility) head of bed elevated  -     Row Name 07/04/22 1015          Transfers    Transfers sit-stand transfer;stand-sit transfer  -     Sit-Stand [place] (Transfers) verbal cues;contact guard  -     Stand-Sit [place] (Transfers) contact guard;verbal cues  -     Row Name 07/04/22 1015          Sit-Stand Transfer    Assistive Device (Sit-Stand Transfers) walker, front-wheeled  -CH     Row Name 07/04/22 1015          Stand-Sit Transfer    Assistive Device (Stand-Sit Transfers) walker, front-wheeled  -     Row Name 07/04/22 1015          Functional Mobility    Functional Mobility- Ind. Level contact guard assist  -     Functional Mobility- Device walker, front-wheeled  -CH     Functional Mobility- Safety Issues supplemental O2  -     Row Name 07/04/22 1015          Activities of Daily Living    BADL Assessment/Intervention grooming;upper body dressing;lower body dressing  -     Row Name 07/04/22 1015          Grooming Assessment/Training    [place] Level (Grooming) set up;oral care regimen;wash face, hands  -CH     Position (Grooming) supported sitting  -     Row Name 07/04/22 1015          Upper Body Dressing Assessment/Training    [place] Level (Upper Body Dressing) minimum assist (75% patient effort);don;doff  -CH     Position (Upper Body Dressing) edge of bed sitting;unsupported sitting  -     Comment, (Upper Body Dressing) hospital gown 2' to lines  -     Row Name 07/04/22 1015          Lower Body Dressing Assessment/Training    [place] Level (Lower Body Dressing) supervision;don;socks  -CH     Position (Lower Body Dressing) edge of bed sitting;unsupported sitting  -           User Key  (r) = Recorded By, (t) = Taken By, (c)  = Cosigned By    Initials Name Provider Type     Niru Zuniga, OTR/L Occupational Therapist               Obj/Interventions     [facility] Name 07/04/22 1015          Sensory Assessment (Somatosensory)    Sensory Assessment (Somatosensory) left UE  -     Left UE Sensory Assessment general sensation;light touch localization  -     Sensory Assessment Pt. reports decreased sensation & coordination in L hand 2' carpal tunnel release  -[facility] Name 07/04/22 1015          Vision Assessment/Intervention    Visual Impairment/Limitations corrective lenses for reading  -[facility] Name 07/04/22 1015          Range of Motion Comprehensive    General Range of Motion upper extremity range of motion deficits identified  -     Comment, General Range of Motion R UE: AROM WFL ; LUE: shoulder achieves 50% AROM, elbow full AROM, wrist 25% AROM, hand 75% AROM  -[facility] Name 07/04/22 1015          Strength Comprehensive (MMT)    General Manual Muscle Testing (MMT) Assessment upper extremity strength deficits identified  -     Comment, General Manual Muscle Testing (MMT) Assessment R UE: grossly 4/5; L UE: shoulder/wrist/hand 3-/5, elbow 4/5  -[facility] Name 07/04/22 1015          Motor Skills    Motor Skills coordination  -     Coordination left;fine motor deficit;minimal impairment  -[facility] Name 07/04/22 1015          Balance    Balance Assessment sitting static balance;sit to stand dynamic balance;sitting dynamic balance;standing dynamic balance;standing static balance  -     Static Sitting Balance supervision  -     Dynamic Sitting Balance supervision  -     Position, Sitting Balance unsupported;sitting edge of bed  -     Sit to Stand Dynamic Balance contact guard  -     Static Standing Balance contact guard  -     Dynamic Standing Balance contact guard  -     Position/Device Used, Standing Balance walker, rolling  -     Balance Interventions sitting;standing;sit to stand;supported;static;dynamic   -           User Key  (r) = Recorded By, (t) = Taken By, (c) = Cosigned By    Initials Name Provider Type    Niru Berg OTR/L Occupational Therapist               Goals/Plan     Row Name 07/04/22 1015          Bathing Goal 1 (OT)    Activity/Device (Bathing Goal 1, OT) bathing skills, all  -CH     [place] Level/Cues Needed (Bathing Goal 1, OT) contact guard required;verbal cues required  -CH     Time Frame (Bathing Goal 1, OT) long term goal (LTG);by discharge  -CH     Progress/Outcomes (Bathing Goal 1, OT) goal ongoing  -[facility] Name 07/04/22 1015          Dressing Goal 1 (OT)    Activity/Device (Dressing Goal 1, OT) dressing skills, all  -CH     [place]/Cues Needed (Dressing Goal 1, OT) minimum assist (75% or more patient effort)  -     Time Frame (Dressing Goal 1, OT) long term goal (LTG);by discharge  -     Progress/Outcome (Dressing Goal 1, OT) goal ongoing  -[facility] Name 07/04/22 1015          Toileting Goal 1 (OT)    Activity/Device (Toileting Goal 1, OT) toileting skills, all  -CH     [place] Level/Cues Needed (Toileting Goal 1, OT) contact guard required  -CH     Time Frame (Toileting Goal 1, OT) long term goal (LTG);by discharge  -     Progress/Outcome (Toileting Goal 1, OT) goal ongoing  [facility] Name 07/04/22 1015          Therapy Assessment/Plan (OT)    Planned Therapy Interventions (OT) activity tolerance training;adaptive equipment training;BADL retraining;edema control/reduction;functional balance retraining;patient/caregiver education/training;ROM/therapeutic exercise;occupation/activity based interventions;strengthening exercise;transfer/mobility retraining  -           User Key  (r) = Recorded By, (t) = Taken By, (c) = Cosigned By    Initials Name Provider Type    Niru Berg OTR/L Occupational Therapist               Clinical Impression     Row Name 07/04/22 1015          Pain Assessment    Additional Documentation Pain Scale: FACES  Pre/Post-Treatment (Group)  -     Row Name 07/04/22 1015          Pain Scale: FACES Pre/Post-Treatment    Pain: FACES Scale, Pretreatment 2-->hurts little bit  -     Posttreatment Pain Rating 6-->hurts even more  -     Pain Location generalized  -     Pain Location - back  -     Row Name 07/04/22 1015          Plan of Care Review    Plan of Care Reviewed With patient  -     Progress no change  -     Outcome Evaluation OT eval complete.  Pt. is AxO x 4 on 4L O2.  Sats in low 90s and appears to be overwhelmed and sad.  Mr. Graham was able to come to EOB CGA and VANNA socks.  He then required assist to change his gown d/t lines.  Mr. Graham was agreeable to fxl mob & to get to chair using rwx & CGA.  O2 readings were difficult to obtain throughout eval but I anticipate he deSATed with mobility. Mr. Graham would benefit from con'td OT tx to improve his fxl capacity & increase his act rosmery.  He demo's significant deficits in act rosmery, generalized strength, & balance.  Anticipate DC to SNF vs. Home with assist & HH  -     Row Name 07/04/22 1015          Therapy Assessment/Plan (OT)    Rehab Potential (OT) good, to achieve stated therapy goals  -     Criteria for Skilled Therapeutic Interventions Met (OT) yes;skilled treatment is necessary  -     Therapy Frequency (OT) 5 times/wk  -     Predicted Duration of Therapy Intervention (OT) 10 days  -     Row Name 07/04/22 1015          Therapy Plan Review/Discharge Plan (OT)    Anticipated Discharge Disposition (OT) skilled nursing facility  -     Row Name 07/04/22 1015          Positioning and Restraints    Pre-Treatment Position in bed  -     Post Treatment Position chair  -     In Chair sitting;call light within reach;encouraged to call for assist  -           User Key  (r) = Recorded By, (t) = Taken By, (c) = Cosigned By    Initials Name Provider Type    Niru Berg, OTR/L Occupational Therapist               Outcome Measures     Row Name  07/04/22 1015          How much help from another is currently needed...    Putting on and taking off regular lower body clothing? 3  -CH     Bathing (including washing, rinsing, and drying) 3  -CH     Toileting (which includes using toilet bed pan or urinal) 3  -CH     Putting on and taking off regular upper body clothing 3  -CH     Taking care of personal grooming (such as brushing teeth) 4  -CH     Eating meals 4  -     AM-PAC 6 Clicks Score (OT) 20  -     Row Name 07/04/22 1024          How much help from another person do you currently need...    Turning from your back to your side while in flat bed without using bedrails? 3  -LH     Moving from lying on back to sitting on the side of a flat bed without bedrails? 3  -LH     Moving to and from a bed to a chair (including a wheelchair)? 3  -LH     Standing up from a chair using your arms (e.g., wheelchair, bedside chair)? 3  -LH     Climbing 3-5 steps with a railing? 3  -LH     To walk in hospital room? 3  -     AM-PAC 6 Clicks Score (PT) 18  -     Highest level of mobility 6 --> Walked 10 steps or more  -     Row Name 07/04/22 1024 07/04/22 1015       Functional Assessment    Outcome Measure Options AM-PAC 6 Clicks Basic Mobility (PT)  - AM-PAC 6 Clicks Daily Activity (OT)  -          User Key  (r) = Recorded By, (t) = Taken By, (c) = Cosigned By    Initials Name Provider Type     Dexter Severino, PT Physical Therapist     Niru Zuniga, OTR/L Occupational Therapist                Occupational Therapy Education                 Title: PT OT SLP Therapies (In Progress)     Topic: Occupational Therapy (In Progress)     Point: ADL training (Done)     Description:   Instruct learner(s) on proper safety adaptation and remediation techniques during self care or transfers.   Instruct in proper use of assistive devices.              Learning Progress Summary           Patient Acceptance, E,D, VU,NR by  at 7/4/2022 1241                   Point: Home  exercise program (Not Started)     Description:   Instruct learner(s) on appropriate technique for monitoring, assisting and/or progressing therapeutic exercises/activities.              Learner Progress:  Not documented in this visit.          Point: Precautions (Done)     Description:   Instruct learner(s) on prescribed precautions during self-care and functional transfers.              Learning Progress Summary           Patient Acceptance, E,D, VU,NR by  at 7/4/2022 1241                   Point: Body mechanics (Done)     Description:   Instruct learner(s) on proper positioning and spine alignment during self-care, functional mobility activities and/or exercises.              Learning Progress Summary           Patient Acceptance, E,D, VU,NR by  at 7/4/2022 1241                               User Key     Initials Effective Dates Name Provider Type Discipline     06/16/21 -  Niru Zuniga, OTR/L Occupational Therapist OT              OT Recommendation and Plan  Planned Therapy Interventions (OT): activity tolerance training, adaptive equipment training, BADL retraining, edema control/reduction, functional balance retraining, patient/caregiver education/training, ROM/therapeutic exercise, occupation/activity based interventions, strengthening exercise, transfer/mobility retraining  Therapy Frequency (OT): 5 times/wk  Plan of Care Review  Plan of Care Reviewed With: patient  Progress: no change  Outcome Evaluation: OT eval complete.  Pt. is AxO x 4 on 4L O2.  Sats in low 90s and appears to be overwhelmed and sad.  Mr. Graham was able to come to EOB CGA and VANNA socks.  He then required assist to change his gown d/t lines.  Mr. Garham was agreeable to fxl mob & to get to chair using rwx & CGA.  O2 readings were difficult to obtain throughout eval but I anticipate he deSATed with mobility. Mr. Graham would benefit from con'td OT tx to improve his fxl capacity & increase his act rosmery.  He demo's significant  deficits in act rosmery, generalized strength, & balance.  Anticipate DC to SNF vs. Home with assist & HH     Time Calculation:    Time Calculation- OT     Row Name 07/04/22 1015             Time Calculation- OT    OT Start Time 1015  add 8 for CR  -CH      OT Stop Time 1101  -CH      OT Time Calculation (min) 46 min  -CH      OT Received On 07/04/22  -CH      OT Goal Re-Cert Due Date 07/14/22  -CH              Untimed Charges    OT Eval/Re-eval Minutes 54  -CH              Total Minutes    Untimed Charges Total Minutes 54  -CH       Total Minutes 54  -CH            User Key  (r) = Recorded By, (t) = Taken By, (c) = Cosigned By    Initials Name Provider Type     Niru Zuniga OTR/L Occupational Therapist              Therapy Charges for Today     Code Description Service Date Service Provider Modifiers Qty    [id number] HC OT EVAL MOD COMPLEXITY 4 7/4/2022 Niru Zuniga OTR/L GO 1               Niru Zuniga OTR/JERED  7/4/2022

## 2022-07-04 NOTE — PLAN OF CARE
Goal Outcome Evaluation:  Plan of Care Reviewed With: patient        Progress: no change  Outcome Evaluation: OT eval complete.  Pt. is AxO x 4 on 4L O2.  Sats in low 90s and appears to be overwhelmed and sad.  Mr. Graham was able to come to EOB CGA and VANNA socks.  He then required assist to change his gown d/t lines.  Mr. Graham was agreeable to fxl mob & to get to chair using rwx & CGA.  O2 readings were difficult to obtain throughout eval but I anticipate he deSATed with mobility. Mr. Graham would benefit from con'td OT tx to improve his fxl capacity & increase his act rosmery.  He demo's significant deficits in act rosmery, generalized strength, & balance.  Anticipate DC to SNF vs. Home with assist & HH

## 2022-07-04 NOTE — PLAN OF CARE
Goal Outcome Evaluation:      Outcome Evaluation: Pt has been Aox4 this shift. Maintaining sats on 4L o2. Continous pulse ox and tele monitoring in use. Purewick external cath in use due to incontinence and urgency. Pt up x1 to stand at EOB, maintained sats. Skin tears noted to bilat elbows. Safety maintained. Encouraged PO intake. Call light in reach

## 2022-07-04 NOTE — PLAN OF CARE
Problem: Adult Inpatient Plan of Care  Goal: Plan of Care Review  Recent Flowsheet Documentation  Taken 7/4/2022 1024 by Dexter Severino, PT  Plan of Care Reviewed With: patient  Outcome Evaluation: PT IE complete.  A&Ox4.  Pt on 4L O2 and O2sats in low 90's when waveform is good.  Ambulated 10ft in room cg A x1 w/ RW.  PT to address functional impairments/limitations of decreased strength, balance, endurance, and gait safety.  Recommend home w/ [place] and [place] at [place].  Thank you for referral.   Goal Outcome Evaluation:  Plan of Care Reviewed With: patient           Outcome Evaluation: PT IE complete.  A&Ox4.  Pt on 4L O2 and O2sats in low 90's when waveform is good.  Ambulated 10ft in room cg A x1 w/ RW.  PT to address functional impairments/limitations of decreased strength, balance, endurance, and gait safety.  Recommend home w/ A and HH at [place].  Thank you for referral.

## 2022-07-04 NOTE — PROGRESS NOTES
[facility] Medicine Services  INPATIENT PROGRESS NOTE    Patient Name: Adrián Graham  Date of Admission: 7/1/2022  Today's Date: 07/04/22  Length of Stay: 3  Primary Care Physician: Mauro Irizarry DO    Subjective   Chief Complaint: Shortness of breath  HPI   Still weak and short of breath with minimal activity    Review of Systems   All pertinent negatives and positives are as above. All other systems have been reviewed and are negative unless otherwise stated.     Objective    Temp:  [96.9 °F (36.1 °C)-98 °F (36.7 °C)] 98 °F (36.7 °C)  Heart Rate:  [62-88] 79  Resp:  [16-20] 20  BP: (102-148)/(52-62) 110/62  Physical Exam  GEN: Awake, alert, interactive, in NAD  HEENT: PERRLA, EOMI, Anicteric, Trachea midline  Lungs: normal respiratory effort, normal chest rise, no distress  Heart: RRR, +S1/s2, no rub  ABD: soft, nt/nd, +BS, no guarding/rebound  Extremities: atraumatic, no cyanosis  Skin: no rashes or petechiae  Neuro: AAOx3, no focal deficits  Psych: normal mood & affect     Results Review:  I have reviewed the labs, radiology results, and diagnostic studies.    Laboratory Data:   Results from last 7 days   Lab Units 07/04/22  0700 07/02/22  0647 07/01/22  1134   WBC 10*3/mm3 15.35* 5.86 7.19   HEMOGLOBIN g/dL 10.7* 12.2* 11.2*   HEMATOCRIT % 32.9* 39.9 35.4*   PLATELETS 10*3/mm3 407 291 302        Results from last 7 days   Lab Units 07/04/22  0700 07/02/22  0647 07/01/22  1134   SODIUM mmol/L 137 134* 133*   POTASSIUM mmol/L 4.2 4.0 3.8   CHLORIDE mmol/L 103 102 100   CO2 mmol/L 27.0 16.0* 22.0   BUN mg/dL 34* 25* 20   CREATININE mg/dL 0.87 0.79 0.85   CALCIUM mg/dL 8.7 8.5* 8.4*   BILIRUBIN mg/dL  --  0.4 0.4   ALK PHOS U/L  --  73 73   ALT (SGPT) U/L  --  15 17   AST (SGOT) U/L  --  30 41*   GLUCOSE mg/dL 159* 153* 123*       Culture Data:   Blood Culture   Date Value Ref Range Status   07/02/2022 No growth at 24 hours  Preliminary       Radiology Data:   Imaging  Results (Last 24 Hours)     ** No results found for the last 24 hours. **          I have reviewed the patient's current medications.     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 Transition to orals    BPH > Proscar    Discharge Planning: To be determined.    Electronically signed by Victor Manuel Dorado MD, 07/04/22, 12:39 CDT.

## 2022-07-04 NOTE — THERAPY EVALUATION
Patient Name: Adrián Graham  : 1940    MRN: [id number]                              Today's Date: 2022       Admit Date: 2022    Visit Dx:     ICD-10-CM ICD-9-CM   1. Hypoxemia  R09.02 799.02   2. Orthostatic hypotension  I95.1 458.0   3. Pneumonia due to COVID-19 virus  U07.1 480.8    J12.82 079.89   4. Multiple subsegmental pulmonary emboli without acute cor pulmonale (HCC)  I26.94 415.19   5. Impaired mobility  Z74.09 799.89     Patient Active Problem List   Diagnosis   • Idiopathic peripheral neuropathy   • Essential hypertension   • Benign prostatic hyperplasia without lower urinary tract symptoms   • Overweight (BMI 25.0-29.9)   • Carpal tunnel syndrome, left   • Bilateral carotid artery disease (HCC)   • Hyperlipidemia   • Preop cardiovascular exam [facility] Dr Brown   • Single subsegmental pulmonary embolism without acute cor pulmonale (HCC)   • Pulmonary embolism associated with COVID-19 (HCC)   • Acute respiratory failure with hypoxia (HCC)     Past Medical History:   Diagnosis Date   • Abdominal distention    • Abdominal pain, left upper quadrant    • Allergic rhinitis    • Arthritis    • Bloating    • BPH (benign prostatic hyperplasia)    • Carpal tunnel syndrome    • Change in bowel habits    • Constipation    • Diarrhea    • Erectile dysfunction    • GERD (gastroesophageal reflux disease)    • History of shingles    • Hypertension    • Idiopathic peripheral neuropathy    • Neuropathy    • Pneumonia    • Shingles    • Weight loss      Past Surgical History:   Procedure Laterality Date   • APPENDECTOMY     • BACK SURGERY     • CARPAL TUNNEL RELEASE Right    • CARPAL TUNNEL RELEASE Left 2022   • CHOLECYSTECTOMY     • COLONOSCOPY  2010    5 yr-complete colon not visualized   • COLONOSCOPY  2004    extremely tortuous redundant colon. BE-mild diverticulosis without diverticulitis. ? Gallstones-Dr Chery.   • COLONOSCOPY N/A 09/10/2020    Procedure: COLONOSCOPY WITH ANESTHESIA;   Surgeon: Mani Sy DO;  Location: [facility] ENDOSCOPY;  Service: Gastroenterology;  Laterality: N/A;  Pre: Positive Colorectal Screening  Post: Diverticulosis  Dr. Mooney  CO2 Inflation Used   • ENDOSCOPY N/A 09/26/2016    Procedure: ESOPHAGOGASTRODUODENOSCOPY WITH ANESTHESIA;  Surgeon: Mani Sy DO;  Location: [facility] ENDOSCOPY;  Service:    • HIP SURGERY Right    • JOINT REPLACEMENT     • KNEE SURGERY     • OTHER SURGICAL HISTORY      Surgery for Spinal Stenosis   • OTHER SURGICAL HISTORY      Laser Prostate Surgery      General Information     Row Name 07/04/22 1024          Physical Therapy Time and Intention    Document Type evaluation  SOA  -     Mode of Treatment co-treatment;physical therapy  -     Row Name 07/04/22 1024          General Information    Patient Profile Reviewed yes  -     Prior Level of Function independent:;community mobility;ADL's  DME:  RW, wc, shower chair.  walk in shower  -     Existing Precautions/Restrictions fall;oxygen therapy device and L/min  -     Barriers to Rehab medically complex  -     Row Name 07/04/22 1024          Living Environment    People in Home alone  -[facility] Name 07/04/22 1024          Home Main Entrance    Number of Stairs, Main Entrance one  -     Stair Railings, Main Entrance none  -     Row Name 07/04/22 1024          Cognition    Orientation Status (Cognition) oriented x 4  -[facility] Name 07/04/22 1024          Safety Issues, Functional Mobility    Safety Issues Affecting Function (Mobility) ability to follow commands  -     Impairments Affecting Function (Mobility) balance;endurance/activity tolerance;strength;shortness of breath  -           User Key  (r) = Recorded By, (t) = Taken By, (c) = Cosigned By    Initials Name Provider Type     Dexter Severino, PT Physical Therapist               Mobility     Row Name 07/04/22 1024          Bed Mobility    Bed Mobility supine-sit  -     Comment, (Bed Mobility) sitting EOB upon  entering room  -[facility] Name 07/04/22 1024          Sit-Stand Transfer    Sit-Stand Emmons (Transfers) verbal cues;contact guard  -[facility] Name 07/04/22 1024          Gait/Stairs (Locomotion)    Emmons Level (Gait) contact guard  -     Assistive Device (Gait) walker, front-wheeled  -     Distance in Feet (Gait) 10  -           User Key  (r) = Recorded By, (t) = Taken By, (c) = Cosigned By    Initials Name Provider Type     Dexter Severino, PT Physical Therapist               Obj/Interventions     [facility] Name 07/04/22 1024          Range of Motion Comprehensive    General Range of Motion bilateral lower extremity ROM WFL  -[facility] Name 07/04/22 1024          Strength Comprehensive (MMT)    General Manual Muscle Testing (MMT) Assessment lower extremity strength deficits identified  -LH     Row Name 07/04/22 1024          Sensory Assessment (Somatosensory)    Sensory Assessment (Somatosensory) sensation intact  -           User Key  (r) = Recorded By, (t) = Taken By, (c) = Cosigned By    Initials Name Provider Type     Dexter Severino, PT Physical Therapist               Goals/Plan     Row Name 07/04/22 1024          Bed Mobility Goal 1 (PT)    Activity/Assistive Device (Bed Mobility Goal 1, PT) bed mobility activities, all  -     Emmons Level/Cues Needed (Bed Mobility Goal 1, PT) independent  -     Time Frame (Bed Mobility Goal 1, PT) by discharge  -     Progress/Outcomes (Bed Mobility Goal 1, PT) new goal  -[facility] Name 07/04/22 1024          Transfer Goal 1 (PT)    Activity/Assistive Device (Transfer Goal 1, PT) sit-to-stand/stand-to-sit  -     Emmons Level/Cues Needed (Transfer Goal 1, PT) independent  -     Time Frame (Transfer Goal 1, PT) by discharge  -     Progress/Outcome (Transfer Goal 1, PT) new goal  -[facility] Name 07/04/22 1024          Gait Training Goal 1 (PT)    Activity/Assistive Device (Gait Training Goal 1, PT) gait (walking locomotion)  -      Griffin Level (Gait Training Goal 1, PT) independent  -     Distance (Gait Training Goal 1, PT) 75ft  -     Time Frame (Gait Training Goal 1, PT) by discharge  -     Progress/Outcome (Gait Training Goal 1, PT) new goal  -[facility] Name 07/04/22 1024          Therapy Assessment/Plan (PT)    Planned Therapy Interventions (PT) balance training;bed mobility training;gait training;home exercise program;strengthening;ROM (range of motion);postural re-education;patient/family education;transfer training  -           User Key  (r) = Recorded By, (t) = Taken By, (c) = Cosigned By    Initials Name Provider Type     Dexter Severino, PT Physical Therapist               Clinical Impression     [facility] Name 07/04/22 1024          Pain    Pain Intervention(s) Medication (See MAR)  -     Additional Documentation Pain Scale: FACES Pre/Post-Treatment (Group)  -[facility] Name 07/04/22 1024          Pain Scale: FACES Pre/Post-Treatment    Pain: FACES Scale, Pretreatment 0-->no hurt  -     Posttreatment Pain Rating 0-->no hurt  -[facility] Name 07/04/22 1024          Plan of Care Review    Plan of Care Reviewed With patient  -     Outcome Evaluation PT IE complete.  A&Ox4.  Pt on 4L O2 and O2sats in low 90's when waveform is good.  Ambulated 10ft in room cg A x1 w/ RW.  PT to address functional impairments/limitations of decreased strength, balance, endurance, and gait safety.  Recommend home w/ A and HH at [facility].  Thank you for referral.  -     Row Name 07/04/22 1024          Therapy Assessment/Plan (PT)    Patient/Family Therapy Goals Statement (PT) return home  -     Rehab Potential (PT) good, to achieve stated therapy goals  -     Criteria for Skilled Interventions Met (PT) yes;skilled treatment is necessary  -     Therapy Frequency (PT) 2 times/day  -     Predicted Duration of Therapy Intervention (PT) until dc  -[facility] Name 07/04/22 1024          Positioning and Restraints    Pre-Treatment Position in  bed  -LH     Post Treatment Position chair  -LH     In Chair sitting;call light within reach;encouraged to call for assist;with OT  -           User Key  (r) = Recorded By, (t) = Taken By, (c) = Cosigned By    Initials Name Provider Type     Dexter Severino, PT Physical Therapist               Outcome Measures     Row Name 07/04/22 1024          How much help from another person do you currently need...    Turning from your back to your side while in flat bed without using bedrails? 3  -LH     Moving from lying on back to sitting on the side of a flat bed without bedrails? 3  -LH     Moving to and from a bed to a chair (including a wheelchair)? 3  -LH     Standing up from a chair using your arms (e.g., wheelchair, bedside chair)? 3  -LH     Climbing 3-5 steps with a railing? 3  -LH     To walk in hospital room? 3  -     AM-PAC 6 Clicks Score (PT) 18  -LH     Highest level of mobility 6 --> Walked 10 steps or more  -     Row Name 07/04/22 1024          Functional Assessment    Outcome Measure Options AM-PAC 6 Clicks Basic Mobility (PT)  -           User Key  (r) = Recorded By, (t) = Taken By, (c) = Cosigned By    Initials Name Provider Type     Dexter Severino, PT Physical Therapist                             Physical Therapy Education                 Title: PT OT SLP Therapies (Done)     Topic: Physical Therapy (Done)     Point: Mobility training (Done)     Learning Progress Summary           Patient Acceptance, E,D, DU,VU by  at 7/4/2022 1132    Comment: benefits of PT and POC, call for A OOB                   Point: Precautions (Done)     Learning Progress Summary           Patient Acceptance, E,D, DU,VU by  at 7/4/2022 1132    Comment: benefits of PT and POC, call for A OOB                               User Key     Initials Effective Dates Name Provider Type Discipline     06/16/21 -  Dexter Severino, PT Physical Therapist PT              PT Recommendation and Plan  Planned Therapy Interventions  (PT): balance training, bed mobility training, gait training, home exercise program, strengthening, ROM (range of motion), postural re-education, patient/family education, transfer training  Plan of Care Reviewed With: patient  Outcome Evaluation: PT IE complete.  A&Ox4.  Pt on 4L O2 and O2sats in low 90's when waveform is good.  Ambulated 10ft in room cg A x1 w/ RW.  PT to address functional impairments/limitations of decreased strength, balance, endurance, and gait safety.  Recommend home w/ A and HH at [facility].  Thank you for referral.     Time Calculation:    PT Charges     Row Name 07/04/22 1024             Time Calculation    Start Time 1024  -      Stop Time 1050  -      Time Calculation (min) 26 min  -      PT Received On 07/04/22  -      PT Goal Re-Cert Due Date 07/14/22  -              Untimed Charges    PT Eval/Re-eval Minutes 26  -LH              Total Minutes    Untimed Charges Total Minutes 26  -       Total Minutes 26  -            User Key  (r) = Recorded By, (t) = Taken By, (c) = Cosigned By    Initials Name Provider Type     Dexter Severino, PT Physical Therapist              Therapy Charges for Today     Code Description Service Date Service Provider Modifiers Qty    87851494307 HC PT EVAL MOD COMPLEXITY 2 7/4/2022 Dexter Severino, PT GP 1          PT G-Codes  Outcome Measure Options: AM-PAC 6 Clicks Basic Mobility (PT)  AM-PAC 6 Clicks Score (PT): 18    Dexter Severino PT  7/4/2022

## 2022-07-04 NOTE — PROGRESS NOTES
PULMONARY AND CRITICAL CARE PROGRESS NOTE - [facility]    Patient: Adrián Graham  1940   MR# [id number]   Acct# [id number]  07/04/22   09:13 CDT  Referring Provider: Victor Manuel Dorado,*    Chief Complaint: Covid-19     Interval history: Patient is seen from the door in an effort to preserve PPE and reduce exposure.  He is seen awake and alert resting in bed.  He is feeling some better today.  He is complaining of a lot of mucus that is thin.  Oxygen saturation has remained stable on 4 L nasal cannula.  No documented fevers.  No other issues reported.    Meds:  albuterol sulfate HFA, 2 puff, Inhalation, 4x Daily - RT  ascorbic acid, 500 mg, Oral, Daily  aspirin, 81 mg, Oral, Daily  atorvastatin, 20 mg, Oral, Daily  azithromycin, 500 mg, Intravenous, Daily  cetirizine, 10 mg, Oral, Daily  enoxaparin, 1 mg/kg, Subcutaneous, Q12H  famotidine, 20 mg, Oral, BID AC  finasteride, 5 mg, Oral, Daily  fluticasone, 2 spray, Nasal, Daily  gabapentin, 400 mg, Oral, Q12H  melatonin, 3 mg, Oral, Nightly  methylPREDNISolone sodium succinate, 40 mg, Intravenous, Q12H  montelukast, 10 mg, Oral, Nightly  piperacillin-tazobactam, 4.5 g, Intravenous, Q8H  sodium chloride, 10 mL, Intravenous, Q12H  vitamin D3, 5,000 Units, Oral, Daily  zinc sulfate, 220 mg, Oral, Daily         Review of Systems:   Review of Systems   Constitutional: Positive for fatigue. Negative for fever.   HENT: Positive for congestion.    Respiratory: Positive for cough and shortness of breath.    Gastrointestinal: Negative for diarrhea and nausea.   Musculoskeletal: Positive for arthralgias.   Neurological: Positive for weakness.          Physical Exam:  SpO2 Percentage    07/04/22 0331 07/04/22 0707 07/04/22 0845   SpO2: 100% 100% 92%     Temp:  [96.9 °F (36.1 °C)-97.5 °F (36.4 °C)] 97.5 °F (36.4 °C)  Heart Rate:  [62-84] 80  Resp:  [18-20] 18  BP: (102-148)/(52-58) 104/52    Intake/Output Summary (Last 24 hours) at 7/4/2022 4010  Last  data filed at 7/4/2022 0244  Gross per 24 hour   Intake 250 ml   Output 2100 ml   Net -1850 ml     Body mass index is 25.6 kg/m².   GENERAL/CONSTITUTIONAL: no distress.  4 L nasal cannula  HEENT: atraumatic, normocephalic. Extraocular movements normal  NOSE: normal  NECK: jugular veins nondistended  CHEST: no paradox, no retractions.  No respiratory distress.   ABDOMEN: nondistended  : deferred  EXTREMITIES: no visible edema.  NEURO:  Awake and alert   PSYCH: no agitation  SKIN: no jaundice.  No rash    Electronically signed by MEKHI Villavicencio, 7/4/2022, 09:13 CDT        Physician Substantive Portion: Medical Decision Making    Laboratory Data:  Results from last 7 days   Lab Units 07/04/22  0700 07/02/22  0647 07/01/22  1134   WBC 10*3/mm3 15.35* 5.86 7.19   HEMOGLOBIN g/dL 10.7* 12.2* 11.2*   PLATELETS 10*3/mm3 407 291 302     Results from last 7 days   Lab Units 07/04/22  0700 07/02/22  0647 07/01/22  1134   SODIUM mmol/L 137 134* 133*   POTASSIUM mmol/L 4.2 4.0 3.8   BUN mg/dL 34* 25* 20   CREATININE mg/dL 0.87 0.79 0.85   INR   --   --  2.16*     Results from last 7 days   Lab Units 07/02/22  0417 07/01/22  1515   PH, ARTERIAL pH units 7.409 7.437   PCO2, ARTERIAL mm Hg 37.0 32.8*   PO2 ART mm Hg 80.2* 80.3*   FIO2 % 70 40     Blood Culture   Date Value Ref Range Status   06/26/2022 No growth at 5 days  Final   06/26/2022 No growth at 5 days  Final     Results from last 7 days   Lab Units 07/04/22  0700 07/02/22  1248 07/02/22  0647 07/01/22  1429 07/01/22  1134   CRP mg/dL 6.46*  --  22.42*   < >  --    PROCALCITONIN ng/mL 0.08 0.21  --    < >  --    CK TOTAL U/L  --   --   --   --  167    < > = values in this interval not displayed.      Recent films:  Adult Transthoracic Echo Limited W/ Cont if Necessary Per Protocol    Result Date: 7/2/2022  · This is a limited echocardiogram. · Left ventricular systolic function is normal. Left ventricular ejection fraction appears to be 61 - 65%. · The right  ventricular cavity is mildly dilated. Normal right ventricular systolic function noted.       Physician Substantive Portion: Medical Decision Making    Results from last 7 days   Lab Units 07/04/22  0700 07/02/22  0647 07/01/22  1134   WBC 10*3/mm3 15.35* 5.86 7.19   HEMOGLOBIN g/dL 10.7* 12.2* 11.2*   PLATELETS 10*3/mm3 407 291 302     Results from last 7 days   Lab Units 07/04/22  0700 07/02/22  0647 07/01/22  1134   SODIUM mmol/L 137 134* 133*   POTASSIUM mmol/L 4.2 4.0 3.8   CO2 mmol/L 27.0 16.0* 22.0   BUN mg/dL 34* 25* 20   CREATININE mg/dL 0.87 0.79 0.85   MAGNESIUM mg/dL  --  2.3  --    PHOSPHORUS mg/dL  --  4.8*  --    GLUCOSE mg/dL 159* 153* 123*     Results from last 7 days   Lab Units 07/02/22  0417 07/01/22  1515   PH, ARTERIAL pH units 7.409 7.437   PCO2, ARTERIAL mm Hg 37.0 32.8*   PO2 ART mm Hg 80.2* 80.3*   FIO2 % 70 40     Lab Results   Component Value Date    PROBNP 1,299.0 07/01/2022     Blood Culture   Date Value Ref Range Status   06/26/2022 No growth at 5 days  Final   06/26/2022 No growth at 5 days  Final       Recent radiology:   Imaging Results (Last 72 Hours)     Procedure Component Value Units Date/Time    US Venous Doppler Lower Extremity Bilateral (duplex) [[id number]] Collected: 07/03/22 1112     Updated: 07/03/22 1116    Narrative:      History: Swelling       Impression:      Impression: There is no evidence of deep venous thrombosis in either  lower extremity.     Comments: Bilateral lower extremity venous duplex exam was performed  using color Doppler flow, Doppler waveform analysis, and grayscale  imaging, with and without compression. There is no evidence of deep  venous thrombosis in the common femoral veins in either lower extremity.  This is a limited exam due to Covid positivity.        This report was finalized on 07/03/2022 11:13 by Dr. Albert Brown MD.    CT Angiogram Chest [[id number]] Collected: 07/01/22 1402     Updated: 07/01/22 1411    Narrative:      CT ANGIOGRAM  CHEST- 7/1/2022 1:40 PM CDT      HISTORY: Recent history of pulmonary embolus now with syncope      COMPARISON: CT scan dated 6/26/2022.      DOSE LENGTH PRODUCT: 763 mGy cm. Automated exposure control was also  utilized to decrease patient radiation dose.     TECHNIQUE: Helical tomographic images of the chest were obtained after  the administration of intravenous contrast following angiogram protocol.  Additionally, 3D and multiplanar reformatted images were provided.        FINDINGS:    Pulmonary arteries: There is adequate enhancement of the pulmonary  arteries to evaluate for central and segmental pulmonary emboli. There  are multiple pulmonary emboli identified in the segmental and  subsegmental pulmonary arteries of the right lower lobe.     Aorta and great vessels: Thoracic aorta is normal in caliber. No  dissection identified. Minimal calcified plaque in the arch.  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      Visualized neck base: The imaged portion of the base of the neck appears  unremarkable.      Lungs: Increasing multifocal bilateral airspace opacities, mixed  groundglass and consolidative and most notably in the lung periphery and  bases. This is increased from the recent CT scan from June 26. No  pleural effusion. Airways are clear.     Heart: The heart is normal in size. There is no pericardial effusion.     Mediastinum and lymph nodes: Mild reactive adenopathy in the tonya and  mediastinum..     Skeletal and soft tissues: Chest wall soft tissues are unremarkable. No  acute bony abnormality. Multilevel bridging spinal osteophytes.      Upper abdomen: The imaged portion of the upper abdomen demonstrates no  acute process. Prior cholecystectomy.       Impression:      1. Worsening Covid pneumonia with increasing bilateral mixed groundglass  and consolidative pulmonary infiltrates.  2. Acute pulmonary embolus identified in the segmental and subsegmental  pulmonary arteries in the  right lower lobe. This is mildly increased as  compared with the recent CT scan. No evidence of right heart strain.     This report was finalized on 07/01/2022 14:08 by Dr Prem Mclaughlin, .    XR Chest 1 View [[id number]] Collected: 07/01/22 1250     Updated: 07/01/22 1254    Narrative:      XR CHEST 1 VW- 7/1/2022 12:39 PM CDT     HISTORY: Hypoxemia     COMPARISON: Chest exam dated 6/26/2022.     FINDINGS:      Nearly diffuse opacification of the left lung. Right lung is mostly  completely clear. There is no mediastinal shift noted. No pleural  effusion. Cardiac silhouette is obscured by the left lung consolidation.  Pulmonary vasculature are nondilated. No acute bony abnormality.       Impression:      1. Bilateral, asymmetric pulmonary infiltrates. Left lung in particular  is near completely opacified, much worse than was seen on the June 26th  exam. Appearance favors a worsening atypical pneumonia.  2. No pleural effusion or pneumothorax.     This report was finalized on 07/01/2022 12:51 by Dr Prem Mclaughlin, .    CT Head Without Contrast [[id number]] Collected: 07/01/22 1155     Updated: 07/01/22 1201    Narrative:      EXAMINATION:   CT HEAD WO CONTRAST-  7/1/2022 11:55 AM CDT     HISTORY: CT BRAIN without contrast 7/1/2022 11:47 AM CDT     HISTORY: Patient fell     COMPARISON: None      DLP: 924 mGy cm. In order to have a CT radiation dose as low as  reasonably achievable, Automated Exposure Control was utilized for  adjustment of the mA and/or KV according to patient size.     TECHNIQUE: Serial axial tomographic images of the brain were obtained  without the use of intravenous contrast.      FINDINGS:   The midline structures are nondisplaced. There is mild cerebral and  cerebellar volume loss, with an associated increase in the prominence of  the ventricles and sulci. The basilar cisterns are normal in size and  configuration. There is no evidence of intracranial hemorrhage or  mass-effect. There is low  attenuation in the periventricular white  matter, consistent with chronic ischemic change. There are no abnormal  extra-axial fluid collections. There is no evidence of tonsillar  herniation.      The included orbits and their contents are unremarkable. A mucous cyst  is present left maxillary antrum. Mastoid air cells are pneumatized..  The visualized osseous structures and overlying soft tissues of the  skull and face are intact.        Impression:      Mild cerebral and cerebellar volume loss with chronic microvascular  disease but no evidence of acute intracranial process.  2. Mucous cyst left maxillary sinus        This report was finalized on 07/01/2022 11:58 by Dr. Don Delacruz MD.        My radiograph interpretation/independent review of imaging: infiltrate L>R  Other test results (not lab or imaging): Results for orders placed during the hospital encounter of 07/01/22    Adult Transthoracic Echo Limited W/ Cont if Necessary Per Protocol    Interpretation Summary  · This is a limited echocardiogram.  · Left ventricular systolic function is normal. Left ventricular ejection fraction appears to be 61 - 65%.  · The right ventricular cavity is mildly dilated. Normal right ventricular systolic function noted.        My radiograph interpretation/independent review of imaging: no new films    Pulmonary Assessment:    1. Covid/postcovid related organizing pneumonitis  2. rv dilatation of uncertain significance, perhaps hypoxia related  3. Weakness and deconditioning.    Recommend/plan:   · Continue oxygen and taper as tolerated; still requiring 4 lpm  · Increase mobility  · Continue prednisone at 30 mg daily for now, taper slowly      This visit was performed by both a physician and an [facility] RN.  I personally evaluated and examined the patient.  I performed all aspects of the medical decision making as documented.  Electronically signed by Adilson Cazares MD, 7/4/2022, 11:25 CDT

## 2022-07-04 NOTE — PLAN OF CARE
Goal Outcome Evaluation:  Plan of Care Reviewed With: patient        Progress: improving  Outcome Evaluation: Pt AxOx4, Up x 1, External Catheter in place.  4L humidified O2 to maintain sats above 90%.  PRN tylenol given for HA w/ good results.  Tele in place running NS.  Safety maintained.

## 2022-07-05 NOTE — PLAN OF CARE
Goal Outcome Evaluation:  Plan of Care Reviewed With: patient        Progress: improving  Outcome Evaluation: Pt trans to EOB ccga-min assist, performed BLE AROM, sit-stand min assist, pt amb 50 feet back/forth in room with rwx cga-min assist, bathroom trans cga-min assist, trans to chair cga, pt would benefit from cont therapy

## 2022-07-05 NOTE — THERAPY TREATMENT NOTE
Acute Care - Occupational Therapy Treatment Note  [facility]     Patient Name: Adrián Graham  : 1940  MRN: [id number]  Today's Date: 2022             Admit Date: 2022       ICD-10-CM ICD-9-CM   1. Hypoxemia  R09.02 799.02   2. Orthostatic hypotension  I95.1 458.0   3. Pneumonia due to COVID-19 virus  U07.1 480.8    J12.82 079.89   4. Multiple subsegmental pulmonary emboli without acute cor pulmonale (HCC)  I26.94 415.19   5. Impaired mobility  Z74.09 799.89   6. Decreased activities of daily living (ADL)  Z78.9 V49.89     Patient Active Problem List   Diagnosis   • Idiopathic peripheral neuropathy   • Essential hypertension   • Benign prostatic hyperplasia without lower urinary tract symptoms   • Overweight (BMI 25.0-29.9)   • Carpal tunnel syndrome, left   • Bilateral carotid artery disease (HCC)   • Hyperlipidemia   • Preop cardiovascular exam [facility] Dr Brown   • Single subsegmental pulmonary embolism without acute cor pulmonale (HCC)   • Pulmonary embolism associated with COVID-19 (HCC)   • Acute respiratory failure with hypoxia (HCC)     Past Medical History:   Diagnosis Date   • Abdominal distention    • Abdominal pain, left upper quadrant    • Allergic rhinitis    • Arthritis    • Bloating    • BPH (benign prostatic hyperplasia)    • Carpal tunnel syndrome    • Change in bowel habits    • Constipation    • Diarrhea    • Erectile dysfunction    • GERD (gastroesophageal reflux disease)    • History of shingles    • Hypertension    • Idiopathic peripheral neuropathy    • Neuropathy    • Pneumonia    • Shingles    • Weight loss      Past Surgical History:   Procedure Laterality Date   • APPENDECTOMY     • BACK SURGERY     • CARPAL TUNNEL RELEASE Right    • CARPAL TUNNEL RELEASE Left 2022   • CHOLECYSTECTOMY     • COLONOSCOPY  2010    5 yr-complete colon not visualized   • COLONOSCOPY  2004    extremely tortuous redundant colon. BE-mild diverticulosis without diverticulitis. ?  Gallstones-Dr Chery.   • COLONOSCOPY N/A 09/10/2020    Procedure: COLONOSCOPY WITH ANESTHESIA;  Surgeon: Mani Sy DO;  Location: [facility] ENDOSCOPY;  Service: Gastroenterology;  Laterality: N/A;  Pre: Positive Colorectal Screening  Post: Diverticulosis  Dr. Mooney  CO2 Inflation Used   • ENDOSCOPY N/A 09/26/2016    Procedure: ESOPHAGOGASTRODUODENOSCOPY WITH ANESTHESIA;  Surgeon: Mani Sy DO;  Location: [facility] ENDOSCOPY;  Service:    • HIP SURGERY Right    • JOINT REPLACEMENT     • KNEE SURGERY     • OTHER SURGICAL HISTORY      Surgery for Spinal Stenosis   • OTHER SURGICAL HISTORY      Laser Prostate Surgery         OT ASSESSMENT FLOWSHEET (last 12 hours)     OT Evaluation and Treatment     Row Name 07/05/22 1140                   OT Time and Intention    Subjective Information complains of;weakness;fatigue  -MW        Document Type therapy note (daily note)  -        Mode of Treatment occupational therapy  -                  General Information    Existing Precautions/Restrictions fall;oxygen therapy device and L/min  -MW                  Pain Scale: Word Pre/Post-Treatment    Pain: Word Scale, Pretreatment 2 - mild pain  -MW        Posttreatment Pain Rating 2 - mild pain  -MW        Pain Location generalized  -                  Cognition    Personal Safety Interventions fall prevention program maintained;muscle strengthening facilitated;nonskid shoes/slippers when out of bed;supervised activity  -                  Activities of Daily Living    BADL Assessment/Intervention bathing  -                  Bathing Assessment/Intervention    Gould Level (Bathing) upper extremities;chest/trunk;upper body;set up;supervision  -        Position (Bathing) supported sitting  -        Comment, (Bathing) supported in chair  -                  Transfer Assessment/Treatment    Transfers sit-stand transfer  -                  Transfers    Sit-Stand Gould (Transfers) standby assist  -                   Sit-Stand Transfer    Assistive Device (Sit-Stand Transfers) walker, front-wheeled  -                  Motor Skills    Comments, Therapeutic Exercise sit<>stand 2x5 reps for strengthening, endurance, and increased awareness of exertion for energy conservation  -                  Plan of Care Review    Plan of Care Reviewed With patient  -        Progress improving  -        Outcome Evaluation OT txt completed. Performed TB sponge bath at set up and S. 2x5 sit<>stands for endurance, strengthening, and increased awareness of exertion. Scapular and trunk stretching/breathing exercises. Cont OT POC. Recommend d/c short term rehab  -                  Positioning and Restraints    Pre-Treatment Position sitting in chair/recliner  -        Post Treatment Position chair  -MW        In Chair sitting;call light within reach;encouraged to call for assist;exit alarm on;legs elevated  -                  Therapy Plan Review/Discharge Plan (OT)    Anticipated Discharge Disposition (OT) skilled nursing facility  -              User Key  (r) = Recorded By, (t) = Taken By, (c) = Cosigned By    Initials Name Effective Dates     Thuy Ohara, OTR/L 08/28/18 -                  Occupational Therapy Education                 Title: PT OT SLP Therapies (Done)     Topic: Occupational Therapy (Done)     Point: ADL training (Done)     Description:   Instruct learner(s) on proper safety adaptation and remediation techniques during self care or transfers.   Instruct in proper use of assistive devices.              Learning Progress Summary           Patient Acceptance, E,D, VU by ERVIN at 7/5/2022 1314    Acceptance, E,D, VU,NR by  at 7/4/2022 1241                   Point: Home exercise program (Done)     Description:   Instruct learner(s) on appropriate technique for monitoring, assisting and/or progressing therapeutic exercises/activities.              Learning Progress Summary           Patient  Acceptance, E,D, VU by  at 7/5/2022 1314                   Point: Precautions (Done)     Description:   Instruct learner(s) on prescribed precautions during self-care and functional transfers.              Learning Progress Summary           Patient Acceptance, E,D, VU by  at 7/5/2022 1314    Acceptance, E,D, VU,NR by  at 7/4/2022 1241                   Point: Body mechanics (Done)     Description:   Instruct learner(s) on proper positioning and spine alignment during self-care, functional mobility activities and/or exercises.              Learning Progress Summary           Patient Acceptance, E,D, VU by  at 7/5/2022 1314    Acceptance, E,D, VU,NR by  at 7/4/2022 1241                               User Key     Initials Effective Dates Name Provider Type Discipline     06/16/21 -  Niru Zuniga, OTR/L Occupational Therapist OT     08/28/18 -  Thuy Ohara, OTR/L Occupational Therapist OT                  OT Recommendation and Plan     Plan of Care Review  Plan of Care Reviewed With: patient  Progress: improving  Outcome Evaluation: OT txt completed. Performed TB sponge bath at set up and S. 2x5 sit<>stands for endurance, strengthening, and increased awareness of exertion. Scapular and trunk stretching/breathing exercises. Cont OT POC. Recommend d/c short term rehab  Plan of Care Reviewed With: patient  Outcome Evaluation: OT txt completed. Performed TB sponge bath at set up and S. 2x5 sit<>stands for endurance, strengthening, and increased awareness of exertion. Scapular and trunk stretching/breathing exercises. Cont OT POC. Recommend d/c short term rehab     Outcome Measures     Row Name 07/05/22 1300 07/05/22 1000          How much help from another person do you currently need...    Turning from your back to your side while in flat bed without using bedrails? -- 3  -AH     Moving from lying on back to sitting on the side of a flat bed without bedrails? -- 3  -AH     Moving to and from a bed to  a chair (including a wheelchair)? -- 3  -AH     Standing up from a chair using your arms (e.g., wheelchair, bedside chair)? -- 3  -AH     Climbing 3-5 steps with a railing? -- 2  -AH     To walk in hospital room? -- 3  -AH     AM-PAC 6 Clicks Score (PT) -- 17  -AH            How much help from another is currently needed...    Putting on and taking off regular lower body clothing? 3  -MW --     Bathing (including washing, rinsing, and drying) 3  -MW --     Toileting (which includes using toilet bed pan or urinal) 3  -MW --     Putting on and taking off regular upper body clothing 3  -MW --     Taking care of personal grooming (such as brushing teeth) 4  -MW --     Eating meals 4  -MW --     AM-PAC 6 Clicks Score (OT) 20  -MW --            Functional Assessment    Outcome Measure Options AM-PAC 6 Clicks Daily Activity (OT)  -MW AM-PAC 6 Clicks Basic Mobility (PT)  -           User Key  (r) = Recorded By, (t) = Taken By, (c) = Cosigned By    Initials Name Provider Type    Corrina Garcia PTA Physical Therapist Assistant    Thuy Mejia, OTR/L Occupational Therapist                Time Calculation:    Time Calculation- OT     Row Name 07/05/22 1313 07/05/22 1046          Time Calculation- OT    OT Start Time 1135  -MW --     OT Stop Time 1215  -MW --     OT Time Calculation (min) 40 min  -MW --     Total Timed Code Minutes- OT 40 minute(s)  -MW --     OT Received On 07/05/22  -MW --            Timed Charges    99354 - Gait Training Minutes  -- 30  -AH            Total Minutes    Timed Charges Total Minutes -- 30  -AH      Total Minutes -- 30  -AH           User Key  (r) = Recorded By, (t) = Taken By, (c) = Cosigned By    Initials Name Provider Type    Corrina Garcia PTA Physical Therapist Assistant    Thuy Mejia, OTR/L Occupational Therapist              Therapy Charges for Today     Code Description Service Date Service Provider Modifiers Qty    [id number]  OT SELF CARE/MGMT/TRAIN EA 15  MIN 7/5/2022 Thuy Ohara OTR/L GO 2    [id number]  OT THER PROC EA 15 MIN 7/5/2022 Thuy Ohara OTR/L GO 1               COURTNEY Montano/JERED  7/5/2022

## 2022-07-05 NOTE — PLAN OF CARE
Goal Outcome Evaluation:  Plan of Care Reviewed With: patient           Outcome Evaluation: A & O x 4, up with assist of one to BSC, voiding per exernal catheter, BM this shift, O2 continues at 3L NC, prn tylenol given for c/o leg pain r/t neuropathy, sinus 62 on tele, bed alarm set, safety maintained

## 2022-07-05 NOTE — CASE MANAGEMENT/SOCIAL WORK
Continued Stay Note   [place]     Patient Name: Adrián Graham  MRN: [id number]  Today's Date: 7/5/2022    Admit Date: 7/1/2022     Discharge Plan     Row Name 07/05/22 1455       Plan    Plan Comments Long discussion with pt dtr (Pat) regarding dc plans/needs. Discussed home with HH vs rehab placement. Sent Pat Medicare Compare list and she prefers [employer]. Referral has been sent to [facility], await answer. Pt tested + for Covid on 6-26-22. Message sent to Physician asking if pt will come out of isolation precautions tomorrow since it will be 10 days.               Discharge Codes    No documentation.               Expected Discharge Date and Time     Expected Discharge Date Expected Discharge Time    Jul 7, 2022             [place]

## 2022-07-05 NOTE — PLAN OF CARE
Goal Outcome Evaluation:  Plan of Care Reviewed With: patient        Progress: improving  Outcome Evaluation: OT txt completed. Performed TB sponge bath at set up and S. 2x5 sit<>stands for endurance, strengthening, and increased awareness of exertion. Scapular and trunk stretching/breathing exercises. Cont OT POC. Recommend d/c short term rehab

## 2022-07-05 NOTE — PROGRESS NOTES
PULMONARY AND CRITICAL CARE PROGRESS NOTE - [facility]    Patient: Adrián Graham  1940   MR# [id number]   Acct# [id number]  07/05/22   06:59 CDT  Referring Provider: Victor Manuel Dorado,*    Chief Complaint: Covid-19     Interval history: Patient is seen from the door in an effort to preserve PPE and reduce exposure.  He is seen awake and alert resting in bed, watching TV.  He reports a productive cough.  O2 sat is 94% on 3 L.  He is afebrile.  No overnight issues reported.    Meds:  albuterol sulfate HFA, 2 puff, Inhalation, 4x Daily - RT  ascorbic acid, 500 mg, Oral, Daily  aspirin, 81 mg, Oral, Daily  atorvastatin, 20 mg, Oral, Daily  azithromycin, 500 mg, Intravenous, Daily  cetirizine, 10 mg, Oral, Daily  enoxaparin, 1 mg/kg, Subcutaneous, Q12H  famotidine, 20 mg, Oral, BID AC  finasteride, 5 mg, Oral, Daily  fluticasone, 2 spray, Nasal, Daily  gabapentin, 400 mg, Oral, Q12H  melatonin, 3 mg, Oral, Nightly  montelukast, 10 mg, Oral, Nightly  piperacillin-tazobactam, 4.5 g, Intravenous, Q8H  predniSONE, 30 mg, Oral, Daily With Breakfast  sodium chloride, 10 mL, Intravenous, Q12H  vitamin D3, 5,000 Units, Oral, Daily  zinc sulfate, 220 mg, Oral, Daily         Review of Systems:   Review of Systems   Constitutional: Positive for fatigue. Negative for fever.   HENT: Positive for congestion.    Respiratory: Positive for cough.    Gastrointestinal: Negative for diarrhea and nausea.   Musculoskeletal: Positive for arthralgias.   Neurological: Positive for weakness.          Physical Exam:  SpO2 Percentage    07/04/22 1952 07/05/22 0117 07/05/22 0408   SpO2: 94% 95% 93%     Temp:  [96.3 °F (35.7 °C)-98 °F (36.7 °C)] 97.4 °F (36.3 °C)  Heart Rate:  [62-88] 62  Resp:  [16-20] 18  BP: (102-111)/(49-62) 111/56    Intake/Output Summary (Last 24 hours) at 7/5/2022 0659  Last data filed at 7/5/2022 0607  Gross per 24 hour   Intake 200 ml   Output 500 ml   Net -300 ml     Body mass index is 26.08  kg/m².   GENERAL/CONSTITUTIONAL: no distress.  3 L nasal cannula  HEENT: atraumatic, normocephalic.   NOSE: normal  NECK: jugular veins nondistended  CHEST: no paradox, no retractions.  No respiratory distress.   ABDOMEN: nondistended  : deferred  EXTREMITIES: no visible edema.  NEURO:  Awake and alert   PSYCH: no agitation  SKIN: no jaundice.  No rash    Electronically signed by MEKHI Calvert, 7/5/2022, 06:59 CDT        Physician Substantive Portion: Medical Decision Making    Laboratory Data:  Results from last 7 days   Lab Units 07/04/22  0700 07/02/22  0647 07/01/22  1134   WBC 10*3/mm3 15.35* 5.86 7.19   HEMOGLOBIN g/dL 10.7* 12.2* 11.2*   PLATELETS 10*3/mm3 407 291 302     Results from last 7 days   Lab Units 07/04/22  0700 07/02/22  0647 07/01/22  1134   SODIUM mmol/L 137 134* 133*   POTASSIUM mmol/L 4.2 4.0 3.8   BUN mg/dL 34* 25* 20   CREATININE mg/dL 0.87 0.79 0.85   INR   --   --  2.16*     Results from last 7 days   Lab Units 07/02/22  0417 07/01/22  1515   PH, ARTERIAL pH units 7.409 7.437   PCO2, ARTERIAL mm Hg 37.0 32.8*   PO2 ART mm Hg 80.2* 80.3*   FIO2 % 70 40     Blood Culture   Date Value Ref Range Status   06/26/2022 No growth at 5 days  Final   06/26/2022 No growth at 5 days  Final     Results from last 7 days   Lab Units 07/04/22  0700 07/02/22  1248 07/02/22  0647 07/01/22  1429 07/01/22  1134   CRP mg/dL 6.46*  --  22.42*   < >  --    PROCALCITONIN ng/mL 0.08 0.21  --    < >  --    CK TOTAL U/L  --   --   --   --  167    < > = values in this interval not displayed.      Recent films:  No radiology results from the last 24 hrs   Physician Substantive Portion: Medical Decision Making    Results from last 7 days   Lab Units 07/04/22  0700 07/02/22  0647 07/01/22  1134   WBC 10*3/mm3 15.35* 5.86 7.19   HEMOGLOBIN g/dL 10.7* 12.2* 11.2*   PLATELETS 10*3/mm3 407 291 302     Results from last 7 days   Lab Units 07/04/22  0700 07/02/22  0647 07/01/22  1134   SODIUM mmol/L 137 134*  133*   POTASSIUM mmol/L 4.2 4.0 3.8   CO2 mmol/L 27.0 16.0* 22.0   BUN mg/dL 34* 25* 20   CREATININE mg/dL 0.87 0.79 0.85   MAGNESIUM mg/dL  --  2.3  --    PHOSPHORUS mg/dL  --  4.8*  --    GLUCOSE mg/dL 159* 153* 123*     Results from last 7 days   Lab Units 07/02/22  0417 07/01/22  1515   PH, ARTERIAL pH units 7.409 7.437   PCO2, ARTERIAL mm Hg 37.0 32.8*   PO2 ART mm Hg 80.2* 80.3*   FIO2 % 70 40     Lab Results   Component Value Date    PROBNP 1,299.0 07/01/2022     Blood Culture   Date Value Ref Range Status   06/26/2022 No growth at 5 days  Final   06/26/2022 No growth at 5 days  Final       Recent radiology:   Imaging Results (Last 72 Hours)     Procedure Component Value Units Date/Time    US Venous Doppler Lower Extremity Bilateral (duplex) [[id number]] Collected: 07/03/22 1112     Updated: 07/03/22 1116    Narrative:      History: Swelling       Impression:      Impression: There is no evidence of deep venous thrombosis in either  lower extremity.     Comments: Bilateral lower extremity venous duplex exam was performed  using color Doppler flow, Doppler waveform analysis, and grayscale  imaging, with and without compression. There is no evidence of deep  venous thrombosis in the common femoral veins in either lower extremity.  This is a limited exam due to Covid positivity.        This report was finalized on 07/03/2022 11:13 by Dr. Albert Brown MD.        My radiograph interpretation/independent review of imaging: infiltrate L>R  Other test results (not lab or imaging): Results for orders placed during the hospital encounter of 07/01/22    Adult Transthoracic Echo Limited W/ Cont if Necessary Per Protocol    Interpretation Summary  · This is a limited echocardiogram.  · Left ventricular systolic function is normal. Left ventricular ejection fraction appears to be 61 - 65%.  · The right ventricular cavity is mildly dilated. Normal right ventricular systolic function noted.        My radiograph  interpretation/independent review of imaging: no new films      Pulmonary Assessment:    1. Acute hypoxic respiratory failure,  2. Bilateral groundglass pulmonary infiltrate  3. Worsening COVID-19 pneumonia  4. Possible acute lung injury or cryptogenic organizing pneumonia  5. Pulmonary embolism single subsegmental on anticoagulation  6. Essential hypertension  7. Hyperlipidemia  8. Carotid artery disease  9. Recurrent bronchitis  10. S/p COVID vaccinated status  11. Severe weakness and deconditioning  12. History of fall    Recommend/plan:   · Patient doing well.  Chart reviewed and current events noted.  · Respiratory distress improved.  He is feeling better and took a shower today.  · Currently requiring 4 L oxygen.  Continue titrating oxygen and maintain oxygen saturation more than 92%  · On oral prednisone which will be slowly tapered.  · Continue bronchodilator treatment, routine respiratory care, pulmonary toilet.  · Encourage incentive spirometry.  Continue Singulair fluticasone and Zyrtec.  · Getting Zosyn and azithromycin which could be discontinued within next few days.  · Increase physical activity.  Nutritional support.  DVT and stress ulcer prophylaxis.  Pain and anxiety control.  · Repeat labs and imaging studies from time to time.  He is still in respiratory and contact isolation for COVID-19.  · CODE STATUS: Full overall process: Improving.  · We will continue following.    This visit was performed by both a physician and an [facility] RN.  I personally evaluated and examined the patient.  I performed all aspects of the medical decision making as documented.    Electronically signed by     Miguel Smith MD,  Pulmonologist/Intensivist   7/5/2022, 17:08 CDT

## 2022-07-05 NOTE — PROGRESS NOTES
[facility] Medicine Services  INPATIENT PROGRESS NOTE    Patient Name: Adrián Graham  Date of Admission: 7/1/2022  Today's Date: 07/05/22  Length of Stay: 4  Primary Care Physician: Mauro Irizarry DO    Subjective   Chief Complaint: Shortness of breath  HPI   7/4 Still weak and short of breath with minimal activity  7/5 Feels much better after a showed. Debilitated still. Dyspneic with minimal activity.     Review of Systems   All pertinent negatives and positives are as above. All other systems have been reviewed and are negative unless otherwise stated.     Objective    Temp:  [96.3 °F (35.7 °C)-98.2 °F (36.8 °C)] 98.2 °F (36.8 °C)  Heart Rate:  [62-80] 67  Resp:  [16-20] 16  BP: (102-111)/(49-58) 108/50  Physical Exam  GEN: Awake, alert, interactive, in NAD  HEENT: PERRLA, EOMI, Anicteric, Trachea midline  Lungs: normal respiratory effort, normal chest rise, no distress  Heart: RRR, +S1/s2, no rub  ABD: soft, nt/nd, +BS, no guarding/rebound  Extremities: atraumatic, no cyanosis  Skin: no rashes or petechiae  Neuro: AAOx3, no focal deficits  Psych: normal mood & affect     Results Review:  I have reviewed the labs, radiology results, and diagnostic studies.    Laboratory Data:   Results from last 7 days   Lab Units 07/04/22  0700 07/02/22  0647 07/01/22  1134   WBC 10*3/mm3 15.35* 5.86 7.19   HEMOGLOBIN g/dL 10.7* 12.2* 11.2*   HEMATOCRIT % 32.9* 39.9 35.4*   PLATELETS 10*3/mm3 407 291 302        Results from last 7 days   Lab Units 07/04/22  0700 07/02/22  0647 07/01/22  1134   SODIUM mmol/L 137 134* 133*   POTASSIUM mmol/L 4.2 4.0 3.8   CHLORIDE mmol/L 103 102 100   CO2 mmol/L 27.0 16.0* 22.0   BUN mg/dL 34* 25* 20   CREATININE mg/dL 0.87 0.79 0.85   CALCIUM mg/dL 8.7 8.5* 8.4*   BILIRUBIN mg/dL  --  0.4 0.4   ALK PHOS U/L  --  73 73   ALT (SGPT) U/L  --  15 17   AST (SGOT) U/L  --  30 41*   GLUCOSE mg/dL 159* 153* 123*       Culture Data:   Blood Culture   Date Value  Ref Range Status   07/02/2022 No growth at 24 hours  Preliminary       Radiology Data:   Imaging Results (Last 24 Hours)     ** No results found for the last 24 hours. **          I have reviewed the patient's current medications.     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 Transition to orals    BPH > Proscar    Discharge Planning: To be determined.    Electronically signed by Victor Manuel Dorado MD, 07/05/22, 13:57 CDT.

## 2022-07-05 NOTE — PLAN OF CARE
Goal Outcome Evaluation:  Plan of Care Reviewed With: patient        Progress: no change  Pt alert and oriented x4. VSS. Pt c/o pain, PRN meds given. WHEAT. PPP. Lovenox for VTE. , 3L NC. Tolerating prescribed diet. No skin issues noted. Voiding via external cath and BSC. Up x 1. Last BM 7/4. Isolation precautions maintained. Pt up with PT to chair. Bed alarm set. Call light within reach. Safety maintained.

## 2022-07-05 NOTE — THERAPY TREATMENT NOTE
Acute Care - Physical Therapy Treatment Note  [facility]     Patient Name: Adrián Graham  : 1940  MRN: [id number]  Today's Date: 2022      Visit Dx:     ICD-10-CM ICD-9-CM   1. Hypoxemia  R09.02 799.02   2. Orthostatic hypotension  I95.1 458.0   3. Pneumonia due to COVID-19 virus  U07.1 480.8    J12.82 079.89   4. Multiple subsegmental pulmonary emboli without acute cor pulmonale (HCC)  I26.94 415.19   5. Impaired mobility  Z74.09 799.89     Patient Active Problem List   Diagnosis   • Idiopathic peripheral neuropathy   • Essential hypertension   • Benign prostatic hyperplasia without lower urinary tract symptoms   • Overweight (BMI 25.0-29.9)   • Carpal tunnel syndrome, left   • Bilateral carotid artery disease (HCC)   • Hyperlipidemia   • Preop cardiovascular exam [facility] Dr Brown   • Single subsegmental pulmonary embolism without acute cor pulmonale (HCC)   • Pulmonary embolism associated with COVID-19 (HCC)   • Acute respiratory failure with hypoxia (HCC)     Past Medical History:   Diagnosis Date   • Abdominal distention    • Abdominal pain, left upper quadrant    • Allergic rhinitis    • Arthritis    • Bloating    • BPH (benign prostatic hyperplasia)    • Carpal tunnel syndrome    • Change in bowel habits    • Constipation    • Diarrhea    • Erectile dysfunction    • GERD (gastroesophageal reflux disease)    • History of shingles    • Hypertension    • Idiopathic peripheral neuropathy    • Neuropathy    • Pneumonia    • Shingles    • Weight loss      Past Surgical History:   Procedure Laterality Date   • APPENDECTOMY     • BACK SURGERY     • CARPAL TUNNEL RELEASE Right    • CARPAL TUNNEL RELEASE Left 2022   • CHOLECYSTECTOMY     • COLONOSCOPY  2010    5 yr-complete colon not visualized   • COLONOSCOPY  2004    extremely tortuous redundant colon. BE-mild diverticulosis without diverticulitis. ? Gallstones-Dr Chery.   • COLONOSCOPY N/A 09/10/2020    Procedure: COLONOSCOPY WITH ANESTHESIA;   Surgeon: Mani Sy DO;  Location: [facility] ENDOSCOPY;  Service: Gastroenterology;  Laterality: N/A;  Pre: Positive Colorectal Screening  Post: Diverticulosis  Dr. Mooney  CO2 Inflation Used   • ENDOSCOPY N/A 09/26/2016    Procedure: ESOPHAGOGASTRODUODENOSCOPY WITH ANESTHESIA;  Surgeon: Mani Sy DO;  Location: [facility] ENDOSCOPY;  Service:    • HIP SURGERY Right    • JOINT REPLACEMENT     • KNEE SURGERY     • OTHER SURGICAL HISTORY      Surgery for Spinal Stenosis   • OTHER SURGICAL HISTORY      Laser Prostate Surgery     PT Assessment (last 12 hours)     PT Evaluation and Treatment     Row Name 07/05/22 0945          Physical Therapy Time and Intention    Subjective Information complains of;weakness;fatigue;pain  -     Document Type therapy note (daily note)  -     Mode of Treatment physical therapy  -[facility] Name 07/05/22 0945          General Information    Existing Precautions/Restrictions fall;oxygen therapy device and L/min  -[facility] Name 07/05/22 0945          Pain    Pain Intervention(s) Repositioned  -     Additional Documentation Pain Scale: Word Pre/Post-Treatment (Group)  -[facility] Name 07/05/22 0945          Pain Scale: Word Pre/Post-Treatment    Pain: Word Scale, Pretreatment 2 - mild pain  -     Posttreatment Pain Rating 2 - mild pain  -     Pain Location generalized  -     Pre/Posttreatment Pain Comment chroninc pain  -[facility] Name 07/05/22 0945          Bed Mobility    Bed Mobility supine-sit;sit-supine  -     Supine-Sit [place] (Bed Mobility) contact guard;verbal cues  -     Sit-Supine [place] (Bed Mobility) --  chair  -[facility] Name 07/05/22 0945          Transfers    Transfers toilet transfer  -     Sit-Stand [place] (Transfers) contact guard;minimum assist (75% patient effort);verbal cues  -     Stand-Sit [place] (Transfers) contact guard;verbal cues  -     [place] Level (Toilet Transfer) contact guard;minimum assist (75%  patient effort);verbal cues  -     Assistive Device (Toilet Transfer) commode;grab bars/safety frame;walker, front-wheeled  -     Row Name 07/05/22 0945          Gait/Stairs (Locomotion)    [place] Level (Gait) contact guard;minimum assist (75% patient effort);verbal cues  -     Assistive Device (Gait) walker, front-wheeled  -     Distance in Feet (Gait) 50  back and forth in room  -     Row Name 07/05/22 0945          Motor Skills    Comments, Therapeutic Exercise BLE AROM  -     Additional Documentation Comments, Therapeutic Exercise (Row)  -     Row Name 07/05/22 0945          Plan of Care Review    Plan of Care Reviewed With patient  -     Progress improving  -     Outcome Evaluation Pt trans to EOB ccga-min assist, performed BLE AROM, sit-stand min assist, pt amb 50 feet back/forth in room with rwx cga-min assist, bathroom trans cga-min assist, trans to chair cga, pt would benefit from cont therapy  -[facility] Name 07/05/22 0945          Vital Signs    Pre SpO2 (%) 93  -AH     O2 Delivery Pre Treatment nasal cannula  3L  -AH     O2 Delivery Intra Treatment nasal cannula  3L  -AH     Post SpO2 (%) 90  -AH     O2 Delivery Post Treatment nasal cannula  3L  -AH     [facility] Name 07/05/22 0945          Positioning and Restraints    Pre-Treatment Position in bed  -     Post Treatment Position chair  -     In Chair reclined;call light within reach;encouraged to call for assist;exit alarm on;with nsg;notified nsg  -           User Key  (r) = Recorded By, (t) = Taken By, (c) = Cosigned By    Initials Name Provider Type     Corrina Sandra, PTA Physical Therapist Assistant                Physical Therapy Education                 Title: PT OT SLP Therapies (In Progress)     Topic: Physical Therapy (Done)     Point: Mobility training (Done)     Learning Progress Summary           Patient Acceptance, E,D, DU,VU by  at 7/4/2022 1132    Comment: benefits of PT and POC, call for A OOB                    Point: Precautions (Done)     Learning Progress Summary           Patient Acceptance, E,TB, VU by  at 7/5/2022 1046    Comment: call for assist    Acceptance, E,D, DU,VU by  at 7/4/2022 1132    Comment: benefits of PT and POC, call for A OOB                               User Key     Initials Effective Dates Name Provider Type [facility] 06/16/21 -  Corrina Sandra, [facility] Physical Therapist Assistant PT     06/16/21 -  Dexter Severino, PT Physical Therapist PT              PT Recommendation and Plan     Plan of Care Reviewed With: patient  Progress: improving  Outcome Evaluation: Pt trans to EOB ccga-min assist, performed BLE AROM, sit-stand min assist, pt amb 50 feet back/forth in room with rwx cga-min assist, bathroom trans cga-min assist, trans to chair cga, pt would benefit from cont therapy   Outcome Measures     Row Name 07/05/22 1000             How much help from another person do you currently need...    Turning from your back to your side while in flat bed without using bedrails? 3  -AH      Moving from lying on back to sitting on the side of a flat bed without bedrails? 3  -AH      Moving to and from a bed to a chair (including a wheelchair)? 3  -AH      Standing up from a chair using your arms (e.g., wheelchair, bedside chair)? 3  -AH      Climbing 3-5 steps with a railing? 2  -AH      To walk in hospital room? 3  -AH      AM-PAC 6 Clicks Score (PT) 17  -              Functional Assessment    Outcome Measure Options AM-PAC 6 Clicks Basic Mobility (PT)  -            User Key  (r) = Recorded By, (t) = Taken By, (c) = Cosigned By    Initials Name Provider Type     Corrina Sandra PTA Physical Therapist Assistant                 Time Calculation:    PT Charges     Row Name 07/05/22 1046             Time Calculation    Start Time 0945  -      Stop Time 1038  -      Time Calculation (min) 53 min  -      PT Received On 07/05/22  -              Time Calculation- PT    Total Timed  Code Minutes- PT 53 minute(s)  -AH              Timed Charges    58033 - PT Therapeutic Exercise Minutes 23  -AH      99310 - Gait Training Minutes  30  -AH              Total Minutes    Timed Charges Total Minutes 53  -AH       Total Minutes 53  -AH            User Key  (r) = Recorded By, (t) = Taken By, (c) = Cosigned By    Initials Name Provider Type     Corrina Sandra PTA Physical Therapist Assistant              Therapy Charges for Today     Code Description Service Date Service Provider Modifiers Qty    63033446739 HC PT THER PROC EA 15 MIN 7/5/2022 Corrina Sandra PTA GP 2    12867059714 HC GAIT TRAINING EA 15 MIN 7/5/2022 Corrina Sandra, [facility] GP 2          PT G-Codes  Outcome Measure Options: AM-PAC 6 Clicks Basic Mobility (PT)  AM-PAC 6 Clicks Score (PT): 17  AM-PAC 6 Clicks Score (OT): 20    Corrina Sandra PTA  7/5/2022

## 2022-07-06 NOTE — PLAN OF CARE
Goal Outcome Evaluation:  Plan of Care Reviewed With: patient        Progress: improving  Outcome Evaluation: Pt up in chair. Pt on 6L o2 at rest o2 96%. sit-stand cga/min x1. Pt amb back in forth mult times in room 70' rwx 6L o2 monitored dropped to 88% but recovered quickly to 92% once back in chair resting... Feel pt would benfit from snf upon d/c for cont strength and endurance.

## 2022-07-06 NOTE — PLAN OF CARE
Goal Outcome Evaluation:  Plan of Care Reviewed With: patient        Progress: improving  Outcome Evaluation: Pt A/Ox4. [place]. Denies having pain. Upx1 with walker to the bathroom. Pt has had 2 loose BM's thus far in the shift. Pt remains on 3L NC, O2 sat remains stable. IV abx given as ordered. VSS. Voiding via external catheter. Lovenox given for VTE. SR on tele. Safety maintained.

## 2022-07-06 NOTE — THERAPY TREATMENT NOTE
Acute Care - Occupational Therapy Treatment Note  [facility]     Patient Name: Adrián Graham  : 1940  MRN: [id number]  Today's Date: 2022             Admit Date: 2022       ICD-10-CM ICD-9-CM   1. Hypoxemia  R09.02 799.02   2. Orthostatic hypotension  I95.1 458.0   3. Pneumonia due to COVID-19 virus  U07.1 480.8    J12.82 079.89   4. Multiple subsegmental pulmonary emboli without acute cor pulmonale (HCC)  I26.94 415.19   5. Impaired mobility  Z74.09 799.89   6. Decreased activities of daily living (ADL)  Z78.9 V49.89     Patient Active Problem List   Diagnosis   • Idiopathic peripheral neuropathy   • Essential hypertension   • Benign prostatic hyperplasia without lower urinary tract symptoms   • Overweight (BMI 25.0-29.9)   • Carpal tunnel syndrome, left   • Bilateral carotid artery disease (HCC)   • Hyperlipidemia   • Preop cardiovascular exam [facility] Dr Brown   • Single subsegmental pulmonary embolism without acute cor pulmonale (HCC)   • Pulmonary embolism associated with COVID-19 (HCC)   • Acute respiratory failure with hypoxia (HCC)     Past Medical History:   Diagnosis Date   • Abdominal distention    • Abdominal pain, left upper quadrant    • Allergic rhinitis    • Arthritis    • Bloating    • BPH (benign prostatic hyperplasia)    • Carpal tunnel syndrome    • Change in bowel habits    • Constipation    • Diarrhea    • Erectile dysfunction    • GERD (gastroesophageal reflux disease)    • History of shingles    • Hypertension    • Idiopathic peripheral neuropathy    • Neuropathy    • Pneumonia    • Shingles    • Weight loss      Past Surgical History:   Procedure Laterality Date   • APPENDECTOMY     • BACK SURGERY     • CARPAL TUNNEL RELEASE Right    • CARPAL TUNNEL RELEASE Left 2022   • CHOLECYSTECTOMY     • COLONOSCOPY  2010    5 yr-complete colon not visualized   • COLONOSCOPY  2004    extremely tortuous redundant colon. BE-mild diverticulosis without diverticulitis. ?  "Gallstones-Dr Chery.   • COLONOSCOPY N/A 09/10/2020    Procedure: COLONOSCOPY WITH ANESTHESIA;  Surgeon: Mani Sy DO;  Location: [facility] ENDOSCOPY;  Service: Gastroenterology;  Laterality: N/A;  Pre: Positive Colorectal Screening  Post: Diverticulosis  Dr. Mooney  CO2 Inflation Used   • ENDOSCOPY N/A 09/26/2016    Procedure: ESOPHAGOGASTRODUODENOSCOPY WITH ANESTHESIA;  Surgeon: Mani Sy DO;  Location: [facility] ENDOSCOPY;  Service:    • HIP SURGERY Right    • JOINT REPLACEMENT     • KNEE SURGERY     • OTHER SURGICAL HISTORY      Surgery for Spinal Stenosis   • OTHER SURGICAL HISTORY      Laser Prostate Surgery         OT ASSESSMENT FLOWSHEET (last 12 hours)     OT Evaluation and Treatment     Row Name 07/06/22 1040                   OT Time and Intention    Subjective Information complains of;pain  \"neuropathy\"  -MW        Document Type therapy note (daily note)  -MW        Mode of Treatment occupational therapy  -MW                  General Information    Existing Precautions/Restrictions fall;oxygen therapy device and L/min  -MW                  Pain Scale: Word Pre/Post-Treatment    Pain: Word Scale, Pretreatment 4 - moderate pain  -MW        Posttreatment Pain Rating 4 - moderate pain  -MW        Pain Location generalized  -MW        Pre/Posttreatment Pain Comment knees, abrasions, neuropathy  -MW                  Cognition    Personal Safety Interventions fall prevention program maintained;muscle strengthening facilitated;gait belt;nonskid shoes/slippers when out of bed;supervised activity;elopement precautions initiated  -MW                  Activities of Daily Living    BADL Assessment/Intervention toileting;lower body dressing  -MW                  Lower Body Dressing Assessment/Training    [place] Level (Lower Body Dressing) socks;supervision  -MW        Position (Lower Body Dressing) edge of bed sitting  -MW        Comment, (Lower Body Dressing) adjusting   -MW                  " Toileting Assessment/Training    [place] Level (Toileting) perform perineal hygiene;change pad/brief;minimum assist (75% patient effort)  -        Assistive Devices (Toileting) commode;grab bar/safety frame  -        Position (Toileting) supported sitting  -        Comment, (Toileting) fatigued with all activity  -                  Bed Mobility    Bed Mobility supine-sit  -        Supine-Sit [place] (Bed Mobility) supervision  -        Assistive Device (Bed Mobility) head of bed elevated  27 degrees  -                  Functional Mobility    Functional Mobility- [place] Level contact guard assist  -        Functional Mobility- Device walker, front-wheeled  -        Functional Mobility- Comment to BR and back to chair  -                  Transfer Assessment/Treatment    Transfers sit-stand transfer;toilet transfer  -        Comment, (Transfers) increased assist for initial stand from bed  -                  Transfers    Sit-Stand [place] (Transfers) minimum assist (75% patient effort)  -        [place] Level (Toilet Transfer) standby assist;verbal cues  -        Assistive Device (Toilet Transfer) commode;grab bars/safety frame;walker, front-wheeled  -                  Sit-Stand Transfer    Assistive Device (Sit-Stand Transfers) walker, front-wheeled  -                  Toilet Transfer    Type (Toilet Transfer) stand-sit;sit-stand  -                  Plan of Care Review    Plan of Care Reviewed With patient  -        Progress improving  -        Outcome Evaluation OT txt completed. Pt c/o generalized pain from neuropathy and laying in bed since last night. Discussed ability to get up and change positions as desired when staff is present, verbalized understanding. S for bed mobility, Nathen for initial stand 2' stiffness and pain but CGA for all transfers following. SBA for fxl mobility to BR. Nathen for thoroughness for hygiene following BM, modA for brief management  2' fatigue. Pt desatting upon return to chair in low 80s, increased by 1L up to 5L then called RN. Up to 7L by end of session at 90%. RT present to address. Cont OT POC. Please assist pt to stand and position change with any staff entering room. Recommend d/c SNF.  -MW                  Vital Signs    Pre SpO2 (%) 94  -MW        O2 Delivery Pre Treatment supplemental O2  3L  -MW        Intra SpO2 (%) 82   x15+min desat, increased up to 7L w RN present  -MW        O2 Delivery Intra Treatment nasal cannula  -MW        Post SpO2 (%) 90  -MW        O2 Delivery Post Treatment nasal cannula  7L, RT present  -MW                  Positioning and Restraints    Pre-Treatment Position in bed  -MW        Post Treatment Position chair  -MW        In Chair sitting;call light within reach;encouraged to call for assist;legs elevated  -MW                  Therapy Plan Review/Discharge Plan (OT)    Anticipated Discharge Disposition (OT) skilled nursing facility  -              User Key  (r) = Recorded By, (t) = Taken By, (c) = Cosigned By    Initials Name Effective Dates     Thuy Ohara, OTR/L 08/28/18 -                  Occupational Therapy Education                 Title: PT OT SLP Therapies (Done)     Topic: Occupational Therapy (Done)     Point: ADL training (Done)     Description:   Instruct learner(s) on proper safety adaptation and remediation techniques during self care or transfers.   Instruct in proper use of assistive devices.              Learning Progress Summary           Patient Acceptance, E,D, VU by  at 7/5/2022 1314    Acceptance, E,D, VU,NR by  at 7/4/2022 1241                   Point: Home exercise program (Done)     Description:   Instruct learner(s) on appropriate technique for monitoring, assisting and/or progressing therapeutic exercises/activities.              Learning Progress Summary           Patient Acceptance, E,D, VU by  at 7/5/2022 1314                   Point: Precautions (Done)      Description:   Instruct learner(s) on prescribed precautions during self-care and functional transfers.              Learning Progress Summary           Patient Acceptance, E,D, VU by  at 7/5/2022 1314    Acceptance, E,D, VU,NR by  at 7/4/2022 1241                   Point: Body mechanics (Done)     Description:   Instruct learner(s) on proper positioning and spine alignment during self-care, functional mobility activities and/or exercises.              Learning Progress Summary           Patient Acceptance, E,D, VU by  at 7/5/2022 1314    Acceptance, E,D, VU,NR by  at 7/4/2022 1241                               User Key     Initials Effective Dates Name Provider Type Discipline     06/16/21 -  Niru Zuniga, OTR/L Occupational Therapist OT     08/28/18 -  Thuy Ohara, OTR/L Occupational Therapist OT                  OT Recommendation and Plan     Plan of Care Review  Plan of Care Reviewed With: patient  Progress: improving  Outcome Evaluation: OT txt completed. Pt c/o generalized pain from neuropathy and laying in bed since last night. Discussed ability to get up and change positions as desired when staff is present, verbalized understanding. S for bed mobility, [place] for initial stand 2' stiffness and pain but CGA for all transfers following. SBA for fxl mobility to BR. [place] for thoroughness for hygiene following BM, modA for brief management 2' fatigue. Pt desatting upon return to chair in low 80s, increased by 1L up to 5L then called RN. Up to 7L by end of session at 90%. RT present to address. Cont OT POC. Please assist pt to stand and position change with any staff entering room. Recommend d/c SNF.  Plan of Care Reviewed With: patient  Outcome Evaluation: OT txt completed. Pt c/o generalized pain from neuropathy and laying in bed since last night. Discussed ability to get up and change positions as desired when staff is present, verbalized understanding. S for bed mobility, [place] for initial  stand 2' stiffness and pain but CGA for all transfers following. SBA for fxl mobility to BR. Sari for thoroughness for hygiene following BM, modA for brief management 2' fatigue. Pt desatting upon return to chair in low 80s, increased by 1L up to 5L then called RN. Up to 7L by end of session at 90%. RT present to address. Cont OT POC. Please assist pt to stand and position change with any staff entering room. Recommend d/c SNF.     Outcome Measures     Row Name 07/06/22 1100 07/05/22 1300 07/05/22 1000       How much help from another person do you currently need...    Turning from your back to your side while in flat bed without using bedrails? -- -- 3  -AH    Moving from lying on back to sitting on the side of a flat bed without bedrails? -- -- 3  -AH    Moving to and from a bed to a chair (including a wheelchair)? -- -- 3  -AH    Standing up from a chair using your arms (e.g., wheelchair, bedside chair)? -- -- 3  -AH    Climbing 3-5 steps with a railing? -- -- 2  -AH    To walk in hospital room? -- -- 3  -AH    AM-PAC 6 Clicks Score (PT) -- -- 17  -AH       How much help from another is currently needed...    Putting on and taking off regular lower body clothing? 3  -MW 3  -MW --    Bathing (including washing, rinsing, and drying) 2  -MW 3  -MW --    Toileting (which includes using toilet bed pan or urinal) 2  -MW 3  -MW --    Putting on and taking off regular upper body clothing 3  -MW 3  -MW --    Taking care of personal grooming (such as brushing teeth) 3  -MW 4  -MW --    Eating meals 3  -MW 4  -MW --    AM-PAC 6 Clicks Score (OT) 16  -MW 20  -MW --       Functional Assessment    Outcome Measure Options -- AM-PAC 6 Clicks Daily Activity (OT)  -MW AM-PAC 6 Clicks Basic Mobility (PT)  -          User Key  (r) = Recorded By, (t) = Taken By, (c) = Cosigned By    Initials Name Provider Type     Corrina Sandra, PTA Physical Therapist Assistant    Thuy Mejia, OTR/L Occupational Therapist                 Time Calculation:    Time Calculation- OT     Row Name 07/06/22 1151             Time Calculation- OT    OT Start Time 1040  -MW      OT Stop Time 1140  -MW      OT Time Calculation (min) 60 min  -MW      Total Timed Code Minutes- OT 60 minute(s)  -MW      OT Received On 07/06/22  -MW            User Key  (r) = Recorded By, (t) = Taken By, (c) = Cosigned By    Initials Name Provider Type     Thuy Ohara, OTR/L Occupational Therapist              Therapy Charges for Today     Code Description Service Date Service Provider Modifiers Qty    27590367664 HC OT SELF CARE/MGMT/TRAIN EA 15 MIN 7/5/2022 Thuy Ohara OTR/L GO 2    00464832964 HC OT THER PROC EA 15 MIN 7/5/2022 Thuy Ohara, OTR/L GO 1    90183174888 HC OT SELF CARE/MGMT/TRAIN EA 15 MIN 7/6/2022 Thuy Ohara, OTR/L GO 4               Thuy Ohara OTR/L  7/6/2022

## 2022-07-06 NOTE — THERAPY TREATMENT NOTE
Acute Care - Physical Therapy Treatment Note  [facility]     Patient Name: Adrián Graham  : 1940  MRN: [id number]  Today's Date: 2022      Visit Dx:     ICD-10-CM ICD-9-CM   1. Hypoxemia  R09.02 799.02   2. Orthostatic hypotension  I95.1 458.0   3. Pneumonia due to COVID-19 virus  U07.1 480.8    J12.82 079.89   4. Multiple subsegmental pulmonary emboli without acute cor pulmonale (HCC)  I26.94 415.19   5. Impaired mobility  Z74.09 799.89   6. Decreased activities of daily living (ADL)  Z78.9 V49.89     Patient Active Problem List   Diagnosis   • Idiopathic peripheral neuropathy   • Essential hypertension   • Benign prostatic hyperplasia without lower urinary tract symptoms   • Overweight (BMI 25.0-29.9)   • Carpal tunnel syndrome, left   • Bilateral carotid artery disease (HCC)   • Hyperlipidemia   • Preop cardiovascular exam [facility] Dr Brown   • Single subsegmental pulmonary embolism without acute cor pulmonale (HCC)   • Pulmonary embolism associated with COVID-19 (HCC)   • Acute respiratory failure with hypoxia (HCC)     Past Medical History:   Diagnosis Date   • Abdominal distention    • Abdominal pain, left upper quadrant    • Allergic rhinitis    • Arthritis    • Bloating    • BPH (benign prostatic hyperplasia)    • Carpal tunnel syndrome    • Change in bowel habits    • Constipation    • Diarrhea    • Erectile dysfunction    • GERD (gastroesophageal reflux disease)    • History of shingles    • Hypertension    • Idiopathic peripheral neuropathy    • Neuropathy    • Pneumonia    • Shingles    • Weight loss      Past Surgical History:   Procedure Laterality Date   • APPENDECTOMY     • BACK SURGERY     • CARPAL TUNNEL RELEASE Right    • CARPAL TUNNEL RELEASE Left 2022   • CHOLECYSTECTOMY     • COLONOSCOPY  2010    5 yr-complete colon not visualized   • COLONOSCOPY  2004    extremely tortuous redundant colon. BE-mild diverticulosis without diverticulitis. ? Gallstones-Dr Chery.   •  "COLONOSCOPY N/A 09/10/2020    Procedure: COLONOSCOPY WITH ANESTHESIA;  Surgeon: Mani Sy DO;  Location: [facility] ENDOSCOPY;  Service: Gastroenterology;  Laterality: N/A;  Pre: Positive Colorectal Screening  Post: Diverticulosis  Dr. Mooney  CO2 Inflation Used   • ENDOSCOPY N/A 09/26/2016    Procedure: ESOPHAGOGASTRODUODENOSCOPY WITH ANESTHESIA;  Surgeon: Mani Sy DO;  Location: [facility] ENDOSCOPY;  Service:    • HIP SURGERY Right    • JOINT REPLACEMENT     • KNEE SURGERY     • OTHER SURGICAL HISTORY      Surgery for Spinal Stenosis   • OTHER SURGICAL HISTORY      Laser Prostate Surgery     PT Assessment (last 12 hours)     PT Evaluation and Treatment     Row Name 07/06/22 1500 07/06/22 1425       Physical Therapy Time and Intention    Subjective Information complains of;weakness  -NW --    Document Type therapy note (daily note)  -NW --    Mode of Treatment physical therapy  -NW --    Comment, Session Not Performed -- ck bk pt w/ nsg and xray waiting to go in.  -NW    Row Name 07/06/22 1500          General Information    Existing Precautions/Restrictions fall;oxygen therapy device and L/min  6L  -NW     Row Name 07/06/22 1500          Pain Scale: Word Pre/Post-Treatment    Pain: Word Scale, Pretreatment 2 - mild pain  -NW     Posttreatment Pain Rating 2 - mild pain  -NW     Pre/Posttreatment Pain Comment knees, elbows \"sore from crawling on the ground\"  -NW     Row Name 07/06/22 1500          Bed Mobility    Comment, (Bed Mobility) chair  -NW     Row Name 07/06/22 1500          Transfers    Sit-Stand [place] (Transfers) contact guard;minimum assist (75% patient effort);verbal cues  -NW     Stand-Sit [place] (Transfers) contact guard;verbal cues  -NW     Row Name 07/06/22 1500          Gait/Stairs (Locomotion)    [place] Level (Gait) contact guard;verbal cues  -NW     Assistive Device (Gait) walker, front-wheeled  -NW     Distance in Feet (Gait) 70  amb back in forth mult times in room  " -NW     Pattern (Gait) step-through  -NW     Deviations/Abnormal Patterns (Gait) veronique decreased  -     Row Name 07/06/22 1500          Safety Issues, Functional Mobility    Impairments Affecting Function (Mobility) balance;endurance/activity tolerance  -     Row Name 07/06/22 1500          Vital Signs    Pre SpO2 (%) 96  -NW     O2 Delivery Pre Treatment supplemental O2  6L  -NW     Intra SpO2 (%) 88  -NW     O2 Delivery Intra Treatment supplemental O2  6L  -NW     Post SpO2 (%) 92  -NW     O2 Delivery Post Treatment supplemental O2  6L  -NW     Row Name 07/06/22 1500          Positioning and Restraints    Pre-Treatment Position sitting in chair/recliner  -NW     Post Treatment Position chair  -NW     In Chair reclined;call light within reach;encouraged to call for assist;notified ns  -           User Key  (r) = Recorded By, (t) = Taken By, (c) = Cosigned By    Initials Name Provider Type     Cherie Florez PTA Physical Therapist Assistant                Physical Therapy Education                 Title: PT OT SLP Therapies (Done)     Topic: Physical Therapy (Done)     Point: Mobility training (Done)     Learning Progress Summary           Patient Acceptance, E,D, DU,VU by  at 7/4/2022 1132    Comment: benefits of PT and POC, call for A OOB                   Point: Precautions (Done)     Learning Progress Summary           Patient Acceptance, E,TB, VU by  at 7/5/2022 1046    Comment: call for assist    Acceptance, E,D, DU,VU by  at 7/4/2022 1132    Comment: benefits of PT and POC, call for A OOB                               User Key     Initials Effective Dates Name Provider Type Discipline     06/16/21 -  Corrina Sandra PTA Physical Therapist Assistant PT     06/16/21 -  Dexter Severino PT Physical Therapist PT              PT Recommendation and Plan     Plan of Care Reviewed With: patient  Progress: improving  Outcome Evaluation: Pt up in chair. Pt on 6L o2 at rest o2 96%. sit-stand  cga/min x1. Pt amb back in forth mult times in room 70' rwx 6L o2 monitored dropped to 88% but recovered quickly to 92% once back in chair resting... Feel pt would benfit from snf upon d/c for cont strength and endurance.   Outcome Measures     Row Name 07/06/22 1100 07/05/22 1300 07/05/22 1000       How much help from another person do you currently need...    Turning from your back to your side while in flat bed without using bedrails? -- -- 3  -AH    Moving from lying on back to sitting on the side of a flat bed without bedrails? -- -- 3  -AH    Moving to and from a bed to a chair (including a wheelchair)? -- -- 3  -AH    Standing up from a chair using your arms (e.g., wheelchair, bedside chair)? -- -- 3  -AH    Climbing 3-5 steps with a railing? -- -- 2  -AH    To walk in hospital room? -- -- 3  -AH    AM-PAC 6 Clicks Score (PT) -- -- 17  -AH       How much help from another is currently needed...    Putting on and taking off regular lower body clothing? 3  -MW 3  -MW --    Bathing (including washing, rinsing, and drying) 2  -MW 3  -MW --    Toileting (which includes using toilet bed pan or urinal) 2  -MW 3  -MW --    Putting on and taking off regular upper body clothing 3  -MW 3  -MW --    Taking care of personal grooming (such as brushing teeth) 3  -MW 4  -MW --    Eating meals 3  -MW 4  -MW --    AM-PAC 6 Clicks Score (OT) 16  -MW 20  -MW --       Functional Assessment    Outcome Measure Options -- AM-PAC 6 Clicks Daily Activity (OT)  -MW AM-PAC 6 Clicks Basic Mobility (PT)  -          User Key  (r) = Recorded By, (t) = Taken By, (c) = Cosigned By    Initials Name Provider Type    Corrina Garcia, PTA Physical Therapist Assistant    Thuy Mejia, OTR/L Occupational Therapist                 Time Calculation:    PT Charges     Row Name 07/06/22 1532             Time Calculation    Start Time 1500  -NW      Stop Time 1532  -NW      Time Calculation (min) 32 min  -NW      PT Received On 07/06/22  -CHRIS       PT Goal Re-Cert Due Date 07/14/22  -NW              Time Calculation- PT    Total Timed Code Minutes- PT 32 minute(s)  -NW              Timed Charges    89433 - Gait Training Minutes  32  -NW              Total Minutes    Timed Charges Total Minutes 32  -NW       Total Minutes 32  -NW            User Key  (r) = Recorded By, (t) = Taken By, (c) = Cosigned By    Initials Name Provider Type    NW Cherie Florez PTA Physical Therapist Assistant              Therapy Charges for Today     Code Description Service Date Service Provider Modifiers Qty    25830099341 HC GAIT TRAINING EA 15 MIN 7/6/2022 Cherie Florez PTA GP 2          PT G-Codes  Outcome Measure Options: AM-PAC 6 Clicks Daily Activity (OT)  AM-PAC 6 Clicks Score (PT): 17  AM-PAC 6 Clicks Score (OT): 16    Cherie Florez PTA  7/6/2022

## 2022-07-06 NOTE — PROGRESS NOTES
[facility] Medicine Services  INPATIENT PROGRESS NOTE    Patient Name: Adrián Graham  Date of Admission: 7/1/2022  Today's Date: 07/06/22  Length of Stay: 5  Primary Care Physician: Mauro Irizarry DO    Subjective   Chief Complaint: Shortness of breath  Fall       7/4 Still weak and short of breath with minimal activity  7/5 Feels much better after a showed. Debilitated still. Dyspneic with minimal activity.   7/6 Complaints of neuropathic pain that is relieved with walking. Agreeable to rehabilitation placement. Breathing improvement is stagnant, may need oxygen at discharge.    Review of Systems   All pertinent negatives and positives are as above. All other systems have been reviewed and are negative unless otherwise stated.     Objective    Temp:  [97.5 °F (36.4 °C)-98.3 °F (36.8 °C)] 98.3 °F (36.8 °C)  Heart Rate:  [64-72] 72  Resp:  [16] 16  BP: (111-124)/(55-61) 118/61  Physical Exam  GEN: Awake, alert, interactive, in NAD  HEENT: PERRLA, EOMI, Anicteric, Trachea midline  Lungs: normal respiratory effort, normal chest rise, no distress  Heart: RRR, +S1/s2, no rub  ABD: soft, nt/nd, +BS, no guarding/rebound  Extremities: atraumatic, no cyanosis  Skin: no rashes or petechiae  Neuro: AAOx3, no focal deficits  Psych: normal mood & affect     Results Review:  I have reviewed the labs, radiology results, and diagnostic studies.    Laboratory Data:   Results from last 7 days   Lab Units 07/04/22  0700 07/02/22  0647 07/01/22  1134   WBC 10*3/mm3 15.35* 5.86 7.19   HEMOGLOBIN g/dL 10.7* 12.2* 11.2*   HEMATOCRIT % 32.9* 39.9 35.4*   PLATELETS 10*3/mm3 407 291 302        Results from last 7 days   Lab Units 07/04/22  0700 07/02/22  0647 07/01/22  1134   SODIUM mmol/L 137 134* 133*   POTASSIUM mmol/L 4.2 4.0 3.8   CHLORIDE mmol/L 103 102 100   CO2 mmol/L 27.0 16.0* 22.0   BUN mg/dL 34* 25* 20   CREATININE mg/dL 0.87 0.79 0.85   CALCIUM mg/dL 8.7 8.5* 8.4*   BILIRUBIN mg/dL  --   0.4 0.4   ALK PHOS U/L  --  73 73   ALT (SGPT) U/L  --  15 17   AST (SGOT) U/L  --  30 41*   GLUCOSE mg/dL 159* 153* 123*       Culture Data:   Blood Culture   Date Value Ref Range Status   07/02/2022 No growth at 24 hours  Preliminary       Radiology Data:   Imaging Results (Last 24 Hours)     ** No results found for the last 24 hours. **          I have reviewed the patient's current medications.     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 Transition to orals    BPH > Proscar    Discharge Planning: To be determined.    Electronically signed by Victor Manuel Dorado MD, 07/06/22, 10:53 CDT.

## 2022-07-06 NOTE — PLAN OF CARE
Goal Outcome Evaluation:  Plan of Care Reviewed With: patient        Progress: improving  Outcome Evaluation: OT txt completed. Pt c/o generalized pain from neuropathy and laying in bed since last night. Discussed ability to get up and change positions as desired when staff is present, verbalized understanding. S for bed mobility, Nathen for initial stand 2' stiffness and pain but CGA for all transfers following. SBA for fxl mobility to BR. Nathen for thoroughness for hygiene following BM, modA for brief management 2' fatigue. Pt desatting upon return to chair in low 80s, increased by 1L up to 5L then called RN. Up to 7L by end of session at 90%. RT present to address. Cont OT POC. Please assist pt to stand and position change with any staff entering room. Recommend d/c SNF.

## 2022-07-06 NOTE — PLAN OF CARE
Goal Outcome Evaluation:      Patient worked with physical therapy, stood and ambulated short distance to the bedside chair.  Medicated for pain with good effect.  Appetite is good, and he appears to be in better spirits.  Continues on supplemental oxygen.  External male catheter in place. C/o    Loose stool to daughter.  Message sent to physician

## 2022-07-06 NOTE — CASE MANAGEMENT/SOCIAL WORK
Continued Stay Note  [facility]     Patient Name: Adrián Graham  MRN: [id number]  Today's Date: 7/6/2022    Admit Date: 7/1/2022     Discharge Plan     Row Name 07/06/22 1111       Plan    Plan Reached out to infection control nurse, Maureen SCHWARTZ, who determined that patient is still requiring covid isolation measures r/t worsening findings of CXR 7/1/22 and elevated WBC.  Daughter notified that isolation is still required therefore no visitation at this time allowed. Viviana updated per  and they are following for when patient can be taken out of isolation before bed offer can be made.               Discharge Codes    No documentation.               Expected Discharge Date and Time     Expected Discharge Date Expected Discharge Time    Jul 7, 2022             Yadira Glaser RN

## 2022-07-07 NOTE — PROGRESS NOTES
[facility] Medicine Services  INPATIENT PROGRESS NOTE    Patient Name: Adrián Graham  Date of Admission: 7/1/2022  Today's Date: 07/07/22  Length of Stay: 6  Primary Care Physician: Mauro Irizarry DO    Subjective   Chief Complaint: Shortness of breath  Fall       7/4 Still weak and short of breath with minimal activity  7/5 Feels much better after a showed. Debilitated still. Dyspneic with minimal activity.   7/6 Complaints of neuropathic pain that is relieved with walking. Agreeable to rehabilitation placement. Breathing improvement is stagnant, may need oxygen at discharge.  7/7 Oxygen requirements have increased today.     Review of Systems   All pertinent negatives and positives are as above. All other systems have been reviewed and are negative unless otherwise stated.     Objective    Temp:  [96.3 °F (35.7 °C)-96.8 °F (36 °C)] 96.5 °F (35.8 °C)  Heart Rate:  [] 106  Resp:  [16-22] 20  BP: (125-151)/(59-77) 125/59  Physical Exam  GEN: Awake, alert, interactive, in NAD  HEENT: PERRLA, EOMI, Anicteric, Trachea midline  Lungs: normal respiratory effort, normal chest rise, no distress  Heart: RRR, +S1/s2, no rub  ABD: soft, nt/nd, +BS, no guarding/rebound  Extremities: atraumatic, no cyanosis  Skin: no rashes or petechiae  Neuro: AAOx3, no focal deficits  Psych: normal mood & affect     Results Review:  I have reviewed the labs, radiology results, and diagnostic studies.    Laboratory Data:   Results from last 7 days   Lab Units 07/07/22  0941 07/04/22  0700 07/02/22  0647   WBC 10*3/mm3 21.96* 15.35* 5.86   HEMOGLOBIN g/dL 13.8 10.7* 12.2*   HEMATOCRIT % 43.9 32.9* 39.9   PLATELETS 10*3/mm3 553* 407 291        Results from last 7 days   Lab Units 07/07/22  0941 07/04/22  0700 07/02/22  0647 07/01/22  1134   SODIUM mmol/L 138 137 134* 133*   POTASSIUM mmol/L 3.5 4.2 4.0 3.8   CHLORIDE mmol/L 101 103 102 100   CO2 mmol/L 30.0* 27.0 16.0* 22.0   BUN mg/dL 16 34* 25*  20   CREATININE mg/dL 0.77 0.87 0.79 0.85   CALCIUM mg/dL 9.3 8.7 8.5* 8.4*   BILIRUBIN mg/dL  --   --  0.4 0.4   ALK PHOS U/L  --   --  73 73   ALT (SGPT) U/L  --   --  15 17   AST (SGOT) U/L  --   --  30 41*   GLUCOSE mg/dL 113* 159* 153* 123*       Culture Data:   Blood Culture   Date Value Ref Range Status   07/02/2022 No growth at 24 hours  Preliminary       Radiology Data:   Imaging Results (Last 24 Hours)     Procedure Component Value Units Date/Time    XR Chest 1 View [[id number]] Collected: 07/06/22 1553     Updated: 07/06/22 1558    Narrative:      XR CHEST 1 VW- 7/6/2022 2:37 PM CDT     HISTORY: COVID; R09.02-Hypoxemia; I95.1-Orthostatic hypotension;  U07.1-COVID-19; J12.82-Pneumonia due to coronavirus disease 2019;  I26.94-Multiple subsegmental pulmonary emboli without acute cor  pulmonale; Z74.09-Other reduced mobility; Z78.9-Other specified health  status     COMPARISON: 7/1/2022.     FINDINGS:      Reidentified bilateral, asymmetric pulmonary infiltrates. Left lung  patchy infiltrates are nearly diffuse throughout the left lung fields  and perhaps mildly improved from the recent comparison exam. Additional  patchy right basilar infiltrates are very similar. Overall lung volumes  are stable. No new consolidation. No pleural effusion or pneumothorax.  Heart size is stable. Pulmonary vasculature are nondilated. No acute  bony abnormality.       Impression:      1. Covid pneumonia with bilateral asymmetric pulmonary infiltrate (left  greater than right), perhaps mildly improved from 7/1/2022. Lung volumes  are stable.        This report was finalized on 07/06/2022 15:55 by Dr Prem Mclaughlin, .          I have reviewed the patient's current medications.     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 Transition to orals    BPH > Proscar    Insomnia > Benadryl 25 mg PO at HS   Stop melatonin due to no effect    Discharge Planning: SNF subacute rehabilitation approved, probably discharge tomorrow if oxygen need improve.    Electronically signed by Victor Manuel Dorado MD, 07/07/22, 11:55 CDT.

## 2022-07-07 NOTE — PROGRESS NOTES
PULMONARY AND CRITICAL CARE PROGRESS NOTE - [facility]    Patient: Adrián Graham  1940   MR# [id number]   Acct# [id number]  07/07/22   07:10 CDT  Referring Provider: Victor Manuel Dorado,*    Chief Complaint: Covid-19     Interval history: Patient is seen from the door in an effort to preserve PPE and reduce exposure.  He is seen awake with a nursing assistant at bedside.  Morning labs have just been drawn and are still pending.  He is currently on 6 L oxygen (up from 3 L yesterday) with a sat of 92%.  He tells me he was unaware that his oxygen was turned up.  He appears to be in no acute distress.     Meds:  albuterol sulfate HFA, 2 puff, Inhalation, 4x Daily - RT  ascorbic acid, 500 mg, Oral, Daily  aspirin, 81 mg, Oral, Daily  atorvastatin, 20 mg, Oral, Daily  cetirizine, 10 mg, Oral, Daily  enoxaparin, 1 mg/kg, Subcutaneous, Q12H  famotidine, 20 mg, Oral, BID AC  finasteride, 5 mg, Oral, Daily  fluticasone, 2 spray, Nasal, Daily  gabapentin, 400 mg, Oral, Q12H  guaiFENesin, 1,200 mg, Oral, BID  melatonin, 3 mg, Oral, Nightly  montelukast, 10 mg, Oral, Nightly  piperacillin-tazobactam, 4.5 g, Intravenous, Q8H  predniSONE, 20 mg, Oral, Daily With Breakfast  sodium chloride, 10 mL, Intravenous, Q12H  vitamin D3, 5,000 Units, Oral, Daily  zinc sulfate, 220 mg, Oral, Daily         Review of Systems:   Review of Systems   Constitutional: Positive for fatigue. Negative for fever.   HENT: Positive for congestion.    Respiratory: Positive for cough.    Gastrointestinal: Negative for diarrhea and nausea.   Musculoskeletal: Positive for arthralgias.   Neurological: Positive for weakness.          Physical Exam:  SpO2 Percentage    07/07/22 0439 07/07/22 0634 07/07/22 0637   SpO2: 90% 95% 95%     Temp:  [96.3 °F (35.7 °C)-98.3 °F (36.8 °C)] 96.4 °F (35.8 °C)  Heart Rate:  [68-82] 78  Resp:  [16-18] 18  BP: (118-151)/(61-77) 137/67    Intake/Output Summary (Last 24 hours) at 7/7/2022 0710  Last data  filed at 7/6/2022 2320  Gross per 24 hour   Intake 240 ml   Output 500 ml   Net -260 ml     Body mass index is 26.8 kg/m².   GENERAL/CONSTITUTIONAL: no distress.  6 L nasal cannula  HEENT: atraumatic, normocephalic.   NOSE: normal  NECK: jugular veins nondistended  CHEST: no paradox, no retractions.  No respiratory distress.   ABDOMEN: nondistended  : deferred  EXTREMITIES: no visible edema.  NEURO:  Awake and alert   PSYCH: no agitation  SKIN: no jaundice.  No rash    Electronically signed by MEKHI Calvert, 7/7/2022, 07:10 CDT        Physician Substantive Portion: Medical Decision Making    Laboratory Data:  Results from last 7 days   Lab Units 07/04/22  0700 07/02/22  0647 07/01/22  1134   WBC 10*3/mm3 15.35* 5.86 7.19   HEMOGLOBIN g/dL 10.7* 12.2* 11.2*   PLATELETS 10*3/mm3 407 291 302     Results from last 7 days   Lab Units 07/04/22  0700 07/02/22  0647 07/01/22  1134   SODIUM mmol/L 137 134* 133*   POTASSIUM mmol/L 4.2 4.0 3.8   BUN mg/dL 34* 25* 20   CREATININE mg/dL 0.87 0.79 0.85   INR   --   --  2.16*     Results from last 7 days   Lab Units 07/02/22  0417 07/01/22  1515   PH, ARTERIAL pH units 7.409 7.437   PCO2, ARTERIAL mm Hg 37.0 32.8*   PO2 ART mm Hg 80.2* 80.3*   FIO2 % 70 40     Blood Culture   Date Value Ref Range Status   06/26/2022 No growth at 5 days  Final   06/26/2022 No growth at 5 days  Final     Results from last 7 days   Lab Units 07/04/22  0700 07/02/22  1248 07/02/22  0647 07/01/22  1429 07/01/22  1134   CRP mg/dL 6.46*  --  22.42*   < >  --    PROCALCITONIN ng/mL 0.08 0.21  --    < >  --    CK TOTAL U/L  --   --   --   --  167    < > = values in this interval not displayed.      Recent films:  XR Chest 1 View    Result Date: 7/6/2022  XR CHEST 1 VW- 7/6/2022 2:37 PM CDT  HISTORY: COVID; R09.02-Hypoxemia; I95.1-Orthostatic hypotension; U07.1-COVID-19; J12.82-Pneumonia due to coronavirus disease 2019; I26.94-Multiple subsegmental pulmonary emboli without acute cor  pulmonale; Z74.09-Other reduced mobility; Z78.9-Other specified health status  COMPARISON: 7/1/2022.  FINDINGS:  Reidentified bilateral, asymmetric pulmonary infiltrates. Left lung patchy infiltrates are nearly diffuse throughout the left lung fields and perhaps mildly improved from the recent comparison exam. Additional patchy right basilar infiltrates are very similar. Overall lung volumes are stable. No new consolidation. No pleural effusion or pneumothorax. Heart size is stable. Pulmonary vasculature are nondilated. No acute bony abnormality.      Impression: 1. Covid pneumonia with bilateral asymmetric pulmonary infiltrate (left greater than right), perhaps mildly improved from 7/1/2022. Lung volumes are stable.   This report was finalized on 07/06/2022 15:55 by Dr Prem Mclaughlin, .     Physician Substantive Portion: Medical Decision Making    Results from last 7 days   Lab Units 07/04/22  0700 07/02/22  0647 07/01/22  1134   WBC 10*3/mm3 15.35* 5.86 7.19   HEMOGLOBIN g/dL 10.7* 12.2* 11.2*   PLATELETS 10*3/mm3 407 291 302     Results from last 7 days   Lab Units 07/04/22  0700 07/02/22  0647 07/01/22  1134   SODIUM mmol/L 137 134* 133*   POTASSIUM mmol/L 4.2 4.0 3.8   CO2 mmol/L 27.0 16.0* 22.0   BUN mg/dL 34* 25* 20   CREATININE mg/dL 0.87 0.79 0.85   MAGNESIUM mg/dL  --  2.3  --    PHOSPHORUS mg/dL  --  4.8*  --    GLUCOSE mg/dL 159* 153* 123*     Results from last 7 days   Lab Units 07/02/22  0417 07/01/22  1515   PH, ARTERIAL pH units 7.409 7.437   PCO2, ARTERIAL mm Hg 37.0 32.8*   PO2 ART mm Hg 80.2* 80.3*   FIO2 % 70 40     Lab Results   Component Value Date    PROBNP 1,299.0 07/01/2022     Blood Culture   Date Value Ref Range Status   06/26/2022 No growth at 5 days  Final   06/26/2022 No growth at 5 days  Final       Recent radiology:   Imaging Results (Last 72 Hours)     Procedure Component Value Units Date/Time    XR Chest 1 View [[id number]] Collected: 07/06/22 1553     Updated: 07/06/22 1558     Narrative:      XR CHEST 1 VW- 7/6/2022 2:37 PM CDT     HISTORY: COVID; R09.02-Hypoxemia; I95.1-Orthostatic hypotension;  U07.1-COVID-19; J12.82-Pneumonia due to coronavirus disease 2019;  I26.94-Multiple subsegmental pulmonary emboli without acute cor  pulmonale; Z74.09-Other reduced mobility; Z78.9-Other specified health  status     COMPARISON: 7/1/2022.     FINDINGS:      Reidentified bilateral, asymmetric pulmonary infiltrates. Left lung  patchy infiltrates are nearly diffuse throughout the left lung fields  and perhaps mildly improved from the recent comparison exam. Additional  patchy right basilar infiltrates are very similar. Overall lung volumes  are stable. No new consolidation. No pleural effusion or pneumothorax.  Heart size is stable. Pulmonary vasculature are nondilated. No acute  bony abnormality.       Impression:      1. Covid pneumonia with bilateral asymmetric pulmonary infiltrate (left  greater than right), perhaps mildly improved from 7/1/2022. Lung volumes  are stable.        This report was finalized on 07/06/2022 15:55 by Dr Prem Mclaughlin, .        My radiograph interpretation/independent review of imaging: infiltrate L>R  Other test results (not lab or imaging): Results for orders placed during the hospital encounter of 07/01/22    Adult Transthoracic Echo Limited W/ Cont if Necessary Per Protocol    Interpretation Summary  · This is a limited echocardiogram.  · Left ventricular systolic function is normal. Left ventricular ejection fraction appears to be 61 - 65%.  · The right ventricular cavity is mildly dilated. Normal right ventricular systolic function noted.      My radiograph interpretation/independent review of imaging: no new films      Pulmonary Assessment:    1. Acute hypoxic respiratory failure,  2. Bilateral groundglass pulmonary infiltrate  3. Worsening COVID-19 pneumonia  4. Possible acute lung injury or cryptogenic organizing pneumonia  5. Pulmonary embolism single  subsegmental on anticoagulation  6. Essential hypertension  7. Hyperlipidemia  8. Carotid artery disease  9. Recurrent bronchitis  10. S/p COVID vaccinated status  11. Severe weakness and deconditioning  12. History of fall     Recommend/plan:   · Patient not doing well.  Little more short of breath from last night and his oxygen requirement increased to 6 L.   · Steroid dose decreased yesterday to 20 mg prednisone which has been increased to 40 mg today.  · Continue titrating the oxygen to maintain oxygen saturation more than 92%.  He may use Vapotherm or BiPAP if needed.  · Continue bronchodilator treatment routine respiratory care and pulmonary toilet.  · Continue incentive spirometry.  Continue fluticasone nasal spray Singulair and Zyrtec.  · De-escalate antibiotic treatment in a few days he is afebrile and continues to improve.  · He still getting Zosyn.  Cultures have so far been negative.  · Increase physical activity and nutritional support to continue.  · DVT and stress ulcer prophylaxis and pain and anxiety control.  · Repeat imaging studies and labs as needed.  May need to check the inflammatory markers  · He still remains in COVID isolation which may be discontinued soon.  · CODE STATUS: Full.  Overall prognosis: Guarded.  · We will follow.  He will also need outpatient pulmonary clinic follow-up after discharge.    This visit was performed by both a physician and an [facility] RN.  I personally evaluated and examined the patient.  I performed all aspects of the medical decision making as documented.    Electronically signed by     Miguel Smith MD,  Pulmonologist/Intensivist   7/7/2022, 18:35 CDT

## 2022-07-07 NOTE — THERAPY TREATMENT NOTE
Acute Care - Physical Therapy Treatment Note  [facility]     Patient Name: Adrián Graham  : 1940  MRN: [id number]  Today's Date: 2022      Visit Dx:     ICD-10-CM ICD-9-CM   1. Hypoxemia  R09.02 799.02   2. Orthostatic hypotension  I95.1 458.0   3. Pneumonia due to COVID-19 virus  U07.1 480.8    J12.82 079.89   4. Multiple subsegmental pulmonary emboli without acute cor pulmonale (HCC)  I26.94 415.19   5. Impaired mobility  Z74.09 799.89   6. Decreased activities of daily living (ADL)  Z78.9 V49.89     Patient Active Problem List   Diagnosis   • Idiopathic peripheral neuropathy   • Essential hypertension   • Benign prostatic hyperplasia without lower urinary tract symptoms   • Overweight (BMI 25.0-29.9)   • Carpal tunnel syndrome, left   • Bilateral carotid artery disease (HCC)   • Hyperlipidemia   • Preop cardiovascular exam [facility] Dr Brown   • Single subsegmental pulmonary embolism without acute cor pulmonale (HCC)   • Pulmonary embolism associated with COVID-19 (HCC)   • Acute respiratory failure with hypoxia (HCC)     Past Medical History:   Diagnosis Date   • Abdominal distention    • Abdominal pain, left upper quadrant    • Allergic rhinitis    • Arthritis    • Bloating    • BPH (benign prostatic hyperplasia)    • Carpal tunnel syndrome    • Change in bowel habits    • Constipation    • Diarrhea    • Erectile dysfunction    • GERD (gastroesophageal reflux disease)    • History of shingles    • Hypertension    • Idiopathic peripheral neuropathy    • Neuropathy    • Pneumonia    • Shingles    • Weight loss      Past Surgical History:   Procedure Laterality Date   • APPENDECTOMY     • BACK SURGERY     • CARPAL TUNNEL RELEASE Right    • CARPAL TUNNEL RELEASE Left 2022   • CHOLECYSTECTOMY     • COLONOSCOPY  2010    5 yr-complete colon not visualized   • COLONOSCOPY  2004    extremely tortuous redundant colon. BE-mild diverticulosis without diverticulitis. ? Gallstones-Dr Chery.   •  COLONOSCOPY N/A 09/10/2020    Procedure: COLONOSCOPY WITH ANESTHESIA;  Surgeon: Mani Sy DO;  Location: [facility] ENDOSCOPY;  Service: Gastroenterology;  Laterality: N/A;  Pre: Positive Colorectal Screening  Post: Diverticulosis  Dr. Mooney  CO2 Inflation Used   • ENDOSCOPY N/A 09/26/2016    Procedure: ESOPHAGOGASTRODUODENOSCOPY WITH ANESTHESIA;  Surgeon: aMni Sy DO;  Location: [facility] ENDOSCOPY;  Service:    • HIP SURGERY Right    • JOINT REPLACEMENT     • KNEE SURGERY     • OTHER SURGICAL HISTORY      Surgery for Spinal Stenosis   • OTHER SURGICAL HISTORY      Laser Prostate Surgery     PT Assessment (last 12 hours)     PT Evaluation and Treatment     [facility] Name 07/07/22 1405 07/07/22 0954       Physical Therapy Time and Intention    Subjective Information complains of;weakness;fatigue  - --    Document Type therapy note (daily note)  - --    Mode of Treatment physical therapy  - --    Session Not Performed -- other (see comments)  -    Comment, Session Not Performed -- with MINA  -[facility] Name 07/07/22 1405          General Information    Existing Precautions/Restrictions fall;oxygen therapy device and L/min  6L  -[facility] Name 07/07/22 1405          Pain    Pain Intervention(s) Repositioned;Ambulation/increased activity  -[facility] Name 07/07/22 1405          Pain Scale: Word Pre/Post-Treatment    Pain: Word Scale, Pretreatment 2 - mild pain  -     Posttreatment Pain Rating 2 - mild pain  -     Pain Location generalized  -[facility] Name 07/07/22 1405          Bed Mobility    Supine-Sit [place] (Bed Mobility) supervision  -     Sit-Supine [place] (Bed Mobility) supervision  -[facility] Name 07/07/22 1405          Transfers    Sit-Stand [place] (Transfers) minimum assist (75% patient effort);verbal cues  -     Stand-Sit [place] (Transfers) contact guard;verbal cues  -[facility] Name 07/07/22 1405          Gait/Stairs (Locomotion)    [place] Level (Gait)  contact guard;verbal cues  -     Assistive Device (Gait) walker, front-wheeled  -     Distance in Feet (Gait) 50 x 2  -     Pattern (Gait) step-through  -     Deviations/Abnormal Patterns (Gait) veronique decreased  -[facility] Name 07/07/22 1405          Safety Issues, Functional Mobility    Impairments Affecting Function (Mobility) balance;endurance/activity tolerance  -[facility] Name 07/07/22 1405          Motor Skills    Comments, Therapeutic Exercise BLE AROM  -[facility] Name 07/07/22 1405          Plan of Care Review    Plan of Care Reviewed With patient  -     Progress improving  -     Outcome Evaluation Pt trans to EOB sba, performed BLE AROM, sit-stand min assist, pt amb 50 feet x 2 rwx cga, 02 @ 6L, trans back to bed sba, 02 sat 88% after walking back to 92% with deep breathing and rest, pt would benefit from [facility]  -[facility] Name 07/07/22 1405          Vital Signs    Pre SpO2 (%) 100  -AH     O2 Delivery Pre Treatment nasal cannula  8L  -     Intra SpO2 (%) 88  -AH     O2 Delivery Intra Treatment nasal cannula  6L  -AH     Post SpO2 (%) 92  -AH     O2 Delivery Post Treatment nasal cannula  6L  -AH     [facility] Name 07/07/22 1405          Positioning and Restraints    Pre-Treatment Position in bed  -     Post Treatment Position bed  -     In Bed fowlers;call light within reach;encouraged to call for assist;exit alarm on;with other staff  [facility]           User Key  (r) = Recorded By, (t) = Taken By, (c) = Cosigned By    Initials Name Provider Type     Corrina Sandra, PTA Physical Therapist Assistant                Physical Therapy Education                 Title: PT OT SLP Therapies (Done)     Topic: Physical Therapy (Done)     Point: Mobility training (Done)     Learning Progress Summary           Patient Acceptance, E,D, DU,VU by  at 7/4/2022 1132    Comment: benefits of PT and POC, call for A OOB                   Point: Precautions (Done)     Learning Progress Summary           Patient  Acceptance, E,TB, VU by  at 7/7/2022 1450    Comment: deep breathing    Acceptance, E,TB, VU by  at 7/5/2022 1046    Comment: call for assist    Acceptance, E,D, DU,VU by  at 7/4/2022 1132    Comment: benefits of PT and POC, call for A OOB                               User Key     Initials Effective Dates Name Provider Type Discipline     06/16/21 -  Corrina Sandra, PTA Physical Therapist Assistant PT     06/16/21 -  Dexter Severino, PT Physical Therapist PT              PT Recommendation and Plan     Plan of Care Reviewed With: patient  Progress: improving  Outcome Evaluation: Pt trans to EOB sba, performed BLE AROM, sit-stand min assist, pt amb 50 feet x 2 rwx cga, 02 @ 6L, trans back to bed sba, 02 sat 88% after walking back to 92% with deep breathing and rest, pt would benefit from SNF   Outcome Measures     Row Name 07/07/22 1400 07/07/22 1100 07/06/22 1500       How much help from another person do you currently need...    Turning from your back to your side while in flat bed without using bedrails? 3  -AH -- 3  -NW    Moving from lying on back to sitting on the side of a flat bed without bedrails? 3  -AH -- 3  -NW    Moving to and from a bed to a chair (including a wheelchair)? 3  -AH -- 3  -NW    Standing up from a chair using your arms (e.g., wheelchair, bedside chair)? 3  -AH -- 3  -NW    Climbing 3-5 steps with a railing? 3  -AH -- 3  -NW    To walk in hospital room? 3  -AH -- 3  -NW    AM-PAC 6 Clicks Score (PT) 18  -AH -- 18  -NW       How much help from another is currently needed...    Putting on and taking off regular lower body clothing? -- 2  -TS --    Bathing (including washing, rinsing, and drying) -- 2  -TS --    Toileting (which includes using toilet bed pan or urinal) -- 3  -TS --    Putting on and taking off regular upper body clothing -- 3  -TS --    Taking care of personal grooming (such as brushing teeth) -- 3  -TS --    Eating meals -- 4  -TS --    AM-PAC 6 Clicks Score (OT) --  17  -TS --       Functional Assessment    Outcome Measure Options AM-PAC 6 Clicks Basic Mobility (PT)  - -- AM-[facility] 6 Clicks Basic Mobility (PT)  -NW    Row Name 07/06/22 1100 07/05/22 1300 07/05/22 1000       How much help from another person do you currently need...    Turning from your back to your side while in flat bed without using bedrails? -- -- 3  -AH    Moving from lying on back to sitting on the side of a flat bed without bedrails? -- -- 3  -AH    Moving to and from a bed to a chair (including a wheelchair)? -- -- 3  -AH    Standing up from a chair using your arms (e.g., wheelchair, bedside chair)? -- -- 3  -AH    Climbing 3-5 steps with a railing? -- -- 2  -AH    To walk in hospital room? -- -- 3  -AH    AM-PAC 6 Clicks Score (PT) -- -- 17  -AH       How much help from another is currently needed...    Putting on and taking off regular lower body clothing? 3  -MW 3  -MW --    Bathing (including washing, rinsing, and drying) 2  -MW 3  -MW --    Toileting (which includes using toilet bed pan or urinal) 2  -MW 3  -MW --    Putting on and taking off regular upper body clothing 3  -MW 3  -MW --    Taking care of personal grooming (such as brushing teeth) 3  -MW 4  -MW --    Eating meals 3  -MW 4  -MW --    AM-PAC 6 Clicks Score (OT) 16  -MW 20  -MW --       Functional Assessment    Outcome Measure Options -- AM-PAC 6 Clicks Daily Activity (OT)  -MW AM-[facility] 6 Clicks Basic Mobility (PT)  -          User Key  (r) = Recorded By, (t) = Taken By, (c) = Cosigned By    Initials Name Provider Type     Corrina Sandra, ZION Physical Therapist Assistant    TS Jes Matos COTA Occupational Therapist Assistant    NW Cherie Florez, ZION Physical Therapist Assistant    MW Thuy Ohara, OTR/L Occupational Therapist                 Time Calculation:    PT Charges     Row Name 07/07/22 1451             Time Calculation    Start Time 1405  -      Stop Time 1440  -      Time Calculation (min) 35 min  -       PT Received On 07/07/22  -              Time Calculation- PT    Total Timed Code Minutes- PT 35 minute(s)  -AH              Timed Charges    00895 - PT Therapeutic Exercise Minutes 15  -AH      68822 - Gait Training Minutes  20  -AH              Total Minutes    Timed Charges Total Minutes 35  -AH       Total Minutes 35  -AH            User Key  (r) = Recorded By, (t) = Taken By, (c) = Cosigned By    Initials Name Provider Type     Corrina Sandra, ZION Physical Therapist Assistant              Therapy Charges for Today     Code Description Service Date Service Provider Modifiers Qty    41161450433 HC PT THER PROC EA 15 MIN 7/7/2022 Corrina Sandra, PTA GP 1    99973486784 HC GAIT TRAINING EA 15 MIN 7/7/2022 Corrina Sandra, PTA GP 1          PT G-Codes  Outcome Measure Options: AM-PAC 6 Clicks Basic Mobility (PT)  AM-PAC 6 Clicks Score (PT): 18  AM-PAC 6 Clicks Score (OT): 17    Corrina Sandra PTA  7/7/2022

## 2022-07-07 NOTE — PLAN OF CARE
Goal Outcome Evaluation:  Plan of Care Reviewed With: patient        Progress: improving  Outcome Evaluation: Pt trans to EOB sba, performed BLE AROM, sit-stand min assist, pt amb 50 feet x 2 rwx cga, 02 @ 6L, trans back to bed sba, 02 sat 88% after walking back to 92% with deep breathing and rest, pt would benefit from SNF

## 2022-07-07 NOTE — CASE MANAGEMENT/SOCIAL WORK
Continued Stay Note  [facility]     Patient Name: Adrián Graham  MRN: [id number]  Today's Date: 7/7/2022    Admit Date: 7/1/2022     Discharge Plan     Row Name 07/07/22 1606       Plan    Plan Comments Pt was taken off of Covid precautions today. Notified Viviana at [facility] and they are working on bed availability. Dtr has been updated and aware that she can visit pt. Will follow up with Viviana tomorrow.               Discharge Codes    No documentation.               Expected Discharge Date and Time     Expected Discharge Date Expected Discharge Time    Jul 9, 2022             [place]

## 2022-07-07 NOTE — PLAN OF CARE
Isolation precautions discontinued today.  Ambulating in herrera with PT.  Continues to be O2 dependent.  Denies any further episodes of loose stool today.  Medicated for leg pain X1.  Safety measures maintained.

## 2022-07-07 NOTE — THERAPY TREATMENT NOTE
Acute Care - Occupational Therapy Treatment Note  [facility]     Patient Name: Adrián Graham  : 1940  MRN: [id number]  Today's Date: 2022             Admit Date: 2022       ICD-10-CM ICD-9-CM   1. Hypoxemia  R09.02 799.02   2. Orthostatic hypotension  I95.1 458.0   3. Pneumonia due to COVID-19 virus  U07.1 480.8    J12.82 079.89   4. Multiple subsegmental pulmonary emboli without acute cor pulmonale (HCC)  I26.94 415.19   5. Impaired mobility  Z74.09 799.89   6. Decreased activities of daily living (ADL)  Z78.9 V49.89     Patient Active Problem List   Diagnosis   • Idiopathic peripheral neuropathy   • Essential hypertension   • Benign prostatic hyperplasia without lower urinary tract symptoms   • Overweight (BMI 25.0-29.9)   • Carpal tunnel syndrome, left   • Bilateral carotid artery disease (HCC)   • Hyperlipidemia   • Preop cardiovascular exam CE[facility] Dr Brown   • Single subsegmental pulmonary embolism without acute cor pulmonale (HCC)   • Pulmonary embolism associated with COVID-19 (HCC)   • Acute respiratory failure with hypoxia (HCC)     Past Medical History:   Diagnosis Date   • Abdominal distention    • Abdominal pain, left upper quadrant    • Allergic rhinitis    • Arthritis    • Bloating    • BPH (benign prostatic hyperplasia)    • Carpal tunnel syndrome    • Change in bowel habits    • Constipation    • Diarrhea    • Erectile dysfunction    • GERD (gastroesophageal reflux disease)    • History of shingles    • Hypertension    • Idiopathic peripheral neuropathy    • Neuropathy    • Pneumonia    • Shingles    • Weight loss      Past Surgical History:   Procedure Laterality Date   • APPENDECTOMY     • BACK SURGERY     • CARPAL TUNNEL RELEASE Right    • CARPAL TUNNEL RELEASE Left 2022   • CHOLECYSTECTOMY     • COLONOSCOPY  2010    5 yr-complete colon not visualized   • COLONOSCOPY  2004    extremely tortuous redundant colon. BE-mild diverticulosis without diverticulitis. ?  Gallstones-Dr Chery.   • COLONOSCOPY N/A 09/10/2020    Procedure: COLONOSCOPY WITH ANESTHESIA;  Surgeon: Mani Sy DO;  Location: [facility] ENDOSCOPY;  Service: Gastroenterology;  Laterality: N/A;  Pre: Positive Colorectal Screening  Post: Diverticulosis  Dr. Mooney  CO2 Inflation Used   • ENDOSCOPY N/A 09/26/2016    Procedure: ESOPHAGOGASTRODUODENOSCOPY WITH ANESTHESIA;  Surgeon: Mani Sy DO;  Location: [facility] ENDOSCOPY;  Service:    • HIP SURGERY Right    • JOINT REPLACEMENT     • KNEE SURGERY     • OTHER SURGICAL HISTORY      Surgery for Spinal Stenosis   • OTHER SURGICAL HISTORY      Laser Prostate Surgery         OT ASSESSMENT FLOWSHEET (last 12 hours)     OT Evaluation and Treatment     Row Name 07/07/22 0855                   OT Time and Intention    Subjective Information complains of;fatigue;dyspnea  -TS        Document Type therapy note (daily note)  -TS        Mode of Treatment occupational therapy  -TS        Patient Effort good  -TS        Comment pt can be emotional at times  -TS                  General Information    Existing Precautions/Restrictions fall;oxygen therapy device and L/min  -TS                  Pain Assessment    Pretreatment Pain Rating 0/10 - no pain  -TS        Posttreatment Pain Rating 0/10 - no pain  -TS                  Cognition    Personal Safety Interventions fall prevention program maintained;nonskid shoes/slippers when out of bed  -TS                  Activities of Daily Living    BADL Assessment/Intervention lower body dressing;toileting  -TS                  Lower Body Dressing Assessment/Training    [place] Level (Lower Body Dressing) don;pants/bottoms;socks;set up;moderate assist (50% patient effort)  -TS        Position (Lower Body Dressing) unsupported sitting;supported standing  -TS                  Toileting Assessment/Training    [place] Level (Toileting) toileting skills;adjust/manage clothing;supervision;verbal cues  -TS        Assistive  "Devices (Toileting) urinal  -TS        Position (Toileting) supported standing  -TS                  Transfer Assessment/Treatment    Transfers sit-stand transfer;stand-sit transfer  -TS                  Transfers    Sit-Stand [place] (Transfers) standby assist;supervision  -TS        Stand-Sit [place] (Transfers) supervision  -TS                  Sit-Stand Transfer    Assistive Device (Sit-Stand Transfers) walker, front-wheeled  -TS                  Stand-Sit Transfer    Assistive Device (Stand-Sit Transfers) walker, front-wheeled  -TS                  Plan of Care Review    Plan of Care Reviewed With patient  -TS        Progress improving  -TS        Outcome Evaluation Pt becomes emotional sporadically. Pt appears anxious/depressed due to isolation. Pt physically strong but O2 needs are restricting pt from increased participation. Pt and MINA discussed focusing on what pt can do vs what pt is having more trouble doing. Pt min/mod A for LB dressing. Pt transfers with SBA/S with RW. Completed toileting task in standing with SBA with RW for balance and use of urinal. Pt on 5L when MINA entered room, O2 sats did not reach greater than 90% through tx. Pt left in room with respiratory who placed pt on hi mahesh O2 at 8L. Pt did report having some brown \"old\" blood when he did cough something up occasionally. Pt noted to have a dry cough through tx and did not produce any phlem. Continue OT POC  -TS                  Positioning and Restraints    Pre-Treatment Position sitting in chair/recliner  -TS        Post Treatment Position chair  -TS        In Chair reclined;call light within reach;encouraged to call for assist;with other staff  -TS              User Key  (r) = Recorded By, (t) = Taken By, (c) = Cosigned By    Initials Name Effective Dates    TS Jes Matos, MINA 06/16/21 -                  Occupational Therapy Education                 Title: PT OT SLP Therapies (Done)     Topic: Occupational " Therapy (Done)     Point: ADL training (Done)     Description:   Instruct learner(s) on proper safety adaptation and remediation techniques during self care or transfers.   Instruct in proper use of assistive devices.              Learning Progress Summary           Patient Acceptance, E,D, VU by  at 7/5/2022 1314    Acceptance, E,D, VU,NR by  at 7/4/2022 1241                   Point: Home exercise program (Done)     Description:   Instruct learner(s) on appropriate technique for monitoring, assisting and/or progressing therapeutic exercises/activities.              Learning Progress Summary           Patient Acceptance, E,D, VU by  at 7/5/2022 1314                   Point: Precautions (Done)     Description:   Instruct learner(s) on prescribed precautions during self-care and functional transfers.              Learning Progress Summary           Patient Acceptance, E,D, VU by  at 7/5/2022 1314    Acceptance, E,D, VU,NR by  at 7/4/2022 1241                   Point: Body mechanics (Done)     Description:   Instruct learner(s) on proper positioning and spine alignment during self-care, functional mobility activities and/or exercises.              Learning Progress Summary           Patient Acceptance, E,D, VU by  at 7/5/2022 1314    Acceptance, E,D, VU,NR by  at 7/4/2022 1241                               User Key     Initials Effective Dates Name Provider Type Discipline     06/16/21 -  Niru Zuniga K, OTR/L Occupational Therapist OT     08/28/18 -  Thuy Ohara, OTR/L Occupational Therapist OT                  OT Recommendation and Plan     Plan of Care Review  Plan of Care Reviewed With: patient  Progress: improving  Outcome Evaluation: Pt becomes emotional sporadically. Pt appears anxious/depressed due to isolation. Pt physically strong but O2 needs are restricting pt from increased participation. Pt and MINA discussed focusing on what pt can do vs what pt is having more trouble doing. Pt  "min/mod A for LB dressing. Pt transfers with SBA/S with RW. Completed toileting task in standing with SBA with RW for balance and use of urinal. Pt on 5L when MINA entered room, O2 sats did not reach greater than 90% through tx. Pt left in room with respiratory who placed pt on hi mahesh O2 at 8L. Pt did report having some brown \"old\" blood when he did cough something up occasionally. Pt noted to have a dry cough through tx and did not produce any phlem. Continue OT POC  Plan of Care Reviewed With: patient  Outcome Evaluation: Pt becomes emotional sporadically. Pt appears anxious/depressed due to isolation. Pt physically strong but O2 needs are restricting pt from increased participation. Pt and MINA discussed focusing on what pt can do vs what pt is having more trouble doing. Pt min/mod A for LB dressing. Pt transfers with SBA/S with RW. Completed toileting task in standing with SBA with RW for balance and use of urinal. Pt on 5L when MINA entered room, O2 sats did not reach greater than 90% through tx. Pt left in room with respiratory who placed pt on hi mahesh O2 at 8L. Pt did report having some brown \"old\" blood when he did cough something up occasionally. Pt noted to have a dry cough through tx and did not produce any phlem. Continue OT POC     Outcome Measures     Row Name 07/07/22 1100 07/06/22 1500 07/06/22 1100       How much help from another person do you currently need...    Turning from your back to your side while in flat bed without using bedrails? -- 3  -NW --    Moving from lying on back to sitting on the side of a flat bed without bedrails? -- 3  -NW --    Moving to and from a bed to a chair (including a wheelchair)? -- 3  -NW --    Standing up from a chair using your arms (e.g., wheelchair, bedside chair)? -- 3  -NW --    Climbing 3-5 steps with a railing? -- 3  -NW --    To walk in hospital room? -- 3  -NW --    AM-PAC 6 Clicks Score (PT) -- 18  -NW --       How much help from another is currently " needed...    Putting on and taking off regular lower body clothing? 2  -TS -- 3  -MW    Bathing (including washing, rinsing, and drying) 2  -TS -- 2  -MW    Toileting (which includes using toilet bed pan or urinal) 3  -TS -- 2  -MW    Putting on and taking off regular upper body clothing 3  -TS -- 3  -MW    Taking care of personal grooming (such as brushing teeth) 3  -TS -- 3  -MW    Eating meals 4  -TS -- 3  -MW    AM-PAC 6 Clicks Score (OT) 17  -TS -- 16  -MW       Functional Assessment    Outcome Measure Options -- AM-PAC 6 Clicks Basic Mobility (PT)  -NW --    Row Name 07/05/22 1300 07/05/22 1000          How much help from another person do you currently need...    Turning from your back to your side while in flat bed without using bedrails? -- 3  -AH     Moving from lying on back to sitting on the side of a flat bed without bedrails? -- 3  -AH     Moving to and from a bed to a chair (including a wheelchair)? -- 3  -AH     Standing up from a chair using your arms (e.g., wheelchair, bedside chair)? -- 3  -AH     Climbing 3-5 steps with a railing? -- 2  -AH     To walk in hospital room? -- 3  -AH     AM-PAC 6 Clicks Score (PT) -- 17  -AH            How much help from another is currently needed...    Putting on and taking off regular lower body clothing? 3  -MW --     Bathing (including washing, rinsing, and drying) 3  -MW --     Toileting (which includes using toilet bed pan or urinal) 3  -MW --     Putting on and taking off regular upper body clothing 3  -MW --     Taking care of personal grooming (such as brushing teeth) 4  -MW --     Eating meals 4  -MW --     AM-PAC 6 Clicks Score (OT) 20  -MW --            Functional Assessment    Outcome Measure Options AM-PAC 6 Clicks Daily Activity (OT)  -MW AM-PAC 6 Clicks Basic Mobility (PT)  -           User Key  (r) = Recorded By, (t) = Taken By, (c) = Cosigned By    Initials Name Provider Type    Corrina Garcia, PTA Physical Therapist Assistant    TS  Jes Matos COTA Occupational Therapist Assistant    NW Cherie Florez, PTA Physical Therapist Assistant    MW Thuy Ohara, OTR/L Occupational Therapist                Time Calculation:    Time Calculation- OT     Row Name 07/07/22 1112             Time Calculation- OT    OT Start Time 0855  -TS      OT Stop Time 1015  -TS      OT Time Calculation (min) 80 min  -TS      Total Timed Code Minutes- OT 80 minute(s)  -TS      OT Received On 07/07/22  -TS              Timed Charges    35524 - OT Self Care/Mgmt Minutes 80  -TS              Total Minutes    Timed Charges Total Minutes 80  -TS       Total Minutes 80  -TS            User Key  (r) = Recorded By, (t) = Taken By, (c) = Cosigned By    Initials Name Provider Type    TS Jes Matos COTA Occupational Therapist Assistant              Therapy Charges for Today     Code Description Service Date Service Provider Modifiers Qty    [id number] HC OT SELF CARE/MGMT/TRAIN EA 15 MIN 7/7/2022 Jes Matos COTA GO 5               Jes SANTOS. LEOPOLDO Matos  7/7/2022

## 2022-07-07 NOTE — NURSING NOTE
Patient had repeat CXR which show slight improvement from 7/1 regarding pneumonia left greater than right. Patient has remained afebrile. Noted that oxygen has been increased from 3 liters to 6 liters today. Patient tested positive for COVID on 6/26 and had an Antigen test on 7/6 which is negative. Patient is 11 days out from original COVID test. If Hospitalist agrees can be removed from Isolation. Maureen Hong RN, MSN Infection Preventionist

## 2022-07-07 NOTE — PLAN OF CARE
Goal Outcome Evaluation:  Plan of Care Reviewed With: patient        Progress: no change  Outcome Evaluation: Pt A/Ox4, forgetful at times. C/o having some pain in L knee, PRN traMADol given with some relief noted. Pt has been off and on asleep throughout the night and claims that he doesn't feel like he is getting enough rest. Relaxation techniques encouraged along with repositioning. Lovenox given for VTE. IV abx given as ordered. Pt remains stable on 6L of humidifed oxygen. UPx1 with walker to BR. No BM thus far in the shift. VSS. SR on tele. Was able to obtain the overdue sputum culture. Safety maintained.

## 2022-07-08 NOTE — PLAN OF CARE
"Goal Outcome Evaluation:  Plan of Care Reviewed With: patient        Progress: declining  Outcome Evaluation: Pt emotional \"i was doing so good\" Pt now on vapotherm 30/40. upon entering o2 89%. Bed mobility min x1. Sat EOB working on AROM BLEs x10-15 and AROM BUEs x10 w/ rest breaks. Pt stood x3 min x1 fatigues quickly each time and had to sit back down. pt was able to march in place and take sidesteps. pt is limited to mobility due to vapo therm, but pt is much weaker this am. o2 dropped to 80% during rx took several minutes to recover to 87-88%. at this time pt will need snf upon d/c  "

## 2022-07-08 NOTE — PROGRESS NOTES
PULMONARY AND CRITICAL CARE PROGRESS NOTE - [facility]    Patient: Adrián Graham  1940   MR# [id number]   Acct# [id number]  07/08/22   07:05 CDT  Referring Provider: Victor Manuel Dorado,*    Chief Complaint: Covid-19 -with worsening hypoxemic respiratory failure    Interval history: Patient is now out of COVID isolation. He had ABGs done early this morning on 15 L with a PO2 of 64.  He has been transitioned to Vapotherm currently 35 L 70% with a sat of 97%.  He tells me he is breathing better since being on the Vapotherm.  I talked with his nurse at the nurses station and they will decrease Vapotherm throughout the day as tolerated.  CRP and ferritin results are pending.  Otherwise, continue current treatment plan.    Meds:  albuterol sulfate HFA, 2 puff, Inhalation, 4x Daily - RT  ascorbic acid, 500 mg, Oral, Daily  aspirin, 81 mg, Oral, Daily  atorvastatin, 20 mg, Oral, Daily  cetirizine, 10 mg, Oral, Daily  diphenhydrAMINE, 25 mg, Oral, Nightly  enoxaparin, 1 mg/kg, Subcutaneous, Q12H  famotidine, 20 mg, Oral, BID AC  finasteride, 5 mg, Oral, Daily  fluticasone, 2 spray, Nasal, Daily  gabapentin, 400 mg, Oral, Q12H  guaiFENesin, 1,200 mg, Oral, BID  montelukast, 10 mg, Oral, Nightly  piperacillin-tazobactam, 4.5 g, Intravenous, Q8H  predniSONE, 40 mg, Oral, Daily With Breakfast  sodium chloride, 10 mL, Intravenous, Q12H  vitamin D3, 5,000 Units, Oral, Daily  zinc sulfate, 220 mg, Oral, Daily         Review of Systems:   Review of Systems   Constitutional: Positive for fatigue. Negative for fever.   HENT: Positive for congestion.    Respiratory: Positive for cough.    Gastrointestinal: Negative for diarrhea and nausea.   Musculoskeletal: Positive for arthralgias.   Neurological: Positive for weakness.          Physical Exam:  SpO2 Percentage    07/08/22 0618 07/08/22 0630 07/08/22 0655   SpO2: (!) 86% 92% 93%     Temp:  [96 °F (35.6 °C)-97.7 °F (36.5 °C)] 97 °F (36.1 °C)  Heart Rate:   [] 78  Resp:  [16-22] 20  BP: (115-156)/(56-83) 156/83    Intake/Output Summary (Last 24 hours) at 7/8/2022 0705  Last data filed at 7/7/2022 2041  Gross per 24 hour   Intake 240 ml   Output 650 ml   Net -410 ml     Body mass index is 26.68 kg/m².   GENERAL/CONSTITUTIONAL: no distress.  Vapotherm 35 L 70%.  HEENT: atraumatic, normocephalic.   NOSE: normal  NECK: jugular veins nondistended  CHEST: no paradox, no retractions.  No respiratory distress.   ABDOMEN: nondistended  : deferred  EXTREMITIES: no visible edema.  NEURO:  Awake and alert   PSYCH: no agitation  SKIN: no jaundice.  No rash  Electronically signed by MEKHI Calvert, 7/8/2022, 07:05 CDT        Physician Substantive Portion: Medical Decision Making    Laboratory Data:  Results from last 7 days   Lab Units 07/07/22  0941 07/04/22  0700 07/02/22  0647   WBC 10*3/mm3 21.96* 15.35* 5.86   HEMOGLOBIN g/dL 13.8 10.7* 12.2*   PLATELETS 10*3/mm3 553* 407 291     Results from last 7 days   Lab Units 07/07/22  0941 07/04/22  0700 07/02/22  0647 07/01/22  1134   SODIUM mmol/L 138 137 134* 133*   POTASSIUM mmol/L 3.5 4.2 4.0 3.8   BUN mg/dL 16 34* 25* 20   CREATININE mg/dL 0.77 0.87 0.79 0.85   INR   --   --   --  2.16*     Results from last 7 days   Lab Units 07/08/22  0649 07/02/22  0417 07/01/22  1515   PH, ARTERIAL pH units 7.491* 7.409 7.437   PCO2, ARTERIAL mm Hg 39.1 37.0 32.8*   PO2 ART mm Hg 63.6* 80.2* 80.3*   FIO2 %  --  70 40     Blood Culture   Date Value Ref Range Status   06/26/2022 No growth at 5 days  Final   06/26/2022 No growth at 5 days  Final     Results from last 7 days   Lab Units 07/04/22  0700 07/02/22  1248 07/02/22  0647 07/01/22  1429 07/01/22  1134   CRP mg/dL 6.46*  --  22.42*   < >  --    PROCALCITONIN ng/mL 0.08 0.21  --    < >  --    CK TOTAL U/L  --   --   --   --  167    < > = values in this interval not displayed.      Recent films:  XR Chest 1 View    Result Date: 7/6/2022  XR CHEST 1 VW- 7/6/2022 2:37 PM  CDT  HISTORY: COVID; R09.02-Hypoxemia; I95.1-Orthostatic hypotension; U07.1-COVID-19; J12.82-Pneumonia due to coronavirus disease 2019; I26.94-Multiple subsegmental pulmonary emboli without acute cor pulmonale; Z74.09-Other reduced mobility; Z78.9-Other specified health status  COMPARISON: 7/1/2022.  FINDINGS:  Reidentified bilateral, asymmetric pulmonary infiltrates. Left lung patchy infiltrates are nearly diffuse throughout the left lung fields and perhaps mildly improved from the recent comparison exam. Additional patchy right basilar infiltrates are very similar. Overall lung volumes are stable. No new consolidation. No pleural effusion or pneumothorax. Heart size is stable. Pulmonary vasculature are nondilated. No acute bony abnormality.      Impression: 1. Covid pneumonia with bilateral asymmetric pulmonary infiltrate (left greater than right), perhaps mildly improved from 7/1/2022. Lung volumes are stable.   This report was finalized on 07/06/2022 15:55 by Dr Prem Mclaughlin, .     Physician Substantive Portion: Medical Decision Making    Results from last 7 days   Lab Units 07/07/22  0941 07/04/22  0700 07/02/22  0647   WBC 10*3/mm3 21.96* 15.35* 5.86   HEMOGLOBIN g/dL 13.8 10.7* 12.2*   PLATELETS 10*3/mm3 553* 407 291     Results from last 7 days   Lab Units 07/07/22  0941 07/04/22  0700 07/02/22  0647   SODIUM mmol/L 138 137 134*   POTASSIUM mmol/L 3.5 4.2 4.0   CO2 mmol/L 30.0* 27.0 16.0*   BUN mg/dL 16 34* 25*   CREATININE mg/dL 0.77 0.87 0.79   MAGNESIUM mg/dL  --   --  2.3   PHOSPHORUS mg/dL  --   --  4.8*   GLUCOSE mg/dL 113* 159* 153*     Results from last 7 days   Lab Units 07/08/22  0649 07/02/22  0417 07/01/22  1515   PH, ARTERIAL pH units 7.491* 7.409 7.437   PCO2, ARTERIAL mm Hg 39.1 37.0 32.8*   PO2 ART mm Hg 63.6* 80.2* 80.3*   FIO2 %  --  70 40     Lab Results   Component Value Date    PROBNP 1,299.0 07/01/2022     Blood Culture   Date Value Ref Range Status   06/26/2022 No growth at 5 days   Final   06/26/2022 No growth at 5 days  Final       Recent radiology:   Imaging Results (Last 72 Hours)     Procedure Component Value Units Date/Time    XR Chest 1 View [[id number]] Collected: 07/06/22 1553     Updated: 07/06/22 1558    Narrative:      XR CHEST 1 VW- 7/6/2022 2:37 PM CDT     HISTORY: COVID; R09.02-Hypoxemia; I95.1-Orthostatic hypotension;  U07.1-COVID-19; J12.82-Pneumonia due to coronavirus disease 2019;  I26.94-Multiple subsegmental pulmonary emboli without acute cor  pulmonale; Z74.09-Other reduced mobility; Z78.9-Other specified health  status     COMPARISON: 7/1/2022.     FINDINGS:      Reidentified bilateral, asymmetric pulmonary infiltrates. Left lung  patchy infiltrates are nearly diffuse throughout the left lung fields  and perhaps mildly improved from the recent comparison exam. Additional  patchy right basilar infiltrates are very similar. Overall lung volumes  are stable. No new consolidation. No pleural effusion or pneumothorax.  Heart size is stable. Pulmonary vasculature are nondilated. No acute  bony abnormality.       Impression:      1. Covid pneumonia with bilateral asymmetric pulmonary infiltrate (left  greater than right), perhaps mildly improved from 7/1/2022. Lung volumes  are stable.        This report was finalized on 07/06/2022 15:55 by Dr Prem Mclaughlin, .        My radiograph interpretation/independent review of imaging: infiltrate L>R  Other test results (not lab or imaging): Results for orders placed during the hospital encounter of 07/01/22    Adult Transthoracic Echo Limited W/ Cont if Necessary Per Protocol    Interpretation Summary  · This is a limited echocardiogram.  · Left ventricular systolic function is normal. Left ventricular ejection fraction appears to be 61 - 65%.  · The right ventricular cavity is mildly dilated. Normal right ventricular systolic function noted.      My radiograph interpretation/independent review of imaging: no new films      Pulmonary  Assessment:    1. Acute hypoxic respiratory failure,  2. Bilateral groundglass pulmonary infiltrate  3. Worsening COVID-19 pneumonia  4. Possible acute lung injury or cryptogenic organizing pneumonia  5. Pulmonary embolism single subsegmental on anticoagulation  6. Essential hypertension  7. Hyperlipidemia  8. Carotid artery disease  9. Recurrent bronchitis  10. S/p COVID vaccinated status  11. Severe weakness and deconditioning  12. History of fall    Recommend/plan:   · Patient appears comfortable but requiring Vapotherm today.  He is on 25 L flow and 35% FiO2.  · Prednisone dose has been increased to 40 mg and CRP and D-dimer has been ordered.  · Continue titrating oxygen and try to transition oxygen to high flow oxygen when possible.  · Encourage incentive spirometry to improve pulmonary compliance. Increase physical activity as tolerated.  · DVT and stress ulcer prophylaxis and pain and anxiety control  · Repeat imaging studies and lab as needed.  Doctors also be followed.  · Nutritional support to continue..  Still on Zosyn.  De-escalate antibiotic treatment slowly.  · Due to high oxygen requirement he will most likely need oxygen at time of discharge and will need home oxygen reevaluation prior to discharge.  · CODE STATUS: Full.  Overall prognosis: Guarded but little better today.  He is off isolation today.  · We will follow.    This visit was performed by both a physician and an [facility] RN.  I personally evaluated and examined the patient.  I performed all aspects of the medical decision making as documented.    Electronically signed by     Miguel Smith MD,  Pulmonologist/Intensivist   7/8/2022, 16:59 CDT

## 2022-07-08 NOTE — CASE MANAGEMENT/SOCIAL WORK
Continued Stay Note   [place]     Patient Name: Adrián Graham  MRN: [id number]  Today's Date: 7/8/2022    Admit Date: 7/1/2022     Discharge Plan     Row Name 07/08/22 1421       Plan    Plan Comments Viviana Victoria is continuing to follow. Pt is now on Vapotherm and not medically stable for dc.               Discharge Codes    No documentation.               Expected Discharge Date and Time     Expected Discharge Date Expected Discharge Time    Jul 9, 2022             [facility] [place]

## 2022-07-08 NOTE — NURSING NOTE
When RN entered room pt O2 saturation was in the low 70's. Changed out  probes, still O2 sat remains the same. Bumped pt O2 up to 8L HF. O2 sat still remains low. Called LATONIA Giron for assistance. Dr. Parham notified of situation and ordered stat ABGs. Based off of the results of ABG, vapotherm was placed back on the pt.

## 2022-07-08 NOTE — PLAN OF CARE
Goal Outcome Evaluation:  Plan of Care Reviewed With: patient        Progress: improving  Outcome Evaluation: pt now on vapo 30/'35 o2 sats 96-98%. spoke w/ Respiratory ok to change over to 15L hi mahesh. sat EOB monitored o2 on hi mahesh sats remained in hi 90s. mult sit-stands min x1 then pt amb to chair few feet o2 remained in 90s on 15L hi flow. notified RTT ok to leave on hi flow notified RN. left pt up in chair and o2 sat was 98% as i was leaving.

## 2022-07-08 NOTE — PLAN OF CARE
Goal Outcome Evaluation:  Plan of Care Reviewed With: patient        Progress: no change  Outcome Evaluation: Vapotherm 30/40. NSG request not to get pt to EOB or OOB d/t deSATs this am. withPT. Performed tx in bed fully upright in seated position. O2 sats min 80s-90s. Anticipate pt can sit up next tx. Performed BUE ther ex continuous GM movement x30 seconds with RBs in between reps x6 sets. Performed reaching L and R for bed rails x10. Performed pulmonary ther ex and postural ther ex x10 reps for increase pulmonary function, decreased O2 needs and increased fxl ADLs. Recommend SNF vs inpatient rehab prior to O2 needs and progress made.

## 2022-07-08 NOTE — PLAN OF CARE
Goal Outcome Evaluation:  Plan of Care Reviewed With: patient, other (see comments)      Progress: no change  Outcome Evaluation: Ntn assessment completed. Pt tolerating oral diet. Oral intake avg. 75% of three available meals. Cardiac diet. Boost Plus added BID. Cont to follow for poc.

## 2022-07-08 NOTE — THERAPY TREATMENT NOTE
Acute Care - Physical Therapy Treatment Note  [facility]     Patient Name: Adrián Graham  : 1940  MRN: [id number]  Today's Date: 2022      Visit Dx:     ICD-10-CM ICD-9-CM   1. Hypoxemia  R09.02 799.02   2. Orthostatic hypotension  I95.1 458.0   3. Pneumonia due to COVID-19 virus  U07.1 480.8    J12.82 079.89   4. Multiple subsegmental pulmonary emboli without acute cor pulmonale (HCC)  I26.94 415.19   5. Impaired mobility  Z74.09 799.89   6. Decreased activities of daily living (ADL)  Z78.9 V49.89     Patient Active Problem List   Diagnosis   • Idiopathic peripheral neuropathy   • Essential hypertension   • Benign prostatic hyperplasia without lower urinary tract symptoms   • Overweight (BMI 25.0-29.9)   • Carpal tunnel syndrome, left   • Bilateral carotid artery disease (HCC)   • Hyperlipidemia   • Preop cardiovascular exam CEA Dr Brown   • Single subsegmental pulmonary embolism without acute cor pulmonale (HCC)   • Pulmonary embolism associated with COVID-19 (HCC)   • Acute respiratory failure with hypoxia (HCC)     Past Medical History:   Diagnosis Date   • Abdominal distention    • Abdominal pain, left upper quadrant    • Allergic rhinitis    • Arthritis    • Bloating    • BPH (benign prostatic hyperplasia)    • Carpal tunnel syndrome    • Change in bowel habits    • Constipation    • Diarrhea    • Erectile dysfunction    • GERD (gastroesophageal reflux disease)    • History of shingles    • Hypertension    • Idiopathic peripheral neuropathy    • Neuropathy    • Pneumonia    • Shingles    • Weight loss      Past Surgical History:   Procedure Laterality Date   • APPENDECTOMY     • BACK SURGERY     • CARPAL TUNNEL RELEASE Right    • CARPAL TUNNEL RELEASE Left 2022   • CHOLECYSTECTOMY     • COLONOSCOPY  2010    5 yr-complete colon not visualized   • COLONOSCOPY  2004    extremely tortuous redundant colon. BE-mild diverticulosis without diverticulitis. ? Gallstones-Dr Chery.   •  COLONOSCOPY N/A 09/10/2020    Procedure: COLONOSCOPY WITH ANESTHESIA;  Surgeon: Mani Sy DO;  Location: [facility] ENDOSCOPY;  Service: Gastroenterology;  Laterality: N/A;  Pre: Positive Colorectal Screening  Post: Diverticulosis  Dr. Mooney  CO2 Inflation Used   • ENDOSCOPY N/A 09/26/2016    Procedure: ESOPHAGOGASTRODUODENOSCOPY WITH ANESTHESIA;  Surgeon: Mani Sy DO;  Location: [facility] ENDOSCOPY;  Service:    • HIP SURGERY Right    • JOINT REPLACEMENT     • KNEE SURGERY     • OTHER SURGICAL HISTORY      Surgery for Spinal Stenosis   • OTHER SURGICAL HISTORY      Laser Prostate Surgery     PT Assessment (last 12 hours)     PT Evaluation and Treatment     [facility] Name 07/08/22 1009          Physical Therapy Time and Intention    Subjective Information complains of;weakness;fatigue  -NW     Document Type therapy note (daily note)  -NW     Mode of Treatment physical therapy  -NW     Comment pt discouraged b/c of set back  -[facility] Name 07/08/22 1009          General Information    Existing Precautions/Restrictions fall;oxygen therapy device and L/min  vapo 30/40  -[facility] Name 07/08/22 1009          Pain    Pretreatment Pain Rating 0/10 - no pain  -[facility] Name 07/08/22 1009          Bed Mobility    Supine-Sit [place] (Bed Mobility) verbal cues;contact guard;minimum assist (75% patient effort)  -NW     Sit-Supine [place] (Bed Mobility) verbal cues;contact guard  -[facility] Name 07/08/22 1009          Transfers    Sit-Stand [place] (Transfers) verbal cues;minimum assist (75% patient effort)  -NW     Stand-Sit [place] (Transfers) verbal cues;contact guard;minimum assist (75% patient effort)  -[facility] Name 07/08/22 1009          Sit-Stand Transfer    Assistive Device (Sit-Stand Transfers) walker, front-wheeled  -[facility] Name 07/08/22 1009          Stand-Sit Transfer    Assistive Device (Stand-Sit Transfers) walker, front-wheeled  -[facility] Name 07/08/22 1009           "Gait/Stairs (Locomotion)    Comment, (Gait/Stairs) \"im so weak\" pt able to sit EOB working on ROM BLEs and UEs. pt stood x3 fatigued quickly each time and had to sit back down. monitored o2  -NW     Row Name 07/08/22 1009          Safety Issues, Functional Mobility    Impairments Affecting Function (Mobility) endurance/activity tolerance;strength  -NW     Row Name 07/08/22 1009          Motor Skills    Comments, Therapeutic Exercise AROM BLEs x10 and AROM BUEs x10 w/ rest breaks  -NW     Row Name 07/08/22 1009          Vital Signs    Pre SpO2 (%) 89  -NW     O2 Delivery Pre Treatment supplemental O2  vapo  -NW     Intra SpO2 (%) 80  -NW     O2 Delivery Intra Treatment --  vapo  -NW     Post SpO2 (%) 86  -NW     O2 Delivery Post Treatment --  vapo  -NW     Row Name 07/08/22 1009          Positioning and Restraints    Pre-Treatment Position in bed  -NW     Post Treatment Position bed  -NW     In Bed fowlers;call light within reach;encouraged to call for assist;notified [facility]           User Key  (r) = Recorded By, (t) = Taken By, (c) = Cosigned By    Initials Name Provider Type     Cherie Florez, [facility] Physical Therapist Assistant                Physical Therapy Education                 Title: PT OT SLP Therapies (Done)     Topic: Physical Therapy (Done)     Point: Mobility training (Done)     Learning Progress Summary           Patient Acceptance, E,D, DU,VU by  at 7/4/2022 1132    Comment: benefits of PT and POC, call for A OOB                   Point: Precautions (Done)     Learning Progress Summary           Patient Acceptance, E,TB, VU by  at 7/7/2022 1450    Comment: deep breathing    Acceptance, E,TB, VU by  at 7/5/2022 1046    Comment: call for assist    Acceptance, E,D, DU,VU by  at 7/4/2022 1132    Comment: benefits of PT and POC, call for A OOB                               User Key     Initials Effective Dates Name Provider Type [facility] 06/16/21 -  Corrina Sandra PTA Physical " "Therapist Assistant PT     06/16/21 -  Dexter Severino, PT Physical Therapist PT              PT Recommendation and Plan     Plan of Care Reviewed With: patient  Progress: declining  Outcome Evaluation: Pt emotional \"i was doing so good\" Pt now on vapotherm 30/40. upon entering o2 89%. Bed mobility min x1. Sat EOB working on AROM BLEs x10-15 and AROM BUEs x10 w/ rest breaks. Pt stood x3 min x1 fatigues quickly each time and had to sit back down. pt was able to march in place and take sidesteps. pt is limited to mobility due to vapo therm, but pt is much weaker this am. o2 dropped to 80% during rx took several minutes to recover to 87-88%. at this time pt will need snf upon d/c   Outcome Measures     Row Name 07/08/22 1000 07/07/22 1400 07/07/22 1100       How much help from another person do you currently need...    Turning from your back to your side while in flat bed without using bedrails? 3  -NW 3  -AH --    Moving from lying on back to sitting on the side of a flat bed without bedrails? 3  -NW 3  -AH --    Moving to and from a bed to a chair (including a wheelchair)? 3  -NW 3  -AH --    Standing up from a chair using your arms (e.g., wheelchair, bedside chair)? 2  -NW 3  -AH --    Climbing 3-5 steps with a railing? 1  -NW 3  -AH --    To walk in hospital room? 3  -NW 3  -AH --    AM-PAC 6 Clicks Score (PT) 15  -NW 18  -AH --       How much help from another is currently needed...    Putting on and taking off regular lower body clothing? -- -- 2  -TS    Bathing (including washing, rinsing, and drying) -- -- 2  -TS    Toileting (which includes using toilet bed pan or urinal) -- -- 3  -TS    Putting on and taking off regular upper body clothing -- -- 3  -TS    Taking care of personal grooming (such as brushing teeth) -- -- 3  -TS    Eating meals -- -- 4  -TS    AM-PAC 6 Clicks Score (OT) -- -- 17  -TS       Functional Assessment    Outcome Measure Options AM-PAC 6 Clicks Basic Mobility (PT)  -NW AM-PAC 6 Clicks " Basic Mobility (PT)  - --    Row Name 07/06/22 1500 07/06/22 1100 07/05/22 1300       How much help from another person do you currently need...    Turning from your back to your side while in flat bed without using bedrails? 3  -NW -- --    Moving from lying on back to sitting on the side of a flat bed without bedrails? 3  -NW -- --    Moving to and from a bed to a chair (including a wheelchair)? 3  -NW -- --    Standing up from a chair using your arms (e.g., wheelchair, bedside chair)? 3  -NW -- --    Climbing 3-5 steps with a railing? 3  -NW -- --    To walk in hospital room? 3  -NW -- --    AM-PAC 6 Clicks Score (PT) 18  -NW -- --       How much help from another is currently needed...    Putting on and taking off regular lower body clothing? -- 3  -MW 3  -MW    Bathing (including washing, rinsing, and drying) -- 2  -MW 3  -MW    Toileting (which includes using toilet bed pan or urinal) -- 2  -MW 3  -MW    Putting on and taking off regular upper body clothing -- 3  -MW 3  -MW    Taking care of personal grooming (such as brushing teeth) -- 3  -MW 4  -MW    Eating meals -- 3  -MW 4  -MW    AM-PAC 6 Clicks Score (OT) -- 16  -MW 20  -MW       Functional Assessment    Outcome Measure Options AM-PAC 6 Clicks Basic Mobility (PT)  -NW -- AM-PAC 6 Clicks Daily Activity (OT)  -MW          User Key  (r) = Recorded By, (t) = Taken By, (c) = Cosigned By    Initials Name Provider Type     Corrina Sandra, [facility] Physical Therapist Assistant    Jes Galindo COTA Occupational Therapist Assistant    Cherie Hatfield PTA Physical Therapist Assistant    Thuy Mjeia, OTR/L Occupational Therapist                 Time Calculation:    PT Charges     Row Name 07/08/22 1052             Time Calculation    Start Time 1009  -NW      Stop Time 1052  -NW      Time Calculation (min) 43 min  -NW      PT Received On 07/08/22  -NW      PT Goal Re-Cert Due Date 07/14/22  -NW              Time Calculation- PT    Total  Timed Code Minutes- PT 43 minute(s)  -NW              Timed Charges    14589 - PT Therapeutic Exercise Minutes 13  -NW      35223 - PT Therapeutic Activity Minutes 30  -NW              Total Minutes    Timed Charges Total Minutes 43  -NW       Total Minutes 43  -NW            User Key  (r) = Recorded By, (t) = Taken By, (c) = Cosigned By    Initials Name Provider Type    NW Cherie Florez PTA Physical Therapist Assistant              Therapy Charges for Today     Code Description Service Date Service Provider Modifiers Qty    01996913789 HC PT THER PROC EA 15 MIN 7/8/2022 Cherie Florez PTA GP 1    06738409193 HC PT THERAPEUTIC ACT EA 15 MIN 7/8/2022 Cherie Florez PTA GP 2          PT G-Codes  Outcome Measure Options: AM-PAC 6 Clicks Basic Mobility (PT)  AM-PAC 6 Clicks Score (PT): 15  AM-PAC 6 Clicks Score (OT): 17    Cherie Florez PTA  7/8/2022

## 2022-07-08 NOTE — PLAN OF CARE
Goal Outcome Evaluation:  Plan of Care Reviewed With: patient        Progress: no change   Pt alert and oriented x4. VSS. No c/o pain. WHEAT. PPP. Tele NSR. Lovenox for VTE. , Pt on VapoTherm this morning, titrated down to HF NC 15L this afternoon. Tolerating prescribed diet. Voiding via external cath,  cath care completed. Up x 1 BSC/chair. Last BM 7/6. Bed/chair alarm set. Call light within reach. Safety maintained.

## 2022-07-08 NOTE — PROGRESS NOTES
[facility] Medicine Services  INPATIENT PROGRESS NOTE    Patient Name: Adrián Graham  Date of Admission: 7/1/2022  Today's Date: 07/08/22  Length of Stay: 7  Primary Care Physician: Mauro Irizarry DO    Subjective   Chief Complaint: Shortness of breath  Fall       7/4 Still weak and short of breath with minimal activity  7/5 Feels much better after a showed. Debilitated still. Dyspneic with minimal activity.   7/6 Complaints of neuropathic pain that is relieved with walking. Agreeable to rehabilitation placement. Breathing improvement is stagnant, may need oxygen at discharge.  7/7 Oxygen requirements have increased today.   7/8 Switched to high flow oxygen overnight, oxygen requirements seem to be increasing. He appears saddened by this news, not distressed.     Review of Systems   All pertinent negatives and positives are as above. All other systems have been reviewed and are negative unless otherwise stated.     Objective    Temp:  [96 °F (35.6 °C)-97.7 °F (36.5 °C)] 97.5 °F (36.4 °C)  Heart Rate:  [68-99] 91  Resp:  [16-20] 18  BP: (115-161)/(56-83) 161/61  Physical Exam  GEN: Awake, alert, interactive, in NAD  HEENT: PERRLA, EOMI, Anicteric, Trachea midline  Lungs: normal respiratory effort, normal chest rise, no distress  Heart: RRR, +S1/s2, no rub  ABD: soft, nt/nd, +BS, no guarding/rebound  Extremities: atraumatic, no cyanosis  Skin: no rashes or petechiae  Neuro: AAOx3, no focal deficits  Psych: normal mood & affect     Results Review:  I have reviewed the labs, radiology results, and diagnostic studies.    Laboratory Data:   Results from last 7 days   Lab Units 07/07/22  0941 07/04/22  0700 07/02/22  0647   WBC 10*3/mm3 21.96* 15.35* 5.86   HEMOGLOBIN g/dL 13.8 10.7* 12.2*   HEMATOCRIT % 43.9 32.9* 39.9   PLATELETS 10*3/mm3 553* 407 291        Results from last 7 days   Lab Units 07/07/22  0941 07/04/22  0700 07/02/22  0647 07/01/22  1134   SODIUM mmol/L 138 137  134* 133*   POTASSIUM mmol/L 3.5 4.2 4.0 3.8   CHLORIDE mmol/L 101 103 102 100   CO2 mmol/L 30.0* 27.0 16.0* 22.0   BUN mg/dL 16 34* 25* 20   CREATININE mg/dL 0.77 0.87 0.79 0.85   CALCIUM mg/dL 9.3 8.7 8.5* 8.4*   BILIRUBIN mg/dL  --   --  0.4 0.4   ALK PHOS U/L  --   --  73 73   ALT (SGPT) U/L  --   --  15 17   AST (SGOT) U/L  --   --  30 41*   GLUCOSE mg/dL 113* 159* 153* 123*       Culture Data:   Blood Culture   Date Value Ref Range Status   07/02/2022 No growth at 24 hours  Preliminary       Radiology Data:   Imaging Results (Last 24 Hours)     ** No results found for the last 24 hours. **          I have reviewed the patient's current medications.     Assessment/Plan     Active Hospital Problems    Diagnosis    • Acute respiratory failure with hypoxia (HCC)    • Pulmonary embolism associated with COVID-19 (HCC)    • Bilateral carotid artery disease (HCC)    • Hyperlipidemia    • Benign prostatic hyperplasia without lower urinary tract symptoms    • Essential hypertension        Plan:  Vitals every 4 hour  Continuous pulse oxymetry  Up with assistance, fall precautions  Oxygen requirements increasing, he did respond to single dose Lasix yesterday  ? 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 Transition to orals when oxygen requirements stabilize    BPH > Proscar    Insomnia > Benadryl 25 mg PO at HS   Stop melatonin due to no effect    Discharge Planning: SNF subacute rehabilitation approved, probably discharge tomorrow if oxygen need improve.    Electronically signed by Victor Manuel Dorado MD, 07/08/22, 10:34 CDT.

## 2022-07-08 NOTE — PLAN OF CARE
"Goal Outcome Evaluation:  Plan of Care Reviewed With: patient        Progress: improving  Outcome Evaluation: Pt A/Ox4. C/o having some pain \"everywhere\". PRN PO pain meds given. 1st dose of scheduled nightly Benadryl given lastnight, pt appears to be resting more tonight than he has in the last few nights previously. SR on tele. VSS. Requiring 6L of O2. IV abx infusing as ordered. Pt frequently/urgently urinating at the beginning of shift lastnight after recieving dose of lasix yesterday evening, external catheter applied just until the frequency subsided. Upx1 with walker. Safety maintained.  "

## 2022-07-09 NOTE — CONSULTS
#20G (1.88)IV placed to pt right forearm using US guidance, x 1 attempt.  #20G (1.88) IV placed to pt right upper using US guidance, x 1 attempt.

## 2022-07-09 NOTE — PROGRESS NOTES
[facility] Medicine Services  INPATIENT PROGRESS NOTE    Patient Name: Adrián Graham  Date of Admission: 7/1/2022  Today's Date: 07/09/22  Length of Stay: 8  Primary Care Physician: Mauro Irizarry DO    Subjective   Chief Complaint: Shortness of breath  Fall       7/4 Still weak and short of breath with minimal activity  7/5 Feels much better after a showed. Debilitated still. Dyspneic with minimal activity.   7/6 Complaints of neuropathic pain that is relieved with walking. Agreeable to rehabilitation placement. Breathing improvement is stagnant, may need oxygen at discharge.  7/7 Oxygen requirements have increased today.   7/8 Switched to high flow oxygen overnight, oxygen requirements seem to be increasing. He appears saddened by this news, not distressed.  7/9 Overnight oxygen saturation has dropped and requiring high flow at 40%. This AM decompensated further needing transfer to critical care. Temp 102. Nose bleed around canula, no sputum production. Chest with rales over left lower lobe.     Review of Systems   All pertinent negatives and positives are as above. All other systems have been reviewed and are negative unless otherwise stated.     Objective    Temp:  [97.4 °F (36.3 °C)-102 °F (38.9 °C)] 102 °F (38.9 °C)  Heart Rate:  [] 103  Resp:  [16-38] 28  BP: (106-126)/(49-71) 124/71  Physical Exam  Constitutional:       General: He is in acute distress.      Appearance: He is well-developed. He is ill-appearing.   HENT:      Head: Normocephalic and atraumatic.      Right Ear: External ear normal.      Left Ear: External ear normal.      Nose: Nose normal.      Mouth/Throat:      Mouth: Mucous membranes are dry.   Eyes:      General:         Right eye: No discharge.         Left eye: No discharge.      Extraocular Movements: Extraocular movements intact.      Conjunctiva/sclera: Conjunctivae normal.      Pupils: Pupils are equal, round, and reactive to light.    Neck:      Vascular: No JVD.   Cardiovascular:      Rate and Rhythm: Regular rhythm. Tachycardia present.      Heart sounds: Normal heart sounds. No murmur heard.  Pulmonary:      Effort: No respiratory distress.      Breath sounds: No stridor. Rales present. No wheezing or rhonchi.   Chest:      Chest wall: No tenderness.   Abdominal:      General: Bowel sounds are normal. There is no distension.      Palpations: Abdomen is soft.      Tenderness: There is no abdominal tenderness. There is no guarding or rebound.   Musculoskeletal:         General: No tenderness or deformity. Normal range of motion.      Cervical back: Normal range of motion and neck supple. No rigidity.      Right lower leg: No edema.      Left lower leg: No edema.   Skin:     General: Skin is warm and dry.      Findings: No rash.   Neurological:      General: No focal deficit present.      Mental Status: He is alert and oriented to person, place, and time. Mental status is at baseline.      Cranial Nerves: No cranial nerve deficit.      Sensory: No sensory deficit.      Motor: No abnormal muscle tone.      Deep Tendon Reflexes: Reflexes normal.   Psychiatric:         Mood and Affect: Mood normal.         Behavior: Behavior normal.         Results Review:  I have reviewed the labs, radiology results, and diagnostic studies.    Laboratory Data:   Results from last 7 days   Lab Units 07/07/22  0941 07/04/22  0700   WBC 10*3/mm3 21.96* 15.35*   HEMOGLOBIN g/dL 13.8 10.7*   HEMATOCRIT % 43.9 32.9*   PLATELETS 10*3/mm3 553* 407        Results from last 7 days   Lab Units 07/07/22  0941 07/04/22  0700   SODIUM mmol/L 138 137   POTASSIUM mmol/L 3.5 4.2   CHLORIDE mmol/L 101 103   CO2 mmol/L 30.0* 27.0   BUN mg/dL 16 34*   CREATININE mg/dL 0.77 0.87   CALCIUM mg/dL 9.3 8.7   GLUCOSE mg/dL 113* 159*       Culture Data:   Blood Culture   Date Value Ref Range Status   07/02/2022 No growth at 24 hours  Preliminary       Radiology Data:   Imaging Results  (Last 24 Hours)     Procedure Component Value Units Date/Time    XR Chest 1 View [[id number]] Collected: 07/08/22 1115     Updated: 07/08/22 1119    Narrative:      EXAMINATION: XR CHEST 1 VW-     7/8/2022 11:09 AM CDT     HISTORY: Worsening hypoxemia; R09.02-Hypoxemia; I95.1-Orthostatic  hypotension; U07.1-COVID-19; J12.82-Pneumonia due to coronavirus disease  2019; I26.94-Multiple subsegmental pulmonary emboli without acute cor  pulmonale; Z74.09-Other reduced mobility; Z78.9-Other specified health  status     1 view chest x-ray.     Comparison is made with July 6, 2022.     Patchy infiltrate is again seen throughout both lungs.  There is been no improvement.     No pneumothorax.     Stable heart size.     Summary:  1. Similar appearance of infiltrate throughout both lungs. No  improvement.  This report was finalized on 07/08/2022 11:16 by Dr. Alvaro Wang MD.          I have reviewed the patient's current medications.     Assessment/Plan     Active Hospital Problems    Diagnosis    • Acute respiratory failure with hypoxia (HCC)    • Pulmonary embolism associated with COVID-19 (HCC)    • Bilateral carotid artery disease (HCC)    • Hyperlipidemia    • Benign prostatic hyperplasia without lower urinary tract symptoms    • Essential hypertension        Plan:  Vitals per protocol  Transfer to critical care 7/9  Continuous pulse oxymetry  Up with assistance, fall precautions  Code status reviewed > DNR/DNI    Hypoxemia worsening with fever  ID consult ?antibiotic coverage    Blood cultures x 2 7/9  Repeat MRSA screen nares  Vancomycin 1.5 IVPB and pharmacy to dose  Start BiPAP 7/9    COVID 19 PNA > Isolation discontinued 7/6, oral steroid taper  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 Transition to orals when oxygen requirements stabilize    BPH >  Proscar    Insomnia > Benadryl 25 mg PO at HS     Prognosis guarded    I spent 52 minutes providing critical care management to this patient. This excludes time spent in performing separately billed procedures.       Discharge Planning: SNF subacute rehabilitation approved, probably discharge tomorrow if oxygen need improve.    Electronically signed by Victor Manuel Dorado MD, 07/09/22, 10:09 CDT.

## 2022-07-09 NOTE — NURSING NOTE
Around this time I was giving my pt his morning meds. Pt started complaining that he couldn't catch his breath and started shivering uncontrobably. I got a warm blanket and checked his O2 levels. His stats dropped down into the 70's. He was on Vapotherm 30/100 and a nonrebreather mask. I called the respiratory therapist and my charge nurse. The respiratory therapist had me turn his vopotherm up to 40/100. His O2 levels came up to high 80's to low 90's but  would periodiocally dip down into the 70's. My charge nurse contacted Dr Dorado and let him know the situation while I contacted the family.     1015 - Pt moved to CCU 01, bedside report given to YASMEEN Garland.

## 2022-07-09 NOTE — PROGRESS NOTES
approx 0915 call her nursing stating patient not breathing well with decreasing saturations regardless of vapotherm 40lpm and 100% with nrb also. Patient labored and soa. See flowsheet.

## 2022-07-09 NOTE — PROGRESS NOTES
"Pharmacy Dosing Service  Pharmacokinetics  Vancomycin Initial Evaluation  Assessment/Action/Plan:  Loading dose: Vancomycin 1500 mg IVPB @ 1200 ONCE  Current Order: Vancomycin 1000 mg IVPB every 12 hours  Current end date/last dose:7/16/22 @ 1200  Levels: None at this time  Additional antimicrobial agent(s): Zosyn    Vancomycin dosage initiated based on population pharmacokinetic parameters. Pharmacy will continue to follow daily and adjust dose accordingly.     Subjective:  Adrián Graham is a 82 y.o. male with a Vancomycin \"Pharmacy to Dose\" consult for the treatment of pneumonia/sepsis , day 1 of 7 of treatment.    AUC Model Data:  Loading dose: 1500 mg at 12:00 07/09/2022.  Regimen: 1000 mg IV every 12 hours.  Exposure target: AUC24 (range) 400-600 mg/L.hr   AUC24,ss: 526 mg/L.hr  PAUC*: 78 %  Ctrough,ss: 17.3 mg/L  Pconc*: 37 %  Tox.: 13 %    Objective:  Ht: 185.4 cm (73\"); Wt: 91.7 kg (202 lb 3.2 oz)  Estimated Creatinine Clearance: 94.3 mL/min (by C-G formula based on SCr of 0.77 mg/dL).   Creatinine   Date Value Ref Range Status   07/07/2022 0.77 0.76 - 1.27 mg/dL Final   07/04/2022 0.87 0.76 - 1.27 mg/dL Final   07/02/2022 0.79 0.76 - 1.27 mg/dL Final   06/14/2022 0.70 0.60 - 1.30 mg/dL Final     Comment:     Serial Number: [id number]:  [id number]      Lab Results   Component Value Date    WBC 21.96 (H) 07/07/2022    WBC 15.35 (H) 07/04/2022    WBC 5.86 07/02/2022      Baseline culture results:  Microbiology Results (last 10 days)       Procedure Component Value - Date/Time    Respiratory Culture - Sputum, Cough [[id number]] Collected: 07/06/22 2202    Lab Status: Final result Specimen: Sputum from Cough Updated: 07/08/22 0930     Respiratory Culture Rejected     Gram Stain Few (2+) WBCs seen      No Epithelial cells seen      No organisms seen    Narrative:      Specimen rejected due to oropharyngeal contamination. Please reorder and recollect specimen if clinically necessary.    COVID-19 RAPID " AG,VERITOR,COR/GEE/PAD/MARIYA/MAD/RACHEL/LAG/JOSEPH/ IN-HOUSE,DRY SWAB, 1-2 HR TAT - Swab, Nasal Cavity [[id number]]  (Normal) Collected: 07/06/22 1451    Lab Status: Final result Specimen: Swab from Nasal Cavity Updated: 07/06/22 1600     COVID19 Presumptive Negative    Narrative:      Fact sheets for providers: https://www.fda.gov/media/424566/download    Fact sheets for patients: https://www.fda.gov/media/135837/download    Legionella Antigen, Urine - Urine, Urine, Clean Catch [[id number]]  (Normal) Collected: 07/02/22 2126    Lab Status: Final result Specimen: Urine, Clean Catch Updated: 07/02/22 2155     LEGIONELLA ANTIGEN, URINE Negative    S. Pneumo Ag Urine or CSF - Urine, Urine, Clean Catch [[id number]]  (Normal) Collected: 07/02/22 2126    Lab Status: Final result Specimen: Urine, Clean Catch Updated: 07/02/22 2155     Strep Pneumo Ag Negative    Blood Culture - Blood, Arm, Left [[id number]]  (Normal) Collected: 07/02/22 1248    Lab Status: Final result Specimen: Blood from Arm, Left Updated: 07/07/22 1333     Blood Culture No growth at 5 days    Respiratory Panel PCR w/COVID-19(SARS-CoV-2) RACHEL/MARIYA/GEE/PAD/COR/MAD/JOSEPH In-House, NP Swab in UTM/VTM, 3-4 HR TAT - Swab, Nasopharynx [[id number]]  (Abnormal) Collected: 07/01/22 1441    Lab Status: Final result Specimen: Swab from Nasopharynx Updated: 07/01/22 1620     ADENOVIRUS, PCR Not Detected     Coronavirus 229E Not Detected     Coronavirus HKU1 Not Detected     Coronavirus NL63 Not Detected     Coronavirus OC43 Not Detected     COVID19 Detected     Human Metapneumovirus Not Detected     Human Rhinovirus/Enterovirus Not Detected     Influenza A PCR Not Detected     Influenza B PCR Not Detected     Parainfluenza Virus 1 Not Detected     Parainfluenza Virus 2 Not Detected     Parainfluenza Virus 3 Not Detected     Parainfluenza Virus 4 Not Detected     RSV, PCR Not Detected     Bordetella pertussis pcr Not Detected     Bordetella parapertussis PCR Not Detected      Chlamydophila pneumoniae PCR Not Detected     Mycoplasma pneumo by PCR Not Detected    Narrative:      In the setting of a positive respiratory panel with a viral infection PLUS a negative procalcitonin without other underlying concern for bacterial infection, consider observing off antibiotics or discontinuation of antibiotics and continue supportive care. If the respiratory panel is positive for atypical bacterial infection (Bordetella pertussis, Chlamydophila pneumoniae, or Mycoplasma pneumoniae), consider antibiotic de-escalation to target atypical bacterial infection.    MRSA Screen, PCR (Inpatient) - Swab, Nares [[id number]]  (Normal) Collected: 07/01/22 1441    Lab Status: Final result Specimen: Swab from Nares Updated: 07/01/22 1635     MRSA PCR No MRSA Detected    Narrative:      The negative predictive value of this diagnostic test is high and should only be used to consider de-escalating anti-MRSA therapy. A positive result may indicate colonization with MRSA and must be correlated clinically.    COVID-19,[employer] IN-HOUSE,Nasal Swab No Transport Media 3-4 HR TAT - Swab, Nasal Cavity [[id number]]  (Normal) Collected: 07/01/22 1214    Lab Status: Final result Specimen: Swab from Nasal Cavity Updated: 07/01/22 1343     COVID19 Not Detected    Narrative:      Fact sheet for providers: https://www.fda.gov/media/071495/download     Fact sheet for patients: https://www.fda.gov/media/042238/download    Test performed by PCR.    Consider negative results in combination with clinical observations, patient history, and epidemiological information.            Miracle Pitt, Kamila  07/09/22 10:44 CDT

## 2022-07-09 NOTE — CONSULTS
"INFECTIOUS DISEASES CONSULT NOTE    Patient:  Adrián Graham 82 y.o. male  ROOM # C001/1  YOB: 1940  MRN: [id number]  [facility]:  [id number]  Admit date: 7/1/2022   Admitting Physician: Victor Manuel Dorado,*  Primary Care Physician: Mauro Irizarry DO  REFERRING PROVIDER: Dr Dorado    REASON FOR CONSULTATION :long COVID with organizing pneumonia and worsening hypoxemia      HISTORY OF PRESENT ILLNESS: Patient is a pleasant 82-year-old gentleman who was admitted July 1 after a fall and complaining of shortness of breath.    He been admitted June 26 and discharged June 27 and was noted to be positive for COVID-19 at that time with right lower lobe segmental and subsegmental PEs and was discharged on Eliquis.    Upon admission July 1 he was initially placed on 2 L of oxygen however was titrated up to 25 L and 40% of Vapotherm fairly quickly over the course of his ER stay.    His COVID PCR testing was positive on repeat this admission (first positive I located was June 26) however he was noted to have some bilateral worsening groundglass opacities on his CTA as well as some questionable increase in his prior PEs.    The patient was started on azithromycin July 2 and received that through July 6  He received Zosyn July 2 through today July 9    He received 125 mg of methylprednisolone on admission then 40 mg IV every 12 hours with last dose the evening of July 3    Patient was noted to have increasing O2 requirements and spike a temperature which is now maxed out at 103 and he was moved to the CCU and is on BiPAP.    Currently the only thing he complains of his \"butt\" hurting on both sides and just request to be repositioned.    Blood cultures have been obtained and vancomycin has been started      Past Medical History:   Diagnosis Date   • Abdominal distention    • Abdominal pain, left upper quadrant    • Allergic rhinitis    • Arthritis    • Bloating    • BPH (benign prostatic hyperplasia)    • " Carpal tunnel syndrome    • Change in bowel habits    • Constipation    • Diarrhea    • Erectile dysfunction    • GERD (gastroesophageal reflux disease)    • History of shingles    • Hypertension    • Idiopathic peripheral neuropathy    • Neuropathy    • Pneumonia    • Shingles    • Weight loss      Past Surgical History:   Procedure Laterality Date   • APPENDECTOMY     • BACK SURGERY     • CARPAL TUNNEL RELEASE Right    • CARPAL TUNNEL RELEASE Left 06/2022   • CHOLECYSTECTOMY     • COLONOSCOPY  09/14/2010    5 yr-complete colon not visualized   • COLONOSCOPY  07/27/2004    extremely tortuous redundant colon. BE-mild diverticulosis without diverticulitis. ? Gallstones-Dr Chery.   • COLONOSCOPY N/A 09/10/2020    Procedure: COLONOSCOPY WITH ANESTHESIA;  Surgeon: Mani Sy DO;  Location: [facility] ENDOSCOPY;  Service: Gastroenterology;  Laterality: N/A;  Pre: Positive Colorectal Screening  Post: Diverticulosis  Dr. Mooney  CO2 Inflation Used   • ENDOSCOPY N/A 09/26/2016    Procedure: ESOPHAGOGASTRODUODENOSCOPY WITH ANESTHESIA;  Surgeon: Mani Sy DO;  Location: [facility] ENDOSCOPY;  Service:    • HIP SURGERY Right    • JOINT REPLACEMENT     • KNEE SURGERY     • OTHER SURGICAL HISTORY      Surgery for Spinal Stenosis   • OTHER SURGICAL HISTORY      Laser Prostate Surgery     Family History   Problem Relation Age of Onset   • Diabetes Mother    • Cancer Mother    • Osteoporosis Mother    • Heart disease Brother    • Colon cancer Neg Hx    • Colon polyps Neg Hx    • Esophageal cancer Neg Hx      Social History     Socioeconomic History   • Marital status:    Tobacco Use   • Smoking status: Never Smoker   • Smokeless tobacco: Never Used   Vaping Use   • Vaping Use: Never used   Substance and Sexual Activity   • Alcohol use: No   • Drug use: No   • Sexual activity: Defer       Current Scheduled Medications:   ascorbic acid, 500 mg, Oral, Daily  aspirin, 81 mg, Oral, Daily  atorvastatin, 20 mg, Oral,  "Daily  cetirizine, 10 mg, Oral, Daily  diphenhydrAMINE, 25 mg, Oral, Nightly  enoxaparin, 1 mg/kg, Subcutaneous, Q12H  famotidine, 20 mg, Oral, BID AC  finasteride, 5 mg, Oral, Daily  fluticasone, 2 spray, Nasal, Daily  furosemide, 20 mg, Oral, BID  gabapentin, 400 mg, Oral, Q12H  guaiFENesin, 1,200 mg, Oral, BID  ipratropium-albuterol, 3 mL, Nebulization, 4x Daily - RT  montelukast, 10 mg, Oral, Nightly  piperacillin-tazobactam, 4.5 g, Intravenous, Q8H  predniSONE, 40 mg, Oral, Daily With Breakfast  sodium chloride, 10 mL, Intravenous, Q12H  [START ON 7/10/2022] vancomycin, 1,000 mg, Intravenous, Q12H  vitamin D3, 5,000 Units, Oral, Daily  zinc sulfate, 220 mg, Oral, Daily      Current PRN Medications:  •  acetaminophen  •  albuterol  •  ondansetron  •  sodium chloride  •  traMADol     No Known Allergies    Review of Systems   Constitutional: Positive for fever.   HENT: Negative for ear pain.    Eyes: Negative for photophobia.   Respiratory: Positive for shortness of breath.    Cardiovascular: Negative for chest pain.   Gastrointestinal: Negative for vomiting.   Endocrine: Negative for polyuria.   Genitourinary: Negative for dysuria.   Musculoskeletal: Negative for arthralgias.   Skin: Negative for wound.         Vital Signs:  /40   Pulse 96   Temp (!) 103.1 °F (39.5 °C) (Axillary)   Resp (!) 32   Ht 185.4 cm (73\")   Wt 91.7 kg (202 lb 3.2 oz)   SpO2 93%   BMI 26.68 kg/m²   Temp (24hrs), Av.2 °F (37.3 °C), Min:97.4 °F (36.3 °C), Max:103.1 °F (39.5 °C)      Physical Exam   General: Patient's elderly male lying in bed in moderate respiratory distress  HEENT: Sclera anicteric.  BiPAP is in place.  Respiratory: Effort even and labored with a rate of 30/min.  Patient is able to however talk while the BiPAP mask is on in 4-5 word sentences.  He does have scattered rhonchi throughout  Heart: S1 is 2, rate is 105.  Abdomen: Soft, nontender, nondistended, bowel sounds positive  Extremities: No asymmetric " edema  Neuro: Alert, oriented, nightly he Justyna fairly well considering he has a BiPAP mask on  Psych: Pleasant and cooperative      Line/IV site: Peripheral IV x2 right upper extremity, condition patent and no redness    Results Review:    I reviewed the patient's new clinical results.    Lab Results:  CBC:   Lab Results   Lab 07/04/22  0700 07/07/22  0941   WBC 15.35* 21.96*   HEMOGLOBIN 10.7* 13.8   HEMATOCRIT 32.9* 43.9   PLATELETS 407 553*   LYMPHOCYTE %  --  16.2*       CMP:   Lab Results   Lab 07/04/22  0700 07/07/22  0941   SODIUM 137 138   POTASSIUM 4.2 3.5   CHLORIDE 103 101   CO2 27.0 30.0*   BUN 34* 16   CREATININE 0.87 0.77   CALCIUM 8.7 9.3   GLUCOSE 159* 113*     COVID LABS:  Results From Last 14 Days   Lab Units 07/07/22  0941 07/04/22  0700 07/02/22  1248 07/02/22  0647 07/01/22  1429 07/01/22  1429 07/01/22  1134 06/26/22  1137   PROBNP pg/mL  --   --   --   --   --  1,299.0  --  717.8   CK TOTAL U/L  --   --   --   --   --   --  167  --    CRP mg/dL 8.16* 6.46*  --  22.42*   < > 18.78*  --   --    D DIMER QUANT MCGFEU/mL  --   --   --   --   --   --  4.49* 14.95*   FERRITIN ng/mL 1,243.00*  --   --   --   --   --   --   --    LACTATE mmol/L  --   --  1.8  --   --   --   --  1.3   PROCALCITONIN ng/mL  --  0.08 0.21  --   --  0.17  --   --    PROTIME Seconds  --   --   --   --   --   --  23.3*  --    INR   --   --   --   --   --   --  2.16*  --    TROPONIN T ng/mL  --   --   --   --   --  <0.010 <0.010 <0.010    < > = values in this interval not displayed.         Lab Results (last 72 hours)     Procedure Component Value Units Date/Time    MRSA Screen, PCR (Inpatient) - Swab, Nares [[id number]]  (Normal) Collected: 07/09/22 1052    Specimen: Swab from Nares Updated: 07/09/22 1210     MRSA PCR No MRSA Detected    Narrative:      The negative predictive value of this diagnostic test is high and should only be used to consider de-escalating anti-MRSA therapy. A positive result may indicate colonization  with MRSA and must be correlated clinically.    Blood Culture - Blood, Arm, Right [[id number]] Collected: 07/09/22 1030    Specimen: Blood from Arm, Right Updated: 07/09/22 1046    Blood Culture - Blood, Arm, Left [[id number]] Collected: 07/09/22 1028    Specimen: Blood from Arm, Left Updated: 07/09/22 1046    Blood Gas, Arterial - [[id number]]  (Abnormal) Collected: 07/09/22 0933    Specimen: Arterial Blood Updated: 07/09/22 0932     Site Right Radial     Angel's Test Positive     pH, Arterial 7.476 pH units      Comment: 83 Value above reference range        pCO2, Arterial 34.6 mm Hg      Comment: 84 Value below reference range        pO2, Arterial 47.0 mm Hg      Comment: 85 Value below critical limit        HCO3, Arterial 25.5 mmol/L      Base Excess, Arterial 2.3 mmol/L      Comment: 83 Value above reference range        O2 Saturation, Arterial 85.2 %      Comment: 84 Value below reference range        Temperature 37.0 C      Barometric Pressure for Blood Gas 750 mmHg      Modality Heated HFNC     FIO2 100 %      Flow Rate 40.0 lpm      Ventilator Mode NA     Notified [facility] [id number]      Notified By [id number]     Notified Time 07/09/2022 09:35     Collected by [id number]     Comment: Meter: [id number]     :  [id number]        pCO2, Temperature Corrected 34.6 mm Hg      pH, Temp Corrected 7.476 pH Units      pO2, Temperature Corrected 47.0 mm Hg     POC Glucose Once [[id number]]  (Normal) Collected: 07/09/22 0908    Specimen: Blood Updated: 07/09/22 0920     Glucose 92 mg/dL      Comment: : [id number] Miguel A Hurtado ID: [id number]       C-reactive Protein [[id number]]  (Abnormal) Collected: 07/07/22 0941    Specimen: Blood Updated: 07/08/22 1215     C-Reactive Protein 8.16 mg/dL     Ferritin [[id number]]  (Abnormal) Collected: 07/07/22 0941    Specimen: Blood Updated: 07/08/22 1215     Ferritin 1,243.00 ng/mL     Narrative:      Results may be falsely decreased if patient taking Biotin.      Respiratory Culture  - Sputum, Cough [[id number]] Collected: 07/06/22 2202    Specimen: Sputum from Cough Updated: 07/08/22 0930     Respiratory Culture Rejected     Gram Stain Few (2+) WBCs seen      No Epithelial cells seen      No organisms seen    Narrative:      Specimen rejected due to oropharyngeal contamination. Please reorder and recollect specimen if clinically necessary.    Blood Gas, Arterial - [[id number]]  (Abnormal) Collected: 07/08/22 0649    Specimen: Arterial Blood Updated: 07/08/22 0643     Site Right Radial     Angel's Test N/A     pH, Arterial 7.491 pH units      Comment: 83 Value above reference range        pCO2, Arterial 39.1 mm Hg      pO2, Arterial 63.6 mm Hg      Comment: 84 Value below reference range        HCO3, Arterial 29.8 mmol/L      Comment: 83 Value above reference range        Base Excess, Arterial 6.0 mmol/L      Comment: 83 Value above reference range        O2 Saturation, Arterial 93.8 %      Comment: 84 Value below reference range        Temperature 37.0 C      Barometric Pressure for Blood Gas 749 mmHg      Modality HFNC     Flow Rate 15.0 lpm      Ventilator Mode NA     Collected by [id number]     Comment: Meter: [id number]     :  [id number]        pCO2, Temperature Corrected 39.1 mm Hg      pH, Temp Corrected 7.491 pH Units      pO2, Temperature Corrected 63.6 mm Hg     Blood Culture - Blood, Arm, Left [[id number]]  (Normal) Collected: 07/02/22 1248    Specimen: Blood from Arm, Left Updated: 07/07/22 1333     Blood Culture No growth at 5 days    Manual Differential [[id number]]  (Abnormal) Collected: 07/07/22 0941    Specimen: Blood Updated: 07/07/22 1043     Neutrophil % 76.8 %      Lymphocyte % 16.2 %      Eosinophil % 2.0 %      Bands %  2.0 %      Metamyelocyte % 1.0 %      Myelocyte % 1.0 %      Atypical Lymphocyte % 1.0 %      Neutrophils Absolute 17.30 10*3/mm3      Lymphocytes Absolute 3.78 10*3/mm3      Eosinophils Absolute 0.44 10*3/mm3      Rouleaux Mod/2+     WBC Morphology  Normal     Platelet Estimate Increased    CBC & Differential [[id number]]  (Abnormal) Collected: 07/07/22 0941    Specimen: Blood Updated: 07/07/22 1043    Narrative:      The following orders were created for panel order CBC & Differential.  Procedure                               Abnormality         Status                     ---------                               -----------         ------                     CBC Auto Differential[[id number]]        Abnormal            Final result                 Please view results for these tests on the individual orders.    CBC Auto Differential [[id number]]  (Abnormal) Collected: 07/07/22 0941    Specimen: Blood Updated: 07/07/22 1043     WBC 21.96 10*3/mm3      RBC 4.68 10*6/mm3      Hemoglobin 13.8 g/dL      Hematocrit 43.9 %      MCV 93.8 fL      MCH 29.5 pg      MCHC 31.4 g/dL      RDW 13.8 %      RDW-SD 46.7 fl      MPV 8.8 fL      Platelets 553 10*3/mm3     Basic Metabolic Panel [[id number]]  (Abnormal) Collected: 07/07/22 0941    Specimen: Blood Updated: 07/07/22 1042     Glucose 113 mg/dL      BUN 16 mg/dL      Creatinine 0.77 mg/dL      Sodium 138 mmol/L      Potassium 3.5 mmol/L      Chloride 101 mmol/L      CO2 30.0 mmol/L      Calcium 9.3 mg/dL      BUN/Creatinine Ratio 20.8     Anion Gap 7.0 mmol/L      eGFR 88.8 mL/min/1.73      Comment: National Kidney Foundation and American Society of Nephrology (ASN) Task Force recommended calculation based on the Chronic Kidney Disease Epidemiology Collaboration (CKD-EPI) equation refit without adjustment for race.       Narrative:      GFR Normal >60  Chronic Kidney Disease <60  Kidney Failure <15      COVID-19 RAPID AG,VERITOR,COR/GEE/PAD/MARIYA/MAD/RACHEL/LAG/JOSEPH/ IN-HOUSE,DRY SWAB, 1-2 HR TAT - Swab, Nasal Cavity [[id number]]  (Normal) Collected: 07/06/22 1451    Specimen: Swab from Nasal Cavity Updated: 07/06/22 1600     COVID19 Presumptive Negative    Narrative:      Fact sheets for providers:  https://www.fda.gov/media/472814/download    Fact sheets for patients: https://www.fda.gov/media/608926/download          Estimated Creatinine Clearance: 94.3 mL/min (by C-G formula based on SCr of 0.77 mg/dL).    Culture Results:    Microbiology Results (last 10 days)     Procedure Component Value - Date/Time    MRSA Screen, PCR (Inpatient) - Swab, Nares [[id number]]  (Normal) Collected: 07/09/22 1052    Lab Status: Final result Specimen: Swab from Nares Updated: 07/09/22 1210     MRSA PCR No MRSA Detected    Narrative:      The negative predictive value of this diagnostic test is high and should only be used to consider de-escalating anti-MRSA therapy. A positive result may indicate colonization with MRSA and must be correlated clinically.    Respiratory Culture - Sputum, Cough [[id number]] Collected: 07/06/22 2202    Lab Status: Final result Specimen: Sputum from Cough Updated: 07/08/22 0930     Respiratory Culture Rejected     Gram Stain Few (2+) WBCs seen      No Epithelial cells seen      No organisms seen    Narrative:      Specimen rejected due to oropharyngeal contamination. Please reorder and recollect specimen if clinically necessary.    COVID-19 RAPID AG,VERITOR,COR/GEE/PAD/MARIYA/MAD/RACHEL/LAG/JOSEPH/ IN-HOUSE,DRY SWAB, 1-2 HR TAT - Swab, Nasal Cavity [[id number]]  (Normal) Collected: 07/06/22 1451    Lab Status: Final result Specimen: Swab from Nasal Cavity Updated: 07/06/22 1600     COVID19 Presumptive Negative    Narrative:      Fact sheets for providers: https://www.fda.gov/media/185872/download    Fact sheets for patients: https://www.fda.gov/media/602415/download    Legionella Antigen, Urine - Urine, Urine, Clean Catch [[id number]]  (Normal) Collected: 07/02/22 2126    Lab Status: Final result Specimen: Urine, Clean Catch Updated: 07/02/22 2155     LEGIONELLA ANTIGEN, URINE Negative    S. Pneumo Ag Urine or CSF - Urine, Urine, Clean Catch [[id number]]  (Normal) Collected: 07/02/22 2126    Lab Status: Final  result Specimen: Urine, Clean Catch Updated: 07/02/22 2155     Strep Pneumo Ag Negative    Blood Culture - Blood, Arm, Left [[id number]]  (Normal) Collected: 07/02/22 1248    Lab Status: Final result Specimen: Blood from Arm, Left Updated: 07/07/22 1333     Blood Culture No growth at 5 days    Respiratory Panel PCR w/COVID-19(SARS-CoV-2) RACHEL/MARIYA/GEE/PAD/COR/MAD/JOSEPH In-House, NP Swab in UTM/VTM, 3-4 HR TAT - Swab, Nasopharynx [[id number]]  (Abnormal) Collected: 07/01/22 1441    Lab Status: Final result Specimen: Swab from Nasopharynx Updated: 07/01/22 1620     ADENOVIRUS, PCR Not Detected     Coronavirus 229E Not Detected     Coronavirus HKU1 Not Detected     Coronavirus NL63 Not Detected     Coronavirus OC43 Not Detected     COVID19 Detected     Human Metapneumovirus Not Detected     Human Rhinovirus/Enterovirus Not Detected     Influenza A PCR Not Detected     Influenza B PCR Not Detected     Parainfluenza Virus 1 Not Detected     Parainfluenza Virus 2 Not Detected     Parainfluenza Virus 3 Not Detected     Parainfluenza Virus 4 Not Detected     RSV, PCR Not Detected     Bordetella pertussis pcr Not Detected     Bordetella parapertussis PCR Not Detected     Chlamydophila pneumoniae PCR Not Detected     Mycoplasma pneumo by PCR Not Detected    Narrative:      In the setting of a positive respiratory panel with a viral infection PLUS a negative procalcitonin without other underlying concern for bacterial infection, consider observing off antibiotics or discontinuation of antibiotics and continue supportive care. If the respiratory panel is positive for atypical bacterial infection (Bordetella pertussis, Chlamydophila pneumoniae, or Mycoplasma pneumoniae), consider antibiotic de-escalation to target atypical bacterial infection.    MRSA Screen, PCR (Inpatient) - Swab, Nares [[id number]]  (Normal) Collected: 07/01/22 1441    Lab Status: Final result Specimen: Swab from Nares Updated: 07/01/22 1635     MRSA PCR No MRSA  Detected    Narrative:      The negative predictive value of this diagnostic test is high and should only be used to consider de-escalating anti-MRSA therapy. A positive result may indicate colonization with MRSA and must be correlated clinically.    COVID-19,[employer] IN-HOUSE,Nasal Swab No Transport Media 3-4 HR TAT - Swab, Nasal Cavity [[id number]]  (Normal) Collected: 07/01/22 1214    Lab Status: Final result Specimen: Swab from Nasal Cavity Updated: 07/01/22 1343     COVID19 Not Detected    Narrative:      Fact sheet for providers: https://www.fda.gov/media/872530/download     Fact sheet for patients: https://www.fda.gov/media/673485/download    Test performed by PCR.    Consider negative results in combination with clinical observations, patient history, and epidemiological information.             Radiology:   July 9 July 8      Imaging Results (Last 72 Hours)     Procedure Component Value Units Date/Time    XR Chest 1 View [[id number]] Collected: 07/09/22 1049     Updated: 07/09/22 1054    Narrative:      XR CHEST 1 VW- 7/9/2022 10:38 AM CDT     HISTORY: hypoxemia pneumonia worsening oxygen level; R09.02-Hypoxemia;  I95.1-Orthostatic hypotension; U07.1-COVID-19; J12.82-Pneumonia due to  coronavirus disease 2019; I26.94-Multiple subsegmental pulmonary emboli  without acute cor pulmonale; Z74.09-Other reduced mobility; Z78.9-Other  specified health status     COMPARISON: Chest exam dated 7/8/2022.     FINDINGS:      Multifocal bilateral patchy infiltrates, appearing stable. No dense  areas of consolidation. The lungs are well expanded. No pleural effusion  or pneumothorax. The cardiomediastinal silhouette and pulmonary  vascularity are within normal limits. The osseous structures and  surrounding soft tissues demonstrate no acute abnormality.       Impression:      1. Bilateral, essentially diffuse patchy lung infiltrates reflecting  atypical  pneumonia. Infiltrates appear stable. Lung volumes are stable.  No new consolidation.        This report was finalized on 07/09/2022 10:51 by Dr Prem Mclaughlin, .    XR Chest 1 View [[id number]] Collected: 07/08/22 1115     Updated: 07/08/22 1119    Narrative:      EXAMINATION: XR CHEST 1 VW-     7/8/2022 11:09 AM CDT     HISTORY: Worsening hypoxemia; R09.02-Hypoxemia; I95.1-Orthostatic  hypotension; U07.1-COVID-19; J12.82-Pneumonia due to coronavirus disease  2019; I26.94-Multiple subsegmental pulmonary emboli without acute cor  pulmonale; Z74.09-Other reduced mobility; Z78.9-Other specified health  status     1 view chest x-ray.     Comparison is made with July 6, 2022.     Patchy infiltrate is again seen throughout both lungs.  There is been no improvement.     No pneumothorax.     Stable heart size.     Summary:  1. Similar appearance of infiltrate throughout both lungs. No  improvement.  This report was finalized on 07/08/2022 11:16 by Dr. Alvaro Wang MD.    XR Chest 1 View [[id number]] Collected: 07/06/22 1553     Updated: 07/06/22 1558    Narrative:      XR CHEST 1 VW- 7/6/2022 2:37 PM CDT     HISTORY: COVID; R09.02-Hypoxemia; I95.1-Orthostatic hypotension;  U07.1-COVID-19; J12.82-Pneumonia due to coronavirus disease 2019;  I26.94-Multiple subsegmental pulmonary emboli without acute cor  pulmonale; Z74.09-Other reduced mobility; Z78.9-Other specified health  status     COMPARISON: 7/1/2022.     FINDINGS:      Reidentified bilateral, asymmetric pulmonary infiltrates. Left lung  patchy infiltrates are nearly diffuse throughout the left lung fields  and perhaps mildly improved from the recent comparison exam. Additional  patchy right basilar infiltrates are very similar. Overall lung volumes  are stable. No new consolidation. No pleural effusion or pneumothorax.  Heart size is stable. Pulmonary vasculature are nondilated. No acute  bony abnormality.       Impression:      1. Covid pneumonia with bilateral  asymmetric pulmonary infiltrate (left  greater than right), perhaps mildly improved from 7/1/2022. Lung volumes  are stable.        This report was finalized on 07/06/2022 15:55 by Dr Prem Mclaughlin, .            Assessment & Plan       Essential hypertension    Benign prostatic hyperplasia without lower urinary tract symptoms    Bilateral carotid artery disease (HCC)    Hyperlipidemia    Pulmonary embolism associated with COVID-19 (HCC)    Acute respiratory failure with hypoxia (HCC)      IMPRESSION:  1. COVID-19 infection diagnosed June 26, 2022-Per admission on June 26 patient had been feeling poorly for quite some time and had been evaluated by his primary provider June 7 for cough and shortness of breath, had a CT of the chest that showed patchy groundglass infiltrates but reports he received steroids (not hypoxemic) and antibiotics.  Was reportedly not given COVID-19 related treatment such as Paxlovid, etc.  Based on this history patient is likely a month or more into COVID-19 infection.  Steroid use in the acute phase without hypoxemia, likely to prolong his viremia.  Patient has been on antibiotics this admission to include azithromycin and Zosyn.  He has had worsening respiratory failure requiring increasing oxygen concerning for superimposed bacterial infection, progression of COVID 19 which would be less likely, cryptogenic organizing pneumonia, developing ARDS, additional PE, etc.  2. Fever-likely infectious etiology.  Do not see where patient had any central line in place.  No obvious external infection on exam.  Denied diarrhea to suggest C. difficile.  No dysuria  3. Leukocytosis based on labs 2 days ago.  Patient did receive methylprednisolone early on admission  4. Pulmonary emboli  5. Elevated CRP-overall improved but increased when last checked 2 days ago  6. Elevated ferritin      RECOMMENDATION:   · Discontinue Zosyn-patient is already received approximately 8 days and spiked this fever on  Zosyn  · Start empiric cefepime  · Discontinue vancomycin as MRSA nasal screen negative  · We will order CBC with manual differential, CMP and CRP for a.m.  · Steroids per pulmonary  · O2 management per pulmonary    Discussed with YASMEEN Garland MD  07/09/22  12:19 CDT

## 2022-07-09 NOTE — PROGRESS NOTES
PULMONARY AND CRITICAL CARE PROGRESS NOTE - [facility]    Patient: Adrián Graham  1940   MR# [id number]   Acct# [id number]  07/09/22   11:35 CDT  Referring Provider: Victor Manuel Dorado,*    Chief Complaint: Covid-19 -with worsening hypoxemic respiratory failure    Interval history: Patient was in medical floor and was out of COVID isolation.  This morning he did tolerated and has increased work of breathing and had Vapotherm 40 L flow which 100% FiO2 with nonrebreather mask and was having difficulty in breathing.  He was moved to the intensive care unit and is currently on BiPAP with 16/6 with rate of 16 and 45% effort and resting comfortably with oxygen saturation 93%.  He did not have any other acute events overnight and remains afebrile of note patient is DNR and does not want to be intubated.      Meds:  ascorbic acid, 500 mg, Oral, Daily  aspirin, 81 mg, Oral, Daily  atorvastatin, 20 mg, Oral, Daily  cetirizine, 10 mg, Oral, Daily  diphenhydrAMINE, 25 mg, Oral, Nightly  enoxaparin, 1 mg/kg, Subcutaneous, Q12H  famotidine, 20 mg, Oral, BID AC  finasteride, 5 mg, Oral, Daily  fluticasone, 2 spray, Nasal, Daily  furosemide, 20 mg, Oral, BID  gabapentin, 400 mg, Oral, Q12H  guaiFENesin, 1,200 mg, Oral, BID  ipratropium-albuterol, 3 mL, Nebulization, 4x Daily - RT  montelukast, 10 mg, Oral, Nightly  piperacillin-tazobactam, 4.5 g, Intravenous, Q8H  predniSONE, 40 mg, Oral, Daily With Breakfast  sodium chloride, 10 mL, Intravenous, Q12H  [START ON 7/10/2022] vancomycin, 1,000 mg, Intravenous, Q12H  vitamin D3, 5,000 Units, Oral, Daily  zinc sulfate, 220 mg, Oral, Daily         Review of Systems:   Review of Systems   Constitutional: Positive for fatigue. Negative for fever.   HENT: Positive for congestion.    Respiratory: Positive for cough.    Gastrointestinal: Negative for diarrhea and nausea.   Musculoskeletal: Positive for arthralgias.   Neurological: Positive for weakness.           Physical Exam:  SpO2 Percentage    07/09/22 1008 07/09/22 1010 07/09/22 1047   SpO2: 97% 100% 91%     Temp:  [97.4 °F (36.3 °C)-102 °F (38.9 °C)] 102 °F (38.9 °C)  Heart Rate:  [] 96  Resp:  [16-38] 33  BP: (106-126)/(49-71) 124/71    Intake/Output Summary (Last 24 hours) at 7/9/2022 1135  Last data filed at 7/8/2022 1744  Gross per 24 hour   Intake 120 ml   Output --   Net 120 ml     Body mass index is 26.68 kg/m².   GENERAL/CONSTITUTIONAL: no distress.  Vapotherm 35 L 70%.  HEENT: atraumatic, normocephalic.   NOSE: normal  NECK: jugular veins nondistended  CHEST: no paradox, no retractions.  No respiratory distress.   ABDOMEN: nondistended  : deferred  EXTREMITIES: no visible edema.  NEURO:  Awake and alert   PSYCH: no agitation  SKIN: no jaundice.  No rash  Electronically signed by Miguel Smith MD, 7/9/2022, 11:35 CDT        Physician Substantive Portion: Medical Decision Making    Laboratory Data:  Results from last 7 days   Lab Units 07/07/22  0941 07/04/22  0700   WBC 10*3/mm3 21.96* 15.35*   HEMOGLOBIN g/dL 13.8 10.7*   PLATELETS 10*3/mm3 553* 407     Results from last 7 days   Lab Units 07/07/22  0941 07/04/22  0700   SODIUM mmol/L 138 137   POTASSIUM mmol/L 3.5 4.2   BUN mg/dL 16 34*   CREATININE mg/dL 0.77 0.87     Results from last 7 days   Lab Units 07/09/22  0933 07/08/22  0649   PH, ARTERIAL pH units 7.476* 7.491*   PCO2, ARTERIAL mm Hg 34.6* 39.1   PO2 ART mm Hg 47.0* 63.6*   FIO2 % 100  --      Blood Culture   Date Value Ref Range Status   06/26/2022 No growth at 5 days  Final   06/26/2022 No growth at 5 days  Final     Results from last 7 days   Lab Units 07/07/22  0941 07/04/22  0700 07/02/22  1248   CRP mg/dL 8.16* 6.46*  --    PROCALCITONIN ng/mL  --  0.08 0.21   FERRITIN ng/mL 1,243.00*  --   --       Recent films:  XR Chest 1 View    Result Date: 7/9/2022  XR CHEST 1 VW- 7/9/2022 10:38 AM CDT  HISTORY: hypoxemia pneumonia worsening oxygen level; R09.02-Hypoxemia;  I95.1-Orthostatic hypotension; U07.1-COVID-19; J12.82-Pneumonia due to coronavirus disease 2019; I26.94-Multiple subsegmental pulmonary emboli without acute cor pulmonale; Z74.09-Other reduced mobility; Z78.9-Other specified health status  COMPARISON: Chest exam dated 7/8/2022.  FINDINGS:  Multifocal bilateral patchy infiltrates, appearing stable. No dense areas of consolidation. The lungs are well expanded. No pleural effusion or pneumothorax. The cardiomediastinal silhouette and pulmonary vascularity are within normal limits. The osseous structures and surrounding soft tissues demonstrate no acute abnormality.      Impression: 1. Bilateral, essentially diffuse patchy lung infiltrates reflecting atypical pneumonia. Infiltrates appear stable. Lung volumes are stable. No new consolidation.   This report was finalized on 07/09/2022 10:51 by Dr Prem Mclaughlin, .    XR Chest 1 View    Result Date: 7/8/2022  EXAMINATION: XR CHEST 1 VW-  7/8/2022 11:09 AM CDT  HISTORY: Worsening hypoxemia; R09.02-Hypoxemia; I95.1-Orthostatic hypotension; U07.1-COVID-19; J12.82-Pneumonia due to coronavirus disease 2019; I26.94-Multiple subsegmental pulmonary emboli without acute cor pulmonale; Z74.09-Other reduced mobility; Z78.9-Other specified health status  1 view chest x-ray.  Comparison is made with July 6, 2022.  Patchy infiltrate is again seen throughout both lungs. There is been no improvement.  No pneumothorax.  Stable heart size.  Summary: 1. Similar appearance of infiltrate throughout both lungs. No improvement. This report was finalized on 07/08/2022 11:16 by Dr. Alvaro Wagn MD.     Physician Substantive Portion: Medical Decision Making    Results from last 7 days   Lab Units 07/07/22  0941 07/04/22  0700   WBC 10*3/mm3 21.96* 15.35*   HEMOGLOBIN g/dL 13.8 10.7*   PLATELETS 10*3/mm3 553* 407     Results from last 7 days   Lab Units 07/07/22  0941 07/04/22  0700   SODIUM mmol/L 138 137   POTASSIUM mmol/L 3.5 4.2   CO2 mmol/L  30.0* 27.0   BUN mg/dL 16 34*   CREATININE mg/dL 0.77 0.87   GLUCOSE mg/dL 113* 159*     Results from last 7 days   Lab Units 07/09/22  0933 07/08/22  0649   PH, ARTERIAL pH units 7.476* 7.491*   PCO2, ARTERIAL mm Hg 34.6* 39.1   PO2 ART mm Hg 47.0* 63.6*   FIO2 % 100  --      Lab Results   Component Value Date    PROBNP 1,299.0 07/01/2022     Blood Culture   Date Value Ref Range Status   06/26/2022 No growth at 5 days  Final   06/26/2022 No growth at 5 days  Final       Recent radiology:   Imaging Results (Last 72 Hours)     Procedure Component Value Units Date/Time    XR Chest 1 View [[id number]] Collected: 07/09/22 1049     Updated: 07/09/22 1054    Narrative:      XR CHEST 1 VW- 7/9/2022 10:38 AM CDT     HISTORY: hypoxemia pneumonia worsening oxygen level; R09.02-Hypoxemia;  I95.1-Orthostatic hypotension; U07.1-COVID-19; J12.82-Pneumonia due to  coronavirus disease 2019; I26.94-Multiple subsegmental pulmonary emboli  without acute cor pulmonale; Z74.09-Other reduced mobility; Z78.9-Other  specified health status     COMPARISON: Chest exam dated 7/8/2022.     FINDINGS:      Multifocal bilateral patchy infiltrates, appearing stable. No dense  areas of consolidation. The lungs are well expanded. No pleural effusion  or pneumothorax. The cardiomediastinal silhouette and pulmonary  vascularity are within normal limits. The osseous structures and  surrounding soft tissues demonstrate no acute abnormality.       Impression:      1. Bilateral, essentially diffuse patchy lung infiltrates reflecting  atypical pneumonia. Infiltrates appear stable. Lung volumes are stable.  No new consolidation.        This report was finalized on 07/09/2022 10:51 by Dr Prem Mclaughlin, .    XR Chest 1 View [[id number]] Collected: 07/08/22 1115     Updated: 07/08/22 1119    Narrative:      EXAMINATION: XR CHEST 1 VW-     7/8/2022 11:09 AM CDT     HISTORY: Worsening hypoxemia; R09.02-Hypoxemia; I95.1-Orthostatic  hypotension; U07.1-COVID-19;  J12.82-Pneumonia due to coronavirus disease  2019; I26.94-Multiple subsegmental pulmonary emboli without acute cor  pulmonale; Z74.09-Other reduced mobility; Z78.9-Other specified health  status     1 view chest x-ray.     Comparison is made with July 6, 2022.     Patchy infiltrate is again seen throughout both lungs.  There is been no improvement.     No pneumothorax.     Stable heart size.     Summary:  1. Similar appearance of infiltrate throughout both lungs. No  improvement.  This report was finalized on 07/08/2022 11:16 by Dr. Alvaro Wang MD.    XR Chest 1 View [[id number]] Collected: 07/06/22 1553     Updated: 07/06/22 1558    Narrative:      XR CHEST 1 VW- 7/6/2022 2:37 PM CDT     HISTORY: COVID; R09.02-Hypoxemia; I95.1-Orthostatic hypotension;  U07.1-COVID-19; J12.82-Pneumonia due to coronavirus disease 2019;  I26.94-Multiple subsegmental pulmonary emboli without acute cor  pulmonale; Z74.09-Other reduced mobility; Z78.9-Other specified health  status     COMPARISON: 7/1/2022.     FINDINGS:      Reidentified bilateral, asymmetric pulmonary infiltrates. Left lung  patchy infiltrates are nearly diffuse throughout the left lung fields  and perhaps mildly improved from the recent comparison exam. Additional  patchy right basilar infiltrates are very similar. Overall lung volumes  are stable. No new consolidation. No pleural effusion or pneumothorax.  Heart size is stable. Pulmonary vasculature are nondilated. No acute  bony abnormality.       Impression:      1. Covid pneumonia with bilateral asymmetric pulmonary infiltrate (left  greater than right), perhaps mildly improved from 7/1/2022. Lung volumes  are stable.        This report was finalized on 07/06/2022 15:55 by Dr Prem Mclaughlin, .        My radiograph interpretation/independent review of imaging: infiltrate L>R  Other test results (not lab or imaging): Results for orders placed during the hospital encounter of 07/01/22    Adult Transthoracic Echo  Limited W/ Cont if Necessary Per Protocol    Interpretation Summary  · This is a limited echocardiogram.  · Left ventricular systolic function is normal. Left ventricular ejection fraction appears to be 61 - 65%.  · The right ventricular cavity is mildly dilated. Normal right ventricular systolic function noted.      My radiograph interpretation/independent review of imaging: no new films      Pulmonary Assessment:    1. Acute hypoxic respiratory failure,  2. Bilateral groundglass pulmonary infiltrate  3. Worsening COVID-19 pneumonia  4. Possible acute lung injury or cryptogenic organizing pneumonia  5. Pulmonary embolism single subsegmental on anticoagulation  6. Essential hypertension  7. Hyperlipidemia  8. Carotid artery disease  9. Recurrent bronchitis  10. S/p COVID vaccinated status  11. Severe weakness and deconditioning  12. History of fall    Recommend/plan:   · Patient appears comfortable and is tolerating BiPAP well we will leave him on the BiPAP and repeat blood gas tomorrow morning.  · We will get the BiPAP off when he is eating and we will continue on the high flow oxygen or Vapotherm at that time.  · Prednisone dose has been increased to 40 mg.  Continue high-dose of steroid for now.  · Continue titrating oxygen and try to transition oxygen to high flow oxygen when possible.  · The patient is on BiPAP now we will try to encourage him to do incentive spirometry when he is off BiPAP.   ·  Increase physical activity as tolerated.  · DVT and stress ulcer prophylaxis and pain and anxiety control  · Repeat imaging studies and lab as needed.  · Still on Zosyn.  De-escalate antibiotic treatment slowly.  · Plan for home oxygen evaluation prior to the discharge.  · He is moved to the ICU for increased oxygen requirement and will stay here for now and is currently BiPAP dependent.  · CODE STATUS: Full.  Overall prognosis: Guarded but little better today.  He is off isolation today.  · We will follow.    This  visit was performed by both a physician and an [facility] RN.  I personally evaluated and examined the patient.  I performed all aspects of the medical decision making as documented.    Electronically signed by     Miguel Smith MD,  Pulmonologist/Intensivist   7/9/2022, 11:35 CDT

## 2022-07-10 NOTE — NURSING NOTE
Morning ABGs show pH 7.506; pO2 69.8 and HCO3 33.8. At the time patient was on 40/70 satting mid upper 90's. Since later this morning however, patient has required 40/80 to maintain oxygenation of 90% on VapoTherm. Pt is very adamant that he is unable to tolerate the bipap (as we did attempt to wear it last night for about an hour). Pt denied SOB. Pt changes positions independently, but weight shift assistance provided to further protect blanchable spot to coccyx.     UOP mostly collected by external device, avg 110mL/hr. Pt pleasant and cooperative, but forlorn.

## 2022-07-10 NOTE — PROGRESS NOTES
PULMONARY AND CRITICAL CARE PROGRESS NOTE - [facility]    Patient: Adrián Graham  1940   MR# [id number]   Acct# [id number]  07/10/22   10:35 CDT  Referring Provider: Victor Manuel Dorado,*    Chief Complaint: Covid-19 -with worsening hypoxemic respiratory failure    Interval history: Patient was seen in the follow-up visit in pulmonary rounds in intensive care unit today.  He moved to the ICU and currently on Vapotherm with 40 L flow and 85% FiO2.  He did use BiPAP briefly yesterday but refused to wear it afterwards because he felt that he is going to die on the BiPAP.  He is otherwise comfortable and did not have respite distress this morning and he is afebrile.  He told me he had too much food in the breakfast and is feeling little full.  No other acute events overnight.    Meds:  ascorbic acid, 500 mg, Oral, Daily  aspirin, 81 mg, Oral, Daily  atorvastatin, 20 mg, Oral, Daily  cefepime, 2 g, Intravenous, Q8H  cetirizine, 10 mg, Oral, Daily  diphenhydrAMINE, 25 mg, Oral, Nightly  enoxaparin, 1 mg/kg, Subcutaneous, Q12H  famotidine, 20 mg, Oral, BID AC  finasteride, 5 mg, Oral, Daily  fluticasone, 2 spray, Nasal, Daily  furosemide, 20 mg, Oral, BID  gabapentin, 400 mg, Oral, Q12H  guaiFENesin, 1,200 mg, Oral, BID  ipratropium-albuterol, 3 mL, Nebulization, 4x Daily - RT  montelukast, 10 mg, Oral, Nightly  predniSONE, 40 mg, Oral, Daily With Breakfast  sodium chloride, 250 mL, Intravenous, Once  sodium chloride, 10 mL, Intravenous, Q12H  vitamin D3, 5,000 Units, Oral, Daily  zinc sulfate, 220 mg, Oral, Daily         Review of Systems:   Review of Systems   Constitutional: Positive for fatigue. Negative for fever.   HENT: Positive for congestion.    Respiratory: Positive for cough.    Gastrointestinal: Negative for diarrhea and nausea.   Musculoskeletal: Positive for arthralgias.   Neurological: Positive for weakness.          Physical Exam:  SpO2 Percentage    07/10/22 0730 07/10/22 0930  07/10/22 1030   SpO2: (!) 87% 93% (!) 89%     Temp:  [97 °F (36.1 °C)-103.1 °F (39.5 °C)] 97.1 °F (36.2 °C)  Heart Rate:  [] 86  Resp:  [15-33] 18  BP: ()/(30-73) 114/61    Intake/Output Summary (Last 24 hours) at 7/10/2022 1035  Last data filed at 7/10/2022 0900  Gross per 24 hour   Intake 1698.85 ml   Output 1150 ml   Net 548.85 ml     Body mass index is 24.75 kg/m².   GENERAL/CONSTITUTIONAL: no distress.  Vapotherm 40 L flow and a 5% FiO2.  HEENT: atraumatic, normocephalic.   NOSE: normal  NECK: jugular veins nondistended  CHEST: no paradox, no retractions.  No respiratory distress.   ABDOMEN: nondistended  : deferred  EXTREMITIES: no visible edema.  NEURO:  Awake and alert   PSYCH: no agitation  SKIN: no jaundice.  No rash    Electronically signed by Miguel Smith MD, 7/10/2022, 10:35 CDT        Physician Substantive Portion: Medical Decision Making    Laboratory Data:  Results from last 7 days   Lab Units 07/10/22  0223 07/07/22  0941 07/04/22  0700   WBC 10*3/mm3 19.51* 21.96* 15.35*   HEMOGLOBIN g/dL 10.5* 13.8 10.7*   PLATELETS 10*3/mm3 360 553* 407     Results from last 7 days   Lab Units 07/10/22  0223 07/07/22  0941 07/04/22  0700   SODIUM mmol/L 141 138 137   POTASSIUM mmol/L 3.6 3.5 4.2   BUN mg/dL 20 16 34*   CREATININE mg/dL 0.78 0.77 0.87     Results from last 7 days   Lab Units 07/10/22  0416 07/09/22  0933 07/08/22  0649   PH, ARTERIAL pH units 7.506* 7.476* 7.491*   PCO2, ARTERIAL mm Hg 42.9 34.6* 39.1   PO2 ART mm Hg 69.8* 47.0* 63.6*   FIO2 % 70 100  --      Blood Culture   Date Value Ref Range Status   06/26/2022 No growth at 5 days  Final   06/26/2022 No growth at 5 days  Final     Results from last 7 days   Lab Units 07/10/22  0223 07/07/22  0941 07/04/22  0700   CRP mg/dL 18.76* 8.16* 6.46*   PROCALCITONIN ng/mL  --   --  0.08   FERRITIN ng/mL  --  1,243.00*  --       Recent films:  XR Chest 1 View    Result Date: 7/9/2022  XR CHEST 1 VW- 7/9/2022 10:38 AM CDT  HISTORY:  hypoxemia pneumonia worsening oxygen level; R09.02-Hypoxemia; I95.1-Orthostatic hypotension; U07.1-COVID-19; J12.82-Pneumonia due to coronavirus disease 2019; I26.94-Multiple subsegmental pulmonary emboli without acute cor pulmonale; Z74.09-Other reduced mobility; Z78.9-Other specified health status  COMPARISON: Chest exam dated 7/8/2022.  FINDINGS:  Multifocal bilateral patchy infiltrates, appearing stable. No dense areas of consolidation. The lungs are well expanded. No pleural effusion or pneumothorax. The cardiomediastinal silhouette and pulmonary vascularity are within normal limits. The osseous structures and surrounding soft tissues demonstrate no acute abnormality.      Impression: 1. Bilateral, essentially diffuse patchy lung infiltrates reflecting atypical pneumonia. Infiltrates appear stable. Lung volumes are stable. No new consolidation.   This report was finalized on 07/09/2022 10:51 by Dr Prem Mclaughlin, .    XR Chest 1 View    Result Date: 7/8/2022  EXAMINATION: XR CHEST 1 VW-  7/8/2022 11:09 AM CDT  HISTORY: Worsening hypoxemia; R09.02-Hypoxemia; I95.1-Orthostatic hypotension; U07.1-COVID-19; J12.82-Pneumonia due to coronavirus disease 2019; I26.94-Multiple subsegmental pulmonary emboli without acute cor pulmonale; Z74.09-Other reduced mobility; Z78.9-Other specified health status  1 view chest x-ray.  Comparison is made with July 6, 2022.  Patchy infiltrate is again seen throughout both lungs. There is been no improvement.  No pneumothorax.  Stable heart size.  Summary: 1. Similar appearance of infiltrate throughout both lungs. No improvement. This report was finalized on 07/08/2022 11:16 by Dr. Alvaro Wang MD.     Physician Substantive Portion: Medical Decision Making    Results from last 7 days   Lab Units 07/10/22  0223 07/07/22  0941 07/04/22  0700   WBC 10*3/mm3 19.51* 21.96* 15.35*   HEMOGLOBIN g/dL 10.5* 13.8 10.7*   PLATELETS 10*3/mm3 360 553* 407     Results from last 7 days   Lab Units  07/10/22  0223 07/07/22  0941 07/04/22  0700   SODIUM mmol/L 141 138 137   POTASSIUM mmol/L 3.6 3.5 4.2   CO2 mmol/L 31.0* 30.0* 27.0   BUN mg/dL 20 16 34*   CREATININE mg/dL 0.78 0.77 0.87   GLUCOSE mg/dL 114* 113* 159*     Results from last 7 days   Lab Units 07/10/22  0416 07/09/22  0933 07/08/22  0649   PH, ARTERIAL pH units 7.506* 7.476* 7.491*   PCO2, ARTERIAL mm Hg 42.9 34.6* 39.1   PO2 ART mm Hg 69.8* 47.0* 63.6*   FIO2 % 70 100  --      Lab Results   Component Value Date    PROBNP 1,299.0 07/01/2022     Blood Culture   Date Value Ref Range Status   06/26/2022 No growth at 5 days  Final   06/26/2022 No growth at 5 days  Final       Recent radiology:   Imaging Results (Last 72 Hours)     Procedure Component Value Units Date/Time    XR Chest 1 View [[id number]] Collected: 07/09/22 1049     Updated: 07/09/22 1054    Narrative:      XR CHEST 1 VW- 7/9/2022 10:38 AM CDT     HISTORY: hypoxemia pneumonia worsening oxygen level; R09.02-Hypoxemia;  I95.1-Orthostatic hypotension; U07.1-COVID-19; J12.82-Pneumonia due to  coronavirus disease 2019; I26.94-Multiple subsegmental pulmonary emboli  without acute cor pulmonale; Z74.09-Other reduced mobility; Z78.9-Other  specified health status     COMPARISON: Chest exam dated 7/8/2022.     FINDINGS:      Multifocal bilateral patchy infiltrates, appearing stable. No dense  areas of consolidation. The lungs are well expanded. No pleural effusion  or pneumothorax. The cardiomediastinal silhouette and pulmonary  vascularity are within normal limits. The osseous structures and  surrounding soft tissues demonstrate no acute abnormality.       Impression:      1. Bilateral, essentially diffuse patchy lung infiltrates reflecting  atypical pneumonia. Infiltrates appear stable. Lung volumes are stable.  No new consolidation.        This report was finalized on 07/09/2022 10:51 by Dr Prem Mclaughlin, .    XR Chest 1 View [[id number]] Collected: 07/08/22 1115     Updated: 07/08/22 1113     Narrative:      EXAMINATION: XR CHEST 1 VW-     7/8/2022 11:09 AM CDT     HISTORY: Worsening hypoxemia; R09.02-Hypoxemia; I95.1-Orthostatic  hypotension; U07.1-COVID-19; J12.82-Pneumonia due to coronavirus disease  2019; I26.94-Multiple subsegmental pulmonary emboli without acute cor  pulmonale; Z74.09-Other reduced mobility; Z78.9-Other specified health  status     1 view chest x-ray.     Comparison is made with July 6, 2022.     Patchy infiltrate is again seen throughout both lungs.  There is been no improvement.     No pneumothorax.     Stable heart size.     Summary:  1. Similar appearance of infiltrate throughout both lungs. No  improvement.  This report was finalized on 07/08/2022 11:16 by Dr. Alvaro Wang MD.        My radiograph interpretation/independent review of imaging: infiltrate L>R  Other test results (not lab or imaging): Results for orders placed during the hospital encounter of 07/01/22    Adult Transthoracic Echo Limited W/ Cont if Necessary Per Protocol    Interpretation Summary  · This is a limited echocardiogram.  · Left ventricular systolic function is normal. Left ventricular ejection fraction appears to be 61 - 65%.  · The right ventricular cavity is mildly dilated. Normal right ventricular systolic function noted.      My radiograph interpretation/independent review of imaging: no new films      Pulmonary Assessment:    1. Acute hypoxic respiratory failure, currently on Vapotherm  2. Bilateral groundglass pulmonary infiltrate  3. Worsening COVID-19 pneumonia  4. Possible acute lung injury or cryptogenic organizing pneumonia  5. Pulmonary embolism single subsegmental on anticoagulation  6. Essential hypertension  7. Hyperlipidemia  8. Carotid artery disease  9. Recurrent bronchitis  10. S/p COVID vaccinated status  11. Severe weakness and deconditioning  12. History of fall    Recommend/plan:   · Patient appears comfortable and he does not like BiPAP and will continue using Vapotherm  · He  still has high oxygen requirement and we will try to titrate down Vapotherm to high flow supplemental oxygen as tolerated  · Continue bronchodilator treatment.  Continue supportive respiratory care.  · Encourage incentive spirometry and flutter valve to improve pulmonary compliance and clearance.    · His Prednisone dose has been increased to 40 mg.  Continue high-dose of steroid for now.   · He is out of isolation now.  We will increase physical activity as tolerated.  · DVT and stress ulcer prophylaxis and pain and anxiety control  · Repeat imaging studies and lab as needed.  · He is still on Zosyn.  De-escalate antibiotic treatment slowly.  · Plan for home oxygen evaluation prior to the discharge.  · Care plan discussed with RN.  Repeat labs and imaging studies from time to time.  · CODE STATUS: Full.  Overall prognosis: Guarded but little better today.  He is off isolation today.  · We will follow.    This visit was performed by both a physician and an [facility] RN.  I personally evaluated and examined the patient.  I performed all aspects of the medical decision making as documented.    Electronically signed by     Miguel Smith MD,  Pulmonologist/Intensivist   7/10/2022, 10:35 CDT

## 2022-07-10 NOTE — PLAN OF CARE
Goal Outcome Evaluation:  Plan of Care Reviewed With: patient        Progress: improving  Outcome Evaluation: Pt was in bed, on vapotherm at 92%. Performed LE exercises AAROM/AROM BLE X 15.  Performed supine to sitting with min assist with HOB elevated.  Pt maintained sitting with CGA for approx 5 minutes, O2 decreased to 85%.  Sit to supine with min assist.  Will continue to work with pt анна ncrease strength and improve endurance.

## 2022-07-10 NOTE — PLAN OF CARE
"Goal Outcome Evaluation:  Plan of Care Reviewed With: patient        Progress: no change   Patient keeps stating \"I don't believe I am going to make it out if here.\"  I have encouraged him to use his I.S. to help expand and clear out his lungs. As the shift has progress his oxygen requirement has slowly decreased.  He worked with PT today and I believe that has helped his spirits.    "

## 2022-07-10 NOTE — THERAPY TREATMENT NOTE
Acute Care - Physical Therapy Treatment Note  [facility]     Patient Name: Adrián Graham  : 1940  MRN: [id number]  Today's Date: 7/10/2022      Visit Dx:     ICD-10-CM ICD-9-CM   1. Hypoxemia  R09.02 799.02   2. Orthostatic hypotension  I95.1 458.0   3. Pneumonia due to COVID-19 virus  U07.1 480.8    J12.82 079.89   4. Multiple subsegmental pulmonary emboli without acute cor pulmonale (HCC)  I26.94 415.19   5. Impaired mobility  Z74.09 799.89   6. Decreased activities of daily living (ADL)  Z78.9 V49.89     Patient Active Problem List   Diagnosis   • Idiopathic peripheral neuropathy   • Essential hypertension   • Benign prostatic hyperplasia without lower urinary tract symptoms   • Overweight (BMI 25.0-29.9)   • Carpal tunnel syndrome, left   • Bilateral carotid artery disease (HCC)   • Hyperlipidemia   • Preop cardiovascular exam CEA Dr Brown   • Single subsegmental pulmonary embolism without acute cor pulmonale (HCC)   • Pulmonary embolism associated with COVID-19 (HCC)   • Acute respiratory failure with hypoxia (HCC)     Past Medical History:   Diagnosis Date   • Abdominal distention    • Abdominal pain, left upper quadrant    • Allergic rhinitis    • Arthritis    • Bloating    • BPH (benign prostatic hyperplasia)    • Carpal tunnel syndrome    • Change in bowel habits    • Constipation    • Diarrhea    • Erectile dysfunction    • GERD (gastroesophageal reflux disease)    • History of shingles    • Hypertension    • Idiopathic peripheral neuropathy    • Neuropathy    • Pneumonia    • Shingles    • Weight loss      Past Surgical History:   Procedure Laterality Date   • APPENDECTOMY     • BACK SURGERY     • CARPAL TUNNEL RELEASE Right    • CARPAL TUNNEL RELEASE Left 2022   • CHOLECYSTECTOMY     • COLONOSCOPY  2010    5 yr-complete colon not visualized   • COLONOSCOPY  2004    extremely tortuous redundant colon. BE-mild diverticulosis without diverticulitis. ? Gallstones-Dr Chery.   •  COLONOSCOPY N/A 09/10/2020    Procedure: COLONOSCOPY WITH ANESTHESIA;  Surgeon: Mani Sy DO;  Location: [facility] ENDOSCOPY;  Service: Gastroenterology;  Laterality: N/A;  Pre: Positive Colorectal Screening  Post: Diverticulosis  Dr. Mooney  CO2 Inflation Used   • ENDOSCOPY N/A 09/26/2016    Procedure: ESOPHAGOGASTRODUODENOSCOPY WITH ANESTHESIA;  Surgeon: Mani Sy DO;  Location: [facility] ENDOSCOPY;  Service:    • HIP SURGERY Right    • JOINT REPLACEMENT     • KNEE SURGERY     • OTHER SURGICAL HISTORY      Surgery for Spinal Stenosis   • OTHER SURGICAL HISTORY      Laser Prostate Surgery     PT Assessment (last 12 hours)     PT Evaluation and Treatment     Row Name 07/10/22 1105          Physical Therapy Time and Intention    Subjective Information complains of;fatigue;pain;dyspnea  -     Document Type therapy note (daily note)  -     Mode of Treatment physical therapy  -     Row Name 07/10/22 1105          General Information    Existing Precautions/Restrictions fall;oxygen therapy device and L/min  -     Row Name 07/10/22 1105          Pain    Pretreatment Pain Rating 4/10  -TINA     Posttreatment Pain Rating 4/10  -TINA     Pain Location generalized  -     Pre/Posttreatment Pain Comment c/o pain from tail bone and in both hips with exercise  -     Pain Intervention(s) Repositioned  -     Row Name 07/10/22 1105          Bed Mobility    Supine-Sit [place] (Bed Mobility) verbal cues;minimum assist (75% patient effort)  -     Sit-Supine [place] (Bed Mobility) verbal cues;minimum assist (75% patient effort)  -     Row Name 07/10/22 1105          Balance    Comment, Balance sitting EOB, maintained balance with CGA.  maintained sitting approx 5 minutes  -     Row Name 07/10/22 1105          Aerobic Exercise    Comment, Aerobic Exercise (Therapeutic Exercise) in fowlers, AROM/AAROM BLE X 15  -     Row Name 07/10/22 1105          Vital Signs    Pre SpO2 (%) 92  -TINA     O2 Delivery  Pre Treatment --  vapotherm  -TINA     Intra SpO2 (%) 85  -TINA     O2 Delivery Intra Treatment --  vapotherm  -TINA     Post SpO2 (%) 88  -TINA     O2 Delivery Post Treatment --  vapotherm  -TINA     Pre Patient Position Supine  -TINA     Intra Patient Position Sitting  -TINA     Post Patient Position Supine  -TINA     Row Name 07/10/22 1105          Positioning and Restraints    Pre-Treatment Position in bed  -TINA     Post Treatment Position bed  -TINA     In Bed fowlers;call light within reach;encouraged to call for assist;notified nsg;side rails up x2  -TINA           User Key  (r) = Recorded By, (t) = Taken By, (c) = Cosigned By    Initials Name Provider Type    TINA Juan Webster, PTA Physical Therapist Assistant                Physical Therapy Education                 Title: PT OT SLP Therapies (Done)     Topic: Physical Therapy (Done)     Point: Mobility training (Done)     Learning Progress Summary           Patient Acceptance, E, VU,NR by  at 7/10/2022 1105    Comment: bed exercises, progression with mobiliyt, energy conservation    Acceptance, E,TB, VU,NR by  at 7/10/2022 0130    Acceptance, E,D, DU,VU by  at 7/4/2022 1132    Comment: benefits of PT and POC, call for A OOB                   Point: Precautions (Done)     Learning Progress Summary           Patient Acceptance, E,TB, VU,NR by  at 7/10/2022 0130    Acceptance, E,TB, VU by  at 7/7/2022 1450    Comment: deep breathing    Acceptance, E,TB, VU by  at 7/5/2022 1046    Comment: call for assist    Acceptance, E,D, DU,VU by  at 7/4/2022 1132    Comment: benefits of PT and POC, call for A OOB                               User Key     Initials Effective Dates Name Provider Type Discipline     06/16/21 -  Corrina Sandra, PTA Physical Therapist Assistant PT     06/16/21 -  Dexter Severino, PT Physical Therapist PT     12/08/16 -  Juan Webster, [employer] Physical Therapist Assistant PT     06/16/21 -  Ganesh Gunter, RN Registered Nurse Nurse               PT Recommendation and Plan     Plan of Care Reviewed With: patient  Progress: improving  Outcome Evaluation: Pt was in bed, on vapotherm at 92%. Performed LE exercises AAROM/AROM BLE X 15.  Performed supine to sitting with min assist with HOB elevated.  Pt maintained sitting with CGA for approx 5 minutes, O2 decreased to 85%.  Sit to supine with min assist.  Will continue to work with pt анна ncrease strength and improve endurance.   Outcome Measures     Row Name 07/10/22 1105 07/08/22 1000 07/07/22 1400       How much help from another person do you currently need...    Turning from your back to your side while in flat bed without using bedrails? 3  -TINA 3  -NW 3  -AH    Moving from lying on back to sitting on the side of a flat bed without bedrails? 3  -TINA 3  -NW 3  -AH    Moving to and from a bed to a chair (including a wheelchair)? 3  -TINA 3  -NW 3  -AH    Standing up from a chair using your arms (e.g., wheelchair, bedside chair)? 3  -TINA 2  -NW 3  -AH    Climbing 3-5 steps with a railing? 1  -TINA 1  -NW 3  -AH    To walk in hospital room? 2  -TINA 3  -NW 3  -AH    AM-PAC 6 Clicks Score (PT) 15  -TINA 15  -NW 18  -AH       Functional Assessment    Outcome Measure Options AM-PAC 6 Clicks Basic Mobility (PT)  -TINA AM-PAC 6 Clicks Basic Mobility (PT)  -NW AM-PAC 6 Clicks Basic Mobility (PT)  -          User Key  (r) = Recorded By, (t) = Taken By, (c) = Cosigned By    Initials Name Provider Type     Corrina Sandra, PTA Physical Therapist Assistant    Juan Barbosa, PTA Physical Therapist Assistant    Cherie Hatfield, PTA Physical Therapist Assistant                 Time Calculation:    PT Charges     Row Name 07/10/22 1105             Time Calculation    Start Time 1105  -      Stop Time 1129  -      Time Calculation (min) 24 min  -      PT Received On 07/10/22  -              Time Calculation- PT    Total Timed Code Minutes- PT 24 minute(s)  -              Timed Charges    90736 - PT  Therapeutic Exercise Minutes 14  -TINA      18494 - PT Therapeutic Activity Minutes 10  -TINA              Total Minutes    Timed Charges Total Minutes 24  -TINA       Total Minutes 24  -TINA            User Key  (r) = Recorded By, (t) = Taken By, (c) = Cosigned By    Initials Name Provider Type    Juan Barbosa PTA Physical Therapist Assistant              Therapy Charges for Today     Code Description Service Date Service Provider Modifiers Qty    19081204061 HC PT THERAPEUTIC ACT EA 15 MIN 7/10/2022 Juan Webster PTA GP 1    66088590247 HC PT THER PROC EA 15 MIN 7/10/2022 Juan Webster, [facility] GP 1          PT G-Codes  Outcome Measure Options: AM-PAC 6 Clicks Basic Mobility (PT)  AM-PAC 6 Clicks Score (PT): 15  AM-PAC 6 Clicks Score (OT): 17    Juan Webster PTA  7/10/2022

## 2022-07-10 NOTE — PROGRESS NOTES
[facility]  INPATIENT PROGRESS NOTE    Patient Name: Adrián Graham  Date of Admission: 7/1/2022  Today's Date: 07/10/22  Length of Stay: 9  Primary Care Physician: Mauro Irizarry DO    Subjective   Chief Complaint: Shortness of breath  Fall       7/4 Still weak and short of breath with minimal activity  7/5 Feels much better after a showed. Debilitated still. Dyspneic with minimal activity.   7/6 Complaints of neuropathic pain that is relieved with walking. Agreeable to rehabilitation placement. Breathing improvement is stagnant, may need oxygen at discharge.  7/7 Oxygen requirements have increased today.   7/8 Switched to high flow oxygen overnight, oxygen requirements seem to be increasing. He appears saddened by this news, not distressed.  7/9 Overnight oxygen saturation has dropped and requiring high flow at 40%. This AM decompensated further needing transfer to critical care. Temp 102. Nose bleed around canula, no sputum production. Chest with rales over left lower lobe.  7/10 Requiring high flow 80% today. Appears less distressed.      Review of Systems   All pertinent negatives and positives are as above. All other systems have been reviewed and are negative unless otherwise stated.     Objective    Temp:  [97 °F (36.1 °C)-103.1 °F (39.5 °C)] 97.1 °F (36.2 °C)  Heart Rate:  [] 87  Resp:  [15-32] 23  BP: ()/(30-73) 114/61  Physical Exam  Constitutional:       General: He is not in acute distress.     Appearance: He is well-developed. He is ill-appearing. He is not toxic-appearing.   HENT:      Head: Normocephalic and atraumatic.      Right Ear: External ear normal.      Left Ear: External ear normal.      Nose: Nose normal.      Mouth/Throat:      Mouth: Mucous membranes are dry.   Eyes:      General:         Right eye: No discharge.         Left eye: No discharge.      Extraocular Movements: Extraocular movements intact.       Conjunctiva/sclera: Conjunctivae normal.      Pupils: Pupils are equal, round, and reactive to light.   Neck:      Vascular: No JVD.   Cardiovascular:      Rate and Rhythm: Normal rate and regular rhythm.      Heart sounds: Normal heart sounds. No murmur heard.  Pulmonary:      Effort: No respiratory distress.      Breath sounds: No stridor. Rales present. No wheezing or rhonchi.   Chest:      Chest wall: No tenderness.   Abdominal:      General: Bowel sounds are normal. There is no distension.      Palpations: Abdomen is soft.      Tenderness: There is no abdominal tenderness. There is no guarding or rebound.   Musculoskeletal:         General: No tenderness or deformity. Normal range of motion.      Cervical back: Normal range of motion and neck supple. No rigidity.      Right lower leg: No edema.      Left lower leg: No edema.   Skin:     General: Skin is warm and dry.      Findings: No rash.   Neurological:      General: No focal deficit present.      Mental Status: He is alert and oriented to person, place, and time. Mental status is at baseline.      Cranial Nerves: No cranial nerve deficit.      Sensory: No sensory deficit.      Motor: No abnormal muscle tone.      Deep Tendon Reflexes: Reflexes normal.   Psychiatric:         Mood and Affect: Mood normal.         Behavior: Behavior normal.     Results Review:  I have reviewed the labs, radiology results, and diagnostic studies.    Laboratory Data:   Results from last 7 days   Lab Units 07/10/22  0223 07/07/22  0941 07/04/22  0700   WBC 10*3/mm3 19.51* 21.96* 15.35*   HEMOGLOBIN g/dL 10.5* 13.8 10.7*   HEMATOCRIT % 32.1* 43.9 32.9*   PLATELETS 10*3/mm3 360 553* 407        Results from last 7 days   Lab Units 07/10/22  0223 07/07/22  0941 07/04/22  0700   SODIUM mmol/L 141 138 137   POTASSIUM mmol/L 3.6 3.5 4.2   CHLORIDE mmol/L 104 101 103   CO2 mmol/L 31.0* 30.0* 27.0   BUN mg/dL 20 16 34*   CREATININE mg/dL 0.78 0.77 0.87   CALCIUM mg/dL 8.6 9.3 8.7    BILIRUBIN mg/dL 0.3  --   --    ALK PHOS U/L 76  --   --    ALT (SGPT) U/L 38  --   --    AST (SGOT) U/L 30  --   --    GLUCOSE mg/dL 114* 113* 159*       Culture Data:   Blood Culture   Date Value Ref Range Status   07/02/2022 No growth at 24 hours  Preliminary       Radiology Data:   Imaging Results (Last 24 Hours)     ** No results found for the last 24 hours. **          I have reviewed the patient's current medications.     Assessment/Plan     Active Hospital Problems    Diagnosis    • Acute respiratory failure with hypoxia (HCC)    • Pulmonary embolism associated with COVID-19 (HCC)    • Bilateral carotid artery disease (HCC)    • Hyperlipidemia    • Benign prostatic hyperplasia without lower urinary tract symptoms    • Essential hypertension        Plan:  Vitals per protocol  Transfer to critical care 7/9  Continuous pulse oxymetry  Up with assistance, fall precautions  Code status reviewed > DNR/DNI    Hypoxemia worsening with fever  ID consulted 7/9 > antibiotic coverage    Blood cultures x 2 7/9 negative to date  Repeat MRSA screen nares > negative  Vancomycin added 7/9  BiPAP 7/9 now prn    COVID 19 PNA > Isolation discontinued 7/6, oral steroid taper  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 Transition to orals when oxygen requirements stabilize    BPH > Proscar    Insomnia > Benadryl 25 mg PO at HS     Prognosis guarded    Discharge Planning: SNF subacute rehabilitation approved, probably discharge tomorrow if oxygen need improve.    Electronically signed by Victor Manuel Dorado MD, 07/10/22, 11:00 CDT.

## 2022-07-10 NOTE — PROGRESS NOTES
"INFECTIOUS DISEASES PROGRESS NOTE    Patient:  Adrián Graham  YOB: 1940  MRN: [id number]   Admit date: 2022   Admitting Physician: Victor Manuel Dorado,*  Primary Care Physician: Mauro Irizarry DO    Chief Complaint: Neck discomfort earlier.      Interval History: Patient complained of some neck pain earlier.  He has some chronic neck problems.  He received Tylenol.    He had some episodes of desaturation with activity and was increased from 40 L and 80% of Vapotherm to 40 L and 100% as the saturations were in the mid 80s for a bit.  This is improved and he has actually been titrated back down to 40 L and 85% with saturation 93 to 95%    Allergies: No Known Allergies    Current Scheduled Medications:   ascorbic acid, 500 mg, Oral, Daily  aspirin, 81 mg, Oral, Daily  atorvastatin, 20 mg, Oral, Daily  cefepime, 2 g, Intravenous, Q8H  cetirizine, 10 mg, Oral, Daily  diphenhydrAMINE, 25 mg, Oral, Nightly  enoxaparin, 1 mg/kg, Subcutaneous, Q12H  famotidine, 20 mg, Oral, BID AC  finasteride, 5 mg, Oral, Daily  fluticasone, 2 spray, Nasal, Daily  furosemide, 20 mg, Oral, BID  gabapentin, 400 mg, Oral, Q12H  guaiFENesin, 1,200 mg, Oral, BID  ipratropium-albuterol, 3 mL, Nebulization, 4x Daily - RT  montelukast, 10 mg, Oral, Nightly  predniSONE, 40 mg, Oral, Daily With Breakfast  sodium chloride, 250 mL, Intravenous, Once  sodium chloride, 10 mL, Intravenous, Q12H  vitamin D3, 5,000 Units, Oral, Daily  zinc sulfate, 220 mg, Oral, Daily      Current PRN Medications:  •  acetaminophen  •  albuterol  •  ondansetron  •  sodium chloride  •  traMADol    Review of Systems  General: No fever since spiked to 103 yesterday  Respiratory: Rare productive cough.  Short of breath somewhat improved    Objective     Vital Signs:  Temp (24hrs), Av °F (37.2 °C), Min:97 °F (36.1 °C), Max:103.1 °F (39.5 °C)      /58   Pulse 84   Temp 97.1 °F (36.2 °C) (Oral)   Resp 18   Ht 185.4 cm (73\")   Wt 85.1 kg " (187 lb 9.8 oz)   SpO2 (!) 87%   BMI 24.75 kg/m²         Physical Exam  General: Patient 82-year-old male looking a bit better today sitting up in bed.  Pam RN at bedside  HEENT: Sclera anicteric.  Vapotherm in place  Lungs: Occasional scattered rhonchi with faint bilateral crackles.  Abdomen: Soft, nontender, nondistended  Extremities: No edema  Neuro: Alert, oriented, speech clear  Psych: Pleasant and cooperative    Line/IV site: IV x2 antecubital right, condition patent and no redness and upper arm right, condition patent and no redness    Results Review:    I reviewed the patient's new clinical results.    Lab Results:  CBC:   Lab Results   Lab 07/04/22  0700 07/07/22  0941 07/10/22  0223   WBC 15.35* 21.96* 19.51*   HEMOGLOBIN 10.7* 13.8 10.5*   HEMATOCRIT 32.9* 43.9 32.1*   PLATELETS 407 553* 360   LYMPHOCYTE %  --  16.2* 4.0*   MONOCYTES %  --   --  3.0*     >>>> 93% neutrophils, 4% lymphocytes/3% monocytes      CMP:   Lab Results   Lab 07/04/22  0700 07/07/22  0941 07/10/22  0223   SODIUM 137 138 141   POTASSIUM 4.2 3.5 3.6   CHLORIDE 103 101 104   CO2 27.0 30.0* 31.0*   BUN 34* 16 20   CREATININE 0.87 0.77 0.78   CALCIUM 8.7 9.3 8.6   BILIRUBIN  --   --  0.3   ALK PHOS  --   --  76   ALT (SGPT)  --   --  38   AST (SGOT)  --   --  30   GLUCOSE 159* 113* 114*       Estimated Creatinine Clearance: 86.4 mL/min (by C-G formula based on SCr of 0.78 mg/dL).      COVID LABS:  Results From Last 14 Days   Lab Units 07/10/22  0223 07/07/22  0941 07/04/22  0700 07/02/22  1248 07/02/22  0647 07/01/22  1429 07/01/22  1134 06/26/22  1137   PROBNP pg/mL  --   --   --   --   --  1,299.0  --  717.8   CK TOTAL U/L  --   --   --   --   --   --  167  --    CRP mg/dL 18.76* 8.16* 6.46*  --    < > 18.78*  --   --    D DIMER QUANT MCGFEU/mL  --   --   --   --   --   --  4.49* 14.95*   FERRITIN ng/mL  --  1,243.00*  --   --   --   --   --   --    LACTATE mmol/L  --   --   --  1.8  --   --   --  1.3   PROCALCITONIN ng/mL  --    --  0.08 0.21  --  0.17  --   --    PROTIME Seconds  --   --   --   --   --   --  23.3*  --    INR   --   --   --   --   --   --  2.16*  --    TROPONIN T ng/mL  --   --   --   --   --  <0.010 <0.010 <0.010    < > = values in this interval not displayed.         Culture Results:    Microbiology Results (last 10 days)     Procedure Component Value - Date/Time    MRSA Screen, PCR (Inpatient) - Swab, Nares [[id number]]  (Normal) Collected: 07/09/22 1052    Lab Status: Final result Specimen: Swab from Nares Updated: 07/09/22 1210     MRSA PCR No MRSA Detected    Narrative:      The negative predictive value of this diagnostic test is high and should only be used to consider de-escalating anti-MRSA therapy. A positive result may indicate colonization with MRSA and must be correlated clinically.    Respiratory Culture - Sputum, Cough [[id number]] Collected: 07/06/22 2202    Lab Status: Final result Specimen: Sputum from Cough Updated: 07/08/22 0930     Respiratory Culture Rejected     Gram Stain Few (2+) WBCs seen      No Epithelial cells seen      No organisms seen    Narrative:      Specimen rejected due to oropharyngeal contamination. Please reorder and recollect specimen if clinically necessary.    COVID-19 RAPID AG,VERITOR,COR/GEE/PAD/MARIYA/MAD/RACHEL/LAG/JOSEPH/ IN-HOUSE,DRY SWAB, 1-2 HR TAT - Swab, Nasal Cavity [[id number]]  (Normal) Collected: 07/06/22 1451    Lab Status: Final result Specimen: Swab from Nasal Cavity Updated: 07/06/22 1600     COVID19 Presumptive Negative    Narrative:      Fact sheets for providers: https://www.fda.gov/media/005812/download    Fact sheets for patients: https://www.fda.gov/media/866939/download    Legionella Antigen, Urine - Urine, Urine, Clean Catch [[id number]]  (Normal) Collected: 07/02/22 2126    Lab Status: Final result Specimen: Urine, Clean Catch Updated: 07/02/22 2155     LEGIONELLA ANTIGEN, URINE Negative    S. Pneumo Ag Urine or CSF - Urine, Urine, Clean Catch [[id number]]  (Normal)  Collected: 07/02/22 2126    Lab Status: Final result Specimen: Urine, Clean Catch Updated: 07/02/22 2155     Strep Pneumo Ag Negative    Blood Culture - Blood, Arm, Left [[id number]]  (Normal) Collected: 07/02/22 1248    Lab Status: Final result Specimen: Blood from Arm, Left Updated: 07/07/22 1333     Blood Culture No growth at 5 days    Respiratory Panel PCR w/COVID-19(SARS-CoV-2) RACHEL/MARIYA/GEE/PAD/COR/MAD/JOSEPH In-House, NP Swab in UTM/VTM, 3-4 HR TAT - Swab, Nasopharynx [[id number]]  (Abnormal) Collected: 07/01/22 1441    Lab Status: Final result Specimen: Swab from Nasopharynx Updated: 07/01/22 1620     ADENOVIRUS, PCR Not Detected     Coronavirus 229E Not Detected     Coronavirus HKU1 Not Detected     Coronavirus NL63 Not Detected     Coronavirus OC43 Not Detected     COVID19 Detected     Human Metapneumovirus Not Detected     Human Rhinovirus/Enterovirus Not Detected     Influenza A PCR Not Detected     Influenza B PCR Not Detected     Parainfluenza Virus 1 Not Detected     Parainfluenza Virus 2 Not Detected     Parainfluenza Virus 3 Not Detected     Parainfluenza Virus 4 Not Detected     RSV, PCR Not Detected     Bordetella pertussis pcr Not Detected     Bordetella parapertussis PCR Not Detected     Chlamydophila pneumoniae PCR Not Detected     Mycoplasma pneumo by PCR Not Detected    Narrative:      In the setting of a positive respiratory panel with a viral infection PLUS a negative procalcitonin without other underlying concern for bacterial infection, consider observing off antibiotics or discontinuation of antibiotics and continue supportive care. If the respiratory panel is positive for atypical bacterial infection (Bordetella pertussis, Chlamydophila pneumoniae, or Mycoplasma pneumoniae), consider antibiotic de-escalation to target atypical bacterial infection.    MRSA Screen, PCR (Inpatient) - Swab, Nares [[id number]]  (Normal) Collected: 07/01/22 1441    Lab Status: Final result Specimen: Swab from Nares  Updated: 07/01/22 1635     MRSA PCR No MRSA Detected    Narrative:      The negative predictive value of this diagnostic test is high and should only be used to consider de-escalating anti-MRSA therapy. A positive result may indicate colonization with MRSA and must be correlated clinically.    COVID-19,[employer] IN-HOUSE,Nasal Swab No Transport Media 3-4 HR TAT - Swab, Nasal Cavity [[id number]]  (Normal) Collected: 07/01/22 1214    Lab Status: Final result Specimen: Swab from Nasal Cavity Updated: 07/01/22 1343     COVID19 Not Detected    Narrative:      Fact sheet for providers: https://www.fda.gov/media/878934/download     Fact sheet for patients: https://www.fda.gov/media/621138/download    Test performed by PCR.    Consider negative results in combination with clinical observations, patient history, and epidemiological information.               Radiology:   Imaging Results (Last 72 Hours)     Procedure Component Value Units Date/Time    XR Chest 1 View [[id number]] Collected: 07/09/22 1049     Updated: 07/09/22 1054    Narrative:      XR CHEST 1 VW- 7/9/2022 10:38 AM CDT     HISTORY: hypoxemia pneumonia worsening oxygen level; R09.02-Hypoxemia;  I95.1-Orthostatic hypotension; U07.1-COVID-19; J12.82-Pneumonia due to  coronavirus disease 2019; I26.94-Multiple subsegmental pulmonary emboli  without acute cor pulmonale; Z74.09-Other reduced mobility; Z78.9-Other  specified health status     COMPARISON: Chest exam dated 7/8/2022.     FINDINGS:      Multifocal bilateral patchy infiltrates, appearing stable. No dense  areas of consolidation. The lungs are well expanded. No pleural effusion  or pneumothorax. The cardiomediastinal silhouette and pulmonary  vascularity are within normal limits. The osseous structures and  surrounding soft tissues demonstrate no acute abnormality.       Impression:      1. Bilateral, essentially diffuse patchy lung infiltrates reflecting  atypical pneumonia. Infiltrates appear stable. Lung  volumes are stable.  No new consolidation.        This report was finalized on 07/09/2022 10:51 by Dr Prem Mclaughlin, .    XR Chest 1 View [[id number]] Collected: 07/08/22 1115     Updated: 07/08/22 1119    Narrative:      EXAMINATION: XR CHEST 1 VW-     7/8/2022 11:09 AM CDT     HISTORY: Worsening hypoxemia; R09.02-Hypoxemia; I95.1-Orthostatic  hypotension; U07.1-COVID-19; J12.82-Pneumonia due to coronavirus disease  2019; I26.94-Multiple subsegmental pulmonary emboli without acute cor  pulmonale; Z74.09-Other reduced mobility; Z78.9-Other specified health  status     1 view chest x-ray.     Comparison is made with July 6, 2022.     Patchy infiltrate is again seen throughout both lungs.  There is been no improvement.     No pneumothorax.     Stable heart size.     Summary:  1. Similar appearance of infiltrate throughout both lungs. No  improvement.  This report was finalized on 07/08/2022 11:16 by Dr. Alvaro Wang MD.          Assessment & Plan     Active Hospital Problems    Diagnosis    • Acute respiratory failure with hypoxia (HCC)    • Pulmonary embolism associated with COVID-19 (HCC)    • Bilateral carotid artery disease (HCC)    • Hyperlipidemia    • Benign prostatic hyperplasia without lower urinary tract symptoms    • Essential hypertension        IMPRESSION:  1. COVID-19 infection diagnosed June 26, 2022-unclear when patient actually initially was infected with COVID as he was seen June 7 with cough and shortness of breath, had CT of the chest that showed patchy groundglass infiltrates but does not appear he was tested for COVID.  He received steroids and antibiotics.  Unclear if he received any COVID-related treatments June 26 i.e. Paxlovid.  Patient admitted July 1 and had temp spike to 103 and significant increased O2 requirements July 9 prompting transfer to CCU.  2. Fever to 103-blood cultures negative.  No significant change in chest x-ray to support new consolidation in patient who has received IV  azithromycin for 5 days and Zosyn for 8 days this admission and then dosed with vancomycin yesterday x1 followed by continuing on cefepime.  May be related to postacute COVID organizing pneumonia  3. Leukocytosis-patient did receive methylprednisolone early on in admission and is on prednisone now  4. Pulmonary emboli diagnosed June 26  5. Elevated CRP-increased today  6. Elevated ferritin      RECOMMENDATION:   · Continue cefepime for now  · If cultures negative tomorrow we will discontinue cefepime  · Steroid management per pulmonary  · Wean O2 as tolerated  · Continue critical care monitoring    D/w YASMEEN Michael MD  07/10/22  09:24 CDT

## 2022-07-11 NOTE — PROGRESS NOTES
"INFECTIOUS DISEASES PROGRESS NOTE    Patient:  Adrián Graham  YOB: 1940  MRN: [id number]   Admit date: 2022   Admitting Physician: Steven Ross DO  Primary Care Physician: Mauro Irizarry DO    Chief Complaint: \"Trying to use this\".  Held off incentive spirometer      Interval History: Patient notes that he has been told to use it 5 times an hour and up to 10 times an hour.  He said he woke up ready to fight and could do 20.  He acknowledges that he knows he is better off to do it correctly less times then to do 20 times quickly.    It appears he was down to 40 L and 70% on Vapotherm at 1 point.  Currently is on 40 L and 100% with a nonrebreather mask saturating between 88 and 93%.  He did eat some breakfast.    Allergies: No Known Allergies    Current Scheduled Medications:   ascorbic acid, 500 mg, Oral, Daily  aspirin, 81 mg, Oral, Daily  atorvastatin, 20 mg, Oral, Daily  cefepime, 2 g, Intravenous, Q8H  cetirizine, 10 mg, Oral, Daily  diphenhydrAMINE, 25 mg, Oral, Nightly  enoxaparin, 1 mg/kg, Subcutaneous, Q12H  famotidine, 20 mg, Oral, BID AC  finasteride, 5 mg, Oral, Daily  fluticasone, 2 spray, Nasal, Daily  furosemide, 20 mg, Oral, BID  gabapentin, 400 mg, Oral, Q12H  guaiFENesin, 1,200 mg, Oral, BID  ipratropium-albuterol, 3 mL, Nebulization, 4x Daily - RT  montelukast, 10 mg, Oral, Nightly  predniSONE, 40 mg, Oral, Daily With Breakfast  sodium chloride, 250 mL, Intravenous, Once  sodium chloride, 10 mL, Intravenous, Q12H  vitamin D3, 5,000 Units, Oral, Daily  zinc sulfate, 220 mg, Oral, Daily      Current PRN Medications:  •  acetaminophen  •  albuterol  •  ondansetron  •  sodium chloride  •  traMADol    Review of Systems  General: No fever   Respiratory: Shortness of breath    Objective     Vital Signs:  Temp (24hrs), Av.8 °F (36.6 °C), Min:97.3 °F (36.3 °C), Max:98.3 °F (36.8 °C)      /64   Pulse 83   Temp 97.9 °F (36.6 °C) (Oral)   Resp 27   Ht 185.4 cm (73\")  "  Wt 81.5 kg (179 lb 10.8 oz)   SpO2 90%   BMI 23.71 kg/m²         Physical Exam  General: Patient 82-year-old male sitting up with friend visiting  HEENT: Sclera anicteric.  Vapotherm in place at 40 L and 100% with nonrebreather mask as well.  Lungs: Diminished breath sounds bilaterally  Abdomen: Soft, nontender, nondistended  Extremities: No edema  Neuro: Alert, oriented, speech clear  Psych: Pleasant and cooperative    Line/IV site: IV x2 antecubital right, condition patent     Results Review:    I reviewed the patient's new clinical results.    Lab Results:  CBC:   Lab Results   Lab 07/07/22  0941 07/10/22  0223 07/11/22  0312   WBC 21.96* 19.51* 17.30*   HEMOGLOBIN 13.8 10.5* 10.3*   HEMATOCRIT 43.9 32.1* 33.7*   PLATELETS 553* 360 368   LYMPHOCYTE % 16.2* 4.0*  --    MONOCYTES %  --  3.0*  --      >>>> 85.2% neutrophils, 8% lymphocytes/2.7% monocytes      CMP:   Lab Results   Lab 07/07/22  0941 07/10/22  0223 07/11/22  0312   SODIUM 138 141 143   POTASSIUM 3.5 3.6 3.9   CHLORIDE 101 104 102   CO2 30.0* 31.0* 34.0*   BUN 16 20 23   CREATININE 0.77 0.78 0.83   CALCIUM 9.3 8.6 9.0   BILIRUBIN  --  0.3  --    ALK PHOS  --  76  --    ALT (SGPT)  --  38  --    AST (SGOT)  --  30  --    GLUCOSE 113* 114* 113*       Estimated Creatinine Clearance: 77.7 mL/min (by C-G formula based on SCr of 0.83 mg/dL).      COVID LABS:  Results From Last 14 Days   Lab Units 07/10/22  0223 07/07/22  0941 07/04/22  0700 07/02/22  1248 07/02/22  0647 07/01/22  1429 07/01/22  1134   PROBNP pg/mL  --   --   --   --   --  1,299.0  --    CK TOTAL U/L  --   --   --   --   --   --  167   CRP mg/dL 18.76* 8.16* 6.46*  --    < > 18.78*  --    D DIMER QUANT MCGFEU/mL  --   --   --   --   --   --  4.49*   FERRITIN ng/mL  --  1,243.00*  --   --   --   --   --    LACTATE mmol/L  --   --   --  1.8  --   --   --    PROCALCITONIN ng/mL  --   --  0.08 0.21  --  0.17  --    PROTIME Seconds  --   --   --   --   --   --  23.3*   INR   --   --   --   --    --   --  2.16*   TROPONIN T ng/mL  --   --   --   --   --  <0.010 <0.010    < > = values in this interval not displayed.         Culture Results:    Microbiology Results (last 10 days)     Procedure Component Value - Date/Time    MRSA Screen, PCR (Inpatient) - Swab, Nares [[id number]]  (Normal) Collected: 07/09/22 1052    Lab Status: Final result Specimen: Swab from Nares Updated: 07/09/22 1210     MRSA PCR No MRSA Detected    Narrative:      The negative predictive value of this diagnostic test is high and should only be used to consider de-escalating anti-MRSA therapy. A positive result may indicate colonization with MRSA and must be correlated clinically.    Blood Culture - Blood, Arm, Right [[id number]]  (Normal) Collected: 07/09/22 1030    Lab Status: Preliminary result Specimen: Blood from Arm, Right Updated: 07/10/22 1048     Blood Culture No growth at 24 hours    Blood Culture - Blood, Arm, Left [[id number]]  (Normal) Collected: 07/09/22 1028    Lab Status: Preliminary result Specimen: Blood from Arm, Left Updated: 07/10/22 1048     Blood Culture No growth at 24 hours    Respiratory Culture - Sputum, Cough [[id number]] Collected: 07/06/22 2202    Lab Status: Final result Specimen: Sputum from Cough Updated: 07/08/22 0930     Respiratory Culture Rejected     Gram Stain Few (2+) WBCs seen      No Epithelial cells seen      No organisms seen    Narrative:      Specimen rejected due to oropharyngeal contamination. Please reorder and recollect specimen if clinically necessary.    COVID-19 RAPID AG,VERITOR,COR/GEE/PAD/MARIYA/MAD/RACHEL/LAG/JOSEPH/ IN-HOUSE,DRY SWAB, 1-2 HR TAT - Swab, Nasal Cavity [[id number]]  (Normal) Collected: 07/06/22 1451    Lab Status: Final result Specimen: Swab from Nasal Cavity Updated: 07/06/22 1600     COVID19 Presumptive Negative    Narrative:      Fact sheets for providers: https://www.fda.gov/media/055320/download    Fact sheets for patients: https://www.fda.gov/media/364508/download     Legionella Antigen, Urine - Urine, Urine, Clean Catch [[id number]]  (Normal) Collected: 07/02/22 2126    Lab Status: Final result Specimen: Urine, Clean Catch Updated: 07/02/22 2155     LEGIONELLA ANTIGEN, URINE Negative    S. Pneumo Ag Urine or CSF - Urine, Urine, Clean Catch [[id number]]  (Normal) Collected: 07/02/22 2126    Lab Status: Final result Specimen: Urine, Clean Catch Updated: 07/02/22 2155     Strep Pneumo Ag Negative    Blood Culture - Blood, Arm, Left [[id number]]  (Normal) Collected: 07/02/22 1248    Lab Status: Final result Specimen: Blood from Arm, Left Updated: 07/07/22 1333     Blood Culture No growth at 5 days    Respiratory Panel PCR w/COVID-19(SARS-CoV-2) RACHEL/MARIYA/GEE/PAD/COR/MAD/JOSEPH In-House, NP Swab in UTM/VTM, 3-4 HR TAT - Swab, Nasopharynx [[id number]]  (Abnormal) Collected: 07/01/22 1441    Lab Status: Final result Specimen: Swab from Nasopharynx Updated: 07/01/22 1620     ADENOVIRUS, PCR Not Detected     Coronavirus 229E Not Detected     Coronavirus HKU1 Not Detected     Coronavirus NL63 Not Detected     Coronavirus OC43 Not Detected     COVID19 Detected     Human Metapneumovirus Not Detected     Human Rhinovirus/Enterovirus Not Detected     Influenza A PCR Not Detected     Influenza B PCR Not Detected     Parainfluenza Virus 1 Not Detected     Parainfluenza Virus 2 Not Detected     Parainfluenza Virus 3 Not Detected     Parainfluenza Virus 4 Not Detected     RSV, PCR Not Detected     Bordetella pertussis pcr Not Detected     Bordetella parapertussis PCR Not Detected     Chlamydophila pneumoniae PCR Not Detected     Mycoplasma pneumo by PCR Not Detected    Narrative:      In the setting of a positive respiratory panel with a viral infection PLUS a negative procalcitonin without other underlying concern for bacterial infection, consider observing off antibiotics or discontinuation of antibiotics and continue supportive care. If the respiratory panel is positive for atypical bacterial infection  (Bordetella pertussis, Chlamydophila pneumoniae, or Mycoplasma pneumoniae), consider antibiotic de-escalation to target atypical bacterial infection.    MRSA Screen, PCR (Inpatient) - Swab, Nares [[id number]]  (Normal) Collected: 07/01/22 1441    Lab Status: Final result Specimen: Swab from Nares Updated: 07/01/22 1635     MRSA PCR No MRSA Detected    Narrative:      The negative predictive value of this diagnostic test is high and should only be used to consider de-escalating anti-MRSA therapy. A positive result may indicate colonization with MRSA and must be correlated clinically.    COVID-19,[employer] IN-HOUSE,Nasal Swab No Transport Media 3-4 HR TAT - Swab, Nasal Cavity [[id number]]  (Normal) Collected: 07/01/22 1214    Lab Status: Final result Specimen: Swab from Nasal Cavity Updated: 07/01/22 1343     COVID19 Not Detected    Narrative:      Fact sheet for providers: https://www.fda.gov/media/831439/download     Fact sheet for patients: https://www.fda.gov/media/014245/download    Test performed by PCR.    Consider negative results in combination with clinical observations, patient history, and epidemiological information.               Radiology:   Imaging Results (Last 72 Hours)     Procedure Component Value Units Date/Time    XR Chest 1 View [[id number]] Collected: 07/09/22 1049     Updated: 07/09/22 1054    Narrative:      XR CHEST 1 VW- 7/9/2022 10:38 AM CDT     HISTORY: hypoxemia pneumonia worsening oxygen level; R09.02-Hypoxemia;  I95.1-Orthostatic hypotension; U07.1-COVID-19; J12.82-Pneumonia due to  coronavirus disease 2019; I26.94-Multiple subsegmental pulmonary emboli  without acute cor pulmonale; Z74.09-Other reduced mobility; Z78.9-Other  specified health status     COMPARISON: Chest exam dated 7/8/2022.     FINDINGS:      Multifocal bilateral patchy infiltrates, appearing stable. No dense  areas of consolidation. The lungs are well expanded. No pleural effusion  or pneumothorax. The cardiomediastinal  silhouette and pulmonary  vascularity are within normal limits. The osseous structures and  surrounding soft tissues demonstrate no acute abnormality.       Impression:      1. Bilateral, essentially diffuse patchy lung infiltrates reflecting  atypical pneumonia. Infiltrates appear stable. Lung volumes are stable.  No new consolidation.        This report was finalized on 07/09/2022 10:51 by Dr Prem Mclaughlin, .    XR Chest 1 View [[id number]] Collected: 07/08/22 1115     Updated: 07/08/22 1119    Narrative:      EXAMINATION: XR CHEST 1 VW-     7/8/2022 11:09 AM CDT     HISTORY: Worsening hypoxemia; R09.02-Hypoxemia; I95.1-Orthostatic  hypotension; U07.1-COVID-19; J12.82-Pneumonia due to coronavirus disease  2019; I26.94-Multiple subsegmental pulmonary emboli without acute cor  pulmonale; Z74.09-Other reduced mobility; Z78.9-Other specified health  status     1 view chest x-ray.     Comparison is made with July 6, 2022.     Patchy infiltrate is again seen throughout both lungs.  There is been no improvement.     No pneumothorax.     Stable heart size.     Summary:  1. Similar appearance of infiltrate throughout both lungs. No  improvement.  This report was finalized on 07/08/2022 11:16 by Dr. Alvaro Wang MD.          Assessment & Plan     Active Hospital Problems    Diagnosis    • Acute respiratory failure with hypoxia (HCC)    • Pulmonary embolism associated with COVID-19 (HCC)    • Bilateral carotid artery disease (HCC)    • Hyperlipidemia    • Benign prostatic hyperplasia without lower urinary tract symptoms    • Essential hypertension        IMPRESSION:  1. COVID-19 infection diagnosed June 26, 2022-unclear when patient actually initially was infected with COVID as he was seen June 7 with cough and shortness of breath, had CT of the chest that showed patchy groundglass infiltrates but does not appear he was tested for COVID.  He received steroids and antibiotics.  It does not appear he received any COVID-related  treatments June 26 i.e. Paxlovid as he appeared to be quite out of the window for benefit as he was likely infected earlier in the month if not the month prior (2 urgent care visits for cough and congestion 1 in May 1 in June).  Patient admitted July 1 and had temp spike to 103 and significant increased O2 requirements July 9 prompting transfer to CCU.  2. Fever to 103-blood cultures negative.  Fever resolved no significant change in chest x-ray to support new consolidation in patient who has received IV azithromycin for 5 days and Zosyn for 8 days this admission and then dosed with vancomycin yesterday x1 followed by continuing on cefepime.  May be related to postacute COVID organizing pneumonia  3. Leukocytosis-patient did receive methylprednisolone early on in admission and is on prednisone now  4. Pulmonary emboli diagnosed June 26  5. Elevated CRP-  6. Elevated ferritin      RECOMMENDATION:   · DC cefepime   · Steroid management per pulmonary  · Wean O2 as tolerated  · Continue critical care monitoring  · Tried to review with patient proper incentive spirometer use.    D/w YASMEEN Cazares MD  07/11/22  08:27 CDT

## 2022-07-11 NOTE — CASE MANAGEMENT/SOCIAL WORK
Continued Stay Note   Jovanna     Patient Name: Adrián Graham  MRN: [id number]  Today's Date: 7/11/2022    Admit Date: 7/1/2022     Discharge Plan     Row Name 07/11/22 1056       Plan    Plan Comments Pt moved to unit over the weekend and is currently on 40L vapotherm. Esme will continue to follow for placement.               Discharge Codes    No documentation.                     SHORTY Grande

## 2022-07-11 NOTE — NURSING NOTE
Uneventful night. Patient appeared to rest more. Pain medication given x1. Good UOP. Pt receiving education for incentive spirometry use and demonstrating proper technique. Vapotherm titrations as tolerated. Currently, 40/70%L. Afebrile.

## 2022-07-11 NOTE — PROGRESS NOTES
"    PULMONARY AND CRITICAL CARE PROGRESS NOTE - [facility]    Patient: Adrián Graham  1940   MR# [id number]   Acct# [id number]  07/11/22   09:16 CDT  Referring Provider: Steven Ross DO    Chief Complaint: Covid-19 -with worsening hypoxemic respiratory failure    Interval history: The patient is resting in bed on vapotherm 40L/1.0 FiO2.  O2 sat 94%, HR 90, RR 26.  RT at bedside.  He did not tolerate BiPAP.  He states that, \"if I stayed on that thing, it would kill me.\"  No other aggravating or alleviating factors.      Meds:  ascorbic acid, 500 mg, Oral, Daily  aspirin, 81 mg, Oral, Daily  atorvastatin, 20 mg, Oral, Daily  cetirizine, 10 mg, Oral, Daily  diphenhydrAMINE, 25 mg, Oral, Nightly  enoxaparin, 1 mg/kg, Subcutaneous, Q12H  famotidine, 20 mg, Oral, BID AC  finasteride, 5 mg, Oral, Daily  fluticasone, 2 spray, Nasal, Daily  furosemide, 20 mg, Oral, BID  gabapentin, 400 mg, Oral, Q12H  guaiFENesin, 1,200 mg, Oral, BID  ipratropium-albuterol, 3 mL, Nebulization, 4x Daily - RT  montelukast, 10 mg, Oral, Nightly  predniSONE, 40 mg, Oral, Daily With Breakfast  sodium chloride, 250 mL, Intravenous, Once  sodium chloride, 10 mL, Intravenous, Q12H  vitamin D3, 5,000 Units, Oral, Daily  zinc sulfate, 220 mg, Oral, Daily         Review of Systems:   Review of Systems   Constitutional: Positive for fatigue. Negative for fever.   HENT: Positive for congestion.    Respiratory: Positive for cough.    Gastrointestinal: Negative for diarrhea and nausea.   Musculoskeletal: Positive for arthralgias.   Neurological: Positive for weakness.      Physical Exam:  SpO2 Percentage    07/11/22 0600 07/11/22 0615 07/11/22 0735   SpO2: 94% (!) 86% 90%     Temp:  [97.3 °F (36.3 °C)-98.3 °F (36.8 °C)] 97.9 °F (36.6 °C)  Heart Rate:  [] 83  Resp:  [16-27] 27  BP: (103-136)/(45-76) 136/64    Intake/Output Summary (Last 24 hours) at 7/11/2022 0916  Last data filed at 7/11/2022 0000  Gross per 24 hour   Intake " 600 ml   Output 1500 ml   Net -900 ml     Body mass index is 23.71 kg/m².   GENERAL/CONSTITUTIONAL: no distress.  Vapotherm 40 L flow and 1.0 FiO2.  HEENT: atraumatic, normocephalic.   NOSE: normal  NECK: jugular veins nondistended  CHEST: no paradox, no retractions.  No respiratory distress.  Diminished breath sounds.    ABDOMEN: nondistended  : deferred  EXTREMITIES: no edema.  NEURO:  Awake and alert   PSYCH: no agitation  SKIN: no jaundice.  No rash    Electronically signed by MEKHI Ware, 7/11/2022, 09:16 CDT        Physician Substantive Portion: Medical Decision Making    Laboratory Data:  Results from last 7 days   Lab Units 07/11/22  0312 07/10/22  0223 07/07/22  0941   WBC 10*3/mm3 17.30* 19.51* 21.96*   HEMOGLOBIN g/dL 10.3* 10.5* 13.8   PLATELETS 10*3/mm3 368 360 553*     Results from last 7 days   Lab Units 07/11/22  0312 07/10/22  0223 07/07/22  0941   SODIUM mmol/L 143 141 138   POTASSIUM mmol/L 3.9 3.6 3.5   BUN mg/dL 23 20 16   CREATININE mg/dL 0.83 0.78 0.77     Results from last 7 days   Lab Units 07/10/22  0416 07/09/22  0933 07/08/22  0649   PH, ARTERIAL pH units 7.506* 7.476* 7.491*   PCO2, ARTERIAL mm Hg 42.9 34.6* 39.1   PO2 ART mm Hg 69.8* 47.0* 63.6*   FIO2 % 70 100  --      Blood Culture   Date Value Ref Range Status   06/26/2022 No growth at 5 days  Final   06/26/2022 No growth at 5 days  Final     Results from last 7 days   Lab Units 07/10/22  0223 07/07/22  0941   CRP mg/dL 18.76* 8.16*   FERRITIN ng/mL  --  1,243.00*      Recent films:  XR Chest 1 View    Result Date: 7/9/2022  XR CHEST 1 VW- 7/9/2022 10:38 AM CDT  HISTORY: hypoxemia pneumonia worsening oxygen level; R09.02-Hypoxemia; I95.1-Orthostatic hypotension; U07.1-COVID-19; J12.82-Pneumonia due to coronavirus disease 2019; I26.94-Multiple subsegmental pulmonary emboli without acute cor pulmonale; Z74.09-Other reduced mobility; Z78.9-Other specified health status  COMPARISON: Chest exam dated 7/8/2022.  FINDINGS:   Multifocal bilateral patchy infiltrates, appearing stable. No dense areas of consolidation. The lungs are well expanded. No pleural effusion or pneumothorax. The cardiomediastinal silhouette and pulmonary vascularity are within normal limits. The osseous structures and surrounding soft tissues demonstrate no acute abnormality.      Impression: 1. Bilateral, essentially diffuse patchy lung infiltrates reflecting atypical pneumonia. Infiltrates appear stable. Lung volumes are stable. No new consolidation.   This report was finalized on 07/09/2022 10:51 by Dr Prem Mclaughlin, .     Physician Substantive Portion: Medical Decision Making    Results from last 7 days   Lab Units 07/11/22  0312 07/10/22  0223 07/07/22  0941   WBC 10*3/mm3 17.30* 19.51* 21.96*   HEMOGLOBIN g/dL 10.3* 10.5* 13.8   PLATELETS 10*3/mm3 368 360 553*     Results from last 7 days   Lab Units 07/11/22 0312 07/10/22  0223 07/07/22  0941   SODIUM mmol/L 143 141 138   POTASSIUM mmol/L 3.9 3.6 3.5   CO2 mmol/L 34.0* 31.0* 30.0*   BUN mg/dL 23 20 16   CREATININE mg/dL 0.83 0.78 0.77   GLUCOSE mg/dL 113* 114* 113*     Results from last 7 days   Lab Units 07/10/22  0416 07/09/22  0933 07/08/22  0649   PH, ARTERIAL pH units 7.506* 7.476* 7.491*   PCO2, ARTERIAL mm Hg 42.9 34.6* 39.1   PO2 ART mm Hg 69.8* 47.0* 63.6*   FIO2 % 70 100  --      Lab Results   Component Value Date    PROBNP 1,299.0 07/01/2022     Blood Culture   Date Value Ref Range Status   06/26/2022 No growth at 5 days  Final   06/26/2022 No growth at 5 days  Final       Recent radiology:   Imaging Results (Last 72 Hours)     Procedure Component Value Units Date/Time    XR Chest 1 View [[id number]] Collected: 07/09/22 1049     Updated: 07/09/22 1054    Narrative:      XR CHEST 1 VW- 7/9/2022 10:38 AM CDT     HISTORY: hypoxemia pneumonia worsening oxygen level; R09.02-Hypoxemia;  I95.1-Orthostatic hypotension; U07.1-COVID-19; J12.82-Pneumonia due to  coronavirus disease 2019; I26.94-Multiple  subsegmental pulmonary emboli  without acute cor pulmonale; Z74.09-Other reduced mobility; Z78.9-Other  specified health status     COMPARISON: Chest exam dated 7/8/2022.     FINDINGS:      Multifocal bilateral patchy infiltrates, appearing stable. No dense  areas of consolidation. The lungs are well expanded. No pleural effusion  or pneumothorax. The cardiomediastinal silhouette and pulmonary  vascularity are within normal limits. The osseous structures and  surrounding soft tissues demonstrate no acute abnormality.       Impression:      1. Bilateral, essentially diffuse patchy lung infiltrates reflecting  atypical pneumonia. Infiltrates appear stable. Lung volumes are stable.  No new consolidation.        This report was finalized on 07/09/2022 10:51 by Dr Prem Mclaughlin, .    XR Chest 1 View [[id number]] Collected: 07/08/22 1115     Updated: 07/08/22 1119    Narrative:      EXAMINATION: XR CHEST 1 VW-     7/8/2022 11:09 AM CDT     HISTORY: Worsening hypoxemia; R09.02-Hypoxemia; I95.1-Orthostatic  hypotension; U07.1-COVID-19; J12.82-Pneumonia due to coronavirus disease  2019; I26.94-Multiple subsegmental pulmonary emboli without acute cor  pulmonale; Z74.09-Other reduced mobility; Z78.9-Other specified health  status     1 view chest x-ray.     Comparison is made with July 6, 2022.     Patchy infiltrate is again seen throughout both lungs.  There is been no improvement.     No pneumothorax.     Stable heart size.     Summary:  1. Similar appearance of infiltrate throughout both lungs. No  improvement.  This report was finalized on 07/08/2022 11:16 by Dr. Alvaro Wang MD.        My radiograph interpretation/independent review of imaging: infiltrate L>R  Other test results (not lab or imaging): Results for orders placed during the hospital encounter of 07/01/22    Adult Transthoracic Echo Limited W/ Cont if Necessary Per Protocol    Interpretation Summary  · This is a limited echocardiogram.  · Left ventricular  systolic function is normal. Left ventricular ejection fraction appears to be 61 - 65%.  · The right ventricular cavity is mildly dilated. Normal right ventricular systolic function noted.      My radiograph interpretation/independent review of imaging: Reviewed and agree with current interpretation    Pulmonary Assessment:    1. Acute hypoxic respiratory failure, currently on Vapotherm  2. Bilateral groundglass pulmonary infiltrate  3. Worsening COVID-19 pneumonia  4. Possible acute lung injury or cryptogenic organizing pneumonia  5. Pulmonary embolism single subsegmental on anticoagulation  6. Essential hypertension  7. Hyperlipidemia  8. Carotid artery disease  9. Recurrent bronchitis  10. S/p COVID vaccinated status  11. Severe weakness and deconditioning  12. History of fall      Recommend/plan:   · Patient was little worse this morning was increased anxiety and had some fever and had to go back on the BiPAP 14/7 with rate of 16 and 80% FiO2.  He was on Vapotherm on 40 L 400% FiO2 earlier.  · He was still getting cefepime.  Dr. Ross took care of the patient today and I talked to him.  The blood cultures chest x-ray and urinalysis was ordered again.  The chest x-ray shows improving pulmonary infiltrate.  · Patient has anxiety issues and we started the patient on buspirone and was given Ativan as needed.  We will continue on BiPAP and Vapotherm as needed.  · Patient was getting oral prednisone which was changed to IV Solu-Medrol.  He was given morphine for air hunger.  · Continue bronchodilator treatment supportive respiratory care and pulmonary toilet.  · Encourage incentive spirometry.  Pain and anxiety control.  · DVT and stress ulcer prophylaxis.  Nutritional support.  Patient is out of isolation.  · Depending on the culture results we will decide whether to extend antibiotic treatment or follow him closely.  · CODE STATUS: Full.  Overall prognosis: Guarded.  · Repeat labs and imaging studies from time to  time.  · We will continue following.    This visit was performed by both a physician and an [facility] RN.  I personally evaluated and examined the patient.  I performed all aspects of the medical decision making as documented.    Electronically signed by     Miguel Smith MD,  Pulmonologist/Intensivist   7/11/2022, 18:01 CDT

## 2022-07-11 NOTE — PLAN OF CARE
Goal Outcome Evaluation:  Plan of Care Reviewed With: patient        Progress: declining   Pt had been doing better with breathing exercises and oxygenation until he spiked a fever.  His HR went from 80-90 to 110-120, RR 30-40 with oxygen sat in the low to mid 80's on the vapotherm.  He truly did not want to go in the bipap but after receiving a dose of morphine, lasix, and solumedrol he seem to tolerate it better. I decided to give Tylenol when his temp registered 99.3 due to his increased metabolic needs and he topped out at 101.8. His CXR, ABG, and labs looked better.  He feels this episode has really set him back in his progress. His daughter NASIM was updated with his change in condition, and her question sand concerns were addressed to her satisfaction.

## 2022-07-11 NOTE — PROGRESS NOTES
"Pharmacy Dosing Service  Anticoagulant  Enoxaparin    Assessment/Action/Plan:  Patient is currently receiving Enoxaparin 90 mg (1mg/kg) SQ every 12 hours for indication of PE. Labs/dose reviewed. Patient's weight has recently changed. Will decrease dose to 80 mg SQ every 12 hours to reflect current weight of 81.5 kg. Pharmacy will continue to monitor renal function and adjust dose accordingly.     Subjective:  Adrián Graham is a 82 y.o. male  Objective:  [Ht: 185.4 cm (73\"); Wt: 81.5 kg (179 lb 10.8 oz); BMI: Body mass index is 23.71 kg/m².]  Estimated Creatinine Clearance: 77.7 mL/min (by C-G formula based on SCr of 0.83 mg/dL).   Lab Results   Component Value Date    INR 2.16 (H) 07/01/2022    PROTIME 23.3 (H) 07/01/2022      Lab Results   Component Value Date    HGB 10.3 (L) 07/11/2022    HGB 10.5 (L) 07/10/2022    HGB 13.8 07/07/2022      Lab Results   Component Value Date     07/11/2022     07/10/2022     (H) 07/07/2022         Francis Garces, PharmD  07/11/22 15:56 CDT     "

## 2022-07-11 NOTE — PROGRESS NOTES
[facility]  INPATIENT PROGRESS NOTE    Patient Name: Adrián Graham  Date of Admission: 7/1/2022  Today's Date: 07/11/22  Length of Stay: 10  Primary Care Physician: Mauro Irizarry DO    Subjective   Chief Complaint: f/u acute respiratory failure with hypoxia    HPI:  Patient seen and examined in bed.  On BiPAP but looks very uncomfortable.  Sats currently 88%.  Breathing 35-40.  Told by nursing he generally does not like the BiPAP.  He was taken off he was put on 40 L 100% Vapotherm with nonrebreather on top of that.  Sats still sitting about 90.  Tachypneic.  Speaking in short sentences.  Discussed with him again the fact that he could require intubation but he continues to decline at this time.  Says he would not want intubation or CPR.  This is consistent with prior wishes.  Otherwise blood pressure is okay at this time.  Sinus tachycardia.    Review of Systems   All pertinent negatives and positives are as above. All other systems have been reviewed and are negative unless otherwise stated.     Objective    Temp:  [97.3 °F (36.3 °C)-98.3 °F (36.8 °C)] 97.9 °F (36.6 °C)  Heart Rate:  [] 83  Resp:  [16-26] 20  BP: (103-141)/(45-76) 136/64  Physical Exam  GEN: Awake, alert, interactive, in mild to moderate distress  HEENT: PERRLA, EOMI, Anicteric, Trachea midline  Lungs:  No wheezing or rales.  Good effort.  Tachypneic.  Heart: Increased rate, regular rhythm, +S1/s2, no rub  ABD: soft, nt/nd, +BS, no guarding/rebound  Extremities: atraumatic, no cyanosis  Skin: no rashes or petechiae  Neuro: AAOx3, no focal deficits  Psych: normal mood & affect      Results Review:  I have reviewed the labs, radiology results, and diagnostic studies.    Laboratory Data:   Results from last 7 days   Lab Units 07/11/22  0312 07/10/22  0223 07/07/22  0941   WBC 10*3/mm3 17.30* 19.51* 21.96*   HEMOGLOBIN g/dL 10.3* 10.5* 13.8   HEMATOCRIT % 33.7* 32.1* 43.9   PLATELETS  10*3/mm3 368 360 553*        Results from last 7 days   Lab Units 07/11/22  0312 07/10/22  0223 07/07/22  0941   SODIUM mmol/L 143 141 138   POTASSIUM mmol/L 3.9 3.6 3.5   CHLORIDE mmol/L 102 104 101   CO2 mmol/L 34.0* 31.0* 30.0*   BUN mg/dL 23 20 16   CREATININE mg/dL 0.83 0.78 0.77   CALCIUM mg/dL 9.0 8.6 9.3   BILIRUBIN mg/dL  --  0.3  --    ALK PHOS U/L  --  76  --    ALT (SGPT) U/L  --  38  --    AST (SGOT) U/L  --  30  --    GLUCOSE mg/dL 113* 114* 113*       Culture Data:   Blood Culture   Date Value Ref Range Status   06/26/2022 No growth at 5 days  Final   06/26/2022 No growth at 5 days  Final       Radiology Data:   Imaging Results (Last 24 Hours)     ** No results found for the last 24 hours. **          I have reviewed the patient's current medications.     Assessment/Plan     Active Hospital Problems    Diagnosis    • Acute respiratory failure with hypoxia (HCC)    • Pulmonary embolism associated with COVID-19 (HCC)    • Bilateral carotid artery disease (HCC)    • Hyperlipidemia    • Benign prostatic hyperplasia without lower urinary tract symptoms    • Essential hypertension        Plan:  #1 acute respiratory failure with hypoxia -difficult case.  Likely multifactorial.  Recent COVID and PE.  Presented with concern for cryptogenic organizing pneumonia.  Was on steroids and antibiotics.  Initially was getting better and oxygen was weaning to 4 L.  Over the weekend he acutely got worse and is now on max O2.  He is a DNR/DNI.  This was again confirmed with him today.  Pulmonary and ID are following.  We will get another ABG, chest x-ray, add as needed morphine, add one-time dose IV steroid.    #2 pulmonary embolism -recent diagnosis.  Currently on Lovenox    #3 COVID-19 -now negative.  Posttreatment.  Potentially have secondary organizing pneumonia or even fibrosis post-COVID.    #4 hypertension -BP currently elevated stable.  Continue oral meds as able.    #5 hyperlipidemia -statin    #6 BPH  -Proscar    Discharge Planning: Ongoing.  Continue in the ICU.  Patient seems significantly ill/guarded.  Ongoing respiratory distress.  Again confirms DNR/tonight.  Discussed with him without intubation due to potentially pass away from respiratory failure and he is aware.    Electronically signed by Steven Ross DO, 07/11/22, 07:40 CDT.

## 2022-07-12 NOTE — PROGRESS NOTES
"INFECTIOUS DISEASES PROGRESS NOTE    Patient:  Adrián Graham  YOB: 1940  MRN: [id number]   Admit date: 2022   Admitting Physician: Steven Ross DO  Primary Care Physician: Mauro Irizarry DO    Chief Complaint: \"Yesterday was rough\"      Interval History: The patient just finished eating some breakfast.  He has been on 40 L and 90% for some time.  Per nursing he only tolerated BiPAP for a few hours.    Yesterday had temperature elevation to 101.8.  Blood cultures were repeated, he received a one-time dose of methylprednisolone at 125 mg IV and continues on prednisone.  Cefepime was restarted given elevated procalcitonin.    He has had azithromycin then amoxicillin as an outpatient, azithromycin and Zosyn course during this admission    Allergies: No Known Allergies    Current Scheduled Medications:   ascorbic acid, 500 mg, Oral, Daily  aspirin, 81 mg, Oral, Daily  atorvastatin, 20 mg, Oral, Daily  busPIRone, 10 mg, Oral, TID  cefepime, 2 g, Intravenous, Q8H  cetirizine, 10 mg, Oral, Daily  diphenhydrAMINE, 25 mg, Oral, Nightly  enoxaparin, 1 mg/kg, Subcutaneous, Q12H  famotidine, 20 mg, Oral, BID AC  finasteride, 5 mg, Oral, Daily  fluticasone, 2 spray, Nasal, Daily  furosemide, 20 mg, Oral, BID  gabapentin, 400 mg, Oral, Q12H  guaiFENesin, 1,200 mg, Oral, BID  ipratropium-albuterol, 3 mL, Nebulization, 4x Daily - RT  montelukast, 10 mg, Oral, Nightly  predniSONE, 40 mg, Oral, Daily With Breakfast  sodium chloride, 250 mL, Intravenous, Once  sodium chloride, 10 mL, Intravenous, Q12H  vitamin D3, 5,000 Units, Oral, Daily  zinc sulfate, 220 mg, Oral, Daily      Current PRN Medications:  •  acetaminophen  •  albuterol  •  diazePAM  •  Morphine  •  ondansetron  •  sodium chloride  •  traMADol    Review of Systems  General: Fever to 101.8 yesterday  Respiratory: Shortness of breath.  Nonproductive upper airway cough    Objective     Vital Signs:  Temp (24hrs), Av.1 °F (37.3 °C), Min:97.5 " "°F (36.4 °C), Max:101.8 °F (38.8 °C)      /85   Pulse 85   Temp 97.8 °F (36.6 °C) (Axillary)   Resp 22   Ht 185.4 cm (73\")   Wt 85.7 kg (188 lb 15 oz)   SpO2 95%   BMI 24.93 kg/m²         Physical Exam  General: Patient 82-year-old male sitting up trying to push away breakfast tray.  HEENT: Sclera anicteric.  Vapotherm in place at 40 L and 90%.  Lungs: Diminished breath sounds bilaterally.  Patient is not taking very deep breaths.  Saturations were 88% when I walked in the patient was trying to push away his breakfast tray and went to 93% before he left.  Abdomen: Soft, nontender, nondistended  Neuro: Alert, oriented, speech clear  Psych: Pleasant and cooperative       Results Review:    I reviewed the patient's new clinical results.    Lab Results:  CBC:   Lab Results   Lab 07/07/22  0941 07/10/22  0223 07/11/22  0312 07/12/22  0327   WBC 21.96* 19.51* 17.30* 19.06*   HEMOGLOBIN 13.8 10.5* 10.3* 10.3*   HEMATOCRIT 43.9 32.1* 33.7* 32.4*   PLATELETS 553* 360 368 336   LYMPHOCYTE % 16.2* 4.0*  --   --    MONOCYTES %  --  3.0*  --   --      >>>> 90.7% neutrophils, 5% lymphocytes/2.5% monocytes      CMP:   Lab Results   Lab 07/10/22  0223 07/11/22  0312 07/12/22  0327   SODIUM 141 143 145   POTASSIUM 3.6 3.9 3.7   CHLORIDE 104 102 101   CO2 31.0* 34.0* 37.0*   BUN 20 23 28*   CREATININE 0.78 0.83 0.79   CALCIUM 8.6 9.0 9.1   BILIRUBIN 0.3  --   --    ALK PHOS 76  --   --    ALT (SGPT) 38  --   --    AST (SGOT) 30  --   --    GLUCOSE 114* 113* 127*       Estimated Creatinine Clearance: 85.9 mL/min (by C-G formula based on SCr of 0.79 mg/dL).      COVID LABS:  Results From Last 14 Days   Lab Units 07/11/22  1226 07/11/22  0312 07/10/22  0223 07/07/22  0941 07/04/22  0700 07/02/22  1248 07/02/22  0647 07/01/22  1429 07/01/22  1134   0000   PROBNP pg/mL  --   --   --   --   --   --   --  1,299.0  --   --    CK TOTAL U/L  --   --   --   --   --   --   --   --  167  --    CRP mg/dL  --  16.21* 18.76* 8.16* 6.46* "  --    < > 18.78*  --   --    D DIMER QUANT MCGFEU/mL  --   --   --   --   --   --   --   --  4.49*  --    FERRITIN ng/mL  --  1,112.00*  --  1,243.00*  --   --   --   --   --   --    LACTATE mmol/L  --   --   --   --   --  1.8  --   --   --   --    PROCALCITONIN ng/mL 0.77*  --   --   --  0.08 0.21  --  0.17  --    < >   PROTIME Seconds  --   --   --   --   --   --   --   --  23.3*  --    INR   --   --   --   --   --   --   --   --  2.16*  --    TROPONIN T ng/mL  --   --   --   --   --   --   --  <0.010 <0.010  --     < > = values in this interval not displayed.         Culture Results:    Microbiology Results (last 10 days)     Procedure Component Value - Date/Time    Blood Culture With KEVAN - Blood, Arm, Right [[id number]]  (Normal) Collected: 07/11/22 1229    Lab Status: Preliminary result Specimen: Blood from Arm, Right Updated: 07/12/22 0047     Blood Culture No growth at less than 24 hours    Blood Culture With KEVAN - Blood, Hand, Left [[id number]]  (Normal) Collected: 07/11/22 1226    Lab Status: Preliminary result Specimen: Blood from Hand, Left Updated: 07/12/22 0047     Blood Culture No growth at less than 24 hours    MRSA Screen, PCR (Inpatient) - Swab, Nares [[id number]]  (Normal) Collected: 07/09/22 1052    Lab Status: Final result Specimen: Swab from Nares Updated: 07/09/22 1210     MRSA PCR No MRSA Detected    Narrative:      The negative predictive value of this diagnostic test is high and should only be used to consider de-escalating anti-MRSA therapy. A positive result may indicate colonization with MRSA and must be correlated clinically.    Blood Culture - Blood, Arm, Right [[id number]]  (Normal) Collected: 07/09/22 1030    Lab Status: Preliminary result Specimen: Blood from Arm, Right Updated: 07/11/22 1100     Blood Culture No growth at 2 days    Blood Culture - Blood, Arm, Left [[id number]]  (Normal) Collected: 07/09/22 1028    Lab Status: Preliminary result Specimen: Blood from Arm, Left Updated:  07/11/22 1100     Blood Culture No growth at 2 days    Respiratory Culture - Sputum, Cough [[id number]] Collected: 07/06/22 2202    Lab Status: Final result Specimen: Sputum from Cough Updated: 07/08/22 0930     Respiratory Culture Rejected     Gram Stain Few (2+) WBCs seen      No Epithelial cells seen      No organisms seen    Narrative:      Specimen rejected due to oropharyngeal contamination. Please reorder and recollect specimen if clinically necessary.    COVID-19 RAPID AG,VERITOR,COR/GEE/PAD/MARIYA/MAD/RACHEL/LAG/JOSEPH/ IN-HOUSE,DRY SWAB, 1-2 HR TAT - Swab, Nasal Cavity [[id number]]  (Normal) Collected: 07/06/22 1451    Lab Status: Final result Specimen: Swab from Nasal Cavity Updated: 07/06/22 1600     COVID19 Presumptive Negative    Narrative:      Fact sheets for providers: https://www.fda.gov/media/582792/download    Fact sheets for patients: https://www.fda.gov/media/102508/download    Legionella Antigen, Urine - Urine, Urine, Clean Catch [[id number]]  (Normal) Collected: 07/02/22 2126    Lab Status: Final result Specimen: Urine, Clean Catch Updated: 07/02/22 2155     LEGIONELLA ANTIGEN, URINE Negative    S. Pneumo Ag Urine or CSF - Urine, Urine, Clean Catch [[id number]]  (Normal) Collected: 07/02/22 2126    Lab Status: Final result Specimen: Urine, Clean Catch Updated: 07/02/22 2155     Strep Pneumo Ag Negative    Blood Culture - Blood, Arm, Left [[id number]]  (Normal) Collected: 07/02/22 1248    Lab Status: Final result Specimen: Blood from Arm, Left Updated: 07/07/22 1333     Blood Culture No growth at 5 days               Radiology:   Imaging Results (Last 72 Hours)     Procedure Component Value Units Date/Time    XR Chest 1 View [[id number]] Collected: 07/11/22 1111     Updated: 07/11/22 1115    Narrative:      EXAM: XR CHEST 1 VW- 7/11/2022 10:54 AM CDT     HISTORY: f/u xray, hypoxia; R09.02-Hypoxemia; I95.1-Orthostatic  hypotension; U07.1-COVID-19; J12.82-Pneumonia due to coronavirus disease  2019;  I26.94-Multiple subsegmental pulmonary emboli without acute cor  pulmonale; Z74.09-Other reduced mobility; Z78.9-Other specified health  status       COMPARISON: July 9, 2022.     TECHNIQUE: Frontal radiograph of the chest     FINDINGS:   Bilateral interstitial and airspace filling opacities are noted. This is  been described as pneumonia and is stable and unchanged from July 9, 2022.. Cardiac silhouette is normal..      The osseous structures and surrounding soft tissues demonstrate no acute  abnormality.          Impression:      1. Persistent bilateral interstitial and airspace filling opacities most  consistent with pneumonia. This is similar to July 9, 2022.        This report was finalized on 07/11/2022 11:12 by Dr. Don Delacruz MD.    XR Chest 1 View [[id number]] Collected: 07/09/22 1049     Updated: 07/09/22 1054    Narrative:      XR CHEST 1 VW- 7/9/2022 10:38 AM CDT     HISTORY: hypoxemia pneumonia worsening oxygen level; R09.02-Hypoxemia;  I95.1-Orthostatic hypotension; U07.1-COVID-19; J12.82-Pneumonia due to  coronavirus disease 2019; I26.94-Multiple subsegmental pulmonary emboli  without acute cor pulmonale; Z74.09-Other reduced mobility; Z78.9-Other  specified health status     COMPARISON: Chest exam dated 7/8/2022.     FINDINGS:      Multifocal bilateral patchy infiltrates, appearing stable. No dense  areas of consolidation. The lungs are well expanded. No pleural effusion  or pneumothorax. The cardiomediastinal silhouette and pulmonary  vascularity are within normal limits. The osseous structures and  surrounding soft tissues demonstrate no acute abnormality.       Impression:      1. Bilateral, essentially diffuse patchy lung infiltrates reflecting  atypical pneumonia. Infiltrates appear stable. Lung volumes are stable.  No new consolidation.        This report was finalized on 07/09/2022 10:51 by Dr Prem Mclaughlin, .          Assessment & Plan     Active Hospital Problems    Diagnosis    • Acute  respiratory failure with hypoxia (HCC)    • Pulmonary embolism associated with COVID-19 (HCC)    • Bilateral carotid artery disease (HCC)    • Hyperlipidemia    • Benign prostatic hyperplasia without lower urinary tract symptoms    • Essential hypertension        IMPRESSION:  1. COVID-19 infection diagnosed June 26, 2022-unclear when patient actually initially was infected with COVID as he was seen June 7 with cough and shortness of breath, had CT of the chest that showed patchy groundglass infiltrates but does not appear he was tested for COVID.  He received steroids and antibiotics.  It does not appear he received any COVID-related treatments June 26 i.e. Paxlovid as he appeared to be quite out of the window for benefit as he was likely infected earlier in the month if not the month prior (2 urgent care visits for cough and congestion 1 in May 1 in June).  Patient admitted July 1 and had temp spike to 103 and significant increased O2 requirements July 9 prompting transfer to CCU.  2. Fever to 103-blood cultures negative.  Fever resolved no significant change in chest x-ray to support new consolidation in patient who has received IV azithromycin for 5 days and Zosyn for 8 days this admission and then dosed with vancomycin yesterday x1 followed by continuing on cefepime.  May be related to postacute COVID organizing pneumonia  3. Leukocytosis- no bandemia reported.  4. Pulmonary emboli diagnosed June 26  5. Elevated CRP-  6. Elevated ferritin  7. Elevated procalcitonin July 11, 2022      RECOMMENDATION:   · continue cefepime   · Steroid management per pulmonary-dosed with methylprednisolone 125 mg IV x1 July 11  · Wean O2 as tolerated  · Continue critical care monitoring            Beatris Eduardo MD  07/12/22  08:33 CDT

## 2022-07-12 NOTE — PROGRESS NOTES
Malnutrition Severity Assessment    Patient Name:  Adrián Graham  YOB: 1940  MRN: [id number]  Admit Date:  7/1/2022    Patient meets criteria for : Moderate (non-severe) Malnutrition      Malnutrition Severity Assessment  Malnutrition Type: Chronic Disease - Related Malnutrition  Malnutrition Type (last 8 hours)     Malnutrition Severity Assessment     Row Name 07/12/22 1116       Malnutrition Severity Assessment    Malnutrition Type Chronic Disease - Related Malnutrition    Row Name 07/12/22 1116       Insufficient Energy Intake     Insufficient Energy Intake Findings Moderate    Insufficient Energy Intake  <75% of est. energy requirement for > or equal to 1 month  He reports reduced intake, eating ~1/2 of meals    Row Name 07/12/22 1116       Unintentional Weight Loss     Unintentional Weight Loss Findings Severe    Unintentional Weight Loss  Weight loss greater than 7.5% in three months  8.3% in 3 months, 5% in 1 month    Row Name 07/12/22 1116       Muscle Loss    Loss of Muscle Mass Findings Moderate    Latter day Region Moderate - slight depression    Clavicle Bone Region Moderate - some protrusion in females, visible in males    Acromion Bone Region Moderate - acromion may slightly protrude    Scapular Bone Region Moderate - mild depression, bones may show slightly    Row Name 07/12/22 1116       Fat Loss    Subcutaneous Fat Loss Findings Moderate    Orbital Region  Moderate -  somewhat hollowness, slightly dark circles    Upper Arm Region Moderate - some fat tissue, not ample    Row Name 07/12/22 1116       Declining Functional Status    Declining Functional Status Findings Measurably Reduced    Row Name 07/12/22 1116       Criteria Met (Must meet criteria for severity in at least 2 of these categories: M Wasting, Fat Loss, Fluid, Secondary Signs, Wt. Status, Intake)    Patient meets criteria for  Moderate (non-severe) Malnutrition                Electronically signed by:  Magali Lynne RDN,  JUAN PABLO  07/12/22 11:24 CDT

## 2022-07-12 NOTE — PLAN OF CARE
"Goal Outcome Evaluation:  Plan of Care Reviewed With: (P) patient           Outcome Evaluation: (P) OT tx complete. Pt drowsy initially, however agreeable to session. Pt completed bed mobility with min A to reposition. Pt completed face washing and hair combing sitting up in bed with vcs for hair combing to attend to all sides of head. Pt completed BUE AROM exercises and tolerated well, requiring rest breaks following each set. Pt completed isometric exerices in bed d/t desat with AROM to upper 80s. Pt did well with BUE isometrics reporting fatigue, requiring RB following each set. Pt with noted scar tissue build up at recent carpal tunnel incision. Scar massage completed to avoid further adhesions and limitation in motion. Pt desiring to nap at EOS and states he will be \"up for more\" next session. Continue OT POC.  "

## 2022-07-12 NOTE — PROGRESS NOTES
PULMONARY AND CRITICAL CARE PROGRESS NOTE - [facility]    Patient: Adrián Graham  1940   MR# [id number]   Acct# [id number]  07/12/22   08:17 CDT  Referring Provider: Steven Ross DO    Chief Complaint: Covid-19 -with worsening hypoxemic respiratory failure    Interval history: The patient is resting in bed on vapotherm 40L/0.9FiO2.  O2 sat 92%, HR 97, RR 23.  He does not like BiPAP, but tolerated for a little while overnight.  Questions answered regarding flutter and IS.  He states that he feels some better today.  He is afebrile this AM.  No other aggravating or alleviating factors.      Meds:  ascorbic acid, 500 mg, Oral, Daily  aspirin, 81 mg, Oral, Daily  atorvastatin, 20 mg, Oral, Daily  busPIRone, 10 mg, Oral, TID  cefepime, 2 g, Intravenous, Q8H  cetirizine, 10 mg, Oral, Daily  diphenhydrAMINE, 25 mg, Oral, Nightly  enoxaparin, 1 mg/kg, Subcutaneous, Q12H  famotidine, 20 mg, Oral, BID AC  finasteride, 5 mg, Oral, Daily  fluticasone, 2 spray, Nasal, Daily  furosemide, 20 mg, Oral, BID  gabapentin, 400 mg, Oral, Q12H  guaiFENesin, 1,200 mg, Oral, BID  ipratropium-albuterol, 3 mL, Nebulization, 4x Daily - RT  montelukast, 10 mg, Oral, Nightly  predniSONE, 40 mg, Oral, Daily With Breakfast  sodium chloride, 250 mL, Intravenous, Once  sodium chloride, 10 mL, Intravenous, Q12H  vitamin D3, 5,000 Units, Oral, Daily  zinc sulfate, 220 mg, Oral, Daily         Review of Systems:   Review of Systems   Constitutional: Positive for fatigue. Negative for fever.   HENT: Positive for congestion.    Respiratory: Positive for cough.    Gastrointestinal: Negative for diarrhea and nausea.   Musculoskeletal: Positive for arthralgias.   Neurological: Positive for weakness.      Physical Exam:  SpO2 Percentage    07/12/22 0555 07/12/22 0600 07/12/22 0601   SpO2: 93% 95% 96%     Temp:  [97.5 °F (36.4 °C)-101.8 °F (38.8 °C)] 97.8 °F (36.6 °C)  Heart Rate:  [] 72  Resp:  [14-32] 22  BP:  (92158)/(41-68) 98/58    Intake/Output Summary (Last 24 hours) at 7/12/2022 0817  Last data filed at 7/12/2022 0700  Gross per 24 hour   Intake 570 ml   Output 2850 ml   Net -2280 ml     Body mass index is 24.93 kg/m².   GENERAL/CONSTITUTIONAL: no distress.  Vapotherm 40 L flow and 0.9 FiO2.  HEENT: atraumatic, normocephalic.   NOSE: normal  NECK: jugular veins nondistended  CHEST: no paradox, no retractions.  No respiratory distress.  Diminished breath sounds.    ABDOMEN: nondistended  : deferred  EXTREMITIES: no edema.  NEURO:  Awake and alert   PSYCH: no agitation  SKIN: no jaundice.  No rash    Electronically signed by MEKHI Ware, 7/12/2022, 08:17 CDT        Physician Substantive Portion: Medical Decision Making    Laboratory Data:  Results from last 7 days   Lab Units 07/12/22  0327 07/11/22  0312 07/10/22  0223   WBC 10*3/mm3 19.06* 17.30* 19.51*   HEMOGLOBIN g/dL 10.3* 10.3* 10.5*   PLATELETS 10*3/mm3 336 368 360     Results from last 7 days   Lab Units 07/12/22  0327 07/11/22  0312 07/10/22  0223   SODIUM mmol/L 145 143 141   POTASSIUM mmol/L 3.7 3.9 3.6   BUN mg/dL 28* 23 20   CREATININE mg/dL 0.79 0.83 0.78     Results from last 7 days   Lab Units 07/11/22  1056 07/10/22  0416 07/09/22  0933   PH, ARTERIAL pH units 7.499* 7.506* 7.476*   PCO2, ARTERIAL mm Hg 42.8 42.9 34.6*   PO2 ART mm Hg 88.1 69.8* 47.0*   FIO2 % 90 70 100     Blood Culture   Date Value Ref Range Status   06/26/2022 No growth at 5 days  Final   06/26/2022 No growth at 5 days  Final     Results from last 7 days   Lab Units 07/11/22  1226 07/11/22  0312 07/10/22  0223 07/07/22  0941   CRP mg/dL  --  16.21* 18.76* 8.16*   PROCALCITONIN ng/mL 0.77*  --   --   --    FERRITIN ng/mL  --  1,112.00*  --  1,243.00*      Recent films:  XR Chest 1 View    Result Date: 7/11/2022  EXAM: XR CHEST 1 VW- 7/11/2022 10:54 AM CDT  HISTORY: f/u xray, hypoxia; R09.02-Hypoxemia; I95.1-Orthostatic hypotension; U07.1-COVID-19; J12.82-Pneumonia  due to coronavirus disease 2019; I26.94-Multiple subsegmental pulmonary emboli without acute cor pulmonale; Z74.09-Other reduced mobility; Z78.9-Other specified health status   COMPARISON: July 9, 2022.  TECHNIQUE: Frontal radiograph of the chest  FINDINGS: Bilateral interstitial and airspace filling opacities are noted. This is been described as pneumonia and is stable and unchanged from July 9, 2022.. Cardiac silhouette is normal..  The osseous structures and surrounding soft tissues demonstrate no acute abnormality.       Impression: 1. Persistent bilateral interstitial and airspace filling opacities most consistent with pneumonia. This is similar to July 9, 2022.   This report was finalized on 07/11/2022 11:12 by Dr. Don Delacruz MD.     Physician Substantive Portion: Medical Decision Making    Results from last 7 days   Lab Units 07/12/22  0327 07/11/22 0312 07/10/22  0223   WBC 10*3/mm3 19.06* 17.30* 19.51*   HEMOGLOBIN g/dL 10.3* 10.3* 10.5*   PLATELETS 10*3/mm3 336 368 360     Results from last 7 days   Lab Units 07/12/22  0327 07/11/22  0312 07/10/22  0223   SODIUM mmol/L 145 143 141   POTASSIUM mmol/L 3.7 3.9 3.6   CO2 mmol/L 37.0* 34.0* 31.0*   BUN mg/dL 28* 23 20   CREATININE mg/dL 0.79 0.83 0.78   MAGNESIUM mg/dL 2.3  --   --    GLUCOSE mg/dL 127* 113* 114*     Results from last 7 days   Lab Units 07/11/22  1056 07/10/22  0416 07/09/22  0933   PH, ARTERIAL pH units 7.499* 7.506* 7.476*   PCO2, ARTERIAL mm Hg 42.8 42.9 34.6*   PO2 ART mm Hg 88.1 69.8* 47.0*   FIO2 % 90 70 100     Lab Results   Component Value Date    PROBNP 1,299.0 07/01/2022     Blood Culture   Date Value Ref Range Status   06/26/2022 No growth at 5 days  Final   06/26/2022 No growth at 5 days  Final       Recent radiology:   Imaging Results (Last 72 Hours)     Procedure Component Value Units Date/Time    XR Chest 1 View [[id number]] Collected: 07/11/22 1111     Updated: 07/11/22 1115    Narrative:      EXAM: XR CHEST 1 VW- 7/11/2022  10:54 AM CDT     HISTORY: f/u xray, hypoxia; R09.02-Hypoxemia; I95.1-Orthostatic  hypotension; U07.1-COVID-19; J12.82-Pneumonia due to coronavirus disease  2019; I26.94-Multiple subsegmental pulmonary emboli without acute cor  pulmonale; Z74.09-Other reduced mobility; Z78.9-Other specified health  status       COMPARISON: July 9, 2022.     TECHNIQUE: Frontal radiograph of the chest     FINDINGS:   Bilateral interstitial and airspace filling opacities are noted. This is  been described as pneumonia and is stable and unchanged from July 9, 2022.. Cardiac silhouette is normal..      The osseous structures and surrounding soft tissues demonstrate no acute  abnormality.          Impression:      1. Persistent bilateral interstitial and airspace filling opacities most  consistent with pneumonia. This is similar to July 9, 2022.        This report was finalized on 07/11/2022 11:12 by Dr. Don Delacruz MD.    XR Chest 1 View [[id number]] Collected: 07/09/22 1049     Updated: 07/09/22 1054    Narrative:      XR CHEST 1 VW- 7/9/2022 10:38 AM CDT     HISTORY: hypoxemia pneumonia worsening oxygen level; R09.02-Hypoxemia;  I95.1-Orthostatic hypotension; U07.1-COVID-19; J12.82-Pneumonia due to  coronavirus disease 2019; I26.94-Multiple subsegmental pulmonary emboli  without acute cor pulmonale; Z74.09-Other reduced mobility; Z78.9-Other  specified health status     COMPARISON: Chest exam dated 7/8/2022.     FINDINGS:      Multifocal bilateral patchy infiltrates, appearing stable. No dense  areas of consolidation. The lungs are well expanded. No pleural effusion  or pneumothorax. The cardiomediastinal silhouette and pulmonary  vascularity are within normal limits. The osseous structures and  surrounding soft tissues demonstrate no acute abnormality.       Impression:      1. Bilateral, essentially diffuse patchy lung infiltrates reflecting  atypical pneumonia. Infiltrates appear stable. Lung volumes are stable.  No new  consolidation.        This report was finalized on 07/09/2022 10:51 by Dr Prem Mclaughlin, .        My radiograph interpretation/independent review of imaging: infiltrate L>R  Other test results (not lab or imaging): Results for orders placed during the hospital encounter of 07/01/22    Adult Transthoracic Echo Limited W/ Cont if Necessary Per Protocol    Interpretation Summary  · This is a limited echocardiogram.  · Left ventricular systolic function is normal. Left ventricular ejection fraction appears to be 61 - 65%.  · The right ventricular cavity is mildly dilated. Normal right ventricular systolic function noted.      My radiograph interpretation/independent review of imaging: Reviewed and agree with current interpretation    Pulmonary Assessment:    1. Acute hypoxic respiratory failure, currently on Vapotherm  2. Bilateral groundglass pulmonary infiltrate  3. Worsening COVID-19 pneumonia  4. Possible acute lung injury or cryptogenic organizing pneumonia  5. Pulmonary embolism single subsegmental on anticoagulation  6. Essential hypertension  7. Hyperlipidemia  8. Carotid artery disease  9. Recurrent bronchitis  10. S/p COVID vaccinated status  11. Severe weakness and deconditioning  12. History of fall    Recommend/plan:   · Patient is currently on Vapotherm 40 L flow and 75% FiO2.  · He is using BiPAP from time to time but does not like it.  · Respiratory status stable but he is very tired and lethargic.    · Continue Vapotherm and titrate to keep oxygen more than 92%.  Use BiPAP as needed.  · He did not want to be intubated.  He did some physical activity and stood at the bedside today  · Bronchodilator treatment routine respiratory care and IV steroids will be continued  · Continue physical activity.  Encourage incentive spirometry.  Nutritional support.  · Patient was seen by ID.  Cefepime will be continued.  Culture results will be followed in the ID recommendations will be followed.  · Repeat labs and  imaging studies from time to time.  He is out of isolation.  Continue adjunct treatment for COVID-19.  · Current treatment plan supportive care.  DVT and stress ulcer prophylaxis.  Pain and anxiety control.  · CODE STATUS: Limited code.  Overall prognosis: Guarded.  · We will follow.    This visit was performed by both a physician and an [facility] RN.  I personally evaluated and examined the patient.  I performed all aspects of the medical decision making as documented.    Electronically signed by     Miguel Smith MD,  Pulmonologist/Intensivist   7/12/2022, 17:04 CDT

## 2022-07-12 NOTE — PLAN OF CARE
Goal Outcome Evaluation:  Plan of Care Reviewed With: patient        Progress: improving  Outcome Evaluation: Pt was in bed,, agreed to therapy.  Nsg increased vapotherm for activity.  pt was able to transfer supine to sitting with min assist.  Sitting EOB, pt maintain balance with CGA, performed LE exercises, postural and breathing exercises.  Pt was able to stand with min assist, maintained standing for a few seconds with CGA.  Pt's O2 decreased to 82% with activity, but increased back to 90% with rest and cues for breathing.

## 2022-07-12 NOTE — PROGRESS NOTES
"Pharmacy Dosing Service  Anticoagulant  Enoxaparin    Assessment/Action/Plan:  Pharmacy consulted to dose Lovenox for PE  Current weight: 85.7 kg  Renal function - appropriate for standard dosing  Continue Lovenox 1 mg/kg (90 mg, rounded) subQ every 12 hours     Subjective:  Adrián Graham is a 82 y.o. male on Enoxaparin 90 mg SQ every 12 hours for indication of PE.  Objective:  [Ht: 185.4 cm (73\"); Wt: 85.7 kg (188 lb 15 oz); BMI: Body mass index is 24.93 kg/m².]  Estimated Creatinine Clearance: 85.9 mL/min (by C-G formula based on SCr of 0.79 mg/dL). No results found for: DDIMER   Lab Results   Component Value Date    INR 2.16 (H) 07/01/2022    PROTIME 23.3 (H) 07/01/2022      Lab Results   Component Value Date    HGB 10.3 (L) 07/12/2022    HGB 10.3 (L) 07/11/2022    HGB 10.5 (L) 07/10/2022      Lab Results   Component Value Date     07/12/2022     07/11/2022     07/10/2022       Juan Villanueva, PharmD  07/12/22 12:28 CDT     "

## 2022-07-12 NOTE — THERAPY TREATMENT NOTE
Patient Name: Adrián Graham  : 1940    MRN: [id number]                              Today's Date: 2022       Admit Date: 2022    Visit Dx:     ICD-10-CM ICD-9-CM   1. Hypoxemia  R09.02 799.02   2. Orthostatic hypotension  I95.1 458.0   3. Pneumonia due to COVID-19 virus  U07.1 480.8    J12.82 079.89   4. Multiple subsegmental pulmonary emboli without acute cor pulmonale (HCC)  I26.94 415.19   5. Impaired mobility  Z74.09 799.89   6. Decreased activities of daily living (ADL)  Z78.9 V49.89     Patient Active Problem List   Diagnosis   • Idiopathic peripheral neuropathy   • Essential hypertension   • Benign prostatic hyperplasia without lower urinary tract symptoms   • Overweight (BMI 25.0-29.9)   • Carpal tunnel syndrome, left   • Bilateral carotid artery disease (HCC)   • Hyperlipidemia   • Preop cardiovascular exam [facility] Dr Brown   • Single subsegmental pulmonary embolism without acute cor pulmonale (HCC)   • Pulmonary embolism associated with COVID-19 (HCC)   • Acute respiratory failure with hypoxia (HCC)     Past Medical History:   Diagnosis Date   • Abdominal distention    • Abdominal pain, left upper quadrant    • Allergic rhinitis    • Arthritis    • Bloating    • BPH (benign prostatic hyperplasia)    • Carpal tunnel syndrome    • Change in bowel habits    • Constipation    • Diarrhea    • Erectile dysfunction    • GERD (gastroesophageal reflux disease)    • History of shingles    • Hypertension    • Idiopathic peripheral neuropathy    • Neuropathy    • Pneumonia    • Shingles    • Weight loss      Past Surgical History:   Procedure Laterality Date   • APPENDECTOMY     • BACK SURGERY     • CARPAL TUNNEL RELEASE Right    • CARPAL TUNNEL RELEASE Left 2022   • CHOLECYSTECTOMY     • COLONOSCOPY  2010    5 yr-complete colon not visualized   • COLONOSCOPY  2004    extremely tortuous redundant colon. BE-mild diverticulosis without diverticulitis. ? Gallstones-Dr Chery.   • COLONOSCOPY  N/A 09/10/2020    Procedure: COLONOSCOPY WITH ANESTHESIA;  Surgeon: Mani Sy DO;  Location: [facility] ENDOSCOPY;  Service: Gastroenterology;  Laterality: N/A;  Pre: Positive Colorectal Screening  Post: Diverticulosis  Dr. Mooney  CO2 Inflation Used   • ENDOSCOPY N/A 09/26/2016    Procedure: ESOPHAGOGASTRODUODENOSCOPY WITH ANESTHESIA;  Surgeon: Mani Sy DO;  Location: [facility] ENDOSCOPY;  Service:    • HIP SURGERY Right    • JOINT REPLACEMENT     • KNEE SURGERY     • OTHER SURGICAL HISTORY      Surgery for Spinal Stenosis   • OTHER SURGICAL HISTORY      Laser Prostate Surgery      General Information     Row Name 07/12/22 1441          OT Time and Intention    Document Type therapy note (daily note)  -JJ (r) AD (t) JJ (c)     Mode of Treatment occupational therapy  -JJ (r) AD (t) JJ (c)     Row Name 07/12/22 1441          General Information    Patient Profile Reviewed yes  -JJ (r) AD (t) JJ (c)     Existing Precautions/Restrictions fall;oxygen therapy device and L/min  -JJ (r) AD (t) JJ (c)           User Key  (r) = Recorded By, (t) = Taken By, (c) = Cosigned By    Initials Name Provider Type    Karyn Reynolds, OTR/L, CSRS Occupational Therapist    Carolyn Franco, OT Student OT Student                 Mobility/ADL's     Row Name 07/12/22 1441          Bed Mobility    Bed Mobility rolling right;scooting/bridging  -JJ (r) AD (t) JJ (c)     Rolling Left [place] (Bed Mobility) --  -JJ (r) AD (t) JJ (c)     Rolling Right [place] (Bed Mobility) minimum assist (75% patient effort);verbal cues  -JJ (r) AD (t) JJ (c)     Scooting/Bridging [place] (Bed Mobility) minimum assist (75% patient effort);verbal cues  -JJ (r) AD (t) JJ (c)     Assistive Device (Bed Mobility) bed rails;head of bed elevated  -JJ (r) AD (t) JJ (c)     Row Name 07/12/22 1441          Activities of Daily Living    BADL Assessment/Intervention grooming  -JJ (r) AD (t) JJ (c)     Row Name 07/12/22 1441          Grooming  Assessment/Training    [place] Level (Grooming) hair care, combing/brushing;wash face, hands;set up  -JJ (r) AD (t) JJ (c)     Position (Grooming) sitting up in bed  -JJ (r) AD (t) JJ (c)           User Key  (r) = Recorded By, (t) = Taken By, (c) = Cosigned By    Initials Name Provider Type    Karyn Valle OTR/L, CSRS Occupational Therapist    Carolyn Franco, OT Student OT Student               Obj/Interventions     Row Name 07/12/22 1441          Range of Motion Comprehensive    Comment, General Range of Motion BUE AROM in all shoulder and elbow ranges 2x10  -JJ (r) AD (t) JJ (c)     Row Name 07/12/22 1441          Shoulder (Therapeutic Exercise)    Shoulder (Therapeutic Exercise) isometric exercises  -JJ (r) AD (t) JJ (c)     Shoulder Isometrics (Therapeutic Exercise) bilateral;flexion;extension;aBduction;aDduction;2 sets;10 repetitions;3 second hold  -JJ (r) AD (t) JJ (c)     Row Name 07/12/22 1441          Elbow/Forearm (Therapeutic Exercise)    Elbow/Forearm (Therapeutic Exercise) isometric exercise  -JJ (r) AD (t) JJ (c)     Elbow/Forearm Isometrics (Therapeutic Exercise) bilateral;flexion;extension;2 sets;10 repetitions;3 second hold  -JJ (r) AD (t) JJ (c)     Row Name 07/12/22 1441          Wrist (Therapeutic Exercise)    Wrist (Therapeutic Exercise) isometric exercise  -JJ (r) AD (t) JJ (c)     Wrist Isometrics (Therapeutic Exercise) bilateral;flexion;extension;2 sets;10 repetitions;3 second hold  -JJ (r) AD (t) JJ (c)     Row Name 07/12/22 1441          Motor Skills    Therapeutic Exercise shoulder;elbow/forearm;wrist  -JJ (r) AD (t) JJ (c)           User Key  (r) = Recorded By, (t) = Taken By, (c) = Cosigned By    Initials Name Provider Type    JKaryn Valle OTR/L, CSRS Occupational Therapist    Carolyn Franco, OT Student OT Student               Goals/Plan    No documentation.                Clinical Impression     Row Name 07/12/22 1441          Pain Assessment     "Pretreatment Pain Rating 3/10  -JJ (r) AD (t) JJ (c)     Posttreatment Pain Rating 3/10  -JJ (r) AD (t) JJ (c)     Pain Location generalized  -JJ (r) AD (t) JJ (c)     Pain Intervention(s) Repositioned;Rest  -JJ (r) AD (t) JJ (c)     Row Name 07/12/22 1441          Plan of Care Review    Plan of Care Reviewed With patient  -JJ (r) AD (t) JJ (c)     Outcome Evaluation OT tx complete. Pt drowsy initially, however agreeable to session. Pt completed bed mobility with min A to reposition. Pt completed face washing and hair combing sitting up in bed with vcs for hair combing to attend to all sides of head. Pt completed BUE AROM exercises and tolerated well, requiring rest breaks following each set. Pt completed isometric exerices in bed d/t desat with AROM to upper 80s. Pt did well with BUE isometrics reporting fatigue, requiring RB following each set. Pt with noted scar tissue build up at recent carpal tunnel incision. Scar massage completed to avoid further adhesions and limitation in motion. Pt desiring to nap at EOS and states he will be \"up for more\" next session. Continue OT POC.  -JJ (r) AD (t) JJ (c)     Row Name 07/12/22 1441          Positioning and Restraints    Pre-Treatment Position in bed  -JJ (r) AD (t) JJ (c)     Post Treatment Position bed  -JJ (r) AD (t) JJ (c)     In Bed sitting;call light within reach;encouraged to call for assist;side rails up x3;patient within staff view  -JJ (r) AD (t) JJ (c)           User Key  (r) = Recorded By, (t) = Taken By, (c) = Cosigned By    Initials Name Provider Type    Karyn Reynolds, OTMACKENZIE/L, CSRS Occupational Therapist    Carolyn Franco, OT Student OT Student               Outcome Measures     Row Name 07/12/22 1441          How much help from another is currently needed...    Putting on and taking off regular lower body clothing? 2  -JJ (r) AD (t) JJ (c)     Bathing (including washing, rinsing, and drying) 2  -JJ (r) AD (t) JJ (c)     Toileting (which includes " using toilet bed pan or urinal) 3  -JJ (r) AD (t) JJ (c)     Putting on and taking off regular upper body clothing 3  -JJ (r) AD (t) JJ (c)     Taking care of personal grooming (such as brushing teeth) 3  -JJ (r) AD (t) JJ (c)     Eating meals 4  -JJ (r) AD (t) JJ (c)     AM-PAC 6 Clicks Score (OT) 17  -JJ (r) AD (t)     Row Name 07/12/22 1025          How much help from another person do you currently need...    Turning from your back to your side while in flat bed without using bedrails? 3  -TINA     Moving from lying on back to sitting on the side of a flat bed without bedrails? 3  -TINA     Moving to and from a bed to a chair (including a wheelchair)? 3  -TINA     Standing up from a chair using your arms (e.g., wheelchair, bedside chair)? 3  -TINA     Climbing 3-5 steps with a railing? 1  -TINA     To walk in hospital room? 2  -TINA     AM-PAC 6 Clicks Score (PT) 15  -TINA     Highest level of mobility 4 --> Transferred to chair/commode  -TINA     Row Name 07/12/22 1025          Functional Assessment    Outcome Measure Options AM-PAC 6 Clicks Basic Mobility (PT)  -TINA           User Key  (r) = Recorded By, (t) = Taken By, (c) = Cosigned By    Initials Name Provider Type    Juan Barbosa, PTA Physical Therapist Assistant    Karyn Reynolds, OTR/L, CSRS Occupational Therapist    Carolyn Franco OT Student OT Student                Occupational Therapy Education                 Title: PT OT SLP Therapies (Done)     Topic: Occupational Therapy (Done)     Point: ADL training (Done)     Description:   Instruct learner(s) on proper safety adaptation and remediation techniques during self care or transfers.   Instruct in proper use of assistive devices.              Learning Progress Summary           Patient Acceptance, E, VU by AD at 7/12/2022 1545    Acceptance, E,TB, VU,NR by SF at 7/10/2022 2144    Acceptance, E,TB, VU,NR by SF at 7/10/2022 0130    Acceptance, E,D, VU by ERVIN at 7/5/2022 1314    Acceptance, E,D, VU,NR  by  at 7/4/2022 1241                   Point: Home exercise program (Done)     Description:   Instruct learner(s) on appropriate technique for monitoring, assisting and/or progressing therapeutic exercises/activities.              Learning Progress Summary           Patient Acceptance, E, VU by AD at 7/12/2022 1545    Acceptance, E,TB, VU,NR by  at 7/10/2022 2144    Acceptance, E,TB, VU,NR by  at 7/10/2022 0130    Acceptance, E,D, VU by  at 7/5/2022 1314                   Point: Precautions (Done)     Description:   Instruct learner(s) on prescribed precautions during self-care and functional transfers.              Learning Progress Summary           Patient Acceptance, E, VU by AD at 7/12/2022 1545    Acceptance, E,TB, VU,NR by  at 7/10/2022 2144    Acceptance, E,TB, VU,NR by  at 7/10/2022 0130    Acceptance, E,D, VU by  at 7/5/2022 1314    Acceptance, E,D, VU,NR by  at 7/4/2022 1241                   Point: Body mechanics (Done)     Description:   Instruct learner(s) on proper positioning and spine alignment during self-care, functional mobility activities and/or exercises.              Learning Progress Summary           Patient Acceptance, E, VU by AD at 7/12/2022 1545    Acceptance, E,TB, VU,NR by  at 7/10/2022 2144    Acceptance, E,TB, VU,NR by  at 7/10/2022 0130    Acceptance, E,D, VU by  at 7/5/2022 1314    Acceptance, E,D, VU,NR by  at 7/4/2022 1241                               User Key     Initials Effective Dates Name Provider Type Discipline     06/16/21 -  Niru Zuniga, OTR/L Occupational Therapist OT     06/16/21 -  Ganesh Gunter, RN Registered Nurse Nurse     08/28/18 -  Thuy Ohara, OTR/L Occupational Therapist OT     05/04/22 -  Carolyn Sandoval OT Student OT Student OT              OT Recommendation and Plan     Plan of Care Review  Plan of Care Reviewed With: patient  Outcome Evaluation: OT tx complete. Pt drowsy initially, however agreeable to session.  "Pt completed bed mobility with min A to reposition. Pt completed face washing and hair combing sitting up in bed with vcs for hair combing to attend to all sides of head. Pt completed BUE AROM exercises and tolerated well, requiring rest breaks following each set. Pt completed isometric exerices in bed d/t desat with AROM to upper 80s. Pt did well with BUE isometrics reporting fatigue, requiring RB following each set. Pt with noted scar tissue build up at recent carpal tunnel incision. Scar massage completed to avoid further adhesions and limitation in motion. Pt desiring to nap at EOS and states he will be \"up for more\" next session. Continue OT POC.     Time Calculation:    Time Calculation- OT     Row Name 07/12/22 1441             Time Calculation- OT    OT Start Time 1441  -JJ (r) AD (t) JJ (c)      OT Stop Time 1512  -JJ (r) AD (t) JJ (c)      OT Time Calculation (min) 31 min  -JJ (r) AD (t)      Total Timed Code Minutes- OT 31 minute(s)  -JJ (r) AD (t) JJ (c)      OT Received On 07/12/22  -JJ (r) AD (t) JJ (c)              Timed Charges    66572 - OT Therapeutic Exercise Minutes 16  -JJ (r) AD (t) JJ (c)      91597 - OT Self Care/Mgmt Minutes 15  -JJ (r) AD (t) JJ (c)              Total Minutes    Timed Charges Total Minutes 31  -JJ (r) AD (t)       Total Minutes 31  -JJ (r) AD (t)            User Key  (r) = Recorded By, (t) = Taken By, (c) = Cosigned By    Initials Name Provider Type    Karyn Reynolds OTR/L, CSRS Occupational Therapist    Carolyn Franco OT Student OT Student                       Carolyn Sandoval OT Student  7/12/2022  "

## 2022-07-12 NOTE — PLAN OF CARE
Goal Outcome Evaluation:  Plan of Care Reviewed With: patient, daughter        Progress: no change       Pt rested on BiPAP for almost 3 hours after IV valium given per order.  Rest of night pt was on Vapotherm 40L and 80% with SaO2 92 or greater.  Medicated for cramping in legs once with relief.  Afebrile tonight.  UOP 1200 this shift total.  Pt is able to get 1000 on incentive spirometer with encouagement and does flutter exercise hourly while awake.  Dry cough noted.

## 2022-07-12 NOTE — THERAPY TREATMENT NOTE
Acute Care - Physical Therapy Treatment Note  [facility]     Patient Name: Adrián Graham  : 1940  MRN: [id number]  Today's Date: 2022      Visit Dx:     ICD-10-CM ICD-9-CM   1. Hypoxemia  R09.02 799.02   2. Orthostatic hypotension  I95.1 458.0   3. Pneumonia due to COVID-19 virus  U07.1 480.8    J12.82 079.89   4. Multiple subsegmental pulmonary emboli without acute cor pulmonale (HCC)  I26.94 415.19   5. Impaired mobility  Z74.09 799.89   6. Decreased activities of daily living (ADL)  Z78.9 V49.89     Patient Active Problem List   Diagnosis   • Idiopathic peripheral neuropathy   • Essential hypertension   • Benign prostatic hyperplasia without lower urinary tract symptoms   • Overweight (BMI 25.0-29.9)   • Carpal tunnel syndrome, left   • Bilateral carotid artery disease (HCC)   • Hyperlipidemia   • Preop cardiovascular exam [facility] Dr Brown   • Single subsegmental pulmonary embolism without acute cor pulmonale (HCC)   • Pulmonary embolism associated with COVID-19 (HCC)   • Acute respiratory failure with hypoxia (HCC)     Past Medical History:   Diagnosis Date   • Abdominal distention    • Abdominal pain, left upper quadrant    • Allergic rhinitis    • Arthritis    • Bloating    • BPH (benign prostatic hyperplasia)    • Carpal tunnel syndrome    • Change in bowel habits    • Constipation    • Diarrhea    • Erectile dysfunction    • GERD (gastroesophageal reflux disease)    • History of shingles    • Hypertension    • Idiopathic peripheral neuropathy    • Neuropathy    • Pneumonia    • Shingles    • Weight loss      Past Surgical History:   Procedure Laterality Date   • APPENDECTOMY     • BACK SURGERY     • CARPAL TUNNEL RELEASE Right    • CARPAL TUNNEL RELEASE Left 2022   • CHOLECYSTECTOMY     • COLONOSCOPY  2010    5 yr-complete colon not visualized   • COLONOSCOPY  2004    extremely tortuous redundant colon. BE-mild diverticulosis without diverticulitis. ? Gallstones-Dr Chery.   •  COLONOSCOPY N/A 09/10/2020    Procedure: COLONOSCOPY WITH ANESTHESIA;  Surgeon: Mani Sy DO;  Location: [facility] ENDOSCOPY;  Service: Gastroenterology;  Laterality: N/A;  Pre: Positive Colorectal Screening  Post: Diverticulosis  Dr. Mooney  CO2 Inflation Used   • ENDOSCOPY N/A 09/26/2016    Procedure: ESOPHAGOGASTRODUODENOSCOPY WITH ANESTHESIA;  Surgeon: Mani Sy DO;  Location: [facility] ENDOSCOPY;  Service:    • HIP SURGERY Right    • JOINT REPLACEMENT     • KNEE SURGERY     • OTHER SURGICAL HISTORY      Surgery for Spinal Stenosis   • OTHER SURGICAL HISTORY      Laser Prostate Surgery     PT Assessment (last 12 hours)     PT Evaluation and Treatment     Row Name 07/12/22 1025          Physical Therapy Time and Intention    Subjective Information complains of;weakness;fatigue  -     Document Type therapy note (daily note)  -     Mode of Treatment physical therapy  -     Comment nsg increased vapotherm for activity  -     Row Name 07/12/22 1025          General Information    Existing Precautions/Restrictions fall;oxygen therapy device and L/min  vapotherm  -     Row Name 07/12/22 1025          Pain    Pretreatment Pain Rating 4/10  -     Posttreatment Pain Rating 4/10  -     Pain Location generalized  -     Pain Intervention(s) Repositioned  -     Row Name 07/12/22 1025          Bed Mobility    Supine-Sit [place] (Bed Mobility) verbal cues;minimum assist (75% patient effort)  -     Sit-Supine [place] (Bed Mobility) verbal cues;minimum assist (75% patient effort)  -     Assistive Device (Bed Mobility) bed rails;head of bed elevated  -     Row Name 07/12/22 1025          Transfers    Comment, (Transfers) stood for a few seconds with CGA  -     Sit-Stand [place] (Transfers) verbal cues;minimum assist (75% patient effort)  -     Stand-Sit [place] (Transfers) verbal cues;contact guard  -     Row Name 07/12/22 1025          Balance    Comment, Balance siting  EOB with CGA, performed LE exercises, postural/breathing exercises  -TINA     Row Name 07/12/22 1025          Vital Signs    Pre SpO2 (%) 92  -TINA     O2 Delivery Pre Treatment --  vapotherm  -TINA     Intra SpO2 (%) 82  -TINA     O2 Delivery Intra Treatment --  vapotherm  -TINA     Post SpO2 (%) 91  -TINA     O2 Delivery Post Treatment --  vapotherm  -TINA     Pre Patient Position Prone  -TINA     Intra Patient Position Sitting  -TINA     Post Patient Position Supine  -TINA     Row Name 07/12/22 1025          Positioning and Restraints    Pre-Treatment Position in bed  -TINA     Post Treatment Position bed  -TINA     In Bed notified nsg;fowlers;call light within reach;encouraged to call for assist;side rails up x2  -TINA           User Key  (r) = Recorded By, (t) = Taken By, (c) = Cosigned By    Initials Name Provider Type    Juan Barbosa, PTA Physical Therapist Assistant                Physical Therapy Education                 Title: PT OT SLP Therapies (Done)     Topic: Physical Therapy (Done)     Point: Mobility training (Done)     Learning Progress Summary           Patient Acceptance, E, VU by  at 7/12/2022 1025    Comment: precautions with O2 and breathing    Acceptance, E,TB, VU,NR by  at 7/10/2022 2144    Acceptance, E, VU,NR by  at 7/10/2022 1105    Comment: bed exercises, progression with mobiliyt, energy conservation    Acceptance, E,TB, VU,NR by  at 7/10/2022 0130    Acceptance, E,D, DU,VU by  at 7/4/2022 1132    Comment: benefits of PT and POC, call for A OOB                   Point: Precautions (Done)     Learning Progress Summary           Patient Acceptance, E,TB, VU,NR by  at 7/10/2022 2144    Acceptance, E,TB, VU,NR by  at 7/10/2022 0130    Acceptance, E,TB, VU by  at 7/7/2022 1450    Comment: deep breathing    Acceptance, E,TB, VU by  at 7/5/2022 1046    Comment: call for assist    Acceptance, E,D, DU,VU by  at 7/4/2022 1132    Comment: benefits of PT and POC, call for A OOB                                User Key     Initials Effective Dates Name Provider Type Discipline     06/16/21 -  Corrina Sandra, PTA Physical Therapist Assistant PT     06/16/21 -  Dexter Severino, PT Physical Therapist PT     12/08/16 -  Juan Webster, PTA Physical Therapist Assistant PT     06/16/21 -  Ganesh Gunter RN Registered Nurse Nurse              PT Recommendation and Plan     Plan of Care Reviewed With: patient  Progress: improving  Outcome Evaluation: Pt was in bed,, agreed to therapy.  Nsg increased vapotherm for activity.  pt was able to transfer supine to sitting with min assist.  Sitting EOB, pt maintain balance with CGA, performed LE exercises, postural and breathing exercises.  Pt was able to stand with min assist, maintained standing for a few seconds with CGA.  Pt's O2 decreased to 82% with activity, but increased back to 90% with rest and cues for breathing.   Outcome Measures     Row Name 07/12/22 1025 07/10/22 1105          How much help from another person do you currently need...    Turning from your back to your side while in flat bed without using bedrails? 3  -TINA 3  -TINA     Moving from lying on back to sitting on the side of a flat bed without bedrails? 3  -TINA 3  -TINA     Moving to and from a bed to a chair (including a wheelchair)? 3  -TINA 3  -TINA     Standing up from a chair using your arms (e.g., wheelchair, bedside chair)? 3  -TINA 3  -TINA     Climbing 3-5 steps with a railing? 1  -TINA 1  -TINA     To walk in hospital room? 2  -TINA 2  -TINA     AM-PAC 6 Clicks Score (PT) 15  -TINA 15  -TINA            Functional Assessment    Outcome Measure Options AM-PAC 6 Clicks Basic Mobility (PT)  -TINA AM-PAC 6 Clicks Basic Mobility (PT)  -TINA           User Key  (r) = Recorded By, (t) = Taken By, (c) = Cosigned By    Initials Name Provider Type    Juan Barbosa PTA Physical Therapist Assistant                 Time Calculation:    PT Charges     Row Name 07/12/22 1025             Time Calculation    Start  Time 1025  -TINA      Stop Time 1050  -TINA      Time Calculation (min) 25 min  -TINA              Time Calculation- PT    Total Timed Code Minutes- PT 25 minute(s)  -TINA              Timed Charges    11788 - PT Therapeutic Exercise Minutes 15  -TINA      44013 - PT Therapeutic Activity Minutes 10  -TINA              Total Minutes    Timed Charges Total Minutes 25  -TINA       Total Minutes 25  -TINA            User Key  (r) = Recorded By, (t) = Taken By, (c) = Cosigned By    Initials Name Provider Type    Juan Barbosa PTA Physical Therapist Assistant              Therapy Charges for Today     Code Description Service Date Service Provider Modifiers Qty    05653174528 HC PT THERAPEUTIC ACT EA 15 MIN 7/12/2022 Juan Webster, [facility] GP 1    20697208444 HC PT THER PROC EA 15 MIN 7/12/2022 Juan Webster, [facility] GP 1          PT G-Codes  Outcome Measure Options: AM-PAC 6 Clicks Basic Mobility (PT)  AM-PAC 6 Clicks Score (PT): 15  AM-PAC 6 Clicks Score (OT): 17    Juan Webster PTA  7/12/2022

## 2022-07-12 NOTE — PROGRESS NOTES
[facility]  INPATIENT PROGRESS NOTE    Patient Name: Adrián Graham  Date of Admission: 7/1/2022  Today's Date: 07/12/22  Length of Stay: 11  Primary Care Physician: Mauro Irizarry DO    Subjective   Chief Complaint: f/u acute respiratory failure with hypoxia    HPI:  Patient looks much more comfortable today.  Breathing in the low 20s.  Satting 94% on Vapotherm 40 L 90% FiO2.  Feels a lot better than yesterday.  No chest pain or dizziness. No fevers noted overnight.    Review of Systems   All pertinent negatives and positives are as above. All other systems have been reviewed and are negative unless otherwise stated.     Objective    Temp:  [97.5 °F (36.4 °C)-101.8 °F (38.8 °C)] 97.8 °F (36.6 °C)  Heart Rate:  [] 72  Resp:  [14-32] 22  BP: ()/(41-68) 98/58  Physical Exam  GEN: Awake, alert, interactive, no acute distress  HEENT: PERRLA, EOMI, Anicteric, Trachea midline  Lungs:  No wheezing or rales.  Good effort.   Heart: RRR, +S1/s2, no rub  ABD: soft, nt/nd, +BS, no guarding/rebound  Extremities: atraumatic, no cyanosis  Skin: no rashes or petechiae  Neuro: AAOx3, no focal deficits  Psych: normal mood & affect      Results Review:  I have reviewed the labs, radiology results, and diagnostic studies.    Laboratory Data:   Results from last 7 days   Lab Units 07/12/22  0327 07/11/22  0312 07/10/22  0223   WBC 10*3/mm3 19.06* 17.30* 19.51*   HEMOGLOBIN g/dL 10.3* 10.3* 10.5*   HEMATOCRIT % 32.4* 33.7* 32.1*   PLATELETS 10*3/mm3 336 368 360        Results from last 7 days   Lab Units 07/12/22  0327 07/11/22  0312 07/10/22  0223   SODIUM mmol/L 145 143 141   POTASSIUM mmol/L 3.7 3.9 3.6   CHLORIDE mmol/L 101 102 104   CO2 mmol/L 37.0* 34.0* 31.0*   BUN mg/dL 28* 23 20   CREATININE mg/dL 0.79 0.83 0.78   CALCIUM mg/dL 9.1 9.0 8.6   BILIRUBIN mg/dL  --   --  0.3   ALK PHOS U/L  --   --  76   ALT (SGPT) U/L  --   --  38   AST (SGOT) U/L  --   --  30    GLUCOSE mg/dL 127* 113* 114*       Culture Data:   Blood Culture   Date Value Ref Range Status   06/26/2022 No growth at 5 days  Final   06/26/2022 No growth at 5 days  Final       Radiology Data:   Imaging Results (Last 24 Hours)     Procedure Component Value Units Date/Time    XR Chest 1 View [[id number]] Collected: 07/11/22 1111     Updated: 07/11/22 1115    Narrative:      EXAM: XR CHEST 1 VW- 7/11/2022 10:54 AM CDT     HISTORY: f/u xray, hypoxia; R09.02-Hypoxemia; I95.1-Orthostatic  hypotension; U07.1-COVID-19; J12.82-Pneumonia due to coronavirus disease  2019; I26.94-Multiple subsegmental pulmonary emboli without acute cor  pulmonale; Z74.09-Other reduced mobility; Z78.9-Other specified health  status       COMPARISON: July 9, 2022.     TECHNIQUE: Frontal radiograph of the chest     FINDINGS:   Bilateral interstitial and airspace filling opacities are noted. This is  been described as pneumonia and is stable and unchanged from July 9, 2022.. Cardiac silhouette is normal..      The osseous structures and surrounding soft tissues demonstrate no acute  abnormality.          Impression:      1. Persistent bilateral interstitial and airspace filling opacities most  consistent with pneumonia. This is similar to July 9, 2022.        This report was finalized on 07/11/2022 11:12 by Dr. Don Delacruz MD.          I have reviewed the patient's current medications.     Assessment/Plan     Active Hospital Problems    Diagnosis    • Acute respiratory failure with hypoxia (HCC)    • Pulmonary embolism associated with COVID-19 (HCC)    • Bilateral carotid artery disease (HCC)    • Hyperlipidemia    • Benign prostatic hyperplasia without lower urinary tract symptoms    • Essential hypertension        Plan:  #1 acute respiratory failure with hypoxia -difficult case.  Likely multifactorial.  Recent COVID and PE.  Presented with concern for cryptogenic organizing pneumonia.  Was on steroids and antibiotics.  Initially was  getting better and oxygen was weaning to 4 L.  He however worsened again over 7/8 through 7/10.  Ended up in the ICU yesterday morning was on max oxygen with low sats.  He was given extra dose of steroid yesterday.  Seems to be doing better today.  Now on 40 L 90% FiO2.  Good sats.  Looks comfortable.  Pulmonary and ID following.    #2 pulmonary embolism -recent diagnosis.  Currently on Lovenox    #3 COVID-19 -now negative.  Posttreatment.  Potentially have secondary organizing pneumonia or even fibrosis post-COVID.    #4 hypertension -BP was elevated yesterday while stressed but now back to soft/stable.  Continue to monitor.    #5 hyperlipidemia -statin    #6 BPH -Proscar    Discharge Planning: Ongoing.  Continue in the ICU.    Electronically signed by Steven Ross DO, 07/12/22, 07:30 CDT.

## 2022-07-12 NOTE — PLAN OF CARE
Goal Outcome Evaluation:  Plan of Care Reviewed With: patient        Progress: no change  Outcome Evaluation: Ntn follow up. Pt is on a cardiac diet. He reports a fair appetite, but he is consuming Boost Plus BID. He prefers vanilla flavor. He consumed 50% of the last documented meal. He had recent COVID last month. He has lost 10# in the last month and 17# in the last 3 months. He does qualify for moderate malnutrition in the context of chronic illness. Obtained specific food preferences and relayed info to [facility]. Cont to follow.

## 2022-07-13 PROBLEM — E44.0 MODERATE MALNUTRITION: Status: ACTIVE | Noted: 2022-01-01

## 2022-07-13 NOTE — CASE MANAGEMENT/SOCIAL WORK
Continued Stay Note  [facility]     Patient Name: Adrián Graham  MRN: [id number]  Today's Date: 7/13/2022    Admit Date: 7/1/2022     Discharge Plan     Row Name 07/13/22 1335       Plan    Plan LTAC referral    Plan Comments LTAC referral received.  JEISON John with [facility] of referral.               Discharge Codes    No documentation.                     SHAKA Farris

## 2022-07-13 NOTE — THERAPY TREATMENT NOTE
Acute Care - Physical Therapy Treatment Note  [facility]     Patient Name: Adrián Graham  : 1940  MRN: [id number]  Today's Date: 2022      Visit Dx:     ICD-10-CM ICD-9-CM   1. Hypoxemia  R09.02 799.02   2. Orthostatic hypotension  I95.1 458.0   3. Pneumonia due to COVID-19 virus  U07.1 480.8    J12.82 079.89   4. Multiple subsegmental pulmonary emboli without acute cor pulmonale (HCC)  I26.94 415.19   5. Impaired mobility  Z74.09 799.89   6. Decreased activities of daily living (ADL)  Z78.9 V49.89     Patient Active Problem List   Diagnosis   • Idiopathic peripheral neuropathy   • Essential hypertension   • Benign prostatic hyperplasia without lower urinary tract symptoms   • Overweight (BMI 25.0-29.9)   • Carpal tunnel syndrome, left   • Bilateral carotid artery disease (HCC)   • Hyperlipidemia   • Preop cardiovascular exam [facility] Dr Brown   • Single subsegmental pulmonary embolism without acute cor pulmonale (HCC)   • Pulmonary embolism associated with COVID-19 (HCC)   • Acute respiratory failure with hypoxia (HCC)   • Moderate malnutrition (HCC)     Past Medical History:   Diagnosis Date   • Abdominal distention    • Abdominal pain, left upper quadrant    • Allergic rhinitis    • Arthritis    • Bloating    • BPH (benign prostatic hyperplasia)    • Carpal tunnel syndrome    • Change in bowel habits    • Constipation    • Diarrhea    • Erectile dysfunction    • GERD (gastroesophageal reflux disease)    • History of shingles    • Hypertension    • Idiopathic peripheral neuropathy    • Neuropathy    • Pneumonia    • Shingles    • Weight loss      Past Surgical History:   Procedure Laterality Date   • APPENDECTOMY     • BACK SURGERY     • CARPAL TUNNEL RELEASE Right    • CARPAL TUNNEL RELEASE Left 2022   • CHOLECYSTECTOMY     • COLONOSCOPY  2010    5 yr-complete colon not visualized   • COLONOSCOPY  2004    extremely tortuous redundant colon. BE-mild diverticulosis without diverticulitis. ?  Gallstones-Dr Chery.   • COLONOSCOPY N/A 09/10/2020    Procedure: COLONOSCOPY WITH ANESTHESIA;  Surgeon: Mani Sy DO;  Location: [facility] ENDOSCOPY;  Service: Gastroenterology;  Laterality: N/A;  Pre: Positive Colorectal Screening  Post: Diverticulosis  Dr. Mooney  CO2 Inflation Used   • ENDOSCOPY N/A 09/26/2016    Procedure: ESOPHAGOGASTRODUODENOSCOPY WITH ANESTHESIA;  Surgeon: Mani Sy DO;  Location:  [facility] ENDOSCOPY;  Service:    • HIP SURGERY Right    • JOINT REPLACEMENT     • KNEE SURGERY     • OTHER SURGICAL HISTORY      Surgery for Spinal Stenosis   • OTHER SURGICAL HISTORY      Laser Prostate Surgery     PT Assessment (last 12 hours)     PT Evaluation and Treatment     Row Name 07/13/22 1022          Physical Therapy Time and Intention    Subjective Information complains of;weakness;fatigue  -WK     Document Type therapy note (daily note)  -WK     Mode of Treatment physical therapy  -WK     Comment increase in vapotherm before activity  -WK     Row Name 07/13/22 1022          General Information    Existing Precautions/Restrictions fall;oxygen therapy device and L/min  vapotherm  -WK     Row Name 07/13/22 1022          Bed Mobility    Supine-Sit [place] (Bed Mobility) verbal cues;minimum assist (75% patient effort)  -WK     Sit-Supine [place] (Bed Mobility) verbal cues;minimum assist (75% patient effort)  -WK     Row Name 07/13/22 1022          Transfers    Sit-Stand [place] (Transfers) verbal cues;minimum assist (75% patient effort)  stood x4  -WK     Stand-Sit [place] (Transfers) verbal cues;minimum assist (75% patient effort)  -WK     Row Name 07/13/22 1022          Sit-Stand Transfer    Assistive Device (Sit-Stand Transfers) --  HHA  -WK     Row Name 07/13/22 1022          Stand-Sit Transfer    Assistive Device (Stand-Sit Transfers) --  HHA  -WK     Row Name 07/13/22 1022          Gait/Stairs (Locomotion)    [place] Level (Gait) verbal cues;contact guard  -WK      Assistive Device (Gait) --  HHA  -WK     Distance in Feet (Gait) side steps up HOB  -WK     Comment, (Gait/Stairs) increased due to o2 decreasing. Nsg increase o2 to 40L /90% on vapotherm before activity.With bed mobility pt decreased to 77% no signicant signs of SOA or distress and nsg increased to 40l/100.Pt decresed to 83% with standing and improved with time and breathing technique  -WK     Row Name 07/13/22 1022          Balance    Comment, Balance EOB SBA, increased time for o2 to increase back to 90%  -WK     Row Name 07/13/22 1022          Motor Skills    Comments, Therapeutic Exercise AROM BLE 10 reps  -WK     Row Name 07/13/22 1022          Vital Signs    Pre SpO2 (%) 93  -WK     O2 Delivery Pre Treatment --  vapotherm 40L/80%  -WK     Intra SpO2 (%) 77  40L/90 increased to 40L/100% and he decreased to 83% with activity  -WK     Post SpO2 (%) 94  -WK     O2 Delivery Post Treatment --  vapotherm  -WK     Pre Patient Position Supine  -WK     Intra Patient Position Sitting  -WK     Post Patient Position Supine  -WK     Row Name 07/13/22 1022          Positioning and Restraints    Pre-Treatment Position in bed  -WK     Post Treatment Position bed  -WK     In Bed fowlers;call light within reach;encouraged to call for assist;RUE elevated;LUE elevated;RLE elevated;LLE elevated  -WK           User Key  (r) = Recorded By, (t) = Taken By, (c) = Cosigned By    Initials Name Provider Type    WK Beth Caazres PTA Physical Therapist Assistant                Physical Therapy Education                 Title: PT OT SLP Therapies (Done)     Topic: Physical Therapy (Done)     Point: Mobility training (Done)     Learning Progress Summary           Patient Acceptance, E, VU by WK at 7/13/2022 1316    Comment: Safety, POC and o2    Acceptance, E, VU by TINA at 7/12/2022 1025    Comment: precautions with O2 and breathing    Acceptance, E,TB, VU,NR by SF at 7/10/2022 2144    Acceptance, E, VU,NR by TINA at 7/10/2022 1105     Comment: bed exercises, progression with mobiliyt, energy conservation    Acceptance, E,TB, VU,NR by  at 7/10/2022 0130    Acceptance, E,D, DU,VU by  at 7/4/2022 1132    Comment: benefits of PT and POC, call for A OOB                   Point: Precautions (Done)     Learning Progress Summary           Patient Acceptance, E,TB, VU,NR by  at 7/10/2022 2144    Acceptance, E,TB, VU,NR by  at 7/10/2022 0130    Acceptance, E,TB, VU by  at 7/7/2022 1450    Comment: deep breathing    Acceptance, E,TB, VU by  at 7/5/2022 1046    Comment: call for assist    Acceptance, E,D, DU,VU by  at 7/4/2022 1132    Comment: benefits of PT and POC, call for A OOB                               User Key     Initials Effective Dates Name Provider Type Discipline     06/16/21 -  Corrina Sandra, PTA Physical Therapist Assistant PT     06/16/21 -  Dexter Severino, PT Physical Therapist PT     12/08/16 -  Juan Webster PTA Physical Therapist Assistant PT     06/16/21 -  Ganesh Gunter, RN Registered Nurse Nurse     06/16/21 -  Beth Cazares, PTA Physical Therapist Assistant PT              PT Recommendation and Plan     Plan of Care Reviewed With: patient  Outcome Evaluation: Nsg increased vapotherm prior to pt performing activity. Pt performed bed mobility with min A and increased time to recover o2. Nsg increased vapotherm a 2nd time to 100% o2. Pt desat to 83% after sitting rest from standing [place]. Pt requires cues to posture and breathing while sitting EOB to recover o2 back to 90%. Pt sat EOB with SBA. Pt was able to side step up HOB with CGA.   Outcome Measures     Row Name 07/13/22 1117 07/12/22 1025          How much help from another person do you currently need...    Turning from your back to your side while in flat bed without using bedrails? 3  -WK 3  -TINA     Moving from lying on back to sitting on the side of a flat bed without bedrails? 3  -WK 3  -TINA     Moving to and from a bed to a chair  (including a wheelchair)? 3  -WK 3  -TINA     Standing up from a chair using your arms (e.g., wheelchair, bedside chair)? 3  -WK 3  -TINA     Climbing 3-5 steps with a railing? 2  -WK 1  -TINA     To walk in hospital room? 2  -WK 2  -TINA     AM-PAC 6 Clicks Score (PT) 16  -WK 15  -TINA            Functional Assessment    Outcome Measure Options AM-PAC 6 Clicks Basic Mobility (PT)  -WK AM-PAC 6 Clicks Basic Mobility (PT)  -TINA           User Key  (r) = Recorded By, (t) = Taken By, (c) = Cosigned By    Initials Name Provider Type    Juan Barbosa PTA Physical Therapist Assistant    WK Beth Cazares PTA Physical Therapist Assistant                 Time Calculation:    PT Charges     Row Name 07/13/22 1318             Time Calculation    Start Time 1022  -WK      Stop Time 1116  -WK      Time Calculation (min) 54 min  -WK      PT Received On 07/13/22  -WK              Time Calculation- PT    Total Timed Code Minutes- PT 54 minute(s)  -WK            User Key  (r) = Recorded By, (t) = Taken By, (c) = Cosigned By    Initials Name Provider Type    WK Beth Cazares PTA Physical Therapist Assistant              Therapy Charges for Today     Code Description Service Date Service Provider Modifiers Qty    40557473706 HC PT THERAPEUTIC ACT EA 15 MIN 7/13/2022 Beth Cazares PTA GP 3    08905520220 HC PT THER PROC EA 15 MIN 7/13/2022 Beth Cazares PTA GP 1          PT G-Codes  Outcome Measure Options: AM-PAC 6 Clicks Basic Mobility (PT)  AM-PAC 6 Clicks Score (PT): 16  AM-PAC 6 Clicks Score (OT): 17    Beth Cazares PTA  7/13/2022

## 2022-07-13 NOTE — PROGRESS NOTES
"INFECTIOUS DISEASES PROGRESS NOTE    Patient:  Adrián Graham  YOB: 1940  MRN: [id number]   Admit date: 2022   Admitting Physician: Steven Ross DO  Primary Care Physician: Mauro Irizarry DO    Chief Complaint: \"I was resting well\"      Interval History: The patient notes that he worked with therapy today.  He said, \"it makes me feel better\".    Noted steroid dosing changed to methylprednisolone 40 mg IV every 8 today    Allergies: No Known Allergies    Current Scheduled Medications:   ascorbic acid, 500 mg, Oral, Daily  aspirin, 81 mg, Oral, Daily  atorvastatin, 20 mg, Oral, Daily  busPIRone, 10 mg, Oral, TID  cefepime, 2 g, Intravenous, Q8H  cetirizine, 10 mg, Oral, Daily  diphenhydrAMINE, 25 mg, Oral, Nightly  enoxaparin, 1 mg/kg, Subcutaneous, Q12H  famotidine, 20 mg, Oral, BID AC  finasteride, 5 mg, Oral, Daily  fluticasone, 2 spray, Nasal, Daily  furosemide, 20 mg, Oral, BID  gabapentin, 400 mg, Oral, Q12H  guaiFENesin, 1,200 mg, Oral, BID  ipratropium-albuterol, 3 mL, Nebulization, 4x Daily - RT  methylPREDNISolone sodium succinate, 40 mg, Intravenous, Q8H  montelukast, 10 mg, Oral, Nightly  sodium chloride, 250 mL, Intravenous, Once  sodium chloride, 10 mL, Intravenous, Q12H  vitamin D3, 5,000 Units, Oral, Daily  zinc sulfate, 220 mg, Oral, Daily      Current PRN Medications:  •  acetaminophen  •  albuterol  •  diazePAM  •  Morphine  •  ondansetron  •  sodium chloride  •  traMADol    Review of Systems  General: No recurrent fever   Respiratory \"Hard to take a deep breath\"      :Objective     Vital Signs:  Temp (24hrs), Av.8 °F (36.6 °C), Min:97.5 °F (36.4 °C), Max:98.2 °F (36.8 °C)      /53   Pulse 91   Temp 98.2 °F (36.8 °C) (Oral)   Resp 25   Ht 185.4 cm (73\")   Wt 84 kg (185 lb 3 oz)   SpO2 97%   BMI 24.43 kg/m²         Physical Exam  General: Patient 82-year-old male lying in bed resting comfortably  HEENT: Sclera anicteric.  Vapotherm in place at 40 L and " 90%.  Lungs: Diminished breath sounds bilaterally.  O2 saturations 97 to 99%..  Abdomen: Soft, nontender, nondistended  Neuro: Alert, oriented, speech clear  Psych: Pleasant and cooperative       Results Review:    I reviewed the patient's new clinical results.    Lab Results:  CBC:   Lab Results   Lab 07/07/22  0941 07/10/22  0223 07/11/22  0312 07/12/22  0327 07/13/22  0514   WBC 21.96* 19.51* 17.30* 19.06* 20.01*   HEMOGLOBIN 13.8 10.5* 10.3* 10.3* 10.5*   HEMATOCRIT 43.9 32.1* 33.7* 32.4* 34.4*   PLATELETS 553* 360 368 336 329   LYMPHOCYTE % 16.2* 4.0*  --   --   --    MONOCYTES %  --  3.0*  --   --   --          CMP:   Lab Results   Lab 07/10/22  0223 07/11/22  0312 07/12/22  0327 07/13/22  0514   SODIUM 141 143 145 143   POTASSIUM 3.6 3.9 3.7 3.5   CHLORIDE 104 102 101 103   CO2 31.0* 34.0* 37.0* 35.0*   BUN 20 23 28* 30*   CREATININE 0.78 0.83 0.79 0.70*   CALCIUM 8.6 9.0 9.1 8.9   BILIRUBIN 0.3  --   --   --    ALK PHOS 76  --   --   --    ALT (SGPT) 38  --   --   --    AST (SGOT) 30  --   --   --    GLUCOSE 114* 113* 127* 109*       Estimated Creatinine Clearance: 95 mL/min (A) (by C-G formula based on SCr of 0.7 mg/dL (L)).      COVID LABS:  Results From Last 14 Days   Lab Units 07/11/22  1226 07/11/22 0312 07/10/22  0223 07/07/22  0941 07/04/22  0700 07/02/22  1248 07/02/22  0647 07/01/22  1429 07/01/22  1134   0000   PROBNP pg/mL  --   --   --   --   --   --   --  1,299.0  --   --    CK TOTAL U/L  --   --   --   --   --   --   --   --  167  --    CRP mg/dL  --  16.21* 18.76* 8.16* 6.46*  --    < > 18.78*  --   --    D DIMER QUANT MCGFEU/mL  --   --   --   --   --   --   --   --  4.49*  --    FERRITIN ng/mL  --  1,112.00*  --  1,243.00*  --   --   --   --   --   --    LACTATE mmol/L  --   --   --   --   --  1.8  --   --   --   --    PROCALCITONIN ng/mL 0.77*  --   --   --  0.08 0.21  --  0.17  --    < >   PROTIME Seconds  --   --   --   --   --   --   --   --  23.3*  --    INR   --   --   --   --   --    --   --   --  2.16*  --    TROPONIN T ng/mL  --   --   --   --   --   --   --  <0.010 <0.010  --     < > = values in this interval not displayed.         Culture Results:    Microbiology Results (last 10 days)     Procedure Component Value - Date/Time    Blood Culture With KEVAN - Blood, Arm, Right [[id number]]  (Normal) Collected: 07/11/22 1229    Lab Status: Preliminary result Specimen: Blood from Arm, Right Updated: 07/12/22 1246     Blood Culture No growth at 24 hours    Blood Culture With KEVAN - Blood, Hand, Left [[id number]]  (Normal) Collected: 07/11/22 1226    Lab Status: Preliminary result Specimen: Blood from Hand, Left Updated: 07/12/22 1246     Blood Culture No growth at 24 hours    MRSA Screen, PCR (Inpatient) - Swab, Nares [[id number]]  (Normal) Collected: 07/09/22 1052    Lab Status: Final result Specimen: Swab from Nares Updated: 07/09/22 1210     MRSA PCR No MRSA Detected    Narrative:      The negative predictive value of this diagnostic test is high and should only be used to consider de-escalating anti-MRSA therapy. A positive result may indicate colonization with MRSA and must be correlated clinically.    Blood Culture - Blood, Arm, Right [[id number]]  (Normal) Collected: 07/09/22 1030    Lab Status: Preliminary result Specimen: Blood from Arm, Right Updated: 07/13/22 1102     Blood Culture No growth at 4 days    Blood Culture - Blood, Arm, Left [[id number]]  (Normal) Collected: 07/09/22 1028    Lab Status: Preliminary result Specimen: Blood from Arm, Left Updated: 07/13/22 1102     Blood Culture No growth at 4 days    Respiratory Culture - Sputum, Cough [[id number]] Collected: 07/06/22 2202    Lab Status: Final result Specimen: Sputum from Cough Updated: 07/08/22 0930     Respiratory Culture Rejected     Gram Stain Few (2+) WBCs seen      No Epithelial cells seen      No organisms seen    Narrative:      Specimen rejected due to oropharyngeal contamination. Please reorder and recollect specimen if  clinically necessary.    COVID-19 RAPID AG,VERITOR,COR/GEE/PAD/MARIYA/MAD/RACHEL/LAG/JOSEPH/ IN-HOUSE,DRY SWAB, 1-2 HR TAT - Swab, Nasal Cavity [[id number]]  (Normal) Collected: 07/06/22 1451    Lab Status: Final result Specimen: Swab from Nasal Cavity Updated: 07/06/22 1600     COVID19 Presumptive Negative    Narrative:      Fact sheets for providers: https://www.fda.gov/media/312965/download    Fact sheets for patients: https://www.fda.gov/media/387829/download               Radiology:   Imaging Results (Last 72 Hours)     Procedure Component Value Units Date/Time    XR Chest 1 View [[id number]] Collected: 07/11/22 1111     Updated: 07/11/22 1115    Narrative:      EXAM: XR CHEST 1 VW- 7/11/2022 10:54 AM CDT     HISTORY: f/u xray, hypoxia; R09.02-Hypoxemia; I95.1-Orthostatic  hypotension; U07.1-COVID-19; J12.82-Pneumonia due to coronavirus disease  2019; I26.94-Multiple subsegmental pulmonary emboli without acute cor  pulmonale; Z74.09-Other reduced mobility; Z78.9-Other specified health  status       COMPARISON: July 9, 2022.     TECHNIQUE: Frontal radiograph of the chest     FINDINGS:   Bilateral interstitial and airspace filling opacities are noted. This is  been described as pneumonia and is stable and unchanged from July 9, 2022.. Cardiac silhouette is normal..      The osseous structures and surrounding soft tissues demonstrate no acute  abnormality.          Impression:      1. Persistent bilateral interstitial and airspace filling opacities most  consistent with pneumonia. This is similar to July 9, 2022.        This report was finalized on 07/11/2022 11:12 by Dr. Don Delacruz MD.          Assessment & Plan     Active Hospital Problems    Diagnosis    • Moderate malnutrition (HCC)    • Acute respiratory failure with hypoxia (HCC)    • Pulmonary embolism associated with COVID-19 (HCC)    • Bilateral carotid artery disease (HCC)    • Hyperlipidemia    • Benign prostatic hyperplasia without lower urinary tract  symptoms    • Essential hypertension        IMPRESSION:  1. COVID-19 infection diagnosed June 26, 2022-unclear when patient actually initially was infected with COVID as he was seen June 7 with cough and shortness of breath, had CT of the chest that showed patchy groundglass infiltrates but does not appear he was tested for COVID.  He received steroids and antibiotics.  It does not appear he received any COVID-related treatments June 26 i.e. Paxlovid as he appeared to be quite out of the window for benefit as he was likely infected earlier in the month if not the month prior (2 urgent care visits for cough and congestion 1 in May 1 in June).  Patient admitted July 1 and had temp spike to 103 and significant increased O2 requirements July 9 prompting transfer to CCU.  2. Fever -blood cultures negative.  Fever resolved no significant change in chest x-ray to support new consolidation in patient who has received IV azithromycin for 5 days and Zosyn for 8 days this admission and then dosed with vancomycin  x1 followed by continuing on cefepime.  May be related to postacute COVID organizing pneumonia  3. Leukocytosis- patient on steroids   4. pulmonary emboli diagnosed June 26  5. Elevated CRP-  6. Elevated ferritin  7. Elevated procalcitonin July 11, 2022      RECOMMENDATION:   · Continue empiric cefepime   · Steroid management per pulmonary/hospitalist  · Wean O2 as tolerated  · Continue increase activity with therapy  · Continue critical care monitoring            Beatris Eduardo MD  07/13/22  12:00 CDT

## 2022-07-13 NOTE — PROGRESS NOTES
[facility] Medicine Services  INPATIENT PROGRESS NOTE    Patient Name: Adrián Graham  Date of Admission: 7/1/2022  Today's Date: 07/13/22  Length of Stay: 12  Primary Care Physician: Mauro Irizarry DO    Subjective   Chief Complaint: f/u acute respiratory failure with hypoxia    HPI:  Patient continues to look more comfortable than he did on Monday 7/11 when I saw him but he is now back up to 40 L 100% FiO2 to keep her sat at 90.  He has been as low as 70% FiO2 yesterday.  No new complaints.  No chest pain.  Still eating well.  No significant fevers charted for last 24 hours.    Review of Systems   All pertinent negatives and positives are as above. All other systems have been reviewed and are negative unless otherwise stated.     Objective    Temp:  [96.9 °F (36.1 °C)-97.9 °F (36.6 °C)] 97.9 °F (36.6 °C)  Heart Rate:  [68-97] 84  Resp:  [15-26] 24  BP: ()/(46-85) 118/61  Physical Exam  GEN: Awake, alert, interactive, no acute distress  HEENT: PERRLA, EOMI, Anicteric, Trachea midline  Lungs:  No wheezing or rales.  Good effort.   Heart: RRR, +S1/s2, no rub  ABD: soft, nt/nd, +BS, no guarding/rebound  Extremities: atraumatic, no cyanosis  Skin: no rashes or petechiae  Neuro: AAOx3, no focal deficits  Psych: normal mood & affect      Results Review:  I have reviewed the labs, radiology results, and diagnostic studies.    Laboratory Data:   Results from last 7 days   Lab Units 07/13/22  0514 07/12/22 0327 07/11/22  0312   WBC 10*3/mm3 20.01* 19.06* 17.30*   HEMOGLOBIN g/dL 10.5* 10.3* 10.3*   HEMATOCRIT % 34.4* 32.4* 33.7*   PLATELETS 10*3/mm3 329 336 368        Results from last 7 days   Lab Units 07/13/22  0514 07/12/22  0327 07/11/22  0312 07/10/22  0223   SODIUM mmol/L 143 145 143 141   POTASSIUM mmol/L 3.5 3.7 3.9 3.6   CHLORIDE mmol/L 103 101 102 104   CO2 mmol/L 35.0* 37.0* 34.0* 31.0*   BUN mg/dL 30* 28* 23 20   CREATININE mg/dL 0.70* 0.79 0.83 0.78   CALCIUM  mg/dL 8.9 9.1 9.0 8.6   BILIRUBIN mg/dL  --   --   --  0.3   ALK PHOS U/L  --   --   --  76   ALT (SGPT) U/L  --   --   --  38   AST (SGOT) U/L  --   --   --  30   GLUCOSE mg/dL 109* 127* 113* 114*       Culture Data:   Blood Culture   Date Value Ref Range Status   06/26/2022 No growth at 5 days  Final   06/26/2022 No growth at 5 days  Final       Radiology Data:   Imaging Results (Last 24 Hours)     ** No results found for the last 24 hours. **          I have reviewed the patient's current medications.     Assessment/Plan     Active Hospital Problems    Diagnosis    • Acute respiratory failure with hypoxia (HCC)    • Pulmonary embolism associated with COVID-19 (HCC)    • Bilateral carotid artery disease (HCC)    • Hyperlipidemia    • Benign prostatic hyperplasia without lower urinary tract symptoms    • Essential hypertension        Plan:  #1 acute respiratory failure with hypoxia -difficult case.  Likely multifactorial.  Recent COVID and PE.  Presented with concern for cryptogenic organizing pneumonia.  Was on steroids and antibiotics.  Initially was getting better and oxygen was weaning to 4 L.  He however worsened again over 7/8 through 7/10.  Ended up in the ICU Sunday and when I saw him on 7/11 he was on max oxygen with low sats.  We gave him an extra high-dose slug of steroid at 125 and he did better over the next 24 hours with increasing O2 needs.  He is now slowly been weaning back up overnight however and again on humps and FiO2 this morning.  Discussed with pulmonary.  We will go back to higher dose steroids and initiate Solu-Medrol 40mg iv 3 times daily.  CRP is still high and he seems to be responsive to steroids.  Also on cefepime due to elevated fever 2 days ago.  Blood cultures negative.  Pulmonary and ID following.    #2 pulmonary embolism -recent diagnosis.  Currently on Lovenox    #3 COVID-19 -now negative.  Posttreatment.  Potentially have secondary organizing pneumonia or even fibrosis  post-COVID.    #4 hypertension -BP stable.  Monitor.    #5 hyperlipidemia -statin    #6 BPH -Proscar    Discharge Planning: Ongoing.  Continue in the ICU.    Electronically signed by Steven Ross DO, 07/13/22, 07:54 CDT.

## 2022-07-13 NOTE — THERAPY PROGRESS REPORT/RE-CERT
Patient Name: Adrián Graham  : 1940    MRN: [id number]                              Today's Date: 2022       Admit Date: 2022    Visit Dx:     ICD-10-CM ICD-9-CM   1. Hypoxemia  R09.02 799.02   2. Orthostatic hypotension  I95.1 458.0   3. Pneumonia due to COVID-19 virus  U07.1 480.8    J12.82 079.89   4. Multiple subsegmental pulmonary emboli without acute cor pulmonale (HCC)  I26.94 415.19   5. Impaired mobility  Z74.09 799.89   6. Decreased activities of daily living (ADL)  Z78.9 V49.89     Patient Active Problem List   Diagnosis   • Idiopathic peripheral neuropathy   • Essential hypertension   • Benign prostatic hyperplasia without lower urinary tract symptoms   • Overweight (BMI 25.0-29.9)   • Carpal tunnel syndrome, left   • Bilateral carotid artery disease (HCC)   • Hyperlipidemia   • Preop cardiovascular exam CE[facility] Dr Brown   • Single subsegmental pulmonary embolism without acute cor pulmonale (HCC)   • Pulmonary embolism associated with COVID-19 (HCC)   • Acute respiratory failure with hypoxia (HCC)   • Moderate malnutrition (HCC)     Past Medical History:   Diagnosis Date   • Abdominal distention    • Abdominal pain, left upper quadrant    • Allergic rhinitis    • Arthritis    • Bloating    • BPH (benign prostatic hyperplasia)    • Carpal tunnel syndrome    • Change in bowel habits    • Constipation    • Diarrhea    • Erectile dysfunction    • GERD (gastroesophageal reflux disease)    • History of shingles    • Hypertension    • Idiopathic peripheral neuropathy    • Neuropathy    • Pneumonia    • Shingles    • Weight loss      Past Surgical History:   Procedure Laterality Date   • APPENDECTOMY     • BACK SURGERY     • CARPAL TUNNEL RELEASE Right    • CARPAL TUNNEL RELEASE Left 2022   • CHOLECYSTECTOMY     • COLONOSCOPY  2010    5 yr-complete colon not visualized   • COLONOSCOPY  2004    extremely tortuous redundant colon. BE-mild diverticulosis without diverticulitis. ?  Gallstones-Dr Chery.   • COLONOSCOPY N/A 09/10/2020    Procedure: COLONOSCOPY WITH ANESTHESIA;  Surgeon: Mani Sy DO;  Location:  [facility];  Service: Gastroenterology;  Laterality: N/A;  Pre: Positive Colorectal Screening  Post: Diverticulosis  Dr. Mooney  CO2 Inflation Used   • ENDOSCOPY N/A 09/26/2016    Procedure: ESOPHAGOGASTRODUODENOSCOPY WITH ANESTHESIA;  Surgeon: Mani Sy DO;  Location:  [facility];  Service:    • HIP SURGERY Right    • JOINT REPLACEMENT     • KNEE SURGERY     • OTHER SURGICAL HISTORY      Surgery for Spinal Stenosis   • OTHER SURGICAL HISTORY      Laser Prostate Surgery      General Information     Row Name 07/13/22 1300          OT Time and Intention    Document Type progress note/recertification  -JJ (r) AD (t) JJ (c)     Mode of Treatment occupational therapy  -JJ (r) AD (t) JJ (c)     Row Name 07/13/22 1300          General Information    Patient Profile Reviewed yes  -JJ (r) AD (t) JJ (c)     Prior Level of Function --  No changes in PLOF or history since eval  -JJ (r) AD (t) JJ (c)     Existing Precautions/Restrictions fall;oxygen therapy device and L/min  -JJ (r) AD (t) JJ (c)     Barriers to Rehab medically complex  -JJ (r) AD (t) JJ (c)     Row Name 07/13/22 1300          Cognition    Orientation Status (Cognition) oriented x 4  -JJ (r) AD (t) JJ (c)     Row Name 07/13/22 1300          Safety Issues, Functional Mobility    Impairments Affecting Function (Mobility) endurance/activity tolerance;shortness of breath  -JJ (r) AD (t) JJ (c)           User Key  (r) = Recorded By, (t) = Taken By, (c) = Cosigned By    Initials Name Provider Type    Karyn Reynolds, OTR/L, CSRS Occupational Therapist    Carolyn Franco, OT Student OT Student                 Mobility/ADL's     Row Name 07/13/22 1300          Bed Mobility    Bed Mobility rolling right;scooting/bridging;supine-sit;sit-supine  -JJ (r) AD (t) JJ (c)     Rolling Right [place] (Bed  Mobility) minimum assist (75% patient effort);verbal cues  -JJ (r) AD (t) JJ (c)     Scooting/Bridging [place] (Bed Mobility) moderate assist (50% patient effort);2 person assist;verbal cues  -JJ (r) AD (t) JJ (c)     Supine-Sit [place] (Bed Mobility) minimum assist (75% patient effort);verbal cues  -JJ (r) AD (t) JJ (c)     Sit-Supine [place] (Bed Mobility) minimum assist (75% patient effort);verbal cues  -JJ (r) AD (t) JJ (c)     Assistive Device (Bed Mobility) bed rails;head of bed elevated;draw sheet  -JJ (r) AD (t) JJ (c)     Comment, (Bed Mobility) Pt fatigued following sitting EOB, required increased assist with scooting this date.  -JJ (r) AD (t) JJ (c)     Row Name 07/13/22 1300          Transfers    Comment, (Transfers) not assessed d/t desat to 82 with EOB sitting  -JJ (r) AD (t) JJ (c)     Row Name 07/13/22 1300          Functional Mobility    Functional Mobility- Comment Not assessed d/t desat to 82% with supine-sit  -JJ (r) AD (t) JJ (c)     Row Name 07/13/22 1300          Activities of Daily Living    BADL Assessment/Intervention grooming;lower body dressing  -JJ (r) AD (t) JJ (c)     Row Name 07/13/22 1300          Grooming Assessment/Training    [place] Level (Grooming) hair care, combing/brushing;wash face, hands;set up  -JJ (r) AD (t) JJ (c)     Row Name 07/13/22 1300          Lower Body Dressing Assessment/Training    [place] Level (Lower Body Dressing) socks;maximum assist (25% patient effort)  -JJ (r) AD (t) JJ (c)     Position (Lower Body Dressing) edge of bed sitting;supported sitting  -JJ (r) AD (t) JJ (c)     Comment, (Lower Body Dressing) Pt unable to adjust socks sitting EOB, unable to catch breath, felt he could not bend down  -JJ (r) AD (t) JJ (c)           User Key  (r) = Recorded By, (t) = Taken By, (c) = Cosigned By    Initials Name Provider Type    Karyn Reynolds, OTR/L, CSRS Occupational Therapist    Carolyn Franco, OT Student OT Student                Obj/Interventions     [facility] Name 07/13/22 1402          Sensory Assessment (Somatosensory)    Sensory Assessment (Somatosensory) left UE  -JJ (r) AD (t) JJ (c)     Sensory Assessment N/T L hand at median nerve distribution d/t recent carpal tunnel release  -JJ (r) AD (t) JJ (c)     Row Name 07/13/22 1402          Vision Assessment/Intervention    Visual Impairment/Limitations corrective lenses for reading  -JJ (r) AD (t) JJ (c)     Row Name 07/13/22 1402          Range of Motion Comprehensive    General Range of Motion upper extremity range of motion deficits identified  -JJ (r) AD (t) JJ (c)     Comment, General Range of Motion RUE WFL; L shoulder ~50% impaired, elbow WFL, hand ~25% impaired (unable to squeeze 2 fingers with MMT)  -JJ (r) AD (t) JJ (c)     Row Name 07/13/22 1402          Strength Comprehensive (MMT)    Comment, General Manual Muscle Testing (MMT) Assessment BUE grossly 4/5  -JJ (r) AD (t) JJ (c)     Row Name 07/13/22 1402          Balance    Balance Assessment sitting static balance;sitting dynamic balance  -JJ (r) AD (t) JJ (c)     Static Sitting Balance contact guard  -JJ (r) AD (t) JJ (c)     Dynamic Sitting Balance contact guard  -JJ (r) AD (t) JJ (c)           User Key  (r) = Recorded By, (t) = Taken By, (c) = Cosigned By    Initials Name Provider Type    Karyn Reynolds, OTR/L, CSRS Occupational Therapist    Carolyn Franco, OT Student OT Student               Goals/Plan     Row Name 07/13/22 1414          Transfer Goal 1 (OT)    Activity/Assistive Device (Transfer Goal 1, OT) sit-to-stand/stand-to-sit;commode, bedside without drop arms  -JJ (r) AD (t) JJ (c)     [place] Level/Cues Needed (Transfer Goal 1, OT) minimum assist (75% or more patient effort);moderate assist (50-74% patient effort)  -JJ (r) AD (t) JJ (c)     Time Frame (Transfer Goal 1, OT) long term goal (LTG);by discharge  -JJ (r) AD (t) CHERRI (c)     Strategies/Barriers (Transfers Goal 1, OT) O2 >90  -JJ (r) AD (t)  JJ (c)     Progress/Outcome (Transfer Goal 1, OT) goal ongoing;new goal  -JJ (r) AD (t) JJ (c)     Row Name 07/13/22 1414          Bathing Goal 1 (OT)    Activity/Device (Bathing Goal 1, OT) lower body bathing  -JJ (r) AD (t) JJ (c)     [place] Level/Cues Needed (Bathing Goal 1, OT) verbal cues required;minimum assist (75% or more patient effort)  -JJ (r) AD (t) JJ (c)     Time Frame (Bathing Goal 1, OT) long term goal (LTG);by discharge  -JJ (r) AD (t) JJ (c)     Strategies/Barriers (Bathing Goal 1, OT) Sitting EOB, O2 >90  -JJ (r) AD (t) JJ (c)     Progress/Outcomes (Bathing Goal 1, OT) goal revised this date  -JJ (r) AD (t) JJ (c)     Row Name 07/13/22 1414          Dressing Goal 1 (OT)    Activity/Device (Dressing Goal 1, OT) lower body dressing  -JJ (r) AD (t) JJ (c)     [place]/Cues Needed (Dressing Goal 1, OT) minimum assist (75% or more patient effort)  -JJ (r) AD (t) JJ (c)     Time Frame (Dressing Goal 1, OT) long term goal (LTG);by discharge  -JJ (r) AD (t) JJ (c)     Strategies/Barriers (Dressing Goal 1, OT) sitting EOB, O2 >90  -JJ (r) AD (t) JJ (c)     Progress/Outcome (Dressing Goal 1, OT) goal revised this date  -JJ (r) AD (t) JJ (c)     Row Name 07/13/22 1414          Toileting Goal 1 (OT)    Activity/Device (Toileting Goal 1, OT) toileting skills, all  -JJ (r) AD (t) JJ (c)     [place] Level/Cues Needed (Toileting Goal 1, OT) minimum assist (75% or more patient effort)  -JJ (r) AD (t) JJ (c)     Time Frame (Toileting Goal 1, OT) long term goal (LTG);by discharge  -JJ (r) MAT (t) CHERRI (c)     Progress/Outcome (Toileting Goal 1, OT) goal revised this date  -CHERRI RIVERO (r) (selene) CHERRI (melissa)     Row Name 07/13/22 1414          Problem Specific Goal 1 (OT)    Problem Specific Goal 1 (OT) Pt will sit EOB to complete 5 in grooming tasks with O2 sats >90%  -CHERRI (shasta) MAT (t) CHERRI (c)     Time Frame (Problem Specific Goal 1, OT) long term goal (LTG);by discharge  -CHERRI (shasta) MAT (t) CHERRI (c)     Progress/Outcome  (Problem Specific Goal 1, OT) new goal;goal ongoing  -JJ (r) AD (t) JJ (c)     Row Name 07/13/22 1414          Therapy Assessment/Plan (OT)    Planned Therapy Interventions (OT) activity tolerance training;BADL retraining;functional balance retraining;occupation/activity based interventions;patient/caregiver education/training;strengthening exercise;transfer/mobility retraining  -JJ (r) AD (t) JJ (c)           User Key  (r) = Recorded By, (t) = Taken By, (c) = Cosigned By    Initials Name Provider Type    Karyn Reynolds, OTR/L, CSRS Occupational Therapist    aCrolyn Franco, OT Student OT Student               Clinical Impression     Row Name 07/13/22 1300          Pain Assessment    Pretreatment Pain Rating 4/10  -JJ (r) AD (t) JJ (c)     Posttreatment Pain Rating 4/10  -JJ (r) AD (t) JJ (c)     Pain Location - Side/Orientation Right  -JJ (r) AD (t) JJ (c)     Pain Location - hip  -JJ (r) AD (t) JJ (c)     Pain Intervention(s) Rest;Repositioned  -JJ (r) AD (t) JJ (c)     Row Name 07/13/22 1300          Plan of Care Review    Plan of Care Reviewed With patient  -JJ (r) AD (t) JJ (c)     Outcome Evaluation OT progress note complete. Pt admitted to CCU on 7/9 d/t increasing SOA. Pt now on vapotherm. Pt O2 at 96% at arrival. Pt min A with bed mobility, with desat to 82% with supine to sit. Pt walked through PLB and good posture ~2 min, however O2 improved only to 85%. Pt reported he did not feel well and was unable to adjust socks or perform ROM/MMT sitting EOB. Pt returned to supine with min A and required extensive RB and O2 return to 90. ROM and MMT completed sitting supported in the bed. Pt RUE WFL, with some limitation in LUE d/t previous RTC tear. No changes in ROM and strenght since eval, however endurance is significantly decreased. Pt unable to perform donning/doffing socks, and becomes SOA with bed mobility or sitting task. Pt will benefit from continued skilled OT intervention to address activity  tolerance and SOA to improve I and safety with ADLs. Recommended d/c SNF.  -JJ (r) AD (t) JJ (c)     Row Name 07/13/22 1300          Therapy Assessment/Plan (OT)    Rehab Potential (OT) good, to achieve stated therapy goals  -JJ (r) AD (t) JJ (c)     Criteria for Skilled Therapeutic Interventions Met (OT) yes;skilled treatment is necessary  -JJ (r) AD (t) JJ (c)     Therapy Frequency (OT) 5 times/wk  -JJ (r) AD (t) JJ (c)     Predicted Duration of Therapy Intervention (OT) 10 days  -JJ (r) AD (t) JJ (c)     Row Name 07/13/22 1300          Therapy Plan Review/Discharge Plan (OT)    Anticipated Discharge Disposition (OT) skilled nursing facility  -JJ (r) AD (t) JJ (c)     Row Name 07/13/22 1300          Vital Signs    Pre SpO2 (%) 95  -JJ (r) AD (t) JJ (c)     O2 Delivery Pre Treatment other (see comments)  40L 80%  -JJ (r) AD (t) JJ (c)     Intra SpO2 (%) 82  -JJ (r) AD (t) JJ (c)     Post SpO2 (%) 91  Pt remained on 40L 80% throughout session, returned to 91 sats with extensive RB  -JJ (r) AD (t) JJ (c)     O2 Delivery Post Treatment other (see comments)  vapotherm 40L 80%  -JJ (r) AD (t) JJ (c)     Pre Patient Position Supine  -JJ (r) AD (t) JJ (c)     Intra Patient Position Sitting  -JJ (r) AD (t) JJ (c)     Post Patient Position Supine  -JJ (r) AD (t) JJ (c)     Row Name 07/13/22 1300          Positioning and Restraints    Pre-Treatment Position in bed  -JJ (r) AD (t) JJ (c)     Post Treatment Position bed  -JJ (r) AD (t) JJ (c)     In Bed fowlers;call light within reach;encouraged to call for assist;legs elevated;RUE elevated;LUE elevated  -JJ (r) AD (t) JJ (c)           User Key  (r) = Recorded By, (t) = Taken By, (c) = Cosigned By    Initials Name Provider Type    Karyn Reynolds, OTR/L, CSRS Occupational Therapist    Carolyn Franco, OT Student OT Student               Outcome Measures     Row Name 07/13/22 1300          How much help from another is currently needed...    Putting on and taking off  regular lower body clothing? 2  -JJ (r) AD (t) JJ (c)     Bathing (including washing, rinsing, and drying) 2  -JJ (r) AD (t) JJ (c)     Toileting (which includes using toilet bed pan or urinal) 3  -JJ (r) AD (t) JJ (c)     Putting on and taking off regular upper body clothing 3  -JJ (r) AD (t) JJ (c)     Taking care of personal grooming (such as brushing teeth) 3  -JJ (r) AD (t) JJ (c)     Eating meals 4  -JJ (r) AD (t) JJ (c)     AM-PAC 6 Clicks Score (OT) 17  -JJ (r) AD (t)     Row Name 07/13/22 1117          How much help from another person do you currently need...    Turning from your back to your side while in flat bed without using bedrails? 3  -WK     Moving from lying on back to sitting on the side of a flat bed without bedrails? 3  -WK     Moving to and from a bed to a chair (including a wheelchair)? 3  -WK     Standing up from a chair using your arms (e.g., wheelchair, bedside chair)? 3  -WK     Climbing 3-5 steps with a railing? 2  -WK     To walk in hospital room? 2  -WK     AM-PAC 6 Clicks Score (PT) 16  -WK     Highest level of mobility 5 --> Static standing  -WK     Row Name 07/13/22 1117          Functional Assessment    Outcome Measure Options AM-PAC 6 Clicks Basic Mobility (PT)  -WK           User Key  (r) = Recorded By, (t) = Taken By, (c) = Cosigned By    Initials Name Provider Type    WK Beth Cazares PTA Physical Therapist Assistant    Karyn Reynolds, OTR/L, CSRS Occupational Therapist    Carolyn Franco OT Student OT Student                Occupational Therapy Education                 Title: PT OT SLP Therapies (Done)     Topic: Occupational Therapy (Done)     Point: ADL training (Done)     Description:   Instruct learner(s) on proper safety adaptation and remediation techniques during self care or transfers.   Instruct in proper use of assistive devices.              Learning Progress Summary           Patient Acceptance, E, VU by AD at 7/13/2022 1418    Acceptance, E, VU by  AD at 7/12/2022 1545    Acceptance, E,TB, VU,NR by SF at 7/10/2022 2144    Acceptance, E,TB, VU,NR by SF at 7/10/2022 0130    Acceptance, E,D, VU by  at 7/5/2022 1314    Acceptance, E,D, VU,NR by  at 7/4/2022 1241                   Point: Home exercise program (Done)     Description:   Instruct learner(s) on appropriate technique for monitoring, assisting and/or progressing therapeutic exercises/activities.              Learning Progress Summary           Patient Acceptance, E, VU by AD at 7/13/2022 1418    Acceptance, E, VU by AD at 7/12/2022 1545    Acceptance, E,TB, VU,NR by SF at 7/10/2022 2144    Acceptance, E,TB, VU,NR by  at 7/10/2022 0130    Acceptance, E,D, VU by ERVIN at 7/5/2022 1314                   Point: Precautions (Done)     Description:   Instruct learner(s) on prescribed precautions during self-care and functional transfers.              Learning Progress Summary           Patient Acceptance, E, VU by AD at 7/13/2022 1418    Acceptance, E, VU by AD at 7/12/2022 1545    Acceptance, E,TB, VU,NR by SF at 7/10/2022 2144    Acceptance, E,TB, VU,NR by  at 7/10/2022 0130    Acceptance, E,D, VU by  at 7/5/2022 1314    Acceptance, E,D, VU,NR by  at 7/4/2022 1241                   Point: Body mechanics (Done)     Description:   Instruct learner(s) on proper positioning and spine alignment during self-care, functional mobility activities and/or exercises.              Learning Progress Summary           Patient Acceptance, E, VU by AD at 7/13/2022 1418    Acceptance, E, VU by AD at 7/12/2022 1545    Acceptance, E,TB, VU,NR by SF at 7/10/2022 2144    Acceptance, E,TB, VU,NR by  at 7/10/2022 0130    Acceptance, E,D, VU by  at 7/5/2022 1314    Acceptance, E,D, VU,NR by  at 7/4/2022 1241                               User Key     Initials Effective Dates Name Provider Type Discipline     06/16/21 -  Niru Zuniga, OTR/L Occupational Therapist OT    SF 06/16/21 -  Ganesh Gunter, RN  Registered Nurse Nurse     08/28/18 -  Thuy Ohara, OTR/L Occupational Therapist OT    AD 05/04/22 -  Carolyn Sandoval OT Student OT Student OT              OT Recommendation and Plan  Planned Therapy Interventions (OT): activity tolerance training, BADL retraining, functional balance retraining, occupation/activity based interventions, patient/caregiver education/training, strengthening exercise, transfer/mobility retraining  Therapy Frequency (OT): 5 times/wk  Plan of Care Review  Plan of Care Reviewed With: patient  Outcome Evaluation: OT progress note complete. Pt admitted to CCU on 7/9 d/t increasing SOA. Pt now on vapotherm. Pt O2 at 96% at arrival. Pt min A with bed mobility, with desat to 82% with supine to sit. Pt walked through PLB and good posture ~2 min, however O2 improved only to 85%. Pt reported he did not feel well and was unable to adjust socks or perform ROM/MMT sitting EOB. Pt returned to supine with min A and required extensive RB and O2 return to 90. ROM and MMT completed sitting supported in the bed. Pt RUE WFL, with some limitation in LUE d/t previous RTC tear. No changes in ROM and strenght since eval, however endurance is significantly decreased. Pt unable to perform donning/doffing socks, and becomes SOA with bed mobility or sitting task. Pt will benefit from continued skilled OT intervention to address activity tolerance and SOA to improve I and safety with ADLs. Recommended d/c SNF.     Time Calculation:    Time Calculation- OT     Row Name 07/13/22 1300             Time Calculation- OT    OT Start Time 1300  +10 min CR and previous attempt  -JJ (r) AD (t) JJ (c)      OT Stop Time 1330  -JJ (r) AD (t) JJ (c)      OT Time Calculation (min) 30 min  -JJ (r) AD (t)      Total Timed Code Minutes- OT 40 minute(s)  -JJ (r) AD (t) JJ (c)      OT Received On 07/13/22  -JJ (r) AD (t) JJ (c)      OT Goal Re-Cert Due Date 07/23/22  -JJ (r) AD (t) JJ (c)              Timed Charges    16691 - OT  Self Care/Mgmt Minutes 10  -JJ (r) AD (t) JJ (c)              Total Minutes    Timed Charges Total Minutes 10  -JJ (r) AD (t)       Total Minutes 10  -JJ (r) AD (t)            User Key  (r) = Recorded By, (t) = Taken By, (c) = Cosigned By    Initials Name Provider Type    Karyn Reynolds, PAULR/L, CSRS Occupational Therapist    Carolyn Franco, OT Student OT Student                       Carolyn Sandoval OT Student  7/13/2022

## 2022-07-13 NOTE — PLAN OF CARE
Problem: Adult Inpatient Plan of Care  Goal: Plan of Care Review  Outcome: Ongoing, Progressing  Flowsheets (Taken 7/13/2022 1117)  Plan of Care Reviewed With: patient  Outcome Evaluation: Nsg increased vapotherm prior to pt performing activity. Pt performed bed mobility with min A and increased time to recover o2. Nsg increased vapotherm a 2nd time to 100% o2. Pt desat to 83% after sitting rest from standing Nathen. Pt performed 4 sit to stands. Pt requires cues for posture and breathing while sitting EOB to recover o2 back to 90%. Pt sat EOB with SBA. Pt was able to side step up HOB with CGA.

## 2022-07-13 NOTE — PLAN OF CARE
Goal Outcome Evaluation:  Plan of Care Reviewed With: patient           Outcome Evaluation: OT progress note complete. Pt admitted to CCU on 7/9 d/t increasing SOA. Pt now on vapotherm. Pt O2 at 96% at arrival. Pt min A with bed mobility, with desat to 82% with supine to sit. Pt walked through PLB and good posture ~2 min, however O2 improved only to 85%. Pt reported he did not feel well and was unable to adjust socks or perform ROM/MMT sitting EOB. Pt returned to supine with min A and required extensive RB and O2 return to 90. ROM and MMT completed sitting supported in the bed. Pt RUE WFL, with some limitation in LUE d/t previous RTC tear. No changes in ROM and strenght since eval, however endurance is significantly decreased. Pt unable to perform donning/doffing socks, and becomes SOA with bed mobility or sitting task. Pt will benefit from continued skilled OT intervention to address activity tolerance and SOA to improve I and safety with ADLs. Recommended d/c SNF.

## 2022-07-13 NOTE — PLAN OF CARE
Goal Outcome Evaluation:  Plan of Care Reviewed With: patient, daughter        Progress: improving       Pt rested on BiPAP for 5 hours with decrease in FiO2 to 60%.  Currently on vapotherm 40L and 70%.  Coughing up small amount of clear secretions.  Reports pain in chest with coughing.  Good UOP.

## 2022-07-13 NOTE — PROGRESS NOTES
PULMONARY AND CRITICAL CARE PROGRESS NOTE - [facility]    Patient: Adrián Graham  1940   MR# [id number]   Acct# [id number]  07/13/22   07:05 CDT  Referring Provider: Steven Ross DO    Chief Complaint: Covid-19 -with worsening hypoxemic respiratory failure    Interval history: The patient is resting in bed on vapotherm 40L/0.9FiO2.  O2 sat 87%-90%. He tells me that he does not like the BiPAP, but did wear it overnight.  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  He is waiting for his breakfast tray.  No other aggravating or alleviating factors.  No overnight events reported.     Meds:  ascorbic acid, 500 mg, Oral, Daily  aspirin, 81 mg, Oral, Daily  atorvastatin, 20 mg, Oral, Daily  busPIRone, 10 mg, Oral, TID  cefepime, 2 g, Intravenous, Q8H  cetirizine, 10 mg, Oral, Daily  diphenhydrAMINE, 25 mg, Oral, Nightly  enoxaparin, 1 mg/kg, Subcutaneous, Q12H  famotidine, 20 mg, Oral, BID AC  finasteride, 5 mg, Oral, Daily  fluticasone, 2 spray, Nasal, Daily  furosemide, 20 mg, Oral, BID  gabapentin, 400 mg, Oral, Q12H  guaiFENesin, 1,200 mg, Oral, BID  ipratropium-albuterol, 3 mL, Nebulization, 4x Daily - RT  montelukast, 10 mg, Oral, Nightly  predniSONE, 40 mg, Oral, Daily With Breakfast  sodium chloride, 250 mL, Intravenous, Once  sodium chloride, 10 mL, Intravenous, Q12H  vitamin D3, 5,000 Units, Oral, Daily  zinc sulfate, 220 mg, Oral, Daily         Review of Systems:   Review of Systems   Constitutional: Positive for fatigue. Negative for fever.   HENT: Positive for congestion.    Respiratory: Positive for cough.    Gastrointestinal: Negative for diarrhea and nausea.   Musculoskeletal: Positive for arthralgias.   Neurological: Positive for weakness.      Physical Exam:  SpO2 Percentage    07/13/22 0500 07/13/22 0600 07/13/22 0639   SpO2: 100% 99% 94%     Temp:  [96.9 °F (36.1 °C)-97.9 °F (36.6 °C)] 97.9 °F (36.6 °C)  Heart Rate:  [68-97] 88  Resp:  [15-26] 24  BP:  ()/(46-85) 118/61    Intake/Output Summary (Last 24 hours) at 7/13/2022 0705  Last data filed at 7/13/2022 0636  Gross per 24 hour   Intake 1168 ml   Output 800 ml   Net 368 ml     Body mass index is 24.43 kg/m².   GENERAL/CONSTITUTIONAL: no distress.  Vapotherm 40 L flow and 0.9 FiO2.  HEENT: atraumatic, normocephalic.   NOSE: normal  NECK: jugular veins nondistended  CHEST: no paradox, no retractions.  No respiratory distress.  Diminished breath sounds.    ABDOMEN: nondistended  : deferred  EXTREMITIES: no edema.  NEURO:  Awake and alert   PSYCH: no agitation  SKIN: no jaundice.  No rash    Electronically signed by MEKHI Calvert, 7/13/2022, 07:05 CDT        Physician Substantive Portion: Medical Decision Making    Laboratory Data:  Results from last 7 days   Lab Units 07/13/22  0514 07/12/22  0327 07/11/22  0312   WBC 10*3/mm3 20.01* 19.06* 17.30*   HEMOGLOBIN g/dL 10.5* 10.3* 10.3*   PLATELETS 10*3/mm3 329 336 368     Results from last 7 days   Lab Units 07/13/22  0514 07/12/22  0327 07/11/22  0312   SODIUM mmol/L 143 145 143   POTASSIUM mmol/L 3.5 3.7 3.9   BUN mg/dL 30* 28* 23   CREATININE mg/dL 0.70* 0.79 0.83     Results from last 7 days   Lab Units 07/11/22  1056 07/10/22  0416 07/09/22  0933   PH, ARTERIAL pH units 7.499* 7.506* 7.476*   PCO2, ARTERIAL mm Hg 42.8 42.9 34.6*   PO2 ART mm Hg 88.1 69.8* 47.0*   FIO2 % 90 70 100     Blood Culture   Date Value Ref Range Status   06/26/2022 No growth at 5 days  Final   06/26/2022 No growth at 5 days  Final     Results from last 7 days   Lab Units 07/11/22  1226 07/11/22  0312 07/10/22  0223 07/07/22  0941   CRP mg/dL  --  16.21* 18.76* 8.16*   PROCALCITONIN ng/mL 0.77*  --   --   --    FERRITIN ng/mL  --  1,112.00*  --  1,243.00*      Recent films:  XR Chest 1 View    Result Date: 7/11/2022  EXAM: XR CHEST 1 VW- 7/11/2022 10:54 AM CDT  HISTORY: f/u xray, hypoxia; R09.02-Hypoxemia; I95.1-Orthostatic hypotension; U07.1-COVID-19;  J12.82-Pneumonia due to coronavirus disease 2019; I26.94-Multiple subsegmental pulmonary emboli without acute cor pulmonale; Z74.09-Other reduced mobility; Z78.9-Other specified health status   COMPARISON: July 9, 2022.  TECHNIQUE: Frontal radiograph of the chest  FINDINGS: Bilateral interstitial and airspace filling opacities are noted. This is been described as pneumonia and is stable and unchanged from July 9, 2022.. Cardiac silhouette is normal..  The osseous structures and surrounding soft tissues demonstrate no acute abnormality.       Impression: 1. Persistent bilateral interstitial and airspace filling opacities most consistent with pneumonia. This is similar to July 9, 2022.   This report was finalized on 07/11/2022 11:12 by Dr. Don Delacruz MD.     Physician Substantive Portion: Medical Decision Making    Results from last 7 days   Lab Units 07/13/22  0514 07/12/22  0327 07/11/22  0312   WBC 10*3/mm3 20.01* 19.06* 17.30*   HEMOGLOBIN g/dL 10.5* 10.3* 10.3*   PLATELETS 10*3/mm3 329 336 368     Results from last 7 days   Lab Units 07/13/22  0514 07/12/22  0327 07/11/22  0312   SODIUM mmol/L 143 145 143   POTASSIUM mmol/L 3.5 3.7 3.9   CO2 mmol/L 35.0* 37.0* 34.0*   BUN mg/dL 30* 28* 23   CREATININE mg/dL 0.70* 0.79 0.83   MAGNESIUM mg/dL 2.3 2.3  --    PHOSPHORUS mg/dL 3.2  --   --    GLUCOSE mg/dL 109* 127* 113*     Results from last 7 days   Lab Units 07/11/22  1056 07/10/22  0416 07/09/22  0933   PH, ARTERIAL pH units 7.499* 7.506* 7.476*   PCO2, ARTERIAL mm Hg 42.8 42.9 34.6*   PO2 ART mm Hg 88.1 69.8* 47.0*   FIO2 % 90 70 100     Lab Results   Component Value Date    PROBNP 1,299.0 07/01/2022     Blood Culture   Date Value Ref Range Status   06/26/2022 No growth at 5 days  Final   06/26/2022 No growth at 5 days  Final       Recent radiology:   Imaging Results (Last 72 Hours)     Procedure Component Value Units Date/Time    XR Chest 1 View [[id number]] Collected: 07/11/22 1111     Updated: 07/11/22 1115     Narrative:      EXAM: XR CHEST 1 VW- 7/11/2022 10:54 AM CDT     HISTORY: f/u xray, hypoxia; R09.02-Hypoxemia; I95.1-Orthostatic  hypotension; U07.1-COVID-19; J12.82-Pneumonia due to coronavirus disease  2019; I26.94-Multiple subsegmental pulmonary emboli without acute cor  pulmonale; Z74.09-Other reduced mobility; Z78.9-Other specified health  status       COMPARISON: July 9, 2022.     TECHNIQUE: Frontal radiograph of the chest     FINDINGS:   Bilateral interstitial and airspace filling opacities are noted. This is  been described as pneumonia and is stable and unchanged from July 9, 2022.. Cardiac silhouette is normal..      The osseous structures and surrounding soft tissues demonstrate no acute  abnormality.          Impression:      1. Persistent bilateral interstitial and airspace filling opacities most  consistent with pneumonia. This is similar to July 9, 2022.        This report was finalized on 07/11/2022 11:12 by Dr. Don Delacruz MD.        My radiograph interpretation/independent review of imaging: infiltrate L>R  Other test results (not lab or imaging): Results for orders placed during the hospital encounter of 07/01/22    Adult Transthoracic Echo Limited W/ Cont if Necessary Per Protocol    Interpretation Summary  · This is a limited echocardiogram.  · Left ventricular systolic function is normal. Left ventricular ejection fraction appears to be 61 - 65%.  · The right ventricular cavity is mildly dilated. Normal right ventricular systolic function noted.      My radiograph interpretation/independent review of imaging: Reviewed and agree with current interpretation    Pulmonary Assessment:    1. Acute hypoxic respiratory failure, currently on Vapotherm  2. Bilateral groundglass pulmonary infiltrate  3. Worsening COVID-19 pneumonia  4. Possible acute lung injury or cryptogenic organizing pneumonia  5. Pulmonary embolism single subsegmental on anticoagulation  6. Essential  hypertension  7. Hyperlipidemia  8. Carotid artery disease  9. Recurrent bronchitis  10. S/p COVID vaccinated status  11. Severe weakness and deconditioning  12. History of fall    Recommend/plan:   · Patient is tired and sleepy this morning.  Respiratory status stable.    · Currently on Vapotherm 40 L flow and 80 % FiO2.  · He is using BiPAP from time to time but does not like it.  · Talked to Dr. Ross.  He is concerned about his respiratory status.    · We decided to switch the oral prednisone to IV steroid bolus with 80 mg of prednisone and 40 mg 3 times daily for now and later will taper it down as tolerated.  · He probably has cryptogenic organizing pneumonia in addition to COVID pneumonia but the last chest CT showed some improvement.  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  He may need longer time to recover and will need LTAC referral  · Dr. Ross wanted to stabilize the patient before LTAC referral.  · Continue Vapotherm and titrate to keep oxygen more than 92%.  Use BiPAP as needed.  · Continue physical activity as tolerated.  · Bronchodilator treatment routine respiratory care and pulmonary toilet.  · Encourage incentive spirometry.  Nutritional support.  · ID following patient is currently on cefepime Cefepime.  Change antibiotic per culture result.  · Repeat labs and imaging studies from time to time.  He is out of isolation.  Continue adjunct treatment for COVID-19.  · Current treatment plan supportive care.  DVT and stress ulcer prophylaxis.  Pain and anxiety control.  · CODE STATUS: Limited code.  Overall prognosis: Guarded.  · We will follow.    This visit was performed by both a physician and an [facility] RN.  I personally evaluated and examined the patient.  I performed all aspects of the medical decision making as documented.      Electronically signed by     Miguel Smith MD,  Pulmonologist/Intensivist   7/13/2022, 10:32 CDT

## 2022-07-13 NOTE — PLAN OF CARE
Problem: Adult Inpatient Plan of Care  Goal: Plan of Care Review    Outcome: Ongoing, Progressing    Outcome Evaluation: Nsg increased vapotherm prior to pt performing activity. Pt performed bed mobility with min A and increased time to recover o2. Nsg increased vapotherm a 2nd time to 100% o2. Pt desat to 83% after sitting rest from standing [place]. Pt requires cues to posture and breathing while sitting EOB to recover o2 back to 90%. Pt sat EOB with SBA. Pt was able to side step up HOB with CGA.

## 2022-07-14 NOTE — PLAN OF CARE
Goal Outcome Evaluation:   Patient sitting up in bed alert and orientated with son at bedside, no complaints of any and rates pain as a 0/10. Call light in reach and patient ack understanding of how to use for assistance, teaching done on importance of calling out for help and not attempting to get out of bed.

## 2022-07-14 NOTE — THERAPY RE-EVALUATION
Patient Name: Adrián Graham  : 1940    MRN: [id number]                              Today's Date: 2022       Admit Date: 2022    Visit Dx:     ICD-10-CM ICD-9-CM   1. Hypoxemia  R09.02 799.02   2. Orthostatic hypotension  I95.1 458.0   3. Pneumonia due to COVID-19 virus  U07.1 480.8    J12.82 079.89   4. Multiple subsegmental pulmonary emboli without acute cor pulmonale (HCC)  I26.94 415.19   5. Impaired mobility  Z74.09 799.89   6. Decreased activities of daily living (ADL)  Z78.9 V49.89     Patient Active Problem List   Diagnosis   • Idiopathic peripheral neuropathy   • Essential hypertension   • Benign prostatic hyperplasia without lower urinary tract symptoms   • Overweight (BMI 25.0-29.9)   • Carpal tunnel syndrome, left   • Bilateral carotid artery disease (HCC)   • Hyperlipidemia   • Preop cardiovascular exam [facility] Dr Brown   • Single subsegmental pulmonary embolism without acute cor pulmonale (HCC)   • Pulmonary embolism associated with COVID-19 (HCC)   • Acute respiratory failure with hypoxia (HCC)   • Moderate malnutrition (HCC)     Past Medical History:   Diagnosis Date   • Abdominal distention    • Abdominal pain, left upper quadrant    • Allergic rhinitis    • Arthritis    • Bloating    • BPH (benign prostatic hyperplasia)    • Carpal tunnel syndrome    • Change in bowel habits    • Constipation    • Diarrhea    • Erectile dysfunction    • GERD (gastroesophageal reflux disease)    • History of shingles    • Hypertension    • Idiopathic peripheral neuropathy    • Neuropathy    • Pneumonia    • Shingles    • Weight loss      Past Surgical History:   Procedure Laterality Date   • APPENDECTOMY     • BACK SURGERY     • CARPAL TUNNEL RELEASE Right    • CARPAL TUNNEL RELEASE Left 2022   • CHOLECYSTECTOMY     • COLONOSCOPY  2010    5 yr-complete colon not visualized   • COLONOSCOPY  2004    extremely tortuous redundant colon. BE-mild diverticulosis without diverticulitis. ?  Gallstones-Dr Chery.   • COLONOSCOPY N/A 09/10/2020    Procedure: COLONOSCOPY WITH ANESTHESIA;  Surgeon: Mani Sy DO;  Location:  [facility];  Service: Gastroenterology;  Laterality: N/A;  Pre: Positive Colorectal Screening  Post: Diverticulosis  Dr. Mooney  CO2 Inflation Used   • ENDOSCOPY N/A 09/26/2016    Procedure: ESOPHAGOGASTRODUODENOSCOPY WITH ANESTHESIA;  Surgeon: Mani Sy DO;  Location:  [facility];  Service:    • HIP SURGERY Right    • JOINT REPLACEMENT     • KNEE SURGERY     • OTHER SURGICAL HISTORY      Surgery for Spinal Stenosis   • OTHER SURGICAL HISTORY      Laser Prostate Surgery      General Information     Row Name 07/14/22 0810          Physical Therapy Time and Intention    Document Type re-evaluation  Pt presents to Re-Eval with a recent fall and SOB. PT had recent bout of COVID-19 and pneumonia. PMH includes HTN and known carotid disease.  -MOISE (r) RAYMOND (t) MOISE (c)     Mode of Treatment physical therapy  -MOISE (r) RAYMOND (t) MOISE (c)     Row Name 07/14/22 0810          General Information    Patient Profile Reviewed yes  -MOISE (r) RAYMOND (t) MOISE (c)     Existing Precautions/Restrictions fall;oxygen therapy device and L/min  -MOISE (r) RAYMOND (t) MOISE (c)     Barriers to Rehab medically complex  -MOISE (r) RAYMOND (t) MOISE (c)     Row Name 07/14/22 0810          Living Environment    People in Home alone  -MOISE (r) RAYMOND (t) MOISE (c)     Row Name 07/14/22 0810          Home Main Entrance    Number of Stairs, Main Entrance one  -MOISE (r) RAYMOND (t) MOISE (c)     Stair Railings, Main Entrance none  -MOISE (r) RAYMOND (t) MOISE (c)     Row Name 07/14/22 0810          Stairs Within Home, Primary    Number of Stairs, Within Home, Primary none  -MOISE (r) RAYMOND (t) MOISE (c)     Stair Railings, Within Home, Primary none  -MOISE (r) RAYMOND (t) MOISE (c)     Row Name 07/14/22 0810          Cognition    Orientation Status (Cognition) oriented x 4;person;place;situation;time  -MOISE (r) RAYMOND (t) MOISE (c)     Row Name 07/14/22 0810          Safety Issues, Functional  Mobility    Safety Issues Affecting Function (Mobility) ability to follow commands  -MOISE (r) RAYMOND (t) MOISE (c)     Impairments Affecting Function (Mobility) endurance/activity tolerance;shortness of breath  -MOISE (r) RAYMOND (t) MOISE (c)           User Key  (r) = Recorded By, (t) = Taken By, (c) = Cosigned By    Initials Name Provider Type    Morteza Edmondson, PT DPT Physical Therapist    Robert Goldberg, PT Student PT Student               Mobility     Row Name 07/14/22 0810          Bed Mobility    Bed Mobility rolling right;scooting/bridging;supine-sit;sit-supine;rolling left  -MOISE (r) RAYMOND (t) MOISE (c)     Rolling Left [place] (Bed Mobility) contact guard;minimum assist (75% patient effort);verbal cues  -MOISE (r) RAYMOND (t) MOISE (c)     Rolling Right [place] (Bed Mobility) contact guard;minimum assist (75% patient effort);verbal cues  -MOISE (r) RAYMOND (t) MOISE (c)     Scooting/Bridging [place] (Bed Mobility) contact guard;1 person assist  -MOISE (r) RAYMOND (t) MOISE (c)     Supine-Sit [place] (Bed Mobility) contact guard;minimum assist (75% patient effort);1 person assist  -MOISE (r) RAYMOND (t) MOISE (c)     Sit-Supine [place] (Bed Mobility) contact guard;minimum assist (75% patient effort);1 person assist  -MOISE (r) RAYMOND (t) MOISE (c)     Assistive Device (Bed Mobility) bed rails;head of bed elevated;draw sheet  -MOISE (r) RAYMOND (t) MOISE (c)     Row Name 07/14/22 0810          Transfers    Comment, (Transfers) Pt starting position was fowlers  -MOISE (r) RAYMOND (t) MOISE (c)     Row Name 07/14/22 0810          Bed-Chair Transfer    Bed-Chair [place] (Transfers) not tested  -MOISE (r) RAYMOND (t) MOISE (c)     Row Name 07/14/22 0810          Sit-Stand Transfer    Sit-Stand [place] (Transfers) contact guard;minimum assist (75% patient effort);verbal cues  -MOISE (r) RAYMOND (t) MOISE (c)     Assistive Device (Sit-Stand Transfers) other (see comments)  HHA  -MOISE (r) RAYMOND (t) MOISE (c)     Row Name 07/14/22 0810          Gait/Stairs (Locomotion)    [place] Level (Gait) not  tested  -MOISE (r) RAYMOND (t) MOISE (c)     [place] Level (Stairs) not tested  -MOISE (r) RAYMOND (t) MOISE (c)           User Key  (r) = Recorded By, (t) = Taken By, (c) = Cosigned By    Initials Name Provider Type    Morteza Edmondson PT DPT Physical Therapist    Robert Goldberg, PT Student PT Student               Obj/Interventions     Row Name 07/14/22 0810          Range of Motion Comprehensive    General Range of Motion bilateral lower extremity ROM WFL  -MOISE (r) RAYMOND (t) MOISE (c)     Row Name 07/14/22 0810          Strength Comprehensive (MMT)    General Manual Muscle Testing (MMT) Assessment lower extremity strength deficits identified  -MOISE (r) RAYMOND (t) MOISE (c)     Comment, General Manual Muscle Testing (MMT) Assessment BLE strength grossly 4/5  -MOISE (r) RAYMOND (t) MOISE (c)     Row Name 07/14/22 0810          Balance    Balance Assessment sitting static balance;sitting dynamic balance;sit to stand dynamic balance;standing static balance  -MOISE (r) RAYMOND (t) MOISE (c)     Static Sitting Balance contact guard  -MOISE (r) RAYMOND (t) MOISE (c)     Dynamic Sitting Balance contact guard;minimal assist  -MOISE (r) RAYMOND (t) MOISE (c)     Position, Sitting Balance unsupported;sitting edge of bed  -MOISE (r) RAYMOND (t) MOISE (c)     Sit to Stand Dynamic Balance contact guard;minimal assist  -MOISE (r) RAYMOND (t) MOISE (c)     Static Standing Balance contact guard  -MOISE (r) RAYMOND (t) MOISE (c)     Dynamic Standing Balance not tested  -MOISE (r) RAYMOND (t) MOISE (c)     Balance Interventions sitting;static;dynamic;sit to stand;standing;supported  -MOISE (r) RAYMOND (t) MOISE (c)     Row Name 07/14/22 0810          Sensory Assessment (Somatosensory)    Sensory Assessment (Somatosensory) sensation intact  -MOISE (r) RAYMODN (t) MOISE (c)           User Key  (r) = Recorded By, (t) = Taken By, (c) = Cosigned By    Initials Name Provider Type    Morteza Edmondson PT DPT Physical Therapist    Robert Goldberg, PT Student PT Student               Goals/Plan     Row Name 07/14/22 0810          Bed Mobility Goal 1 (PT)     Activity/Assistive Device (Bed Mobility Goal 1, PT) bed mobility activities, all  -MOISE (r) RAYMOND (t) MOISE (c)     [place] Level/Cues Needed (Bed Mobility Goal 1, PT) independent  -MOISE (r) RAYMOND (t) MOISE (c)     Time Frame (Bed Mobility Goal 1, PT) by discharge  -MOISE (r) RAYMOND (t) MOISE (c)     Progress/Outcomes (Bed Mobility Goal 1, PT) continuing progress toward goal;goal ongoing  -MOISE (r) RAYMOND (t) MOISE (c)     Row Name 07/14/22 0810          Transfer Goal 1 (PT)    Activity/Assistive Device (Transfer Goal 1, PT) sit-to-stand/stand-to-sit  -MOISE (r) RAYMOND (t) MOISE (c)     [place] Level/Cues Needed (Transfer Goal 1, PT) independent  -MOISE (r) RAYMOND (t) MOISE (c)     Time Frame (Transfer Goal 1, PT) by discharge  -MOISE (r) RAYMOND (t) MOISE (c)     Progress/Outcome (Transfer Goal 1, PT) goal ongoing;continuing progress toward goal  -MOISE (r) RAYMOND (t) MOISE (c)     Row Name 07/14/22 0810          Gait Training Goal 1 (PT)    Activity/Assistive Device (Gait Training Goal 1, PT) gait (walking locomotion)  -MOISE (r) RAYMOND (t) MOISE (c)     [place] Level (Gait Training Goal 1, PT) independent  -MOISE (r) RAMYOND (t) MOISE (c)     Distance (Gait Training Goal 1, PT) 75ft  -MOISE (r) RAYMOND (t) MOISE (c)     Time Frame (Gait Training Goal 1, PT) by discharge  -MOISE (r) RAYMOND (t) MOISE (c)     Progress/Outcome (Gait Training Goal 1, PT) goal ongoing;continuing progress toward goal  -MOISE (r) RAYMOND (t) MOISE (c)     Row Name 07/14/22 0810          Therapy Assessment/Plan (PT)    Planned Therapy Interventions (PT) balance training;bed mobility training;gait training;home exercise program;strengthening;ROM (range of motion);postural re-education;patient/family education;transfer training  -MOISE (r) RAYMOND (t) MOISE (c)           User Key  (r) = Recorded By, (t) = Taken By, (c) = Cosigned By    Initials Name Provider Type    Morteza Edmondson, PT DPT Physical Therapist    Robert Goldberg, ELIF Student PT Student               Clinical Impression     Row Name 07/14/22 0810          Pain    Pretreatment Pain Rating 0/10 -  no pain  -MOISE (r) RAYMOND (t) MOISE (c)     Pain Intervention(s) Medication (See MAR);Repositioned;Ambulation/increased activity  -MOISE (r) RAYMOND (t) MOISE (c)     Row Name 07/14/22 0810          Plan of Care Review    Plan of Care Reviewed With patient  -MOISE (r) RAYMOND (t) MOISE (c)     Outcome Evaluation Pt Re-Eval complete. Pt A&Ox4. Pt states he feels well today and is willing to participate in therapy. Pt O2 at 92% upon arrival with vapotherm. Pt was in fowlers upon arrival. Pt was CGA-min assist x1 to reach EOB. Pt had desat to 85% at this time and was talked about good posture as well as PLB to help. Pt donned non-rebreather mask to assist with O2 back to 92% upon nursing request. BLE MMT strength grossly 4/5. PT completed hip marching and knee extension exercises while at bedside (10x each side) as well as ankle pumps. Pt performed 2 STS transfers with CGAx1. Pt stated he was ready to lay back down and was assisted with min assist to do so. Pt ended session in [facility]. Pt will continue to benefit from skilled PT intervention in order to improve tolerance to increased activity as well as improve strength and endurance. Recommend d.c to SNF pending progress.  -MOISE (r) RAYMOND (t) MOISE (c)     Row Name 07/14/22 0810          Therapy Assessment/Plan (PT)    Patient/Family Therapy Goals Statement (PT) return home  -MOISE (r) RAYMOND (t) MOISE (c)     Rehab Potential (PT) good, to achieve stated therapy goals  -MOISE (r) RAYMOND (t) MOISE (c)     Criteria for Skilled Interventions Met (PT) yes;skilled treatment is necessary  -MOISE (r) RAYMOND (t) MOISE (c)     Therapy Frequency (PT) 2 times/day  -MOISE (r) RAYMOND (t) MOISE (c)     Predicted Duration of Therapy Intervention (PT) until d.c  -MOISE (r) RAYMOND (t) MOISE (c)     Row Name 07/14/22 0810          Vital Signs    Pre SpO2 (%) 92  -MOISE (r) RAYMOND (t) MOISE (c)     O2 Delivery Pre Treatment supplemental O2  vapotherm  -MOISE     Intra SpO2 (%) 85  -MOISE (r) RAYMOND (t) MOISE (c)     O2 Delivery Intra Treatment supplemental O2  vapotherm  -MOISE     Post SpO2 (%) 94   -MOISE (r) RAYMOND (t) MOISE (c)     O2 Delivery Post Treatment supplemental O2  vapotherm  -MOISE     Pre Patient Position Supine  -MOISE (r) RAYMOND (t) MOISE (c)     Intra Patient Position Standing  -MOISE (r) RAYMOND (t) MOISE (c)     Post Patient Position Supine  -MOISE (r) RAYMOND (t) MOSIE (c)     Row Name 07/14/22 0810          Positioning and Restraints    Pre-Treatment Position in bed  -MOISE (r) RAYMOND (t) MOISE (c)     Post Treatment Position bed  -MOISE (r) RAYMOND (t) MOISE (c)     In Bed notified nsg;fowlers;call light within reach;encouraged to call for assist;patient within staff view  -MOISE (r) RAYMOND (t) MOISE (c)           User Key  (r) = Recorded By, (t) = Taken By, (c) = Cosigned By    Initials Name Provider Type    Morteza Edmondson, PT DPT Physical Therapist    Robert Goldberg, PT Student PT Student               Outcome Measures     Row Name 07/14/22 0810          How much help from another person do you currently need...    Turning from your back to your side while in flat bed without using bedrails? 3  -MOISE (r) RAYMOND (t) MOISE (c)     Moving from lying on back to sitting on the side of a flat bed without bedrails? 3  -MOISE (r) RAYMOND (t) MOISE (c)     Moving to and from a bed to a chair (including a wheelchair)? 3  -MOISE (r) RAYMOND (t) MOISE (c)     Standing up from a chair using your arms (e.g., wheelchair, bedside chair)? 3  -MOISE (r) RAYMOND (t) MOISE (c)     Climbing 3-5 steps with a railing? 2  -MOISE (r) RAYMOND (t) MOISE (c)     To walk in hospital room? 2  -MOISE (r) RAYMOND (t) MOISE (c)     AM-PAC 6 Clicks Score (PT) 16  -MOISE (r) RAYMOND (t)     Highest level of mobility 5 --> Static standing  -MOISE (r) RAYMOND (t)     Row Name 07/14/22 0810          Functional Assessment    Outcome Measure Options AM-PAC 6 Clicks Basic Mobility (PT)  -MOISE (r) RAYMOND (t) MOISE (c)           User Key  (r) = Recorded By, (t) = Taken By, (c) = Cosigned By    Initials Name Provider Type    Morteza Edmondson, PT DPT Physical Therapist    Robert Goldberg, PT Student PT Student                             Physical Therapy Education                  Title: PT OT SLP Therapies (In Progress)     Topic: Physical Therapy (In Progress)     Point: Mobility training (Done)     Learning Progress Summary           Patient Acceptance, E, VU,DU by  at 7/14/2022 0810    Comment: Pt presents to Re-Eval with SOB. Pt was educated on the benefit of increased activity as well as breathing techniques to improve SOB with activity. Recommend d.c to SNF pending progress.   Family Acceptance, E,TB, VU,NR by  at 7/13/2022 2210      Show all documentation for this point (7)                 Point: Home exercise program (Not Started)     Learner Progress:  Not documented in this visit.          Point: Body mechanics (Not Started)     Learner Progress:  Not documented in this visit.          Point: Precautions (Done)     Learning Progress Summary           Patient Acceptance, E,TB, VU,NR by  at 7/13/2022 2210   Family Acceptance, E,TB, VU,NR by  at 7/13/2022 2210      Show all documentation for this point (5)                             User Key     Initials Effective Dates Name Provider Type Discipline     06/16/21 -  Ganesh Gunter RN Registered Nurse Nurse     05/04/22 -  Robert Garcia, ELIF Student PT Student PT              PT Recommendation and Plan  Planned Therapy Interventions (PT): balance training, bed mobility training, gait training, home exercise program, strengthening, ROM (range of motion), postural re-education, patient/family education, transfer training  Plan of Care Reviewed With: patient  Outcome Evaluation: Pt Re-Eval complete. Pt A&Ox4. Pt states he feels well today and is willing to participate in therapy. Pt O2 at 92% upon arrival with vapotherm. Pt was in fowlers upon arrival. Pt was CGA-min assist x1 to reach EOB. Pt had desat to 85% at this time and was talked about good posture as well as PLB to help. Pt donned non-rebreather mask to assist with O2 back to 92% upon nursing request. BLE MMT strength grossly 4/5. PT completed hip marching and knee  extension exercises while at bedside (10x each side) as well as ankle pumps. Pt performed 2 STS transfers with CGAx1. Pt stated he was ready to lay back down and was assisted with min assist to do so. Pt ended session in fowlers. Pt will continue to benefit from skilled PT intervention in order to improve tolerance to increased activity as well as improve strength and endurance. Recommend d.c to SNF pending progress.     Time Calculation:    PT Charges     Row Name 07/14/22 0810             Time Calculation    Start Time 0810  -MOISE (r) RAYMOND (t) MOISE (c)      Stop Time 0853  -MOISE (r) RAYMOND (t) MOISE (c)      Time Calculation (min) 43 min  -MOISE (r) RAYMOND (t)      PT Received On 07/14/22  -MOISE (r) RAYMOND (t) MOISE (c)      PT Goal Re-Cert Due Date 07/24/22  -MOISE (r) RAYMOND (t) MOISE (c)              Time Calculation- PT    Total Timed Code Minutes- PT 13 minute(s)  -MOISE              Timed Charges    06356 - PT Therapeutic Activity Minutes 13  -MOISE              Total Minutes    Timed Charges Total Minutes 13  -MOISE       Total Minutes 13  -MOISE            User Key  (r) = Recorded By, (t) = Taken By, (c) = Cosigned By    Initials Name Provider Type    Morteza Edmondson, PT DPT Physical Therapist    Robert Goldberg, PT Student PT Student                  PT G-Codes  Outcome Measure Options: AM-PAC 6 Clicks Basic Mobility (PT)  AM-PAC 6 Clicks Score (PT): 16  AM-PAC 6 Clicks Score (OT): 16    Robert Garcia PT Student  7/14/2022

## 2022-07-14 NOTE — PLAN OF CARE
Goal Outcome Evaluation:  Plan of Care Reviewed With: patient           Outcome Evaluation: OT tx complete. Pt AOx4 and agreeable to session. Pt completed bed mobility with min A, and sat EOB for ~5 min to increase sitting tolerance and rest to increase O2 from mid 80s. Pt stood with min A and HHA and completed stand-step t/f to chair at bedside. Pt voiced fear regarding this t/f however with reassurement and encouragement was agreeable and successfully completed the transfer. Pt with min A with t/f d/t unsteadiness and shuffling steps. Pt required vcs to maintain neutral posture as he attempted to reach for chair majority of t/f. Pt desat to 74 with t/f and nonbreather was applied over 40L/80% vapotherm. With increased time, pts O2 returned to 90. Clean hospital gown donned with mod A d/t lines. Pt performed nail hygiene with set up. Pt required set up and min A for meal tray prep d/t limited ROM and N/T in L hand. Continue OT POC.

## 2022-07-14 NOTE — PROGRESS NOTES
[facility] Medicine Services  INPATIENT PROGRESS NOTE    Patient Name: Adrián Graham  Date of Admission: 7/1/2022  Today's Date: 07/14/22  Length of Stay: 13  Primary Care Physician: Mauro Irizarry DO    Subjective   Chief Complaint: f/u acute respiratory failure with hypoxia    HPI:  Patient resting in bed comfortably.  Still on 40 L 100% FiO2 this morning.  Patient seems to have more issues in the morning and needs to wake up and do breathing exercises and then slowly weans.  He has not had any fevers.  No abdominal pain or vomiting/diarrhea.  Taste slowly starting to come back.  No chest pain.  No other adverse events noted.    Review of Systems   All pertinent negatives and positives are as above. All other systems have been reviewed and are negative unless otherwise stated.     Objective    Temp:  [97.3 °F (36.3 °C)-98.5 °F (36.9 °C)] 97.3 °F (36.3 °C)  Heart Rate:  [60-94] 79  Resp:  [15-26] 17  BP: (106-155)/() 139/63  Physical Exam  GEN: Awake, alert, interactive, no acute distress  HEENT: PERRLA, EOMI, Anicteric, Trachea midline  Lungs:  Overall fairly clear lung sounds.  No wheezing or rales.  Good effort.   Heart: RRR, +S1/s2, no rub  ABD: soft, nt/nd, +BS, no guarding/rebound  Extremities: atraumatic, no cyanosis  Skin: no rashes or petechiae  Neuro: AAOx3, no focal deficits  Psych: normal mood & affect      Results Review:  I have reviewed the labs, radiology results, and diagnostic studies.    Laboratory Data:   Results from last 7 days   Lab Units 07/14/22  0338 07/13/22  0514 07/12/22  0327   WBC 10*3/mm3 17.20* 20.01* 19.06*   HEMOGLOBIN g/dL 10.6* 10.5* 10.3*   HEMATOCRIT % 33.3* 34.4* 32.4*   PLATELETS 10*3/mm3 301 329 336        Results from last 7 days   Lab Units 07/14/22  0338 07/13/22  0514 07/12/22  0327 07/11/22  0312 07/10/22  0223   SODIUM mmol/L 143 143 145   < > 141   POTASSIUM mmol/L 4.5 3.5 3.7   < > 3.6   CHLORIDE mmol/L 104 103 101    < > 104   CO2 mmol/L 33.0* 35.0* 37.0*   < > 31.0*   BUN mg/dL 29* 30* 28*   < > 20   CREATININE mg/dL 0.70* 0.70* 0.79   < > 0.78   CALCIUM mg/dL 8.7 8.9 9.1   < > 8.6   BILIRUBIN mg/dL  --   --   --   --  0.3   ALK PHOS U/L  --   --   --   --  76   ALT (SGPT) U/L  --   --   --   --  38   AST (SGOT) U/L  --   --   --   --  30   GLUCOSE mg/dL 147* 109* 127*   < > 114*    < > = values in this interval not displayed.       Culture Data:   Blood Culture   Date Value Ref Range Status   06/26/2022 No growth at 5 days  Final   06/26/2022 No growth at 5 days  Final       Radiology Data:   Imaging Results (Last 24 Hours)     ** No results found for the last 24 hours. **          I have reviewed the patient's current medications.     Assessment/Plan     Active Hospital Problems    Diagnosis    • Moderate malnutrition (HCC)    • Acute respiratory failure with hypoxia (HCC)    • Pulmonary embolism associated with COVID-19 (HCC)    • Bilateral carotid artery disease (HCC)    • Hyperlipidemia    • Benign prostatic hyperplasia without lower urinary tract symptoms    • Essential hypertension        Plan:  #1 acute respiratory failure with hypoxia -difficult case.  Likely multifactorial.  Recent COVID and PE.  Presented with concern for cryptogenic organizing pneumonia.  Was on steroids and antibiotics.  Initially was getting better and oxygen was weaning to 4 L.  He however worsened again over 7/8 through 7/10.  Ended up in the ICU Sunday and when I saw him on 7/11 he was on max oxygen with low sats.  We gave him an extra high-dose slug of steroid at 125 and he did better over the next 24 hours with decreasing O2 needs.  He started to slowly drift back up with O2 needs however and then on 7/13 we increased steroids to Solu-Medrol 40mg iv 3 times daily.  CRP starting to trend down after burst of steroids and increased dose.  Also on cefepime due to elevated fever few days ago.  No subsequent fevers.  Blood cultures negative.  White  count trending down.  Pulmonary and ID following.    #2 pulmonary embolism -recent diagnosis.  Currently on Lovenox    #3 COVID-19 -now negative.  Posttreatment.  Potentially have secondary organizing pneumonia or even fibrosis post-COVID.    #4 hypertension -BP stable.  Monitor.    #5 hyperlipidemia -statin    #6 BPH -Proscar    Discharge Planning: Ongoing.  Continue in the CCU.    Electronically signed by Steven Ross DO, 07/14/22, 07:33 CDT.

## 2022-07-14 NOTE — PROGRESS NOTES
"INFECTIOUS DISEASES PROGRESS NOTE    Patient:  Adrián Graham  YOB: 1940  MRN: [id number]   Admit date: 2022   Admitting Physician: Steven Ross DO  Primary Care Physician: Mauro Irizarry DO    Chief Complaint: \" These eggs are terrible\"      Interval History: The patient notes that mornings are a little \"rougher\" than later in the day.  He does not like the accident likes the fruit, yogurt, supplement drink.    Spoke with YASMEEN Trujillo.  She notes the patient is on 40 L and 80% but was hanging out in the mid to low 80s so she had a gradually increase him.  He is on 40 L and 100% his staying around the 90% range while he eats.    Allergies: No Known Allergies    Current Scheduled Medications:   ascorbic acid, 500 mg, Oral, Daily  aspirin, 81 mg, Oral, Daily  atorvastatin, 20 mg, Oral, Daily  busPIRone, 10 mg, Oral, TID  cefepime, 2 g, Intravenous, Q8H  cetirizine, 10 mg, Oral, Daily  diphenhydrAMINE, 25 mg, Oral, Nightly  enoxaparin, 1 mg/kg, Subcutaneous, Q12H  famotidine, 20 mg, Oral, BID AC  finasteride, 5 mg, Oral, Daily  fluticasone, 2 spray, Nasal, Daily  furosemide, 20 mg, Oral, BID  gabapentin, 400 mg, Oral, Q12H  guaiFENesin, 1,200 mg, Oral, BID  ipratropium-albuterol, 3 mL, Nebulization, 4x Daily - RT  methylPREDNISolone sodium succinate, 40 mg, Intravenous, Q8H  montelukast, 10 mg, Oral, Nightly  sodium chloride, 250 mL, Intravenous, Once  sodium chloride, 10 mL, Intravenous, Q12H  vitamin D3, 5,000 Units, Oral, Daily  zinc sulfate, 220 mg, Oral, Daily      Current PRN Medications:  •  acetaminophen  •  albuterol  •  diazePAM  •  Morphine  •  ondansetron  •  sodium chloride  •  traMADol    Review of Systems  General: No recurrent fever   Respiratory \"about the same\"      :Objective     Vital Signs:  Temp (24hrs), Av.1 °F (36.7 °C), Min:97.3 °F (36.3 °C), Max:98.5 °F (36.9 °C)      /63   Pulse 79   Temp 97.3 °F (36.3 °C) (Axillary)   Resp 17   Ht 185.4 cm (73\")   " Wt 83.2 kg (183 lb 6.8 oz)   SpO2 94%   BMI 24.20 kg/m²         Physical Exam  General: Patient 82-year-old male sitting up in bed eating.  HEENT: Sclera anicteric.  Vapotherm in place at 40 L and 100%  Lungs: He continues to have diminished breath sounds bilaterally but is not taking a deep breath.  O2 sats are between 88 and 93%  Abdomen: Soft, nontender, nondistended  Neuro: Alert, oriented, speech clear  Psych: Pleasant and cooperative       Results Review:    I reviewed the patient's new clinical results.    Lab Results:  CBC:   Lab Results   Lab 07/07/22  0941 07/10/22  0223 07/11/22  0312 07/12/22  0327 07/13/22  0514 07/14/22  0338   WBC 21.96* 19.51* 17.30* 19.06* 20.01* 17.20*   HEMOGLOBIN 13.8 10.5* 10.3* 10.3* 10.5* 10.6*   HEMATOCRIT 43.9 32.1* 33.7* 32.4* 34.4* 33.3*   PLATELETS 553* 360 368 336 329 301   LYMPHOCYTE % 16.2* 4.0*  --   --   --   --    MONOCYTES %  --  3.0*  --   --   --   --          CMP:   Lab Results   Lab 07/10/22  0223 07/11/22  0312 07/12/22  0327 07/13/22  0514 07/14/22  0338   SODIUM 141   < > 145 143 143   POTASSIUM 3.6   < > 3.7 3.5 4.5   CHLORIDE 104   < > 101 103 104   CO2 31.0*   < > 37.0* 35.0* 33.0*   BUN 20   < > 28* 30* 29*   CREATININE 0.78   < > 0.79 0.70* 0.70*   CALCIUM 8.6   < > 9.1 8.9 8.7   BILIRUBIN 0.3  --   --   --   --    ALK PHOS 76  --   --   --   --    ALT (SGPT) 38  --   --   --   --    AST (SGOT) 30  --   --   --   --    GLUCOSE 114*   < > 127* 109* 147*    < > = values in this interval not displayed.       Estimated Creatinine Clearance: 94.1 mL/min (A) (by C-G formula based on SCr of 0.7 mg/dL (L)).      COVID LABS:  Results From Last 14 Days   Lab Units 07/14/22  0338 07/11/22  1226 07/11/22  0312 07/10/22  0223 07/07/22  0941 07/04/22  0700 07/02/22  1248 07/02/22  0647 07/01/22  1429 07/01/22  1134   0000   PROBNP pg/mL  --   --   --   --   --   --   --   --  1,299.0  --   --    CK TOTAL U/L  --   --   --   --   --   --   --   --   --  167  --     CRP mg/dL 6.72*  --  16.21* 18.76* 8.16* 6.46*  --    < > 18.78*  --   --    D DIMER QUANT MCGFEU/mL  --   --   --   --   --   --   --   --   --  4.49*  --    FERRITIN ng/mL  --   --  1,112.00*  --  1,243.00*  --   --   --   --   --   --    LACTATE mmol/L  --   --   --   --   --   --  1.8  --   --   --   --    PROCALCITONIN ng/mL  --  0.77*  --   --   --  0.08 0.21  --  0.17  --    < >   PROTIME Seconds  --   --   --   --   --   --   --   --   --  23.3*  --    INR   --   --   --   --   --   --   --   --   --  2.16*  --    TROPONIN T ng/mL  --   --   --   --   --   --   --   --  <0.010 <0.010  --     < > = values in this interval not displayed.         Culture Results:    Microbiology Results (last 10 days)     Procedure Component Value - Date/Time    Blood Culture With KEVAN - Blood, Arm, Right [[id number]]  (Normal) Collected: 07/11/22 1229    Lab Status: Preliminary result Specimen: Blood from Arm, Right Updated: 07/13/22 1248     Blood Culture No growth at 2 days    Blood Culture With KEVAN - Blood, Hand, Left [[id number]]  (Normal) Collected: 07/11/22 1226    Lab Status: Preliminary result Specimen: Blood from Hand, Left Updated: 07/13/22 1246     Blood Culture No growth at 2 days    MRSA Screen, PCR (Inpatient) - Swab, Nares [[id number]]  (Normal) Collected: 07/09/22 1052    Lab Status: Final result Specimen: Swab from Nares Updated: 07/09/22 1210     MRSA PCR No MRSA Detected    Narrative:      The negative predictive value of this diagnostic test is high and should only be used to consider de-escalating anti-MRSA therapy. A positive result may indicate colonization with MRSA and must be correlated clinically.    Blood Culture - Blood, Arm, Right [[id number]]  (Normal) Collected: 07/09/22 1030    Lab Status: Preliminary result Specimen: Blood from Arm, Right Updated: 07/13/22 1102     Blood Culture No growth at 4 days    Blood Culture - Blood, Arm, Left [[id number]]  (Normal) Collected: 07/09/22 1028    Lab Status:  Preliminary result Specimen: Blood from Arm, Left Updated: 07/13/22 1102     Blood Culture No growth at 4 days    Respiratory Culture - Sputum, Cough [[id number]] Collected: 07/06/22 2202    Lab Status: Final result Specimen: Sputum from Cough Updated: 07/08/22 0930     Respiratory Culture Rejected     Gram Stain Few (2+) WBCs seen      No Epithelial cells seen      No organisms seen    Narrative:      Specimen rejected due to oropharyngeal contamination. Please reorder and recollect specimen if clinically necessary.    COVID-19 RAPID AG,VERITOR,COR/GEE/PAD/MARIYA/MAD/RACHEL/LAG/JOSEPH/ IN-HOUSE,DRY SWAB, 1-2 HR TAT - Swab, Nasal Cavity [[id number]]  (Normal) Collected: 07/06/22 1451    Lab Status: Final result Specimen: Swab from Nasal Cavity Updated: 07/06/22 1600     COVID19 Presumptive Negative    Narrative:      Fact sheets for providers: https://www.fda.gov/media/138347/download    Fact sheets for patients: https://www.fda.gov/media/756419/download               Radiology:   Imaging Results (Last 72 Hours)     Procedure Component Value Units Date/Time    XR Chest 1 View [[id number]] Collected: 07/11/22 1111     Updated: 07/11/22 1115    Narrative:      EXAM: XR CHEST 1 VW- 7/11/2022 10:54 AM CDT     HISTORY: f/u xray, hypoxia; R09.02-Hypoxemia; I95.1-Orthostatic  hypotension; U07.1-COVID-19; J12.82-Pneumonia due to coronavirus disease  2019; I26.94-Multiple subsegmental pulmonary emboli without acute cor  pulmonale; Z74.09-Other reduced mobility; Z78.9-Other specified health  status       COMPARISON: July 9, 2022.     TECHNIQUE: Frontal radiograph of the chest     FINDINGS:   Bilateral interstitial and airspace filling opacities are noted. This is  been described as pneumonia and is stable and unchanged from July 9, 2022.. Cardiac silhouette is normal..      The osseous structures and surrounding soft tissues demonstrate no acute  abnormality.          Impression:      1. Persistent bilateral interstitial and airspace  filling opacities most  consistent with pneumonia. This is similar to July 9, 2022.        This report was finalized on 07/11/2022 11:12 by Dr. Don Delacruz MD.          Assessment & Plan     Active Hospital Problems    Diagnosis    • Moderate malnutrition (HCC)    • Acute respiratory failure with hypoxia (HCC)    • Pulmonary embolism associated with COVID-19 (HCC)    • Bilateral carotid artery disease (HCC)    • Hyperlipidemia    • Benign prostatic hyperplasia without lower urinary tract symptoms    • Essential hypertension        IMPRESSION:  1. COVID-19 infection diagnosed June 26, 2022-unclear when patient actually initially was infected with COVID as he was seen June 7 with cough and shortness of breath, had CT of the chest that showed patchy groundglass infiltrates but does not appear he was tested for COVID.  He received steroids and antibiotics.  It does not appear he received any COVID-related treatments June 26 i.e. Paxlovid as he appeared to be quite out of the window for benefit as he was likely infected earlier in the month if not the month prior (2 urgent care visits for cough and congestion 1 in May 1 in June).  Patient admitted July 1 and had temp spike to 103 and significant increased O2 requirements July 9 prompting transfer to CCU.  2. Fever -blood cultures negative.  Fever resolved no significant change in chest x-ray to support new consolidation in patient who has received IV azithromycin for 5 days and Zosyn for 8 days this admission and then dosed with vancomycin  x1 followed by continuing on cefepime.  May be related to postacute COVID organizing pneumonia  3. Leukocytosis- improved  4. Pulmonary emboli diagnosed June 26  5. Elevated CRP- improved  6. Elevated ferritin  7. Elevated procalcitonin July 11, 2022      RECOMMENDATION:   · Continue empiric cefepime-we will set to discontinue July 16  · Steroid management per pulmonary/hospitalist  · Wean O2 as tolerated  · Continue increase  activity with therapy  · Continue critical care monitoring            Beatris Eduardo MD  07/14/22  08:06 CDT

## 2022-07-14 NOTE — PLAN OF CARE
Goal Outcome Evaluation:  Plan of Care Reviewed With: patient           Outcome Evaluation: Pt Re-Eval complete. Pt A&Ox4. Pt states he feels well today and is willing to participate in therapy. Pt O2 at 92% upon arrival with vapotherm. Pt was in fowlers upon arrival. Pt was CGA-min assist x1 to reach EOB. Pt had desat to 85% at this time and was talked about good posture as well as PLB to help. Pt donned non-rebreather mask to assist with O2 back to 92% upon nursing request. BLE MMT strength grossly 4/5. PT completed hip marching and knee extension exercises while at bedside (10x each side) as well as ankle pumps. Pt performed 2 STS transfers with CGAx1. Pt stated he was ready to lay back down and was assisted with min assist to do so. Pt ended session in fowlers. Pt will continue to benefit from skilled PT intervention in order to improve tolerance to increased activity as well as improve strength and endurance. Recommend d.c to SNF pending progress.

## 2022-07-14 NOTE — THERAPY EVALUATION
Patient Name: Adrián Graham  : 1940    MRN: [id number]                              Today's Date: 2022       Admit Date: 2022    Visit Dx:     ICD-10-CM ICD-9-CM   1. Hypoxemia  R09.02 799.02   2. Orthostatic hypotension  I95.1 458.0   3. Pneumonia due to COVID-19 virus  U07.1 480.8    J12.82 079.89   4. Multiple subsegmental pulmonary emboli without acute cor pulmonale (HCC)  I26.94 415.19   5. Impaired mobility  Z74.09 799.89   6. Decreased activities of daily living (ADL)  Z78.9 V49.89     Patient Active Problem List   Diagnosis   • Idiopathic peripheral neuropathy   • Essential hypertension   • Benign prostatic hyperplasia without lower urinary tract symptoms   • Overweight (BMI 25.0-29.9)   • Carpal tunnel syndrome, left   • Bilateral carotid artery disease (HCC)   • Hyperlipidemia   • Preop cardiovascular exam [facility] Dr Brown   • Single subsegmental pulmonary embolism without acute cor pulmonale (HCC)   • Pulmonary embolism associated with COVID-19 (HCC)   • Acute respiratory failure with hypoxia (HCC)   • Moderate malnutrition (HCC)     Past Medical History:   Diagnosis Date   • Abdominal distention    • Abdominal pain, left upper quadrant    • Allergic rhinitis    • Arthritis    • Bloating    • BPH (benign prostatic hyperplasia)    • Carpal tunnel syndrome    • Change in bowel habits    • Constipation    • Diarrhea    • Erectile dysfunction    • GERD (gastroesophageal reflux disease)    • History of shingles    • Hypertension    • Idiopathic peripheral neuropathy    • Neuropathy    • Pneumonia    • Shingles    • Weight loss      Past Surgical History:   Procedure Laterality Date   • APPENDECTOMY     • BACK SURGERY     • CARPAL TUNNEL RELEASE Right    • CARPAL TUNNEL RELEASE Left 2022   • CHOLECYSTECTOMY     • COLONOSCOPY  2010    5 yr-complete colon not visualized   • COLONOSCOPY  2004    extremely tortuous redundant colon. BE-mild diverticulosis without diverticulitis. ?  Gallstones-Dr Chery.   • COLONOSCOPY N/A 09/10/2020    Procedure: COLONOSCOPY WITH ANESTHESIA;  Surgeon: Mani Sy DO;  Location:  [facility];  Service: Gastroenterology;  Laterality: N/A;  Pre: Positive Colorectal Screening  Post: Diverticulosis  Dr. Mooney  CO2 Inflation Used   • ENDOSCOPY N/A 09/26/2016    Procedure: ESOPHAGOGASTRODUODENOSCOPY WITH ANESTHESIA;  Surgeon: Mani Sy DO;  Location:  [facility];  Service:    • HIP SURGERY Right    • JOINT REPLACEMENT     • KNEE SURGERY     • OTHER SURGICAL HISTORY      Surgery for Spinal Stenosis   • OTHER SURGICAL HISTORY      Laser Prostate Surgery      General Information     Row Name 07/14/22 0810          Physical Therapy Time and Intention    Document Type re-evaluation  Pt presents to Re-Eval with a recent fall and SOB. PT had recent bout of COVID-19 and pneumonia. PMH includes HTN and known carotid disease.  -MOISE (r) RAYMOND (t) MOISE (c)     Mode of Treatment physical therapy  -MOISE (r) RAYMOND (t) MOISE (c)     Row Name 07/14/22 0810          General Information    Patient Profile Reviewed yes  -MOISE (r) RAYMOND (t) MOISE (c)     Existing Precautions/Restrictions fall;oxygen therapy device and L/min  -MOISE (r) RAYMOND (t) MOISE (c)     Barriers to Rehab medically complex  -MOISE (r) RAYMOND (t) MOISE (c)     Row Name 07/14/22 0810          Living Environment    People in Home alone  -MOISE (r) RAYMOND (t) MOISE (c)     Row Name 07/14/22 0810          Home Main Entrance    Number of Stairs, Main Entrance one  -MOISE (r) RAYMOND (t) MOISE (c)     Stair Railings, Main Entrance none  -MOISE (r) RAYMOND (t) MOISE (c)     Row Name 07/14/22 0810          Stairs Within Home, Primary    Number of Stairs, Within Home, Primary none  -MOISE (r) RAYMOND (t) MOISE (c)     Stair Railings, Within Home, Primary none  -MOISE (r) RAYMOND (t) MOISE (c)     Row Name 07/14/22 0810          Cognition    Orientation Status (Cognition) oriented x 4;person;place;situation;time  -MOISE (r) RAYMOND (t) MOISE (c)     Row Name 07/14/22 0810          Safety Issues, Functional  Mobility    Safety Issues Affecting Function (Mobility) ability to follow commands  -MOISE (r) RAYMOND (t) MOISE (c)     Impairments Affecting Function (Mobility) endurance/activity tolerance;shortness of breath  -MOISE (r) RAYMOND (t) MOISE (c)           User Key  (r) = Recorded By, (t) = Taken By, (c) = Cosigned By    Initials Name Provider Type    Morteza Edmondson, PT DPT Physical Therapist    Robert Goldberg, PT Student PT Student               Mobility     Row Name 07/14/22 0810          Bed Mobility    Bed Mobility rolling right;scooting/bridging;supine-sit;sit-supine;rolling left  -MOISE (r) RAYMOND (t) MOISE (c)     Rolling Left [place] (Bed Mobility) contact guard;minimum assist (75% patient effort);verbal cues  -MOISE (r) RAYMOND (t) MOISE (c)     Rolling Right [place] (Bed Mobility) contact guard;minimum assist (75% patient effort);verbal cues  -MOISE (r) RAYMOND (t) MOISE (c)     Scooting/Bridging [place] (Bed Mobility) contact guard;1 person assist  -MOISE (r) RAYMOND (t) MOISE (c)     Supine-Sit [place] (Bed Mobility) contact guard;minimum assist (75% patient effort);1 person assist  -MOISE (r) RAYMOND (t) MOISE (c)     Sit-Supine [place] (Bed Mobility) contact guard;minimum assist (75% patient effort);1 person assist  -MOISE (r) RAYMOND (t) MOISE (c)     Assistive Device (Bed Mobility) bed rails;head of bed elevated;draw sheet  -MOISE (r) RAYMOND (t) MOISE (c)     Row Name 07/14/22 0810          Transfers    Comment, (Transfers) Pt starting position was fowlers  -MOISE (r) RAYMOND (t) MOISE (c)     Row Name 07/14/22 0810          Bed-Chair Transfer    Bed-Chair [place] (Transfers) not tested  -MOISE (r) RAYMOND (t) MOISE (c)     Row Name 07/14/22 0810          Sit-Stand Transfer    Sit-Stand [place] (Transfers) contact guard;minimum assist (75% patient effort);verbal cues  -MOISE (r) RAYMOND (t) MOISE (c)     Assistive Device (Sit-Stand Transfers) other (see comments)  HHA  -MOISE (r) RAYMOND (t) MOISE (c)     Row Name 07/14/22 0810          Gait/Stairs (Locomotion)    [place] Level (Gait) not  tested  -MOISE (r) RAYMOND (t) MOISE (c)     [place] Level (Stairs) not tested  -MOISE (r) RAYMOND (t) MOISE (c)           User Key  (r) = Recorded By, (t) = Taken By, (c) = Cosigned By    Initials Name Provider Type    Morteza Edmondson PT DPT Physical Therapist    Robert Goldberg, PT Student PT Student               Obj/Interventions     Row Name 07/14/22 0810          Range of Motion Comprehensive    General Range of Motion bilateral lower extremity ROM WFL  -MOISE (r) RAYMOND (t) MOISE (c)     Row Name 07/14/22 0810          Strength Comprehensive (MMT)    General Manual Muscle Testing (MMT) Assessment lower extremity strength deficits identified  -MOISE (r) RAYMOND (t) MOISE (c)     Comment, General Manual Muscle Testing (MMT) Assessment BLE strength grossly 4/5  -MOISE (r) RAYMOND (t) MOISE (c)     Row Name 07/14/22 0810          Balance    Balance Assessment sitting static balance;sitting dynamic balance;sit to stand dynamic balance;standing static balance  -MOISE (r) RAYMOND (t) MOISE (c)     Static Sitting Balance contact guard  -MOISE (r) RAYMOND (t) MOISE (c)     Dynamic Sitting Balance contact guard;minimal assist  -MOISE (r) RAYMOND (t) MOISE (c)     Position, Sitting Balance unsupported;sitting edge of bed  -MOISE (r) RAYMOND (t) MOISE (c)     Sit to Stand Dynamic Balance contact guard;minimal assist  -MOISE (r) RAYMOND (t) MOISE (c)     Static Standing Balance contact guard  -MOISE (r) RAYMOND (t) MOISE (c)     Dynamic Standing Balance not tested  -MOISE (r) RAYMOND (t) MOISE (c)     Balance Interventions sitting;static;dynamic;sit to stand;standing;supported  -MOISE (r) RAYMOND (t) MOISE (c)     Row Name 07/14/22 0810          Sensory Assessment (Somatosensory)    Sensory Assessment (Somatosensory) sensation intact  -MOISE (r) RAYMOND (t) MOISE (c)           User Key  (r) = Recorded By, (t) = Taken By, (c) = Cosigned By    Initials Name Provider Type    Morteza Edmondson PT DPT Physical Therapist    Robert Goldberg, PT Student PT Student               Goals/Plan     Row Name 07/14/22 0810          Bed Mobility Goal 1 (PT)     Activity/Assistive Device (Bed Mobility Goal 1, PT) bed mobility activities, all  -MOISE (r) RAYMOND (t) MOISE (c)     [place] Level/Cues Needed (Bed Mobility Goal 1, PT) independent  -MOISE (r) RAYMOND (t) MOISE (c)     Time Frame (Bed Mobility Goal 1, PT) by discharge  -MOISE (r) RAYMOND (t) MOISE (c)     Progress/Outcomes (Bed Mobility Goal 1, PT) continuing progress toward goal;goal ongoing  -MOISE (r) RAYMOND (t) MOISE (c)     Row Name 07/14/22 0810          Transfer Goal 1 (PT)    Activity/Assistive Device (Transfer Goal 1, PT) sit-to-stand/stand-to-sit  -MOISE (r) RAYMOND (t) MOISE (c)     [place] Level/Cues Needed (Transfer Goal 1, PT) independent  -MOISE (r) RAYMOND (t) MOISE (c)     Time Frame (Transfer Goal 1, PT) by discharge  -MOISE (r) RAYMOND (t) MOISE (c)     Progress/Outcome (Transfer Goal 1, PT) goal ongoing;continuing progress toward goal  -MOISE (r) RAYMOND (t) MOISE (c)     Row Name 07/14/22 0810          Gait Training Goal 1 (PT)    Activity/Assistive Device (Gait Training Goal 1, PT) gait (walking locomotion)  -MOISE (r) RAYMOND (t) MOISE (c)     [place] Level (Gait Training Goal 1, PT) independent  -MOISE (r) RAYMOND (t) MOISE (c)     Distance (Gait Training Goal 1, PT) 75ft  -MOISE (r) RAYMOND (t) MOISE (c)     Time Frame (Gait Training Goal 1, PT) by discharge  -MOISE (r) RAYMOND (t) MOISE (c)     Progress/Outcome (Gait Training Goal 1, PT) goal ongoing;continuing progress toward goal  -MOISE (r) RAYMOND (t) MOISE (c)     Row Name 07/14/22 0810          Therapy Assessment/Plan (PT)    Planned Therapy Interventions (PT) balance training;bed mobility training;gait training;home exercise program;strengthening;ROM (range of motion);postural re-education;patient/family education;transfer training  -MOISE (r) RAYMOND (t) MOISE (c)           User Key  (r) = Recorded By, (t) = Taken By, (c) = Cosigned By    Initials Name Provider Type    Morteza Edmondson, PT DPT Physical Therapist    Robert Goldberg, ELIF Student PT Student               Clinical Impression     Row Name 07/14/22 0810          Pain    Pretreatment Pain Rating 0/10 -  no pain  -MOISE (r) RAYMOND (t) MOISE (c)     Pain Intervention(s) Medication (See MAR);Repositioned;Ambulation/increased activity  -MOISE (r) RAYMOND (t) MOISE (c)     Row Name 07/14/22 0810          Plan of Care Review    Plan of Care Reviewed With patient  -MOISE (r) RAYMOND (t) MOISE (c)     Outcome Evaluation Pt Re-Eval complete. Pt A&Ox4. Pt states he feels well today and is willing to participate in therapy. Pt O2 at 92% upon arrival with vapotherm. Pt was in fowlers upon arrival. Pt was CGA-min assist x1 to reach EOB. Pt had desat to 85% at this time and was talked about good posture as well as PLB to help. Pt donned non-rebreather mask to assist with O2 back to 92% upon nursing request. BLE MMT strength grossly 4/5. PT completed hip marching and knee extension exercises while at bedside (10x each side) as well as ankle pumps. Pt performed 2 STS transfers with CGAx1. Pt stated he was ready to lay back down and was assisted with min assist to do so. Pt ended session in [facility]. Pt will continue to benefit from skilled PT intervention in order to improve tolerance to increased activity as well as improve strength and endurance. Recommend d.c to SNF pending progress.  -MOISE (r) RAYMOND (t) MOISE (c)     Row Name 07/14/22 0810          Therapy Assessment/Plan (PT)    Patient/Family Therapy Goals Statement (PT) return home  -MOISE (r) RAYMOND (t) MOISE (c)     Rehab Potential (PT) good, to achieve stated therapy goals  -MOISE (r) RAYMOND (t) MOISE (c)     Criteria for Skilled Interventions Met (PT) yes;skilled treatment is necessary  -MOISE (r) RAYMOND (t) MOISE (c)     Therapy Frequency (PT) 2 times/day  -MOISE (r) RAYMOND (t) MOISE (c)     Predicted Duration of Therapy Intervention (PT) until d.c  -MOISE (r) RAYMOND (t) MOISE (c)     Row Name 07/14/22 0810          Vital Signs    Pre SpO2 (%) 92  -MOISE (r) RAYMOND (t) MOISE (c)     O2 Delivery Pre Treatment supplemental O2  vapotherm  -MOISE     Intra SpO2 (%) 85  -MOISE (r) RAYMOND (t) MOISE (c)     O2 Delivery Intra Treatment supplemental O2  vapotherm  -MOISE     Post SpO2 (%) 94   -MOISE (r) RAYMOND (t) MOISE (c)     O2 Delivery Post Treatment supplemental O2  vapotherm  -MOISE     Pre Patient Position Supine  -MOISE (r) RAYMOND (t) MOISE (c)     Intra Patient Position Standing  -MOISE (r) RAYMOND (t) MOISE (c)     Post Patient Position Supine  -MOISE (r) RAYMOND (t) MOISE (c)     Row Name 07/14/22 0810          Positioning and Restraints    Pre-Treatment Position in bed  -MOISE (r) RAYMOND (t) MOISE (c)     Post Treatment Position bed  -MOISE (r) RAYMOND (t) MOISE (c)     In Bed notified nsg;fowlers;call light within reach;encouraged to call for assist;patient within staff view  -MOISE (r) RAYMOND (t) MOISE (c)           User Key  (r) = Recorded By, (t) = Taken By, (c) = Cosigned By    Initials Name Provider Type    Morteza Edmondson, PT DPT Physical Therapist    Robert Goldberg, PT Student PT Student               Outcome Measures     Row Name 07/14/22 0810          How much help from another person do you currently need...    Turning from your back to your side while in flat bed without using bedrails? 3  -MOISE (r) RAYMOND (t) MOISE (c)     Moving from lying on back to sitting on the side of a flat bed without bedrails? 3  -MOISE (r) RAYMOND (t) MOISE (c)     Moving to and from a bed to a chair (including a wheelchair)? 3  -MOISE (r) RAYMOND (t) MOISE (c)     Standing up from a chair using your arms (e.g., wheelchair, bedside chair)? 3  -MOISE (r) RAYMOND (t) MOISE (c)     Climbing 3-5 steps with a railing? 2  -MOISE (r) RAYMOND (t) MOISE (c)     To walk in hospital room? 2  -MOISE (r) RAYMOND (t) MOISE (c)     AM-PAC 6 Clicks Score (PT) 16  -MOISE (r) RAYMOND (t)     Highest level of mobility 5 --> Static standing  -MOISE (r) RAYMOND (t)     Row Name 07/14/22 0810          Functional Assessment    Outcome Measure Options AM-PAC 6 Clicks Basic Mobility (PT)  -MOISE (r) RAYMOND (t) MOISE (c)           User Key  (r) = Recorded By, (t) = Taken By, (c) = Cosigned By    Initials Name Provider Type    Morteza Edmondson, PT DPT Physical Therapist    Robert Goldberg, PT Student PT Student                             Physical Therapy Education                  Title: PT OT SLP Therapies (In Progress)     Topic: Physical Therapy (In Progress)     Point: Mobility training (Done)     Learning Progress Summary           Patient Acceptance, E, VU,DU by  at 7/14/2022 0810    Comment: Pt presents to Re-Eval with SOB. Pt was educated on the benefit of increased activity as well as breathing techniques to improve SOB with activity. Recommend d.c to SNF pending progress.   Family Acceptance, E,TB, VU,NR by  at 7/13/2022 2210      Show all documentation for this point (7)                 Point: Home exercise program (Not Started)     Learner Progress:  Not documented in this visit.          Point: Body mechanics (Not Started)     Learner Progress:  Not documented in this visit.          Point: Precautions (Done)     Learning Progress Summary           Patient Acceptance, E,TB, VU,NR by  at 7/13/2022 2210   Family Acceptance, E,TB, VU,NR by  at 7/13/2022 2210      Show all documentation for this point (5)                             User Key     Initials Effective Dates Name Provider Type Discipline     06/16/21 -  Ganesh Gunter RN Registered Nurse Nurse     05/04/22 -  Robert Garcia, ELIF Student PT Student PT              PT Recommendation and Plan  Planned Therapy Interventions (PT): balance training, bed mobility training, gait training, home exercise program, strengthening, ROM (range of motion), postural re-education, patient/family education, transfer training  Plan of Care Reviewed With: patient  Outcome Evaluation: Pt Re-Eval complete. Pt A&Ox4. Pt states he feels well today and is willing to participate in therapy. Pt O2 at 92% upon arrival with vapotherm. Pt was in fowlers upon arrival. Pt was CGA-min assist x1 to reach EOB. Pt had desat to 85% at this time and was talked about good posture as well as PLB to help. Pt donned non-rebreather mask to assist with O2 back to 92% upon nursing request. BLE MMT strength grossly 4/5. PT completed hip marching and knee  extension exercises while at bedside (10x each side) as well as ankle pumps. Pt performed 2 STS transfers with CGAx1. Pt stated he was ready to lay back down and was assisted with min assist to do so. Pt ended session in fowlers. Pt will continue to benefit from skilled PT intervention in order to improve tolerance to increased activity as well as improve strength and endurance. Recommend d.c to SNF pending progress.     Time Calculation:    PT Charges     Row Name 07/14/22 0810             Time Calculation    Start Time 0810  -MOISE (r) RAYMOND (t) MOISE (c)      Stop Time 0853  -MOISE (r) RAYMOND (t) MOISE (c)      Time Calculation (min) 43 min  -MOISE (r) RAYMOND (t)      PT Received On 07/14/22  -MOISE (r) RAYMOND (t) MOISE (c)      PT Goal Re-Cert Due Date 07/24/22  -MOISE (r) RAYMOND (t) MOISE (c)              Time Calculation- PT    Total Timed Code Minutes- PT 13 minute(s)  -MOISE              Timed Charges    18265 - PT Therapeutic Activity Minutes 13  -MOISE              Total Minutes    Timed Charges Total Minutes 13  -MOISE       Total Minutes 13  -MOISE            User Key  (r) = Recorded By, (t) = Taken By, (c) = Cosigned By    Initials Name Provider Type    Morteza Edmondson, PT DPT Physical Therapist    Robert Goldberg, PT Student PT Student                  PT G-Codes  Outcome Measure Options: AM-PAC 6 Clicks Basic Mobility (PT)  AM-PAC 6 Clicks Score (PT): 16  AM-PAC 6 Clicks Score (OT): 16    Robert Garcia PT Student  7/14/2022

## 2022-07-14 NOTE — PROGRESS NOTES
PULMONARY AND CRITICAL CARE PROGRESS NOTE - [facility]    Patient: Adrián Graham  1940   MR# [id number]   Acct# [id number]  07/14/22   08:50 CDT  Referring Provider: Steven Ross DO    Chief Complaint: Covid-19 -with worsening hypoxemic respiratory failure    Interval history: The patient is resting in bed on vapotherm 40L/1.0FiO2 with eating.  O2 sat 85%-89%.   Overnight vapotherm at 40/0.7-0.8FiO2.  No overnight events reported.     Meds:  ascorbic acid, 500 mg, Oral, Daily  aspirin, 81 mg, Oral, Daily  atorvastatin, 20 mg, Oral, Daily  busPIRone, 10 mg, Oral, TID  cefepime, 2 g, Intravenous, Q8H  cetirizine, 10 mg, Oral, Daily  diphenhydrAMINE, 25 mg, Oral, Nightly  enoxaparin, 1 mg/kg, Subcutaneous, Q12H  famotidine, 20 mg, Oral, BID AC  finasteride, 5 mg, Oral, Daily  fluticasone, 2 spray, Nasal, Daily  furosemide, 20 mg, Oral, BID  gabapentin, 400 mg, Oral, Q12H  guaiFENesin, 1,200 mg, Oral, BID  ipratropium-albuterol, 3 mL, Nebulization, 4x Daily - RT  methylPREDNISolone sodium succinate, 40 mg, Intravenous, Q8H  montelukast, 10 mg, Oral, Nightly  sodium chloride, 250 mL, Intravenous, Once  sodium chloride, 10 mL, Intravenous, Q12H  vitamin D3, 5,000 Units, Oral, Daily  zinc sulfate, 220 mg, Oral, Daily         Review of Systems:   Review of Systems   Constitutional: Positive for fatigue. Negative for fever.   HENT: Positive for congestion.    Respiratory: Positive for cough.    Gastrointestinal: Negative for diarrhea and nausea.   Musculoskeletal: Positive for arthralgias.   Neurological: Positive for weakness.      Physical Exam:  SpO2 Percentage    07/14/22 0636 07/14/22 0700 07/14/22 0800   SpO2: 94% (!) 89% (!) 89%     Temp:  [97.3 °F (36.3 °C)-98.5 °F (36.9 °C)] 97.6 °F (36.4 °C)  Heart Rate:  [60-94] 89  Resp:  [15-26] 16  BP: (106-155)/() 129/59    Intake/Output Summary (Last 24 hours) at 7/14/2022 0850  Last data filed at 7/14/2022 0800  Gross per 24 hour   Intake  1227.78 ml   Output 1725 ml   Net -497.22 ml     Body mass index is 24.2 kg/m².   GENERAL/CONSTITUTIONAL: no distress.  Vapotherm 40L/1.0 FiO2.  HEENT: atraumatic, normocephalic.   NOSE: normal  NECK: jugular veins nondistended  CHEST: no paradox, no retractions.  No respiratory distress.  Diminished breath sounds.    ABDOMEN: nondistended  : deferred  EXTREMITIES: no edema.  NEURO:  Awake and alert   PSYCH: no agitation  SKIN: no jaundice.  No rash    Electronically signed by MEKHI Ware, 7/14/2022, 08:50 CDT        Physician Substantive Portion: Medical Decision Making    Laboratory Data:  Results from last 7 days   Lab Units 07/14/22  0338 07/13/22  0514 07/12/22  0327   WBC 10*3/mm3 17.20* 20.01* 19.06*   HEMOGLOBIN g/dL 10.6* 10.5* 10.3*   PLATELETS 10*3/mm3 301 329 336     Results from last 7 days   Lab Units 07/14/22  0338 07/13/22  0514 07/12/22  0327   SODIUM mmol/L 143 143 145   POTASSIUM mmol/L 4.5 3.5 3.7   BUN mg/dL 29* 30* 28*   CREATININE mg/dL 0.70* 0.70* 0.79     Results from last 7 days   Lab Units 07/11/22  1056 07/10/22  0416 07/09/22  0933   PH, ARTERIAL pH units 7.499* 7.506* 7.476*   PCO2, ARTERIAL mm Hg 42.8 42.9 34.6*   PO2 ART mm Hg 88.1 69.8* 47.0*   FIO2 % 90 70 100     Blood Culture   Date Value Ref Range Status   06/26/2022 No growth at 5 days  Final   06/26/2022 No growth at 5 days  Final     Results from last 7 days   Lab Units 07/14/22  0338 07/11/22  1226 07/11/22  0312 07/10/22  0223 07/07/22  0941   CRP mg/dL 6.72*  --  16.21*   < > 8.16*   PROCALCITONIN ng/mL  --  0.77*  --   --   --    FERRITIN ng/mL  --   --  1,112.00*  --  1,243.00*    < > = values in this interval not displayed.      Recent films:  No radiology results from the last 24 hrs   Physician Substantive Portion: Medical Decision Making    Results from last 7 days   Lab Units 07/14/22  0338 07/13/22  0514 07/12/22  0327   WBC 10*3/mm3 17.20* 20.01* 19.06*   HEMOGLOBIN g/dL 10.6* 10.5* 10.3*    PLATELETS 10*3/mm3 301 329 336     Results from last 7 days   Lab Units 07/14/22  0338 07/13/22  0514 07/12/22  0327   SODIUM mmol/L 143 143 145   POTASSIUM mmol/L 4.5 3.5 3.7   CO2 mmol/L 33.0* 35.0* 37.0*   BUN mg/dL 29* 30* 28*   CREATININE mg/dL 0.70* 0.70* 0.79   MAGNESIUM mg/dL  --  2.3 2.3   PHOSPHORUS mg/dL  --  3.2  --    GLUCOSE mg/dL 147* 109* 127*     Results from last 7 days   Lab Units 07/11/22  1056 07/10/22  0416 07/09/22  0933   PH, ARTERIAL pH units 7.499* 7.506* 7.476*   PCO2, ARTERIAL mm Hg 42.8 42.9 34.6*   PO2 ART mm Hg 88.1 69.8* 47.0*   FIO2 % 90 70 100     Lab Results   Component Value Date    PROBNP 1,299.0 07/01/2022     Blood Culture   Date Value Ref Range Status   06/26/2022 No growth at 5 days  Final   06/26/2022 No growth at 5 days  Final       Recent radiology:   Imaging Results (Last 72 Hours)     Procedure Component Value Units Date/Time    XR Chest 1 View [[id number]] Collected: 07/11/22 1111     Updated: 07/11/22 1115    Narrative:      EXAM: XR CHEST 1 VW- 7/11/2022 10:54 AM CDT     HISTORY: f/u xray, hypoxia; R09.02-Hypoxemia; I95.1-Orthostatic  hypotension; U07.1-COVID-19; J12.82-Pneumonia due to coronavirus disease  2019; I26.94-Multiple subsegmental pulmonary emboli without acute cor  pulmonale; Z74.09-Other reduced mobility; Z78.9-Other specified health  status       COMPARISON: July 9, 2022.     TECHNIQUE: Frontal radiograph of the chest     FINDINGS:   Bilateral interstitial and airspace filling opacities are noted. This is  been described as pneumonia and is stable and unchanged from July 9, 2022.. Cardiac silhouette is normal..      The osseous structures and surrounding soft tissues demonstrate no acute  abnormality.          Impression:      1. Persistent bilateral interstitial and airspace filling opacities most  consistent with pneumonia. This is similar to July 9, 2022.        This report was finalized on 07/11/2022 11:12 by Dr. Don Delacruz MD.        Results  for orders placed during the hospital encounter of 07/01/22    Adult Transthoracic Echo Limited W/ Cont if Necessary Per Protocol    Interpretation Summary  · This is a limited echocardiogram.  · Left ventricular systolic function is normal. Left ventricular ejection fraction appears to be 61 - 65%.  · The right ventricular cavity is mildly dilated. Normal right ventricular systolic function noted.        My radiograph interpretation/independent review of imaging: no new films since 7/11    Pulmonary Assessment:    1. Acute respiratory failure with hypoxia, unimproved  2. Pulmonary infiltrates related to above, likely organizing pneumonia, unimproved  3. Status post pulmonary embolism  4. History of hypertension  5. History of COVID  6. Weakness and deconditioning    Recommend/plan:   · Continue Vapotherm currently at maximal flow, monitor saturation  · Continue utilizing BiPAP as needed, modestly effective for him  · Continue IV steroids  · Continue bronchodilators  · Follow-up chest x-ray  · Continue antibiotics per ID      This visit was performed by both a physician and an [facility] Practice RN.  I personally evaluated and examined the patient.  I performed all aspects of the medical decision making as documented.  Electronically signed by Adilson Cazares MD, 7/14/2022, 10:29 CDT

## 2022-07-14 NOTE — NURSING NOTE
Patient had an uneventful night. Oxygen titrations with heated high-flow was between 40/70 and 40/80. Pt unable to come down any further. He reported sleeping/resting better. UOP good. No overt complaints, signs of complications.

## 2022-07-14 NOTE — THERAPY TREATMENT NOTE
Patient Name: Adrián Graham  : 1940    MRN: [id number]                              Today's Date: 2022       Admit Date: 2022    Visit Dx:     ICD-10-CM ICD-9-CM   1. Hypoxemia  R09.02 799.02   2. Orthostatic hypotension  I95.1 458.0   3. Pneumonia due to COVID-19 virus  U07.1 480.8    J12.82 079.89   4. Multiple subsegmental pulmonary emboli without acute cor pulmonale (HCC)  I26.94 415.19   5. Impaired mobility  Z74.09 799.89   6. Decreased activities of daily living (ADL)  Z78.9 V49.89     Patient Active Problem List   Diagnosis   • Idiopathic peripheral neuropathy   • Essential hypertension   • Benign prostatic hyperplasia without lower urinary tract symptoms   • Overweight (BMI 25.0-29.9)   • Carpal tunnel syndrome, left   • Bilateral carotid artery disease (HCC)   • Hyperlipidemia   • Preop cardiovascular exam [facility] Dr Brown   • Single subsegmental pulmonary embolism without acute cor pulmonale (HCC)   • Pulmonary embolism associated with COVID-19 (HCC)   • Acute respiratory failure with hypoxia (HCC)   • Moderate malnutrition (HCC)     Past Medical History:   Diagnosis Date   • Abdominal distention    • Abdominal pain, left upper quadrant    • Allergic rhinitis    • Arthritis    • Bloating    • BPH (benign prostatic hyperplasia)    • Carpal tunnel syndrome    • Change in bowel habits    • Constipation    • Diarrhea    • Erectile dysfunction    • GERD (gastroesophageal reflux disease)    • History of shingles    • Hypertension    • Idiopathic peripheral neuropathy    • Neuropathy    • Pneumonia    • Shingles    • Weight loss      Past Surgical History:   Procedure Laterality Date   • APPENDECTOMY     • BACK SURGERY     • CARPAL TUNNEL RELEASE Right    • CARPAL TUNNEL RELEASE Left 2022   • CHOLECYSTECTOMY     • COLONOSCOPY  2010    5 yr-complete colon not visualized   • COLONOSCOPY  2004    extremely tortuous redundant colon. BE-mild diverticulosis without diverticulitis. ?  Gallstones-Dr Chery.   • COLONOSCOPY N/A 09/10/2020    Procedure: COLONOSCOPY WITH ANESTHESIA;  Surgeon: Mani Sy DO;  Location:  [facility];  Service: Gastroenterology;  Laterality: N/A;  Pre: Positive Colorectal Screening  Post: Diverticulosis  Dr. Mooney  CO2 Inflation Used   • ENDOSCOPY N/A 09/26/2016    Procedure: ESOPHAGOGASTRODUODENOSCOPY WITH ANESTHESIA;  Surgeon: Mani Sy DO;  Location:  [facility];  Service:    • HIP SURGERY Right    • JOINT REPLACEMENT     • KNEE SURGERY     • OTHER SURGICAL HISTORY      Surgery for Spinal Stenosis   • OTHER SURGICAL HISTORY      Laser Prostate Surgery      General Information     Row Name 07/14/22 1126          OT Time and Intention    Document Type therapy note (daily note)  -CS (r) AD (t) CS (c)     Mode of Treatment occupational therapy  -CS (r) AD (t) CS (c)     Row Name 07/14/22 1126          General Information    Patient Profile Reviewed yes  -CS (r) AD (t) CS (c)     Existing Precautions/Restrictions fall;oxygen therapy device and L/min  -CS (r) AD (t) CS (c)           User Key  (r) = Recorded By, (t) = Taken By, (c) = Cosigned By    Initials Name Provider Type    CS Meron Levin S, OTR/L, CNT Occupational Therapist    Carolyn Franco OT Student OT Student                 Mobility/ADL's     Row Name 07/14/22 1126          Bed Mobility    Bed Mobility supine-sit  -CS (r) AD (t) CS (c)     Supine-Sit [place] (Bed Mobility) minimum assist (75% patient effort);verbal cues  -CS (r) AD (t) CS (c)     Assistive Device (Bed Mobility) bed rails;head of bed elevated  -CS (r) AD (t) CS (c)     Comment, (Bed Mobility) chair at EOS  -CS (r) AD (t) CS (c)     Row Name 07/14/22 1126          Transfers    Transfers sit-stand transfer;stand-sit transfer;bed-chair transfer  -CS (r) AD (t) CS (c)     Bed-Chair [place] (Transfers) minimum assist (75% patient effort);verbal cues;1 person assist  -CS (r) AD (t) CS (c)     Sit-Stand  [place] (Transfers) verbal cues;minimum assist (75% patient effort)  -CS (r) AD (t) CS (c)     Stand-Sit [place] (Transfers) verbal cues;minimum assist (75% patient effort)  -CS (r) AD (t) CS (c)     Row Name 07/14/22 [facility]          Bed-Chair Transfer    Comment, (Bed-Chair Transfer) desat to 74%; applied non breather over vapotheram at 40L/80%. Extended time O2 returned to 90%. Pt reports fear with bed-chair t/f and was provided reassurance and encouagement. Pt required vcs to stand with neutral posture. Pt slightly unsteady and shuffled feet with stand-step t/f but no true LOB.  -CS (r) AD (t) CS (c)     Row Name 07/14/22 [facility]          Sit-Stand Transfer    Assistive Device (Sit-Stand Transfers) other (see comments)  HHA  -CS (r) AD (t) CS (c)     Row Name 07/14/22 [facility]          Stand-Sit Transfer    Assistive Device (Stand-Sit Transfers) other (see comments)  HHA  -CS (r) AD (t) CS (c)     Row Name 07/14/22 [facility]          Activities of Daily Living    BADL Assessment/Intervention feeding;grooming;upper body dressing  -CS (r) AD (t) CS (c)     Row Name 07/14/22 [facility]          Grooming Assessment/Training    [place] Level (Grooming) nail care;set up;standby assist  -CS (r) AD (t) CS (c)     Position (Grooming) supported sitting  -CS (r) AD (t) CS (c)     Row Name 07/14/22 [facility]          Upper Body Dressing Assessment/Training    [place] Level (Upper Body Dressing) don;doff;moderate assist (50% patient effort)  -CS (r) AD (t) CS (c)     Position (Upper Body Dressing) supported sitting  -CS (r) AD (t) CS (c)     Comment, (Upper Body Dressing) hospital gown. mod A d/t lines  -CS (r) AD (t) CS (c)     Row Name 07/14/22 [facility]          Self-Feeding Assessment/Training    [place] Level (Feeding) prepare tray/open items;minimum assist (75% patient effort);moderate assist (50% patient effort);feeding skills;modified independence  -CS (r) AD (t) CS (c)     Position (Self-Feeding) supported sitting  -CS  (r) AD (t) CS (c)     Comment, (Feeding) Min A for tray prep d/t N/T and decreased ROM in L hand  -CS (r) AD (t) CS (c)           User Key  (r) = Recorded By, (t) = Taken By, (c) = Cosigned By    Initials Name Provider Type    CS LevinMeron S, OTR/L, CNT Occupational Therapist    Carolyn Franco, OT Student OT Student               Obj/Interventions    No documentation.                Goals/Plan    No documentation.                Clinical Impression     Row Name 07/14/22 1126          Pain Assessment    Pretreatment Pain Rating 4/10  -CS (r) AD (t) CS (c)     Posttreatment Pain Rating 3/10  -CS (r) AD (t) CS (c)     Pain Location generalized  -CS (r) AD (t) CS (c)     Row Name 07/14/22 1126          Plan of Care Review    Plan of Care Reviewed With patient  -CS (r) AD (t) CS (c)     Outcome Evaluation OT tx complete. Pt AOx4 and agreeable to session. Pt completed bed mobility with min A, and sat EOB for ~5 min to increase sitting tolerance and rest to increase O2 from mid 80s. Pt stood with min A and HHA and completed stand-step t/f to chair at bedside. Pt voiced fear regarding this t/f however with reassurement and encouragement was agreeable and successfully completed the transfer. Pt with min A with t/f d/t unsteadiness and shuffling steps. Pt required vcs to maintain neutral posture as he attempted to reach for chair majority of t/f. Pt desat to 74 with t/f and nonbreather was applied over 40L/80% vapotherm. With increased time, pts O2 returned to 90. Clean hospital gown donned with mod A d/t lines. Pt performed nail hygiene with set up. Pt required set up and min A for meal tray prep d/t limited ROM and N/T in L hand. Continue OT POC.  -CS (r) AD (t) CS (c)     Row Name 07/14/22 1126          Vital Signs    Pre SpO2 (%) 91  -CS (r) AD (t) CS (c)     O2 Delivery Pre Treatment other (see comments)  vapotherm 40L 80%  -CS (r) AD (t) CS (c)     Intra SpO2 (%) 74  -CS (r) AD (t) CS (c)     O2 Delivery Intra  Treatment other (see comments)  40L 80%  -CS (r) AD (t) CS (c)     Post SpO2 (%) 90  -CS (r) AD (t) CS (c)     O2 Delivery Post Treatment other (see comments)  vapotherm 40L 80% and non breather following chair transfer  -CS (r) AD (t) CS (c)     Pre Patient Position Supine  -CS (r) AD (t) CS (c)     Intra Patient Position Sitting  -CS (r) AD (t) CS (c)     Post Patient Position Sitting  -CS (r) AD (t) CS (c)     Row Name 07/14/22 1126          Positioning and Restraints    Pre-Treatment Position in bed  -CS (r) AD (t) CS (c)     Post Treatment Position chair  -CS (r) AD (t) CS (c)     In Chair reclined;call light within reach;encouraged to call for assist;with nsg;patient within staff view;legs elevated  -CS (r) AD (t) CS (c)           User Key  (r) = Recorded By, (t) = Taken By, (c) = Cosigned By    Initials Name Provider Type    CS Meron Levin, OTR/L, CNT Occupational Therapist    Carolyn Franco, OT Student OT Student               Outcome Measures     Row Name 07/14/22 1126          How much help from another is currently needed...    Putting on and taking off regular lower body clothing? 2  -CS (r) AD (t) CS (c)     Bathing (including washing, rinsing, and drying) 2  -CS (r) AD (t) CS (c)     Toileting (which includes using toilet bed pan or urinal) 3  -CS (r) AD (t) CS (c)     Putting on and taking off regular upper body clothing 3  -CS (r) AD (t) CS (c)     Taking care of personal grooming (such as brushing teeth) 3  -CS (r) AD (t) CS (c)     Eating meals 3  -CS (r) AD (t) CS (c)     AM-PAC 6 Clicks Score (OT) 16  -CS (r) AD (t)     Row Name 07/14/22 0810          How much help from another person do you currently need...    Turning from your back to your side while in flat bed without using bedrails? 3 (P)   -RAYMOND     Moving from lying on back to sitting on the side of a flat bed without bedrails? 3 (P)   -RAYMOND     Moving to and from a bed to a chair (including a wheelchair)? 3 (P)   -RAYMOND     Standing up  from a chair using your arms (e.g., wheelchair, bedside chair)? 3 (P)   -RAYMOND     Climbing 3-5 steps with a railing? 2 (P)   -RAYMOND     To walk in hospital room? 2 (P)   -RAYMOND     AM-PAC 6 Clicks Score (PT) 16 (P)   -RAYMOND     Highest level of mobility 5 --> Static standing (P)   -RAYMOND     Row Name 07/14/22 0810          Functional Assessment    Outcome Measure Options AM-PAC 6 Clicks Basic Mobility (PT) (P)   -RAYMOND           User Key  (r) = Recorded By, (t) = Taken By, (c) = Cosigned By    Initials Name Provider Type    CS Meron Levin, OTR/L, CNT Occupational Therapist    RAYMOND Robert Garcia, PT Student PT Student    Carolyn Franco, OT Student OT Student                Occupational Therapy Education                 Title: PT OT SLP Therapies (In Progress)     Topic: Occupational Therapy (Done)     Point: ADL training (Done)     Description:   Instruct learner(s) on proper safety adaptation and remediation techniques during self care or transfers.   Instruct in proper use of assistive devices.              Learning Progress Summary           Patient Acceptance, E, VU by AD at 7/14/2022 1342   Family Acceptance, E,TB, VU,NR by  at 7/13/2022 2210      Show all documentation for this point (7)                 Point: Home exercise program (Done)     Description:   Instruct learner(s) on appropriate technique for monitoring, assisting and/or progressing therapeutic exercises/activities.              Learning Progress Summary           Patient Acceptance, E, VU by AD at 7/14/2022 1342   Family Acceptance, E,TB, VU,NR by  at 7/13/2022 2210      Show all documentation for this point (6)                 Point: Precautions (Done)     Description:   Instruct learner(s) on prescribed precautions during self-care and functional transfers.              Learning Progress Summary           Patient Acceptance, E, VU by AD at 7/14/2022 1342   Family Acceptance, E,TB, VU,NR by  at 7/13/2022 2210      Show all documentation for this point  (7)                 Point: Body mechanics (Done)     Description:   Instruct learner(s) on proper positioning and spine alignment during self-care, functional mobility activities and/or exercises.              Learning Progress Summary           Patient Acceptance, E, VU by AD at 7/14/2022 1342   Family Acceptance, E,TB, VU,NR by  at 7/13/2022 2210      Show all documentation for this point (7)                             User Key     Initials Effective Dates Name Provider Type Discipline     06/16/21 -  Ganesh Gunter RN Registered Nurse Nurse     05/04/22 -  Carolyn Sandoval OT Student OT Student OT              OT Recommendation and Plan  Planned Therapy Interventions (OT): activity tolerance training, BADL retraining, functional balance retraining, occupation/activity based interventions, patient/caregiver education/training, strengthening exercise, transfer/mobility retraining  Therapy Frequency (OT): 5 times/wk  Plan of Care Review  Plan of Care Reviewed With: patient  Outcome Evaluation: OT tx complete. Pt AOx4 and agreeable to session. Pt completed bed mobility with min A, and sat EOB for ~5 min to increase sitting tolerance and rest to increase O2 from mid 80s. Pt stood with min A and HHA and completed stand-step t/f to chair at bedside. Pt voiced fear regarding this t/f however with reassurement and encouragement was agreeable and successfully completed the transfer. Pt with min A with t/f d/t unsteadiness and shuffling steps. Pt required vcs to maintain neutral posture as he attempted to reach for chair majority of t/f. Pt desat to 74 with t/f and nonbreather was applied over 40L/80% vapotherm. With increased time, pts O2 returned to 90. Clean hospital gown donned with mod A d/t lines. Pt performed nail hygiene with set up. Pt required set up and min A for meal tray prep d/t limited ROM and N/T in L hand. Continue OT POC.     Time Calculation:    Time Calculation- OT     Row Name 07/14/22 1127              Time Calculation- OT    OT Start Time 1126  -CS (r) AD (t) CS (c)      OT Stop Time 1157  -CS (r) AD (t) CS (c)      OT Time Calculation (min) 31 min  -CS (r) AD (t)      Total Timed Code Minutes- OT 31 minute(s)  -CS (r) AD (t) CS (c)              Timed Charges    19828 - OT Self Care/Mgmt Minutes 31  -CS (r) AD (t) CS (c)              Total Minutes    Timed Charges Total Minutes 31  -CS (r) AD (t)       Total Minutes 31  -CS (r) AD (t)            User Key  (r) = Recorded By, (t) = Taken By, (c) = Cosigned By    Initials Name Provider Type    CS Meron Levin, OTR/L, CNT Occupational Therapist    Carolyn Franco, OT Student OT Student                       Carolyn Sandoval OT Student  7/14/2022

## 2022-07-15 NOTE — PLAN OF CARE
Goal Outcome Evaluation:  Plan of Care Reviewed With: patient        Progress: improving  Outcome Evaluation: Ntn follow up. Pt reports to this RD that he is eating pretty good for being in the hospital. He is drinking the Boost Plus BID. He has consumed 67% of the last 3 meals. Obtained more food preferences from pt and relayed via diet message to [facility] for meals, specifically breakfast choices. Will also increase Boost to TID. Weights have varied up and down since admission, current weight does not indicate loss while in the hospital. Will cont to follow and encourage intake.

## 2022-07-15 NOTE — PROGRESS NOTES
[facility] Medicine Services  INPATIENT PROGRESS NOTE    Patient Name: Adrián Graham  Date of Admission: 7/1/2022  Today's Date: 07/15/22  Length of Stay: 14  Primary Care Physician: Mauro Irizarry DO    Subjective   Chief Complaint: f/u acute respiratory failure with hypoxia    HPI:  Patient looks a little bit better today.  40 L only on 80% FiO2/Vapotherm.  He ate a decent breakfast with toast and fruit.  No fevers overnight.  No chest pain.  Wanting to get out of bed to chair.    Review of Systems   All pertinent negatives and positives are as above. All other systems have been reviewed and are negative unless otherwise stated.     Objective    Temp:  [97.5 °F (36.4 °C)-98.5 °F (36.9 °C)] 97.6 °F (36.4 °C)  Heart Rate:  [61-93] 80  Resp:  [16-33] 16  BP: ()/() 138/62  Physical Exam  GEN: Awake, alert, interactive, no acute distress  HEENT: PERRLA, EOMI, Anicteric, Trachea midline  Lungs:  Somewhat diminished but overall fairly clear lung sounds.  No wheezing or rales.  Good effort.   Heart: RRR, +S1/s2, no rub  ABD: soft, nt/nd, +BS, no guarding/rebound  Extremities: atraumatic, no cyanosis  Skin: no rashes or petechiae  Neuro: AAOx3, no focal deficits  Psych: normal mood & affect      Results Review:  I have reviewed the labs, radiology results, and diagnostic studies.    Laboratory Data:   Results from last 7 days   Lab Units 07/15/22  0401 07/14/22  0338 07/13/22  0514   WBC 10*3/mm3 22.81* 17.20* 20.01*   HEMOGLOBIN g/dL 10.9* 10.6* 10.5*   HEMATOCRIT % 34.9* 33.3* 34.4*   PLATELETS 10*3/mm3 322 301 329        Results from last 7 days   Lab Units 07/14/22  0338 07/13/22  0514 07/12/22  0327 07/11/22  0312 07/10/22  0223   SODIUM mmol/L 143 143 145   < > 141   POTASSIUM mmol/L 4.5 3.5 3.7   < > 3.6   CHLORIDE mmol/L 104 103 101   < > 104   CO2 mmol/L 33.0* 35.0* 37.0*   < > 31.0*   BUN mg/dL 29* 30* 28*   < > 20   CREATININE mg/dL 0.70* 0.70* 0.79   < >  0.78   CALCIUM mg/dL 8.7 8.9 9.1   < > 8.6   BILIRUBIN mg/dL  --   --   --   --  0.3   ALK PHOS U/L  --   --   --   --  76   ALT (SGPT) U/L  --   --   --   --  38   AST (SGOT) U/L  --   --   --   --  30   GLUCOSE mg/dL 147* 109* 127*   < > 114*    < > = values in this interval not displayed.       Culture Data:   Blood Culture   Date Value Ref Range Status   06/26/2022 No growth at 5 days  Final   06/26/2022 No growth at 5 days  Final       Radiology Data:   Imaging Results (Last 24 Hours)     Procedure Component Value Units Date/Time    XR Chest 1 View [[id number]] Collected: 07/15/22 0725     Updated: 07/15/22 0731    Narrative:      EXAM/TECHNIQUE: XR CHEST 1 VW-     INDICATION: Respiratory failure; R09.02-Hypoxemia; I95.1-Orthostatic  hypotension; U07.1-COVID-19; J12.82-Pneumonia due to coronavirus disease  2019; I26.94-Multiple subsegmental pulmonary emboli without acute cor  pulmonale; Z74.09-Other reduced mobility; Z78.9-Other specified health  status     COMPARISON: 7/11/2022     FINDINGS:     Enlarged but stable cardiac silhouette. No significant change in diffuse  bilateral interstitial and airspace opacity. No large pleural effusion.  No visible pneumothorax. No acute osseous finding.       Impression:         No significant change in diffuse bilateral interstitial and airspace  opacity.  This report was finalized on 07/15/2022 07:27 by Dr. Helio Shepherd MD.          I have reviewed the patient's current medications.     Assessment/Plan     Active Hospital Problems    Diagnosis    • Moderate malnutrition (HCC)    • Acute respiratory failure with hypoxia (HCC)    • Pulmonary embolism associated with COVID-19 (HCC)    • Bilateral carotid artery disease (HCC)    • Hyperlipidemia    • Benign prostatic hyperplasia without lower urinary tract symptoms    • Essential hypertension        Plan:  #1 acute respiratory failure with hypoxia -difficult case.  Likely multifactorial.  Recent COVID and PE.  Presented with  concern for cryptogenic organizing pneumonia.  Was on steroids and antibiotics.  Initially was getting better and oxygen was weaning to 4 L.  He however worsened again over 7/8 through 7/10.  Ended up in the ICU Sunday and when I saw him on 7/11 he was on max oxygen with low sats.  We gave him an extra high-dose slug of steroid at 125 and he did better over the next 24 hours with decreasing O2 needs.  He started to slowly drift back up with O2 needs however and then on 7/13 we increased steroids to Solu-Medrol 40mg iv 3 times daily.  CRP starting to trend down after burst of steroids and increased dose.  Also on cefepime due to elevated fever few days ago.  No subsequent fevers.  Blood cultures negative.  White count trending down.  Procalcitonin started to trend down.  O2 starting to slowly trend down.  Continue respiratory exercises and incentive spirometry.  Up to chair.  Would benefit from LTAC.  Pulmonary and ID following.    #2 pulmonary embolism -recent diagnosis.  Currently on Lovenox    #3 COVID-19 -now negative.  Posttreatment.  Potentially have secondary organizing pneumonia or even fibrosis post-COVID.    #4 hypertension -BP stable.  Monitor.    #5 hyperlipidemia -statin    #6 BPH -Proscar    Discharge Planning: Ongoing.  Continue in the CCU.  Patient may benefit for discharge to LTAC.  Evaluation underway.  Likely will not have beds until early next week however.    Electronically signed by Steven Ross DO, 07/15/22, 08:10 CDT.

## 2022-07-15 NOTE — PLAN OF CARE
Goal Outcome Evaluation:  Plan of Care Reviewed With: patient           Outcome Evaluation: OT tx complete. Pt AOx4 and agreeable to therapy. Pt completed bed mob with min A, requiring seated rest break to increase O2 while maintaining balance and posture. Pt stood with min A and HHA. Stand-step t/f ompleted to chair, pt requiring vcs for safe sitting. Pt with forward flexed posture and shuffling steps with t/f, with slight unsteadiness. Desat to 79% and non breather donned until O2 at 90. UE AROM with holds and isometrics completed to work on UE endurance and strength for I in ADLs. Pt fatigues quickly, with desat to 84 on 40L/80. O2 not improving, vapotherm increased to 40L/100% by RN. By EOS O2 inc to 89-90%. Continue OT POC. Recommended d/c LTACH vs SNF at this time d/t O2 needs

## 2022-07-15 NOTE — CASE MANAGEMENT/SOCIAL WORK
Continued Stay Note  [facility]     Patient Name: Adrián Graham  MRN: [id number]  Today's Date: 7/15/2022    Admit Date: 7/1/2022     Discharge Plan     Row Name 07/15/22 0840       Plan    Plan LTAC following    Plan Comments Patient remains on 40L high flow O2.  LTAC is following for possible admission pending medical appropriateness and bed availability.  SNF not appropriate at this time due to O2 demands.  SW following and will assist with disposition.               Discharge Codes    No documentation.                     SHAKA Farris

## 2022-07-15 NOTE — PROGRESS NOTES
PULMONARY AND CRITICAL CARE PROGRESS NOTE - [facility]    Patient: Adrián Graham  1940   MR# [id number]   Acct# [id number]  07/15/22   12:09 CDT  Referring Provider: Steven Ross DO    Chief Complaint: Covid-19 -with worsening hypoxemic respiratory failure    Interval history: The patient is resting in bed on vapotherm 40L/0.8FiO2 with eating.  O2 sat 88%.  Overnight vapotherm at 40/0.7-0.8FiO2.  No overnight events reported.     Meds:  ascorbic acid, 500 mg, Oral, Daily  aspirin, 81 mg, Oral, Daily  atorvastatin, 20 mg, Oral, Daily  busPIRone, 10 mg, Oral, TID  cefepime, 2 g, Intravenous, Q8H  cetirizine, 10 mg, Oral, Daily  diphenhydrAMINE, 25 mg, Oral, Nightly  enoxaparin, 1 mg/kg, Subcutaneous, Q12H  famotidine, 20 mg, Oral, BID AC  finasteride, 5 mg, Oral, Daily  fluticasone, 2 spray, Nasal, Daily  furosemide, 20 mg, Oral, BID  gabapentin, 400 mg, Oral, Q12H  guaiFENesin, 1,200 mg, Oral, BID  ipratropium-albuterol, 3 mL, Nebulization, 4x Daily - RT  methylPREDNISolone sodium succinate, 40 mg, Intravenous, Q8H  montelukast, 10 mg, Oral, Nightly  sodium chloride, 250 mL, Intravenous, Once  sodium chloride, 10 mL, Intravenous, Q12H  vitamin D3, 5,000 Units, Oral, Daily  zinc sulfate, 220 mg, Oral, Daily         Review of Systems:   Review of Systems   Constitutional: Positive for fatigue. Negative for fever.   HENT: Positive for congestion.    Respiratory: Positive for cough.    Gastrointestinal: Negative for diarrhea and nausea.   Musculoskeletal: Positive for arthralgias.   Neurological: Positive for weakness.      Physical Exam:  SpO2 Percentage    07/15/22 1000 07/15/22 1048 07/15/22 1100   SpO2: 91% (!) 88% 94%     Temp:  [97.5 °F (36.4 °C)-98.5 °F (36.9 °C)] 98 °F (36.7 °C)  Heart Rate:  [61-90] 83  Resp:  [16-33] 20  BP: ()/() 91/71    Intake/Output Summary (Last 24 hours) at 7/15/2022 1209  Last data filed at 7/15/2022 0800  Gross per 24 hour   Intake 614.65 ml    Output 675 ml   Net -60.35 ml     Body mass index is 25.86 kg/m².   GENERAL/CONSTITUTIONAL: no distress.  Vapotherm 40L/0.8FiO2.  HEENT: atraumatic, normocephalic.   NOSE: normal  NECK: jugular veins nondistended  CHEST: no paradox, no retractions.  No respiratory distress.  Diminished breath sounds.    ABDOMEN: nondistended  : deferred  EXTREMITIES: no edema.  NEURO:  Awake and alert   PSYCH: no agitation  SKIN: no jaundice.  No rash    Electronically signed by MEKHI Ware, 7/15/2022, 12:09 CDT        Physician Substantive Portion: Medical Decision Making    Laboratory Data:  Results from last 7 days   Lab Units 07/15/22  0401 07/14/22  0338 07/13/22  0514   WBC 10*3/mm3 22.81* 17.20* 20.01*   HEMOGLOBIN g/dL 10.9* 10.6* 10.5*   PLATELETS 10*3/mm3 322 301 329     Results from last 7 days   Lab Units 07/15/22  0752 07/14/22  0338 07/13/22  0514   SODIUM mmol/L 142 143 143   POTASSIUM mmol/L 4.1 4.5 3.5   BUN mg/dL 32* 29* 30*   CREATININE mg/dL 0.69* 0.70* 0.70*     Results from last 7 days   Lab Units 07/11/22  1056 07/10/22  0416 07/09/22  0933   PH, ARTERIAL pH units 7.499* 7.506* 7.476*   PCO2, ARTERIAL mm Hg 42.8 42.9 34.6*   PO2 ART mm Hg 88.1 69.8* 47.0*   FIO2 % 90 70 100     Blood Culture   Date Value Ref Range Status   06/26/2022 No growth at 5 days  Final   06/26/2022 No growth at 5 days  Final     Results from last 7 days   Lab Units 07/15/22  0752 07/14/22  0338 07/11/22  1226 07/11/22  0312   CRP mg/dL  --  6.72*  --  16.21*   PROCALCITONIN ng/mL 0.43*  --  0.77*  --    FERRITIN ng/mL  --   --   --  1,112.00*      Recent films:  XR Chest 1 View    Result Date: 7/15/2022  EXAM/TECHNIQUE: XR CHEST 1 VW-  INDICATION: Respiratory failure; R09.02-Hypoxemia; I95.1-Orthostatic hypotension; U07.1-COVID-19; J12.82-Pneumonia due to coronavirus disease 2019; I26.94-Multiple subsegmental pulmonary emboli without acute cor pulmonale; Z74.09-Other reduced mobility; Z78.9-Other specified health  status  COMPARISON: 7/11/2022  FINDINGS:  Enlarged but stable cardiac silhouette. No significant change in diffuse bilateral interstitial and airspace opacity. No large pleural effusion. No visible pneumothorax. No acute osseous finding.      Impression:  No significant change in diffuse bilateral interstitial and airspace opacity. This report was finalized on 07/15/2022 07:27 by Dr. Helio Shepherd MD.     Physician Substantive Portion: Medical Decision Making    Results from last 7 days   Lab Units 07/15/22  0401 07/14/22  0338 07/13/22  0514   WBC 10*3/mm3 22.81* 17.20* 20.01*   HEMOGLOBIN g/dL 10.9* 10.6* 10.5*   PLATELETS 10*3/mm3 322 301 329     Results from last 7 days   Lab Units 07/15/22  0752 07/14/22  0338 07/13/22  0514 07/12/22  0327   SODIUM mmol/L 142 143 143 145   POTASSIUM mmol/L 4.1 4.5 3.5 3.7   CO2 mmol/L 33.0* 33.0* 35.0* 37.0*   BUN mg/dL 32* 29* 30* 28*   CREATININE mg/dL 0.69* 0.70* 0.70* 0.79   MAGNESIUM mg/dL  --   --  2.3 2.3   PHOSPHORUS mg/dL  --   --  3.2  --    GLUCOSE mg/dL 161* 147* 109* 127*     Results from last 7 days   Lab Units 07/11/22  1056 07/10/22  0416 07/09/22  0933   PH, ARTERIAL pH units 7.499* 7.506* 7.476*   PCO2, ARTERIAL mm Hg 42.8 42.9 34.6*   PO2 ART mm Hg 88.1 69.8* 47.0*   FIO2 % 90 70 100     Lab Results   Component Value Date    PROBNP 1,299.0 07/01/2022     Blood Culture   Date Value Ref Range Status   06/26/2022 No growth at 5 days  Final   06/26/2022 No growth at 5 days  Final       Recent radiology:   Imaging Results (Last 72 Hours)     Procedure Component Value Units Date/Time    XR Chest 1 View [[id number]] Collected: 07/15/22 0725     Updated: 07/15/22 0731    Narrative:      EXAM/TECHNIQUE: XR CHEST 1 VW-     INDICATION: Respiratory failure; R09.02-Hypoxemia; I95.1-Orthostatic  hypotension; U07.1-COVID-19; J12.82-Pneumonia due to coronavirus disease  2019; I26.94-Multiple subsegmental pulmonary emboli without acute cor  pulmonale; Z74.09-Other reduced  mobility; Z78.9-Other specified health  status     COMPARISON: 7/11/2022     FINDINGS:     Enlarged but stable cardiac silhouette. No significant change in diffuse  bilateral interstitial and airspace opacity. No large pleural effusion.  No visible pneumothorax. No acute osseous finding.       Impression:         No significant change in diffuse bilateral interstitial and airspace  opacity.  This report was finalized on 07/15/2022 07:27 by Dr. Helio Shepherd MD.        Results for orders placed during the hospital encounter of 07/01/22    Adult Transthoracic Echo Limited W/ Cont if Necessary Per Protocol    Interpretation Summary  · This is a limited echocardiogram.  · Left ventricular systolic function is normal. Left ventricular ejection fraction appears to be 61 - 65%.  · The right ventricular cavity is mildly dilated. Normal right ventricular systolic function noted.      My radiograph interpretation/independent review of imaging: diffuse opacities    Pulmonary Assessment:    1. Organizing pneumonia s/p Covid  2. Acute resp failure with hypoxia, severe, requiring high flow oxygen  3. Leukocytosis, on steroids  4. S/p pulm embolus      Recommend/plan:   · Continue steroids, unable to taper  · Continue high flow oxygen, niv if needed      This visit was performed by both a physician and an [facility] RN.  I personally evaluated and examined the patient.  I performed all aspects of the medical decision making as documented.  Electronically signed by Adilson Cazares MD, 7/15/2022, 15:26 CDT

## 2022-07-15 NOTE — THERAPY TREATMENT NOTE
Patient Name: Adrián Graham  : 1940    MRN: [id number]                              Today's Date: 7/15/2022       Admit Date: 2022    Visit Dx:     ICD-10-CM ICD-9-CM   1. Hypoxemia  R09.02 799.02   2. Orthostatic hypotension  I95.1 458.0   3. Pneumonia due to COVID-19 virus  U07.1 480.8    J12.82 079.89   4. Multiple subsegmental pulmonary emboli without acute cor pulmonale (HCC)  I26.94 415.19   5. Impaired mobility  Z74.09 799.89   6. Decreased activities of daily living (ADL)  Z78.9 V49.89     Patient Active Problem List   Diagnosis   • Idiopathic peripheral neuropathy   • Essential hypertension   • Benign prostatic hyperplasia without lower urinary tract symptoms   • Overweight (BMI 25.0-29.9)   • Carpal tunnel syndrome, left   • Bilateral carotid artery disease (HCC)   • Hyperlipidemia   • Preop cardiovascular exam CE[facility] Dr Brown   • Single subsegmental pulmonary embolism without acute cor pulmonale (HCC)   • Pulmonary embolism associated with COVID-19 (HCC)   • Acute respiratory failure with hypoxia (HCC)   • Moderate malnutrition (HCC)     Past Medical History:   Diagnosis Date   • Abdominal distention    • Abdominal pain, left upper quadrant    • Allergic rhinitis    • Arthritis    • Bloating    • BPH (benign prostatic hyperplasia)    • Carpal tunnel syndrome    • Change in bowel habits    • Constipation    • Diarrhea    • Erectile dysfunction    • GERD (gastroesophageal reflux disease)    • History of shingles    • Hypertension    • Idiopathic peripheral neuropathy    • Neuropathy    • Pneumonia    • Shingles    • Weight loss      Past Surgical History:   Procedure Laterality Date   • APPENDECTOMY     • BACK SURGERY     • CARPAL TUNNEL RELEASE Right    • CARPAL TUNNEL RELEASE Left 2022   • CHOLECYSTECTOMY     • COLONOSCOPY  2010    5 yr-complete colon not visualized   • COLONOSCOPY  2004    extremely tortuous redundant colon. BE-mild diverticulosis without diverticulitis. ?  Gallstones-Dr Chery.   • COLONOSCOPY N/A 09/10/2020    Procedure: COLONOSCOPY WITH ANESTHESIA;  Surgeon: Mani Sy DO;  Location:  [facility];  Service: Gastroenterology;  Laterality: N/A;  Pre: Positive Colorectal Screening  Post: Diverticulosis  Dr. Mooney  CO2 Inflation Used   • ENDOSCOPY N/A 09/26/2016    Procedure: ESOPHAGOGASTRODUODENOSCOPY WITH ANESTHESIA;  Surgeon: Mani Sy DO;  Location:  [facility];  Service:    • HIP SURGERY Right    • JOINT REPLACEMENT     • KNEE SURGERY     • OTHER SURGICAL HISTORY      Surgery for Spinal Stenosis   • OTHER SURGICAL HISTORY      Laser Prostate Surgery      General Information     Row Name 07/15/22 1011          OT Time and Intention    Document Type therapy note (daily note)  -MW (r) AD (t) MW (c)     Mode of Treatment occupational therapy  -MW (r) AD (t) MW (c)     Row Name 07/15/22 1011          General Information    Patient Profile Reviewed yes  -MW (r) AD (t) MW (c)     Existing Precautions/Restrictions fall;oxygen therapy device and L/min  -MW (r) AD (t) MW (c)           User Key  (r) = Recorded By, (t) = Taken By, (c) = Cosigned By    Initials Name Provider Type    MW Thuy Ohara, OTR/L Occupational Therapist    Carolyn Franco OT Student OT Student                 Mobility/ADL's     Row Name 07/15/22 1011          Bed Mobility    Bed Mobility supine-sit  -MW (r) AD (t) MW (c)     Supine-Sit [place] (Bed Mobility) minimum assist (75% patient effort);verbal cues  -MW (r) AD (t) MW (c)     Assistive Device (Bed Mobility) head of bed elevated;bed rails  -MW (r) AD (t) MW (c)     Comment, (Bed Mobility) Chair at EOS  -MW (r) AD (t) MW (c)     Row Name 07/15/22 1011          Transfers    Transfers sit-stand transfer;stand-sit transfer;bed-chair transfer  -MW (r) AD (t) MW (c)     Bed-Chair [place] (Transfers) minimum assist (75% patient effort);verbal cues;1 person assist  -MW (r) AD (t) MW (c)     Sit-Stand [place]  (Transfers) verbal cues;minimum assist (75% patient effort)  -MW (r) AD (t) MW (c)     Stand-Sit [place] (Transfers) verbal cues;minimum assist (75% patient effort)  -MW (r) AD (t) MW (c)     Row Name 07/15/22 1011          Bed-Chair Transfer    Comment, (Bed-Chair Transfer) Pt desat to 79% on 40/80 vapotherm, non breather applied, O2 return to 90. Pt continues with reports of fear with t/f however completes willingly. Pt with min A for balance and vcs for postureal control and safety with sitting.  -MW (r) AD (t) MW (c)     Row Name 07/15/22 1011          Sit-Stand Transfer    Assistive Device (Sit-Stand Transfers) other (see comments)  HHA  -MW (r) AD (t) MW (c)     Row Name 07/15/22 1011          Stand-Sit Transfer    Comment, (Stand-Sit Transfer) vcs for safety with sitting  -MW (r) AD (t) MW (c)           User Key  (r) = Recorded By, (t) = Taken By, (c) = Cosigned By    Initials Name Provider Type    MW Thuy Ohara, OTR/L Occupational Therapist    Carolyn Franco OT Student OT Student               Obj/Interventions     Row Name 07/15/22 1101          Range of Motion Comprehensive    Comment, General Range of Motion UE AROM with holds and isometrics completed to work on UE endurance and strength for I in ADLs. Pt fatigues quickly, with desat to 84 on 40L/80. O2 not improving, vapotherm increased to 40L/100% by RN. By EOS O2 inc to 89-90%.  -MW (r) AD (t) MW (c)     Row Name 07/15/22 1101          Shoulder (Therapeutic Exercise)    Shoulder (Therapeutic Exercise) isometric exercises;AROM (active range of motion)  -MW (r) AD (t) MW (c)     Shoulder AROM (Therapeutic Exercise) bilateral;flexion;aBduction;2 sets;10 repetitions;5 repetitions  -MW (r) AD (t) MW (c)     Shoulder Isometrics (Therapeutic Exercise) bilateral;flexion;aBduction;2 sets;10 repetitions;3 second hold  -MW (r) AD (t) MW (c)     Row Name 07/15/22 1101          Elbow/Forearm (Therapeutic Exercise)    Elbow/Forearm (Therapeutic  Exercise) AROM (active range of motion);isometric exercise  -MW (r) AD (t) MW (c)     Elbow/Forearm AROM (Therapeutic Exercise) bilateral;flexion;extension;2 sets;15 repititions  -MW (r) AD (t) MW (c)     Elbow/Forearm Isometrics (Therapeutic Exercise) bilateral;flexion;extension;2 sets;10 repetitions;5 second hold  -MW (r) AD (t) MW (c)     Row Name 07/15/22 1101          Motor Skills    Therapeutic Exercise shoulder;elbow/forearm  -MW (r) AD (t) MW (c)     [facility] Name 07/15/22 1101          Balance    Static Sitting Balance standby assist  -MW (r) AD (t) MW (c)     Dynamic Sitting Balance standby assist  -MW (r) AD (t) MW (c)     Comment, Balance Pt sat EOB with SBA to allow time for O2 to increase following bed mob.  -MW (r) AD (t) MW (c)           User Key  (r) = Recorded By, (t) = Taken By, (c) = Cosigned By    Initials Name Provider Type    MW Thuy Ohara, OTR/L Occupational Therapist    AD Carolyn Sandoval OT Student OT Student               Goals/Plan    No documentation.                Clinical Impression     Row Name 07/15/22 1011          Pain Assessment    Pretreatment Pain Rating 0/10 - no pain  -MW (r) AD (t) MW (c)     Posttreatment Pain Rating 0/10 - no pain  -MW (r) AD (t) MW (c)     Pre/Posttreatment Pain Comment occasional pain from hip, pt reports not currently in pain  -MW (r) AD (t) MW (c)     Row Name 07/15/22 1011          Plan of Care Review    Plan of Care Reviewed With patient  -MW (r) AD (t) MW (c)     Outcome Evaluation OT tx complete. Pt AOx4 and agreeable to therapy. Pt completed bed mob with min A, requiring seated rest break to increase O2 while maintaining balance and posture. Pt stood with min A and HHA. Stand-step t/f ompleted to chair, pt requiring vcs for safe sitting. Pt with forward flexed posture and shuffling steps with t/f, with slight unsteadiness. Desat to 79% and non breather donned until O2 at 90. UE AROM with holds and isometrics completed to work on UE endurance and  strength for I in ADLs. Pt fatigues quickly, with desat to 84 on 40L/80. O2 not improving, vapotherm increased to 40L/100% by RN. By EOS O2 inc to 89-90%. Continue OT POC. Recommended d/c LTACH vs SNF at this time d/t O2 needs  -MW (r) AD (t) MW (c)     Row Name 07/15/22 1011          Vital Signs    Pre SpO2 (%) 90  -MW (r) AD (t) MW (c)     O2 Delivery Pre Treatment other (see comments)  vapotherm 40L/80%  -MW (r) AD (t) MW (c)     Intra SpO2 (%) 79  -MW (r) AD (t) MW (c)     O2 Delivery Intra Treatment non-rebreather  vapotherm 40/80  -MW (r) AD (t) MW (c)     Post SpO2 (%) 90  -MW (r) AD (t) MW (c)     O2 Delivery Post Treatment other (see comments)  vapotherm 40/100%  -MW (r) AD (t) MW (c)     Pre Patient Position Supine  -MW (r) AD (t) MW (c)     Intra Patient Position Standing  -MW (r) AD (t) MW (c)     Post Patient Position Sitting  -MW (r) AD (t) MW (c)     Row Name 07/15/22 1011          Positioning and Restraints    Pre-Treatment Position in bed  -MW (r) AD (t) MW (c)     Post Treatment Position chair  -MW (r) AD (t) MW (c)     In Chair notified nsg;call light within reach;encouraged to call for assist;with other staff;legs elevated  -MW (r) AD (t) MW (c)           User Key  (r) = Recorded By, (t) = Taken By, (c) = Cosigned By    Initials Name Provider Type    MW Thuy Ohara, OTR/L Occupational Therapist    Carolyn Franco OT Student OT Student               Outcome Measures     Row Name 07/15/22 1011          How much help from another is currently needed...    Putting on and taking off regular lower body clothing? 2  -MW (r) AD (t) MW (c)     Bathing (including washing, rinsing, and drying) 2  -MW (r) AD (t) MW (c)     Toileting (which includes using toilet bed pan or urinal) 3  -MW (r) AD (t) MW (c)     Putting on and taking off regular upper body clothing 3  -MW (r) AD (t) MW (c)     Taking care of personal grooming (such as brushing teeth) 3  -MW (r) AD (t) MW (c)     Eating meals 3  -MW (r)  AD (t) MW (c)     AM-PAC 6 Clicks Score (OT) 16  -MW (r) AD (t)     Row Name 07/15/22 1011          Functional Assessment    Outcome Measure Options AM-PAC 6 Clicks Daily Activity (OT)  -MW (r) AD (t) MW (c)           User Key  (r) = Recorded By, (t) = Taken By, (c) = Cosigned By    Initials Name Provider Type    MW Thuy Ohara, OTR/L Occupational Therapist    Carolyn Franco, OT Student OT Student                Occupational Therapy Education                 Title: PT OT SLP Therapies (In Progress)     Topic: Occupational Therapy (Done)     Point: ADL training (Done)     Description:   Instruct learner(s) on proper safety adaptation and remediation techniques during self care or transfers.   Instruct in proper use of assistive devices.              Learning Progress Summary           Patient Acceptance, E, VU by AD at 7/15/2022 1250   Family Acceptance, E,TB, VU,NR by  at 7/13/2022 2210      Show all documentation for this point (8)                 Point: Home exercise program (Done)     Description:   Instruct learner(s) on appropriate technique for monitoring, assisting and/or progressing therapeutic exercises/activities.              Learning Progress Summary           Patient Acceptance, E, VU by AD at 7/15/2022 1250   Family Acceptance, E,TB, VU,NR by  at 7/13/2022 2210      Show all documentation for this point (7)                 Point: Precautions (Done)     Description:   Instruct learner(s) on prescribed precautions during self-care and functional transfers.              Learning Progress Summary           Patient Acceptance, E, VU by AD at 7/15/2022 1250   Family Acceptance, E,TB, VU,NR by  at 7/13/2022 2210      Show all documentation for this point (8)                 Point: Body mechanics (Done)     Description:   Instruct learner(s) on proper positioning and spine alignment during self-care, functional mobility activities and/or exercises.              Learning Progress Summary            Patient Acceptance, E, VU by AD at 7/15/2022 1250   Family Acceptance, E,TB, VU,NR by  at 7/13/2022 2210      Show all documentation for this point (8)                             User Key     Initials Effective Dates Name Provider Type Discipline     06/16/21 -  Ganesh Gunter, RN Registered Nurse Nurse    MAT 05/04/22 -  Carolyn Sandoval OT Student OT Student OT              OT Recommendation and Plan  Planned Therapy Interventions (OT): activity tolerance training, BADL retraining, functional balance retraining, occupation/activity based interventions, patient/caregiver education/training, strengthening exercise, transfer/mobility retraining  Therapy Frequency (OT): 5 times/wk  Plan of Care Review  Plan of Care Reviewed With: patient  Outcome Evaluation: OT tx complete. Pt AOx4 and agreeable to therapy. Pt completed bed mob with min A, requiring seated rest break to increase O2 while maintaining balance and posture. Pt stood with min A and HHA. Stand-step t/f ompleted to chair, pt requiring vcs for safe sitting. Pt with forward flexed posture and shuffling steps with t/f, with slight unsteadiness. Desat to 79% and non breather donned until O2 at 90. UE AROM with holds and isometrics completed to work on UE endurance and strength for I in ADLs. Pt fatigues quickly, with desat to 84 on 40L/80. O2 not improving, vapotherm increased to 40L/100% by RN. By EOS O2 inc to 89-90%. Continue OT POC. Recommended d/c LTACH vs SNF at this time d/t O2 needs     Time Calculation:    Time Calculation- OT     Row Name 07/15/22 1011             Time Calculation- OT    OT Start Time 1011  +2 min previous attempt  -MW (r) AD (t) MW (c)      OT Stop Time 1048  -MW (r) AD (t) MW (c)      OT Time Calculation (min) 37 min  -MW (r) AD (t)      Total Timed Code Minutes- OT 39 minute(s)  -MW (r) AD (t) MW (c)      OT Received On 07/15/22  -MW (r) AD (t) MW (c)              Timed Charges    89458 - OT Therapeutic Exercise Minutes 10   -MW (r) AD (t) MW (c)      12139 - OT Self Care/Mgmt Minutes 29  -MW (r) AD (t) MW (c)              Total Minutes    Timed Charges Total Minutes 39  -MW (r) AD (t)       Total Minutes 39  -MW (r) AD (t)            User Key  (r) = Recorded By, (t) = Taken By, (c) = Cosigned By    Initials Name Provider Type    MW Thuy Ohara, OTR/L Occupational Therapist    Carolyn Franco OT Student OT Student                       Carolyn Sandoval OT Student  7/15/2022

## 2022-07-15 NOTE — PLAN OF CARE
Goal Outcome Evaluation:   O2 stat monitored continuously, vapotherm in place and supplemental o2 given with nonrebreather as needed.

## 2022-07-15 NOTE — PROGRESS NOTES
"INFECTIOUS DISEASES PROGRESS NOTE    Patient:  Adrián Graham  YOB: 1940  MRN: [id number]   Admit date: 2022   Admitting Physician: Steven Ross DO  Primary Care Physician: Mauro Irizarry DO    Chief Complaint: \" My lungs are gone\"      Interval History: The patient states he has been up to the chair for about 30 or 40 minutes.  He pointed to a nonrebreather mask and said he needed extra oxygen to get there.      Allergies: No Known Allergies    Current Scheduled Medications:   ascorbic acid, 500 mg, Oral, Daily  aspirin, 81 mg, Oral, Daily  atorvastatin, 20 mg, Oral, Daily  busPIRone, 10 mg, Oral, TID  cefepime, 2 g, Intravenous, Q8H  cetirizine, 10 mg, Oral, Daily  diphenhydrAMINE, 25 mg, Oral, Nightly  enoxaparin, 1 mg/kg, Subcutaneous, Q12H  famotidine, 20 mg, Oral, BID AC  finasteride, 5 mg, Oral, Daily  fluticasone, 2 spray, Nasal, Daily  furosemide, 20 mg, Oral, BID  gabapentin, 400 mg, Oral, Q12H  guaiFENesin, 1,200 mg, Oral, BID  ipratropium-albuterol, 3 mL, Nebulization, 4x Daily - RT  methylPREDNISolone sodium succinate, 40 mg, Intravenous, Q8H  montelukast, 10 mg, Oral, Nightly  sodium chloride, 250 mL, Intravenous, Once  sodium chloride, 10 mL, Intravenous, Q12H  vitamin D3, 5,000 Units, Oral, Daily  zinc sulfate, 220 mg, Oral, Daily      Current PRN Medications:  •  acetaminophen  •  albuterol  •  diazePAM  •  Morphine  •  ondansetron  •  sodium chloride  •  traMADol    Review of Systems  General: No fever  Respiratory: Short of breath    :Objective     Vital Signs:  Temp (24hrs), Av.9 °F (36.6 °C), Min:97.5 °F (36.4 °C), Max:98.5 °F (36.9 °C)      /58   Pulse 88   Temp 98 °F (36.7 °C) (Axillary)   Resp 22   Ht 185.4 cm (73\")   Wt 88.9 kg (195 lb 15.8 oz) Comment: bedding  SpO2 (!) 88%   BMI 25.86 kg/m²         Physical Exam  General: Patient 82-year-old male sitting in recliner in no acute distress  HEENT: Sclera anicteric.  Vapotherm in place at 40 L and " 100% saturations are 94%.  I turned him down to 40 L and 95% and saturations stayed 91 to 92%  Respiratory: He did not appear to get conversationally dyspneic  Abdomen: Soft, nontender, nondistended  Neuro: Alert, oriented, speech clear  Psych: Pleasant and cooperative       Results Review:    I reviewed the patient's new clinical results.    Lab Results:  CBC:   Lab Results   Lab 07/10/22  0223 07/11/22  0312 07/12/22  0327 07/13/22  0514 07/14/22  0338 07/15/22  0401   WBC 19.51* 17.30* 19.06* 20.01* 17.20* 22.81*   HEMOGLOBIN 10.5* 10.3* 10.3* 10.5* 10.6* 10.9*   HEMATOCRIT 32.1* 33.7* 32.4* 34.4* 33.3* 34.9*   PLATELETS 360 368 336 329 301 322   LYMPHOCYTE % 4.0*  --   --   --   --   --    MONOCYTES % 3.0*  --   --   --   --   --      >>>>>>>>>>>>>>>>>>>>>>>>>>>> 92.2% neutrophils/3.7% lymphocytes/2.5% monocytes    CMP:   Lab Results   Lab 07/10/22  0223 07/11/22  0312 07/13/22  0514 07/14/22  0338 07/15/22  0752   SODIUM 141   < > 143 143 142   POTASSIUM 3.6   < > 3.5 4.5 4.1   CHLORIDE 104   < > 103 104 103   CO2 31.0*   < > 35.0* 33.0* 33.0*   BUN 20   < > 30* 29* 32*   CREATININE 0.78   < > 0.70* 0.70* 0.69*   CALCIUM 8.6   < > 8.9 8.7 8.8   BILIRUBIN 0.3  --   --   --   --    ALK PHOS 76  --   --   --   --    ALT (SGPT) 38  --   --   --   --    AST (SGOT) 30  --   --   --   --    GLUCOSE 114*   < > 109* 147* 161*    < > = values in this interval not displayed.       Estimated Creatinine Clearance: 102 mL/min (A) (by C-G formula based on SCr of 0.69 mg/dL (L)).      COVID LABS:  Results From Last 14 Days   Lab Units 07/15/22  0752 07/14/22  0338 07/11/22  1226 07/11/22  0312 07/10/22  0223 07/07/22  0941 07/04/22  0700 07/02/22  1248 07/02/22  0647 07/01/22  1429 07/01/22  1134   0000   PROBNP pg/mL  --   --   --   --   --   --   --   --   --  1,299.0  --   --    CK TOTAL U/L  --   --   --   --   --   --   --   --   --   --  167  --    CRP mg/dL  --  6.72*  --  16.21* 18.76* 8.16* 6.46*  --    < > 18.78*  --    --    D DIMER QUANT MCGFEU/mL  --   --   --   --   --   --   --   --   --   --  4.49*  --    FERRITIN ng/mL  --   --   --  1,112.00*  --  1,243.00*  --   --   --   --   --   --    LACTATE mmol/L  --   --   --   --   --   --   --  1.8  --   --   --   --    PROCALCITONIN ng/mL 0.43*  --  0.77*  --   --   --  0.08 0.21  --  0.17  --    < >   PROTIME Seconds  --   --   --   --   --   --   --   --   --   --  23.3*  --    INR   --   --   --   --   --   --   --   --   --   --  2.16*  --    TROPONIN T ng/mL  --   --   --   --   --   --   --   --   --  <0.010 <0.010  --     < > = values in this interval not displayed.         Culture Results:    Microbiology Results (last 10 days)     Procedure Component Value - Date/Time    Blood Culture With KEVAN - Blood, Arm, Right [[id number]]  (Normal) Collected: 07/11/22 1229    Lab Status: Preliminary result Specimen: Blood from Arm, Right Updated: 07/14/22 1248     Blood Culture No growth at 3 days    Blood Culture With KEVAN - Blood, Hand, Left [[id number]]  (Normal) Collected: 07/11/22 1226    Lab Status: Preliminary result Specimen: Blood from Hand, Left Updated: 07/14/22 1248     Blood Culture No growth at 3 days    MRSA Screen, PCR (Inpatient) - Swab, Nares [[id number]]  (Normal) Collected: 07/09/22 1052    Lab Status: Final result Specimen: Swab from Nares Updated: 07/09/22 1210     MRSA PCR No MRSA Detected    Narrative:      The negative predictive value of this diagnostic test is high and should only be used to consider de-escalating anti-MRSA therapy. A positive result may indicate colonization with MRSA and must be correlated clinically.    Blood Culture - Blood, Arm, Right [[id number]]  (Normal) Collected: 07/09/22 1030    Lab Status: Final result Specimen: Blood from Arm, Right Updated: 07/14/22 1049     Blood Culture No growth at 5 days    Blood Culture - Blood, Arm, Left [[id number]]  (Normal) Collected: 07/09/22 1028    Lab Status: Final result Specimen: Blood from Arm,  Left Updated: 07/14/22 1049     Blood Culture No growth at 5 days    Respiratory Culture - Sputum, Cough [[id number]] Collected: 07/06/22 2202    Lab Status: Final result Specimen: Sputum from Cough Updated: 07/08/22 0930     Respiratory Culture Rejected     Gram Stain Few (2+) WBCs seen      No Epithelial cells seen      No organisms seen    Narrative:      Specimen rejected due to oropharyngeal contamination. Please reorder and recollect specimen if clinically necessary.    COVID-19 RAPID AG,VERITOR,COR/GEE/[facility]/MARIYA/MAD/RACHLE/LAG/JOSEPH/ IN-HOUSE,DRY SWAB, 1-2 HR TAT - Swab, Nasal Cavity [[id number]]  (Normal) Collected: 07/06/22 1451    Lab Status: Final result Specimen: Swab from Nasal Cavity Updated: 07/06/22 1600     COVID19 Presumptive Negative    Narrative:      Fact sheets for providers: https://www.fda.gov/media/994609/download    Fact sheets for patients: https://www.fda.gov/media/688343/download               Radiology:         Imaging Results (Last 72 Hours)     Procedure Component Value Units Date/Time    XR Chest 1 View [[id number]] Collected: 07/15/22 0725     Updated: 07/15/22 0731    Narrative:      EXAM/TECHNIQUE: XR CHEST 1 VW-     INDICATION: Respiratory failure; R09.02-Hypoxemia; I95.1-Orthostatic  hypotension; U07.1-COVID-19; J12.82-Pneumonia due to coronavirus disease  2019; I26.94-Multiple subsegmental pulmonary emboli without acute cor  pulmonale; Z74.09-Other reduced mobility; Z78.9-Other specified health  status     COMPARISON: 7/11/2022     FINDINGS:     Enlarged but stable cardiac silhouette. No significant change in diffuse  bilateral interstitial and airspace opacity. No large pleural effusion.  No visible pneumothorax. No acute osseous finding.       Impression:         No significant change in diffuse bilateral interstitial and airspace  opacity.  This report was finalized on 07/15/2022 07:27 by Dr. Helio Shepherd MD.          Assessment & Plan     Active Hospital Problems    Diagnosis    •  Moderate malnutrition (HCC)    • Acute respiratory failure with hypoxia (HCC)    • Pulmonary embolism associated with COVID-19 (HCC)    • Bilateral carotid artery disease (HCC)    • Hyperlipidemia    • Benign prostatic hyperplasia without lower urinary tract symptoms    • Essential hypertension        IMPRESSION:  1. COVID-19 infection diagnosed June 26, 2022-unclear when patient actually initially was infected with COVID as he was seen June 7 with cough and shortness of breath, had CT of the chest that showed patchy groundglass infiltrates but does not appear he was tested for COVID.  He received steroids and antibiotics.  It does not appear he received any COVID-related treatments June 26 i.e. Paxlovid as he appeared to be quite out of the window for benefit as he was likely infected earlier in the month if not the month prior (2 urgent care visits for cough and congestion 1 in May 1 in June).  Patient admitted July 1 and had temp spike to 103 and significant increased O2 requirements July 9 prompting transfer to CCU.  2. Fever -blood cultures negative.  Fever resolved no significant change in chest x-ray to support new consolidation in patient who has received IV azithromycin for 5 days and Zosyn for 8 days this admission and then dosed with vancomycin  x1 followed by continuing on cefepime.  May be related to postacute COVID organizing pneumonia  3. Leukocytosis- on steroids   4. Pulmonary emboli diagnosed June 26  5. Elevated CRP- improved  6. Elevated ferritin  7. Elevated procalcitonin - improved    RECOMMENDATION:   · Continue empiric cefepime-we will set to discontinue July 16  · Steroid management per pulmonary/hospitalist  · Wean O2 as tolerated  · Continue increase activity with therapy  · Continue critical care monitoring      Discussed with YASMEEN Trujillo MD  07/15/22  10:59 CDT

## 2022-07-16 NOTE — PROGRESS NOTES
"Infectious Diseases Progress Note    Patient:  Adrián Graham  YOB: 1940  MRN: [id number]   Admit date: 7/1/2022   Admitting Physician: Steven Ross DO  Primary Care Physician: Mauro Irizarry DO    Chief Complaint/Interval History: He continues to feel even minimal exertion fatiguing.  Feeding himself causes fatigue.  He remains on stable high levels of oxygen supplementation in comparison to yesterday.  He is without fever.  He is hemodynamically stable.    Intake/Output Summary (Last 24 hours) at 7/16/2022 1147  Last data filed at 7/16/2022 0400  Gross per 24 hour   Intake 1260 ml   Output 300 ml   Net 960 ml     Allergies: No Known Allergies  Current Scheduled Medications:   ascorbic acid, 500 mg, Oral, Daily  aspirin, 81 mg, Oral, Daily  atorvastatin, 20 mg, Oral, Daily  busPIRone, 10 mg, Oral, TID  cefepime, 2 g, Intravenous, Q8H  cetirizine, 10 mg, Oral, Daily  dexamethasone, 6 mg, Oral, Q12H  diphenhydrAMINE, 25 mg, Oral, Nightly  enoxaparin, 1 mg/kg, Subcutaneous, Q12H  famotidine, 20 mg, Oral, BID AC  finasteride, 5 mg, Oral, Daily  fluticasone, 2 spray, Nasal, Daily  furosemide, 20 mg, Oral, BID  gabapentin, 400 mg, Oral, Q12H  guaiFENesin, 1,200 mg, Oral, BID  ipratropium-albuterol, 3 mL, Nebulization, 4x Daily - RT  montelukast, 10 mg, Oral, Nightly  sodium chloride, 250 mL, Intravenous, Once  sodium chloride, 10 mL, Intravenous, Q12H  vitamin D3, 5,000 Units, Oral, Daily  zinc sulfate, 220 mg, Oral, Daily      Current PRN Medications:  •  acetaminophen  •  albuterol  •  diazePAM  •  Morphine  •  ondansetron  •  sodium chloride  •  traMADol    Review of Systems see HPI    Vital Signs:  /58   Pulse 77   Temp 98.2 °F (36.8 °C) (Oral)   Resp 26   Ht 185.4 cm (73\")   Wt 84.9 kg (187 lb 2.7 oz)   SpO2 97%   BMI 24.69 kg/m²     Physical Exam  Vital signs - reviewed.  Line/IV site - No erythema, warmth, induration, or tenderness.  Lungs without wheezing  General looks " tired  He is mildly tachypneic at rest and more tachypneic with fairly minimal exertion    Lab Results:  CBC:   Results from last 7 days   Lab Units 07/15/22  0401 07/14/22  0338 07/13/22  0514 07/12/22  0327 07/11/22  0312 07/10/22  0223   WBC 10*3/mm3 22.81* 17.20* 20.01* 19.06* 17.30* 19.51*   HEMOGLOBIN g/dL 10.9* 10.6* 10.5* 10.3* 10.3* 10.5*   HEMATOCRIT % 34.9* 33.3* 34.4* 32.4* 33.7* 32.1*   PLATELETS 10*3/mm3 322 301 329 336 368 360     BMP:  Results from last 7 days   Lab Units 07/15/22  0752 07/14/22  0338 07/13/22  0514 07/12/22  0327 07/11/22  0312 07/10/22  0223   SODIUM mmol/L 142 143 143 145 143 141   POTASSIUM mmol/L 4.1 4.5 3.5 3.7 3.9 3.6   CHLORIDE mmol/L 103 104 103 101 102 104   CO2 mmol/L 33.0* 33.0* 35.0* 37.0* 34.0* 31.0*   BUN mg/dL 32* 29* 30* 28* 23 20   CREATININE mg/dL 0.69* 0.70* 0.70* 0.79 0.83 0.78   GLUCOSE mg/dL 161* 147* 109* 127* 113* 114*   CALCIUM mg/dL 8.8 8.7 8.9 9.1 9.0 8.6   ALT (SGPT) U/L  --   --   --   --   --  38     Component   Ref Range & Units 1 d ago   (7/15/22) 5 d ago   (7/11/22) 12 d ago   (7/4/22) 2 wk ago   (7/2/22) 2 wk ago   (7/1/22)   Procalcitonin   0.00 - 0.25 ng/mL 0.43 High   0.77 High   0.08  0.21  0.17      Component   Ref Range & Units 2 d ago   (7/14/22) 5 d ago   (7/11/22) 6 d ago   (7/10/22) 9 d ago   (7/7/22) 12 d ago   (7/4/22) 2 wk ago   (7/2/22) 2 wk ago   (7/1/22)   C-Reactive Protein   0.00 - 0.50 mg/dL 6.72 High   16.21 High   18.76 High   8.16 High   6.46 High   22.42 High   18.78 High      Culture Results:   Blood Culture   Date Value Ref Range Status   07/11/2022 No growth at 4 days  Preliminary   07/11/2022 No growth at 4 days  Preliminary     Radiology: None  Additional Studies Reviewed: None    Impression:   1.  COVID-19 infection/organizing pneumonia post COVID  2.  Fever-resolved  3.  Leukocytosis-likely secondary to steroids  4.  Pulmonary embolism  5.  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

## 2022-07-16 NOTE — PROGRESS NOTES
PULMONARY AND CRITICAL CARE PROGRESS NOTE -     Patient: Adrián Graham  1940   MR# [id number]   Acct# [id number]  07/16/22   10:09 CDT  Referring Provider: Steven Ross DO    Chief Complaint: Covid-19 -with worsening hypoxemic respiratory failure    Interval history: Events noted chart reviewed.  Still requiring heated high flow 40 L with 80% FiO2.Appears weak and tired.  Meds:  ascorbic acid, 500 mg, Oral, Daily  aspirin, 81 mg, Oral, Daily  atorvastatin, 20 mg, Oral, Daily  busPIRone, 10 mg, Oral, TID  cefepime, 2 g, Intravenous, Q8H  cetirizine, 10 mg, Oral, Daily  diphenhydrAMINE, 25 mg, Oral, Nightly  enoxaparin, 1 mg/kg, Subcutaneous, Q12H  famotidine, 20 mg, Oral, BID AC  finasteride, 5 mg, Oral, Daily  fluticasone, 2 spray, Nasal, Daily  furosemide, 20 mg, Oral, BID  gabapentin, 400 mg, Oral, Q12H  guaiFENesin, 1,200 mg, Oral, BID  ipratropium-albuterol, 3 mL, Nebulization, 4x Daily - RT  methylPREDNISolone sodium succinate, 40 mg, Intravenous, Q8H  montelukast, 10 mg, Oral, Nightly  sodium chloride, 250 mL, Intravenous, Once  sodium chloride, 10 mL, Intravenous, Q12H  vitamin D3, 5,000 Units, Oral, Daily  zinc sulfate, 220 mg, Oral, Daily         Review of Systems:   Review of Systems   Constitutional: Positive for fatigue. Negative for fever.   HENT: Positive for congestion.    Respiratory: Positive for cough.    Gastrointestinal: Negative for diarrhea and nausea.   Musculoskeletal: Positive for arthralgias.   Neurological: Positive for weakness.      Physical Exam:  SpO2 Percentage    07/16/22 0700 07/16/22 0800 07/16/22 0900   SpO2: 93% (!) 88% (!) 85%     Temp:  [97.3 °F (36.3 °C)-98.2 °F (36.8 °C)] 98.2 °F (36.8 °C)  Heart Rate:  [] 101  Resp:  [16-26] 26  BP: ()/() 147/74    Intake/Output Summary (Last 24 hours) at 7/16/2022 1009  Last data filed at 7/16/2022 0400  Gross per 24 hour   Intake 1260 ml   Output 300 ml   Net 960 ml     Body mass index is 24.69 kg/m².    Physical exam  No distress appears weak and tired  On heated high flow 40 L 80% FiO2  Awake alert following simple commands  Neck midline trachea no thyromegaly  Chest diminished breath sounds bilaterally no distress  CVS regular rate and rhythm no murmurs  Abdomen soft nontender no masses felt  Extremities no edema no cyanosis no clubbing  CNS intact        Physician Substantive Portion: Medical Decision Making    Laboratory Data:  Results from last 7 days   Lab Units 07/15/22  0401 07/14/22  0338 07/13/22  0514   WBC 10*3/mm3 22.81* 17.20* 20.01*   HEMOGLOBIN g/dL 10.9* 10.6* 10.5*   PLATELETS 10*3/mm3 322 301 329     Results from last 7 days   Lab Units 07/15/22  0752 07/14/22  0338 07/13/22  0514   SODIUM mmol/L 142 143 143   POTASSIUM mmol/L 4.1 4.5 3.5   BUN mg/dL 32* 29* 30*   CREATININE mg/dL 0.69* 0.70* 0.70*     Results from last 7 days   Lab Units 07/11/22  1056 07/10/22  0416   PH, ARTERIAL pH units 7.499* 7.506*   PCO2, ARTERIAL mm Hg 42.8 42.9   PO2 ART mm Hg 88.1 69.8*   FIO2 % 90 70     Blood Culture   Date Value Ref Range Status   06/26/2022 No growth at 5 days  Final   06/26/2022 No growth at 5 days  Final     Results from last 7 days   Lab Units 07/15/22  0752 07/14/22  0338 07/11/22  1226 07/11/22  0312   CRP mg/dL  --  6.72*  --  16.21*   PROCALCITONIN ng/mL 0.43*  --  0.77*  --    FERRITIN ng/mL  --   --   --  1,112.00*      Recent films:  XR Chest 1 View    Result Date: 7/15/2022  EXAM/TECHNIQUE: XR CHEST 1 VW-  INDICATION: Respiratory failure; R09.02-Hypoxemia; I95.1-Orthostatic hypotension; U07.1-COVID-19; J12.82-Pneumonia due to coronavirus disease 2019; I26.94-Multiple subsegmental pulmonary emboli without acute cor pulmonale; Z74.09-Other reduced mobility; Z78.9-Other specified health status  COMPARISON: 7/11/2022  FINDINGS:  Enlarged but stable cardiac silhouette. No significant change in diffuse bilateral interstitial and airspace opacity. No large pleural effusion. No visible  pneumothorax. No acute osseous finding.      Impression:  No significant change in diffuse bilateral interstitial and airspace opacity. This report was finalized on 07/15/2022 07:27 by Dr. Helio Shepherd MD.     Physician Substantive Portion: Medical Decision Making    Results from last 7 days   Lab Units 07/15/22  0401 07/14/22  0338 07/13/22  0514   WBC 10*3/mm3 22.81* 17.20* 20.01*   HEMOGLOBIN g/dL 10.9* 10.6* 10.5*   PLATELETS 10*3/mm3 322 301 329     Results from last 7 days   Lab Units 07/15/22  0752 07/14/22  0338 07/13/22  0514 07/12/22  0327   SODIUM mmol/L 142 143 143 145   POTASSIUM mmol/L 4.1 4.5 3.5 3.7   CO2 mmol/L 33.0* 33.0* 35.0* 37.0*   BUN mg/dL 32* 29* 30* 28*   CREATININE mg/dL 0.69* 0.70* 0.70* 0.79   MAGNESIUM mg/dL  --   --  2.3 2.3   PHOSPHORUS mg/dL  --   --  3.2  --    GLUCOSE mg/dL 161* 147* 109* 127*     Results from last 7 days   Lab Units 07/11/22  1056 07/10/22  0416   PH, ARTERIAL pH units 7.499* 7.506*   PCO2, ARTERIAL mm Hg 42.8 42.9   PO2 ART mm Hg 88.1 69.8*   FIO2 % 90 70     Lab Results   Component Value Date    PROBNP 1,299.0 07/01/2022     Blood Culture   Date Value Ref Range Status   06/26/2022 No growth at 5 days  Final   06/26/2022 No growth at 5 days  Final       Recent radiology:   Imaging Results (Last 72 Hours)     Procedure Component Value Units Date/Time    XR Chest 1 View [[id number]] Collected: 07/15/22 0725     Updated: 07/15/22 0731    Narrative:      EXAM/TECHNIQUE: XR CHEST 1 VW-     INDICATION: Respiratory failure; R09.02-Hypoxemia; I95.1-Orthostatic  hypotension; U07.1-COVID-19; J12.82-Pneumonia due to coronavirus disease  2019; I26.94-Multiple subsegmental pulmonary emboli without acute cor  pulmonale; Z74.09-Other reduced mobility; Z78.9-Other specified health  status     COMPARISON: 7/11/2022     FINDINGS:     Enlarged but stable cardiac silhouette. No significant change in diffuse  bilateral interstitial and airspace opacity. No large pleural  effusion.  No visible pneumothorax. No acute osseous finding.       Impression:         No significant change in diffuse bilateral interstitial and airspace  opacity.  This report was finalized on 07/15/2022 07:27 by Dr. Helio Shepherd MD.        Results for orders placed during the hospital encounter of 07/01/22    Adult Transthoracic Echo Limited W/ Cont if Necessary Per Protocol    Interpretation Summary  · This is a limited echocardiogram.  · Left ventricular systolic function is normal. Left ventricular ejection fraction appears to be 61 - 65%.  · The right ventricular cavity is mildly dilated. Normal right ventricular systolic function noted.      My radiograph interpretation/independent review of imaging: diffuse opacities    Pulmonary Assessment:    1. Organizing pneumonia s/p Covid  2. Acute resp failure with hypoxia, severe, requiring high flow oxygen  3. Leukocytosis, on steroids  4. S/p pulm embolus      Recommend/plan:   · Continue steroids, unable to taper  · Continue high flow oxygen, niv if needed  · Full dose anticoagulation history of PE  · High-dose steroids currently IV.  I think he is progressed to fibrotic stage.  We will start weaning down steroids.  Watch leukocytosis  · Overall long-term prognosis poor  ·       Electronically signed by Jamarcus Huff MD, 07/16/22, 10:14 AM CDT.

## 2022-07-16 NOTE — PROGRESS NOTES
[facility] Medicine Services  INPATIENT PROGRESS NOTE    Patient Name: Adrián Graham  Date of Admission: 7/1/2022  Today's Date: 07/16/22  Length of Stay: 15  Primary Care Physician: Mauro Irizarry DO    Subjective   Chief Complaint: f/u acute respiratory failure with hypoxia    HPI:  Patient continues to be at the same.  Currently 40 L 80% Vapotherm.  He does desat with eating and with prolonged conversation.  Overall looks more comfortable than he did earlier in the week however.  Slight improvement.  No chest pain.  No dizziness.  Tolerating p.o.    Review of Systems   All pertinent negatives and positives are as above. All other systems have been reviewed and are negative unless otherwise stated.     Objective    Temp:  [97.3 °F (36.3 °C)-98.2 °F (36.8 °C)] 98.2 °F (36.8 °C)  Heart Rate:  [66-96] 72  Resp:  [16-26] 26  BP: ()/() 127/60  Physical Exam  GEN: Awake, alert, interactive, no acute distress  HEENT: PERRLA, EOMI, Anicteric, Trachea midline  Lungs:  Somewhat diminished but overall fairly clear lung sounds.  No wheezing or rales.  Good effort.   Heart: RRR, +S1/s2, no rub  ABD: soft, nt/nd, +BS, no guarding/rebound  Extremities: atraumatic, no cyanosis  Skin: no rashes or petechiae  Neuro: AAOx3, no focal deficits  Psych: normal mood & affect      Results Review:  I have reviewed the labs, radiology results, and diagnostic studies.    Laboratory Data:   Results from last 7 days   Lab Units 07/15/22  0401 07/14/22  0338 07/13/22  0514   WBC 10*3/mm3 22.81* 17.20* 20.01*   HEMOGLOBIN g/dL 10.9* 10.6* 10.5*   HEMATOCRIT % 34.9* 33.3* 34.4*   PLATELETS 10*3/mm3 322 301 329        Results from last 7 days   Lab Units 07/15/22  0752 07/14/22  0338 07/13/22  0514 07/11/22  0312 07/10/22  0223   SODIUM mmol/L 142 143 143   < > 141   POTASSIUM mmol/L 4.1 4.5 3.5   < > 3.6   CHLORIDE mmol/L 103 104 103   < > 104   CO2 mmol/L 33.0* 33.0* 35.0*   < > 31.0*   BUN  mg/dL 32* 29* 30*   < > 20   CREATININE mg/dL 0.69* 0.70* 0.70*   < > 0.78   CALCIUM mg/dL 8.8 8.7 8.9   < > 8.6   BILIRUBIN mg/dL  --   --   --   --  0.3   ALK PHOS U/L  --   --   --   --  76   ALT (SGPT) U/L  --   --   --   --  38   AST (SGOT) U/L  --   --   --   --  30   GLUCOSE mg/dL 161* 147* 109*   < > 114*    < > = values in this interval not displayed.       Culture Data:   Blood Culture   Date Value Ref Range Status   06/26/2022 No growth at 5 days  Final   06/26/2022 No growth at 5 days  Final       Radiology Data:   Imaging Results (Last 24 Hours)     ** No results found for the last 24 hours. **          I have reviewed the patient's current medications.     Assessment/Plan     Active Hospital Problems    Diagnosis    • Moderate malnutrition (HCC)    • Acute respiratory failure with hypoxia (HCC)    • Pulmonary embolism associated with COVID-19 (HCC)    • Bilateral carotid artery disease (HCC)    • Hyperlipidemia    • Benign prostatic hyperplasia without lower urinary tract symptoms    • Essential hypertension        Plan:  #1 acute respiratory failure with hypoxia -difficult case.  Likely multifactorial.  Recent COVID and PE.  Presented with concern for cryptogenic organizing pneumonia.  Was on steroids and antibiotics.  Initially was getting better and oxygen was weaning to 4 L.  He however worsened again over 7/8 through 7/10.  Ended up in the ICU Sunday and when I saw him on 7/11 he was on max oxygen with low sats.  We gave him an extra high-dose slug of steroid at 125 and he did better over the next 24 hours with decreasing O2 needs.  He started to slowly drift back up with O2 needs however and then on 7/13 we increased steroids to Solu-Medrol 40mg iv 3 times daily.  CRP starting to trend down after burst of steroids and increased dose.  Also on cefepime due to elevated fever few days ago.  No subsequent fevers.  Blood cultures negative.  White count trending down.  Procalcitonin started to trend  down.  O2 starting to slowly trend down.  Continue respiratory exercises and incentive spirometry.  Up to chair.  Would benefit from LTAC.  Pulmonary and ID following.    #2 pulmonary embolism -recent diagnosis.  Currently on Lovenox    #3 COVID-19 -now negative.  Posttreatment.  Potentially have secondary organizing pneumonia or even fibrosis post-COVID.    #4 hypertension -BP stable.  Monitor.    #5 hyperlipidemia -statin    #6 BPH -Proscar      -Gave patient a lab holiday today.  We will recheck labs tomorrow.      Discharge Planning: Ongoing.  Continue in the CCU.  Patient may benefit for discharge to LTAC.  Evaluation underway.  Likely will not have beds until early next week however.    Electronically signed by Steven Ross DO, 07/16/22, 08:11 CDT.

## 2022-07-17 NOTE — PROGRESS NOTES
[facility] Medicine Services  INPATIENT PROGRESS NOTE    Patient Name: Adrián Graham  Date of Admission: 7/1/2022  Today's Date: 07/17/22  Length of Stay: 16  Primary Care Physician: Mauro Irizarry DO    Subjective   Chief Complaint: f/u acute respiratory failure with hypoxia    HPI:  Patient resting in bed.  Currently on 40 L 90%.  Daughter at bedside.  No new issues overnight.  No fevers.  Overall patient still feels more comfortable than he did earlier in the week.  Very fatigued.  No chest pain.  No dizziness.  Ate oatmeal and fruit for breakfast.    Review of Systems   All pertinent negatives and positives are as above. All other systems have been reviewed and are negative unless otherwise stated.     Objective    Temp:  [96.9 °F (36.1 °C)-98.9 °F (37.2 °C)] 98.2 °F (36.8 °C)  Heart Rate:  [] 70  Resp:  [16-26] 26  BP: (107-147)/(48-74) 131/66  Physical Exam  GEN: Awake, alert, interactive, no acute distress  HEENT: PERRLA, EOMI, Anicteric, Trachea midline  Lungs:  Somewhat diminished but overall fairly clear lung sounds.  No wheezing or rales.  Good effort.   Heart: RRR, +S1/s2, no rub  ABD: soft, nt/nd, +BS, no guarding/rebound  Extremities: atraumatic, no cyanosis  Skin: no rashes or petechiae  Neuro: AAOx3, no focal deficits  Psych: normal mood & affect      Results Review:  I have reviewed the labs, radiology results, and diagnostic studies.    Laboratory Data:   Results from last 7 days   Lab Units 07/17/22  0344 07/15/22  0401 07/14/22  0338   WBC 10*3/mm3 19.03* 22.81* 17.20*   HEMOGLOBIN g/dL 11.3* 10.9* 10.6*   HEMATOCRIT % 36.3* 34.9* 33.3*   PLATELETS 10*3/mm3 317 322 301        Results from last 7 days   Lab Units 07/17/22  0344 07/15/22  0752 07/14/22  0338   SODIUM mmol/L 141 142 143   POTASSIUM mmol/L 4.4 4.1 4.5   CHLORIDE mmol/L 102 103 104   CO2 mmol/L 32.0* 33.0* 33.0*   BUN mg/dL 28* 32* 29*   CREATININE mg/dL 0.54* 0.69* 0.70*   CALCIUM  mg/dL 8.7 8.8 8.7   GLUCOSE mg/dL 154* 161* 147*       Culture Data:   Blood Culture   Date Value Ref Range Status   06/26/2022 No growth at 5 days  Final   06/26/2022 No growth at 5 days  Final       Radiology Data:   Imaging Results (Last 24 Hours)     ** No results found for the last 24 hours. **          I have reviewed the patient's current medications.     Assessment/Plan     Active Hospital Problems    Diagnosis    • Moderate malnutrition (HCC)    • Acute respiratory failure with hypoxia (HCC)    • Pulmonary embolism associated with COVID-19 (HCC)    • Bilateral carotid artery disease (HCC)    • Hyperlipidemia    • Benign prostatic hyperplasia without lower urinary tract symptoms    • Essential hypertension        Plan:  #1 acute respiratory failure with hypoxia -difficult case.  Likely multifactorial.  Recent COVID and PE.  Presented with concern for cryptogenic organizing pneumonia and likely has fibrotic stage of post-COVID.  Was on steroids and antibiotics.  Initially was getting better and oxygen was weaning to 4 L.  He however worsened again over 7/8 through 7/10.  Ended up in the ICU that Sunday and when I saw him on 7/11 he was on max oxygen with low sats.  We gave him an extra high-dose slug of steroid at 125 and he did better over the next 24 hours with decreasing O2 needs.  He started to slowly drift back up with O2 needs however and then on 7/13 we increased steroids to Solu-Medrol 40mg iv 3 times daily.  CRP trending down after burst of steroids and increased dose.  Also on cefepime due to elevated fever few days ago.  No subsequent fevers.  Blood cultures negative.  White count persists but procalcitonin tending down. Abx now on hold again.  O2 requirements still high but slight improvement from earlier in the week.  Continue respiratory exercises and incentive spirometry.  Up to chair.  Would benefit from LTAC.  Pulmonary and ID following.    #2 pulmonary embolism -recent diagnosis.  Currently  on Lovenox    #3 COVID-19 -now negative.  Posttreatment.  Potentially have secondary organizing pneumonia or even fibrosis post-COVID.    #4 hypertension -BP stable.  Monitor.    #5 hyperlipidemia -statin    #6 BPH -Proscar      Discharge Planning: Ongoing.  Continue in the CCU.  Patient may benefit for discharge to LTAC.  Evaluation underway.  Likely will not have beds until early next week however.    Electronically signed by Steven Ross DO, 07/17/22, 08:12 CDT.

## 2022-07-17 NOTE — THERAPY TREATMENT NOTE
Acute Care - Physical Therapy Treatment Note  [facility]     Patient Name: Adrián Graham  : 1940  MRN: [id number]  Today's Date: 2022      Visit Dx:     ICD-10-CM ICD-9-CM   1. Hypoxemia  R09.02 799.02   2. Orthostatic hypotension  I95.1 458.0   3. Pneumonia due to COVID-19 virus  U07.1 480.8    J12.82 079.89   4. Multiple subsegmental pulmonary emboli without acute cor pulmonale (HCC)  I26.94 415.19   5. Impaired mobility  Z74.09 799.89   6. Decreased activities of daily living (ADL)  Z78.9 V49.89     Patient Active Problem List   Diagnosis   • Idiopathic peripheral neuropathy   • Essential hypertension   • Benign prostatic hyperplasia without lower urinary tract symptoms   • Overweight (BMI 25.0-29.9)   • Carpal tunnel syndrome, left   • Bilateral carotid artery disease (HCC)   • Hyperlipidemia   • Preop cardiovascular exam [facility] Dr Brown   • Single subsegmental pulmonary embolism without acute cor pulmonale (HCC)   • Pulmonary embolism associated with COVID-19 (HCC)   • Acute respiratory failure with hypoxia (HCC)   • Moderate malnutrition (HCC)     Past Medical History:   Diagnosis Date   • Abdominal distention    • Abdominal pain, left upper quadrant    • Allergic rhinitis    • Arthritis    • Bloating    • BPH (benign prostatic hyperplasia)    • Carpal tunnel syndrome    • Change in bowel habits    • Constipation    • Diarrhea    • Erectile dysfunction    • GERD (gastroesophageal reflux disease)    • History of shingles    • Hypertension    • Idiopathic peripheral neuropathy    • Neuropathy    • Pneumonia    • Shingles    • Weight loss      Past Surgical History:   Procedure Laterality Date   • APPENDECTOMY     • BACK SURGERY     • CARPAL TUNNEL RELEASE Right    • CARPAL TUNNEL RELEASE Left 2022   • CHOLECYSTECTOMY     • COLONOSCOPY  2010    5 yr-complete colon not visualized   • COLONOSCOPY  2004    extremely tortuous redundant colon. BE-mild diverticulosis without diverticulitis. ?  Gallstones-Dr Chery.   • COLONOSCOPY N/A 09/10/2020    Procedure: COLONOSCOPY WITH ANESTHESIA;  Surgeon: Mani Sy DO;  Location:  [facility];  Service: Gastroenterology;  Laterality: N/A;  Pre: Positive Colorectal Screening  Post: Diverticulosis  Dr. Mooney  CO2 Inflation Used   • ENDOSCOPY N/A 09/26/2016    Procedure: ESOPHAGOGASTRODUODENOSCOPY WITH ANESTHESIA;  Surgeon: Mani Sy DO;  Location:  [facility];  Service:    • HIP SURGERY Right    • JOINT REPLACEMENT     • KNEE SURGERY     • OTHER SURGICAL HISTORY      Surgery for Spinal Stenosis   • OTHER SURGICAL HISTORY      Laser Prostate Surgery     PT Assessment (last 12 hours)     PT Evaluation and Treatment     Row Name 07/17/22 1042          Physical Therapy Time and Intention    Subjective Information complains of;pain;dyspnea  -     Document Type therapy note (daily note)  -     Mode of Treatment physical therapy  -     Row Name 07/17/22 1042          General Information    Existing Precautions/Restrictions fall;oxygen therapy device and L/min  -     Row Name 07/17/22 1042          Pain    Pain Intervention(s) Repositioned  -     Row Name 07/17/22 1042          Pain Scale: Word Pre/Post-Treatment    Pain: Word Scale, Pretreatment 2 - mild pain  -     Posttreatment Pain Rating 2 - mild pain  -     Pain Location - Side/Orientation Right  -     Pain Location - hip  -     Row Name 07/17/22 1042          Bed Mobility    Comment, (Bed Mobility) nsg declined OOB activity today. Pt. on 40L, 90% vapotherm. Pt. has been desatting very quickly with rolling in bed.  -     Row Name 07/17/22 1042          Aerobic Exercise    Comment, Aerobic Exercise (Therapeutic Exercise) A-AAROM B LEs, in supine 15-20 reps with rest breaks. Calf stretch B calves. B UE AROM x 15 reps with rest breaks  -     Row Name 07/17/22 1042          Plan of Care Review    Plan of Care Reviewed With patient  -     Progress no change  -     Outcome  Evaluation Nsg advised to work on exercises in bed due to pt's respiratory status being up and down today. Pt. currently on 40L and 90% vapotherm. Pt. agreed to ex's. Pt. performed A-AAROM for B LEs 15-20 reps in supine with rest breaks. He was AROM for B UE, 15 reps with rest breaks. Pt's O2 sat varied from 90-93% during session. Will continue to work with pt. and progress as tolerated.  -     Row Name 07/17/22 1042          Vital Signs    Pre SpO2 (%) 93  -MF     O2 Delivery Pre Treatment other (see comments)  vapotherm: 40L, 90%  -MF     Intra SpO2 (%) 90  -MF     O2 Delivery Intra Treatment other (see comments)  vapotherm  -MF     Post SpO2 (%) 93  -MF     O2 Delivery Post Treatment other (see comments)  vapotherm  -MF     Row Name 07/17/22 1042          Positioning and Restraints    Pre-Treatment Position in bed  -     Post Treatment Position bed  -MF     In Bed fowlers;call light within reach;encouraged to call for assist;side lying left;side rails up x2;RUE elevated;LUE elevated;legs elevated  -           User Key  (r) = Recorded By, (t) = Taken By, (c) = Cosigned By    Initials Name Provider Type    Nadia Bermeo, PTA Physical Therapist Assistant                Physical Therapy Education                 Title: PT OT SLP Therapies (In Progress)     Topic: Physical Therapy (In Progress)     Point: Mobility training (Done)     Learning Progress Summary           Patient Acceptance, E, VU,DU by RAYMOND at 7/14/2022 0810    Comment: Pt presents to Re-Eval with SOB. Pt was educated on the benefit of increased activity as well as breathing techniques to improve SOB with activity. Recommend d.c to SNF pending progress.   Family Acceptance, E,TB, VU,NR by SF at 7/13/2022 2210      Show all documentation for this point (7)                 Point: Home exercise program (Done)     Learning Progress Summary           Patient Acceptance, E, VU,DU by  at 7/17/2022 1337    Comment: exercises                    Point: Body mechanics (Not Started)     Learner Progress:  Not documented in this visit.          Point: Precautions (Done)     Learning Progress Summary           Patient Acceptance, E,TB, VU,NR by  at 7/13/2022 2210   Family Acceptance, E,TB, VU,NR by  at 7/13/2022 2210      Show all documentation for this point (5)                             User Key     Initials Effective Dates Name Provider Type Discipline     06/16/21 -  Ganesh Gunter RN Registered Nurse Nurse     06/16/21 -  Nadia Denis PTA Physical Therapist Assistant PT     05/04/22 -  Robert Garcia PT Student PT Student PT              PT Recommendation and Plan     Plan of Care Reviewed With: patient  Progress: no change  Outcome Evaluation: Nsg advised to work on exercises in bed due to pt's respiratory status being up and down today. Pt. currently on 40L and 90% vapotherm. Pt. agreed to ex's. Pt. performed A-AAROM for B LEs 15-20 reps in supine with rest breaks. He was AROM for B UE, 15 reps with rest breaks. Pt's O2 sat varied from 90-93% during session. Will continue to work with pt. and progress as tolerated.   Outcome Measures     Row Name 07/17/22 1042             How much help from another person do you currently need...    Turning from your back to your side while in flat bed without using bedrails? 3  -MF      Moving from lying on back to sitting on the side of a flat bed without bedrails? 3  -MF      Moving to and from a bed to a chair (including a wheelchair)? 3  -MF      Standing up from a chair using your arms (e.g., wheelchair, bedside chair)? 3  -MF      Climbing 3-5 steps with a railing? 2  -MF      To walk in hospital room? 2  -MF      AM-PAC 6 Clicks Score (PT) 16  -MF              Functional Assessment    Outcome Measure Options AM-PAC 6 Clicks Basic Mobility (PT)  -MF            User Key  (r) = Recorded By, (t) = Taken By, (c) = Cosigned By    Initials Name Provider Type    Nadia Bermeo PTA  Physical Therapist Assistant                 Time Calculation:    PT Charges     Row Name 07/17/22 1337             Time Calculation    Start Time 1042  -MF      Stop Time 1108  -MF      Time Calculation (min) 26 min  -MF      PT Received On 07/17/22  -MF              Time Calculation- PT    Total Timed Code Minutes- PT 26 minute(s)  -MF              Timed Charges    94127 - PT Therapeutic Exercise Minutes 26  -MF              Total Minutes    Timed Charges Total Minutes 26  -MF       Total Minutes 26  -MF            User Key  (r) = Recorded By, (t) = Taken By, (c) = Cosigned By    Initials Name Provider Type    Nadia Bermeo PTA Physical Therapist Assistant              Therapy Charges for Today     Code Description Service Date Service Provider Modifiers Qty    85748115955 HC PT THER PROC EA 15 MIN 7/17/2022 Nadia Denis PTA GP 2          PT G-Codes  Outcome Measure Options: AM-PAC 6 Clicks Basic Mobility (PT)  AM-PAC 6 Clicks Score (PT): 16  AM-PAC 6 Clicks Score (OT): 16    Nadia Denis PTA  7/17/2022

## 2022-07-17 NOTE — PROGRESS NOTES
PULMONARY AND CRITICAL CARE PROGRESS NOTE -     Patient: Adrián Graham  1940   MR# [id number]   Acct# [id number]  07/17/22   10:21 CDT  Referring Provider: Steven Ross DO    Chief Complaint: Covid-19 -with worsening hypoxemic respiratory failure    Interval history: Events noted chart reviewed.  Still requiring heated high flow 35 L with 90% FiO2.Appears weak and tired.  He seems to be slowly declining  Meds:  ascorbic acid, 500 mg, Oral, Daily  aspirin, 81 mg, Oral, Daily  atorvastatin, 20 mg, Oral, Daily  busPIRone, 10 mg, Oral, TID  cetirizine, 10 mg, Oral, Daily  dexamethasone, 6 mg, Oral, Q12H  diphenhydrAMINE, 25 mg, Oral, Nightly  enoxaparin, 1 mg/kg, Subcutaneous, Q12H  famotidine, 20 mg, Oral, BID AC  finasteride, 5 mg, Oral, Daily  fluticasone, 2 spray, Nasal, Daily  furosemide, 20 mg, Oral, BID  gabapentin, 400 mg, Oral, Q12H  guaiFENesin, 1,200 mg, Oral, BID  ipratropium-albuterol, 3 mL, Nebulization, 4x Daily - RT  montelukast, 10 mg, Oral, Nightly  sodium chloride, 250 mL, Intravenous, Once  sodium chloride, 10 mL, Intravenous, Q12H  vitamin D3, 5,000 Units, Oral, Daily  zinc sulfate, 220 mg, Oral, Daily         Review of Systems:   Review of Systems   Constitutional: Positive for fatigue. Negative for fever.   HENT: Positive for congestion.    Respiratory: Positive for cough.    Gastrointestinal: Negative for diarrhea and nausea.   Musculoskeletal: Positive for arthralgias.   Neurological: Positive for weakness.      Physical Exam:  SpO2 Percentage    07/17/22 0500 07/17/22 0600 07/17/22 0633   SpO2: 95% 95% 95%     Temp:  [96.9 °F (36.1 °C)-98.9 °F (37.2 °C)] 98.2 °F (36.8 °C)  Heart Rate:  [65-93] 70  Resp:  [16-26] 26  BP: (107-136)/(48-73) 131/66    Intake/Output Summary (Last 24 hours) at 7/17/2022 1021  Last data filed at 7/17/2022 0400  Gross per 24 hour   Intake 300 ml   Output 1475 ml   Net -1175 ml     Body mass index is 24.69 kg/m².   Physical exam  No distress appears weak  and tired  On heated high flow 40 L 80% FiO2  Awake alert following simple commands  Neck midline trachea no thyromegaly  Chest diminished breath sounds bilaterally no distress  CVS regular rate and rhythm no murmurs  Abdomen soft nontender no masses felt  Extremities no edema no cyanosis no clubbing  CNS intact        Physician Substantive Portion: Medical Decision Making    Laboratory Data:  Results from last 7 days   Lab Units 07/17/22  0344 07/15/22  0401 07/14/22  0338   WBC 10*3/mm3 19.03* 22.81* 17.20*   HEMOGLOBIN g/dL 11.3* 10.9* 10.6*   PLATELETS 10*3/mm3 317 322 301     Results from last 7 days   Lab Units 07/17/22  0344 07/15/22  0752 07/14/22  0338   SODIUM mmol/L 141 142 143   POTASSIUM mmol/L 4.4 4.1 4.5   BUN mg/dL 28* 32* 29*   CREATININE mg/dL 0.54* 0.69* 0.70*     Results from last 7 days   Lab Units 07/11/22  1056   PH, ARTERIAL pH units 7.499*   PCO2, ARTERIAL mm Hg 42.8   PO2 ART mm Hg 88.1   FIO2 % 90     Blood Culture   Date Value Ref Range Status   06/26/2022 No growth at 5 days  Final   06/26/2022 No growth at 5 days  Final     Results from last 7 days   Lab Units 07/17/22  0344 07/15/22  0752 07/14/22  0338 07/11/22  1226 07/11/22  0312   CRP mg/dL 1.40*  --  6.72*  --  16.21*   PROCALCITONIN ng/mL 0.20 0.43*  --    < >  --    FERRITIN ng/mL  --   --   --   --  1,112.00*    < > = values in this interval not displayed.      Recent films:  No radiology results from the last 24 hrs   Physician Substantive Portion: Medical Decision Making    Results from last 7 days   Lab Units 07/17/22  0344 07/15/22  0401 07/14/22  0338   WBC 10*3/mm3 19.03* 22.81* 17.20*   HEMOGLOBIN g/dL 11.3* 10.9* 10.6*   PLATELETS 10*3/mm3 317 322 301     Results from last 7 days   Lab Units 07/17/22  0344 07/15/22  0752 07/14/22  0338 07/13/22  0514 07/12/22  0327   SODIUM mmol/L 141 142 143 143 145   POTASSIUM mmol/L 4.4 4.1 4.5 3.5 3.7   CO2 mmol/L 32.0* 33.0* 33.0* 35.0* 37.0*   BUN mg/dL 28* 32* 29* 30* 28*    CREATININE mg/dL 0.54* 0.69* 0.70* 0.70* 0.79   MAGNESIUM mg/dL 2.5*  --   --  2.3 2.3   PHOSPHORUS mg/dL 3.7  --   --  3.2  --    GLUCOSE mg/dL 154* 161* 147* 109* 127*     Results from last 7 days   Lab Units 07/11/22  1056   PH, ARTERIAL pH units 7.499*   PCO2, ARTERIAL mm Hg 42.8   PO2 ART mm Hg 88.1   FIO2 % 90     Lab Results   Component Value Date    PROBNP 1,299.0 07/01/2022     Blood Culture   Date Value Ref Range Status   06/26/2022 No growth at 5 days  Final   06/26/2022 No growth at 5 days  Final       Recent radiology:   Imaging Results (Last 72 Hours)     Procedure Component Value Units Date/Time    XR Chest 1 View [[id number]] Collected: 07/15/22 0725     Updated: 07/15/22 0731    Narrative:      EXAM/TECHNIQUE: XR CHEST 1 VW-     INDICATION: Respiratory failure; R09.02-Hypoxemia; I95.1-Orthostatic  hypotension; U07.1-COVID-19; J12.82-Pneumonia due to coronavirus disease  2019; I26.94-Multiple subsegmental pulmonary emboli without acute cor  pulmonale; Z74.09-Other reduced mobility; Z78.9-Other specified health  status     COMPARISON: 7/11/2022     FINDINGS:     Enlarged but stable cardiac silhouette. No significant change in diffuse  bilateral interstitial and airspace opacity. No large pleural effusion.  No visible pneumothorax. No acute osseous finding.       Impression:         No significant change in diffuse bilateral interstitial and airspace  opacity.  This report was finalized on 07/15/2022 07:27 by Dr. Helio Shepherd MD.        Results for orders placed during the hospital encounter of 07/01/22    Adult Transthoracic Echo Limited W/ Cont if Necessary Per Protocol    Interpretation Summary  · This is a limited echocardiogram.  · Left ventricular systolic function is normal. Left ventricular ejection fraction appears to be 61 - 65%.  · The right ventricular cavity is mildly dilated. Normal right ventricular systolic function noted.      My radiograph interpretation/independent review of  imaging: diffuse opacities    Pulmonary Assessment:    1. Organizing pneumonia s/p Covid  2. Acute resp failure with hypoxia, severe, requiring high flow oxygen  3. Leukocytosis, on steroids  4. S/p pulm embolus      Recommend/plan:   · Oxygen requirement is progressively getting worse.  I think he is progressed to fibrotic stage of COVID.  We will continue with high-dose Decadron 6 mg twice daily for now.  Already on full dose anticoagulation for PE.  Recommend consideration for palliative care.  · Continue high flow oxygen, niv if needed  · Full dose anticoagulation history of PE  · High-dose steroids currently IV.  I think he is progressed to fibrotic stage.  We will start weaning down steroids.  Watch leukocytosis  · Overall long-term prognosis poor  ·       Electronically signed by Jamarcus Huff MD, 07/16/22, 10:14 AM CDT.

## 2022-07-17 NOTE — PLAN OF CARE
Goal Outcome Evaluation:  Plan of Care Reviewed With: patient        Progress: no change  Outcome Evaluation: Nsg advised to work on exercises in bed due to pt's respiratory status being up and down today. Pt. currently on 40L and 90% vapotherm. Pt. agreed to ex's. Pt. performed A-AAROM for B LEs 15-20 reps in supine with rest breaks. He was AROM for B UE, 15 reps with rest breaks. Pt's O2 sat varied from 90-93% during session. Will continue to work with pt. and progress as tolerated.

## 2022-07-18 NOTE — CONSULTS
Unable to evaluate patient today. Will plan to see in the morning.     Electronically signed by MEKHI Steele, 07/18/22, 16:28 CDT.

## 2022-07-18 NOTE — PROGRESS NOTES
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  O2 sat run in the high 80s.  Leukocytes improving.  C-reactive protein is improving.  Anemia stable.  Patient is extremely weak.    Review of Systems   Constitutional: Positive for appetite change and fatigue. Negative for activity change, chills and fever.   HENT: Negative for hearing loss, nosebleeds, tinnitus and trouble swallowing.    Eyes: Negative for visual disturbance.   Respiratory: Positive for shortness of breath. Negative for cough, chest tightness and wheezing.    Cardiovascular: Negative for chest pain, palpitations and leg swelling.   Gastrointestinal: Negative for abdominal distention, abdominal pain, blood in stool, constipation, diarrhea, nausea and vomiting.   Endocrine: Negative for cold intolerance, heat intolerance, polydipsia, polyphagia and polyuria.   Genitourinary: Negative for decreased urine volume, difficulty urinating, dysuria, flank pain, frequency and hematuria.   Musculoskeletal: Positive for arthralgias, gait problem and myalgias. Negative for joint swelling.   Skin: Negative for rash.   Allergic/Immunologic: Negative for immunocompromised state.   Neurological: Positive for weakness. Negative for dizziness, syncope, light-headedness and headaches.   Hematological: Negative for adenopathy. Does not bruise/bleed easily.   Psychiatric/Behavioral: Negative for confusion and sleep disturbance. The patient is not nervous/anxious.            All pertinent negatives and positives are as above. All other systems have been reviewed and are negative unless otherwise stated.     Objective    Temp:  [97.5 °F (36.4 °C)-98.6 °F (37 °C)]  97.5 °F (36.4 °C)  Heart Rate:  [] 74  Resp:  [13-30] 23  BP: (104-155)/(53-81) 140/81    Intake/Output Summary (Last 24 hours) at 7/18/2022 0716  Last data filed at 7/18/2022 0500  Gross per 24 hour   Intake --   Output 2650 ml   Net -2650 ml     Physical Exam  Vitals and nursing note reviewed.   Constitutional:       Comments: Advanced age.  Extremely weak.   HENT:      Head: Normocephalic.   Eyes:      Conjunctiva/sclera: Conjunctivae normal.      Pupils: Pupils are equal, round, and reactive to light.   Neck:      Vascular: No JVD.   Cardiovascular:      Rate and Rhythm: Normal rate and regular rhythm.      Heart sounds: Normal heart sounds.   Pulmonary:      Effort: No respiratory distress.      Breath sounds: No wheezing or rales.      Comments: On Vapotherm.  Diminished breath sound bilateral, clear.  Chest:      Chest wall: No tenderness.   Abdominal:      General: Bowel sounds are normal. There is no distension.      Palpations: Abdomen is soft.      Tenderness: There is no abdominal tenderness.   Musculoskeletal:         General: No tenderness or deformity.      Cervical back: Neck supple.   Skin:     General: Skin is warm and dry.      Capillary Refill: Capillary refill takes 2 to 3 seconds.      Findings: No rash.   Neurological:      Mental Status: He is alert.      Cranial Nerves: No cranial nerve deficit.      Motor: Weakness present. No abnormal muscle tone.      Coordination: Coordination abnormal.      Gait: Gait abnormal.      Deep Tendon Reflexes: Reflexes normal.   Psychiatric:         Mood and Affect: Mood normal.         Behavior: Behavior normal.         Results Review:  Lab Results (last 24 hours)     ** No results found for the last 24 hours. **           Cultures:  Blood Culture   Date Value Ref Range Status   07/11/2022 No growth at 5 days  Final   07/11/2022 No growth at 5 days  Final       Radiology Data:    Imaging Results (Last 24 Hours)     ** No results found for the last 24  hours. **          No Known Allergies    Scheduled meds:   ascorbic acid, 500 mg, Oral, Daily  aspirin, 81 mg, Oral, Daily  atorvastatin, 20 mg, Oral, Daily  busPIRone, 10 mg, Oral, TID  cetirizine, 10 mg, Oral, Daily  dexamethasone, 6 mg, Oral, Q12H  diphenhydrAMINE, 25 mg, Oral, Nightly  enoxaparin, 1 mg/kg, Subcutaneous, Q12H  famotidine, 20 mg, Oral, BID AC  finasteride, 5 mg, Oral, Daily  fluticasone, 2 spray, Nasal, Daily  furosemide, 20 mg, Oral, BID  gabapentin, 400 mg, Oral, Q12H  guaiFENesin, 1,200 mg, Oral, BID  ipratropium-albuterol, 3 mL, Nebulization, 4x Daily - RT  montelukast, 10 mg, Oral, Nightly  sodium chloride, 250 mL, Intravenous, Once  sodium chloride, 10 mL, Intravenous, Q12H  vitamin D3, 5,000 Units, Oral, Daily  zinc sulfate, 220 mg, Oral, Daily        PRN meds:  •  acetaminophen  •  albuterol  •  diazePAM  •  Morphine  •  ondansetron  •  sodium chloride  •  traMADol    Assessment/Plan       Essential hypertension    Benign prostatic hyperplasia without lower urinary tract symptoms    Bilateral carotid artery disease (HCC)    Hyperlipidemia    Pulmonary embolism associated with COVID-19 (HCC)    Acute respiratory failure with hypoxia (HCC)    Moderate malnutrition (HCC)      Plan:  HPI . Acute respiratory failure with hypoxia -difficult case.  Likely multifactorial.  Recent COVID and PE.  Presented with concern for cryptogenic organizing pneumonia and likely has fibrotic stage of post-COVID.  Was on steroids and antibiotics.  Initially was getting better and oxygen was weaning to 4 L.  He however worsened again over 7/8 through 7/10.  Ended up in the ICU that Sunday and when I saw him on 7/11 he was on max oxygen with low sats.  We gave him an extra high-dose slug of steroid at 125 and he did better over the next 24 hours with decreasing O2 needs.  He started to slowly drift back up with O2 needs however and then on 7/13 we increased steroids to Solu-Medrol 40mg iv 3 times daily.  CRP  trending down after burst of steroids and increased dose.  Also on cefepime due to elevated fever few days ago.  No subsequent fevers.  Blood cultures negative.  White count persists but procalcitonin tending down. Abx now on hold again.  O2 requirements still high but slight improvement from earlier in the week.  Continue respiratory exercises and incentive spirometry.  Up to chair.  Would benefit from LTAC.  Pulmonary and ID following.    Respiratory failure hypoxia/post COVID-pneumonia COVID infection/COPD.  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  Pulmonary consult.  Infectious consult.  Leukocytosis-on steroid.  Vitamin C.  Decadron.  Mucinex.  Duo nebs.  Singulair.  Vitamin D.  Zinc.  Vitamin C . incentive spirometer.Flutter valve .  Chest x-ray-No significant change in diffuse bilateral interstitial and airspace opacity.  Vapotherm 40 L / 100%.  C-reactive protein improving.  Leukocytosis stable.    Pulmonary embolus.  Lovenox therapeutic.    Hyperlipidemia/edema.  Lipitor . aspirin.  Lasix p.o.  Echocardiogram 7/2/2022- ejection fraction 61 to 65%, right ventricle cavity mildly dilated.    Anxiety.  Valium as needed.  BuSpar.    Allergic rhinitis.  Zyrtec.  Benadryl nightly.  Flonase.    Reflux.  Pepcid.  Zofran as needed.    Pain/neuropathy.  Morphine as needed.  Neurontin.  Ultram as needed.    Benign prostate hypertrophy.  Proscar.    Nutrition . soft texture/ground/cardiac diet.  Boost supplement.    Deconditioning.  PT and OT consult    Blood cultures no growth in 5 days.    Discharge Planning: DNR.  DNI.  Pending LTAC evaluation.  Palliative care consult recommended by pulmonary.    Electronically signed by Jefferson Hernandez MD, 07/18/22, 7:16 AM CDT.

## 2022-07-18 NOTE — PROGRESS NOTES
PULMONARY AND CRITICAL CARE PROGRESS NOTE -     Patient: Adrián Graham  1940   MR# [id number]   Acct# [id number]  07/18/22   09:59 CDT  Referring Provider: Jefferson Hernandez MD    Chief Complaint: Covid-19 -with worsening hypoxemic respiratory failure    Interval history: His oxygen requirement progressively getting worse.  Now 40 L 100%.  He seems to be resting comfortably.  I asked him if he is tired and he stated that he wants to continue fighting.  He desats with minimal activity.      Meds:  ascorbic acid, 500 mg, Oral, Daily  aspirin, 81 mg, Oral, Daily  atorvastatin, 20 mg, Oral, Daily  busPIRone, 10 mg, Oral, TID  cetirizine, 10 mg, Oral, Daily  dexamethasone, 6 mg, Oral, Q12H  diphenhydrAMINE, 25 mg, Oral, Nightly  enoxaparin, 1 mg/kg, Subcutaneous, Q12H  famotidine, 20 mg, Oral, BID AC  finasteride, 5 mg, Oral, Daily  fluticasone, 2 spray, Nasal, Daily  furosemide, 20 mg, Oral, BID  gabapentin, 400 mg, Oral, Q12H  guaiFENesin, 1,200 mg, Oral, BID  ipratropium-albuterol, 3 mL, Nebulization, 4x Daily - RT  montelukast, 10 mg, Oral, Nightly  sodium chloride, 250 mL, Intravenous, Once  sodium chloride, 10 mL, Intravenous, Q12H  vitamin D3, 5,000 Units, Oral, Daily  zinc sulfate, 220 mg, Oral, Daily         Review of Systems:   Weakness  Cough and congestion ongoing    Physical Exam:  SpO2 Percentage    07/18/22 0717 07/18/22 0800 07/18/22 0900   SpO2: 93% (!) 88% 95%     Temp:  [97.5 °F (36.4 °C)-98.6 °F (37 °C)] 97.8 °F (36.6 °C)  Heart Rate:  [] 83  Resp:  [13-30] 23  BP: (104-155)/(53-81) 118/66    Intake/Output Summary (Last 24 hours) at 7/18/2022 0959  Last data filed at 7/18/2022 0800  Gross per 24 hour   Intake --   Output 2485 ml   Net -2485 ml     Body mass index is 24.17 kg/m².   Physical exam  No distress appears weak and tired  On heated high flow 40 L 80% FiO2  Awake alert following simple commands  Neck midline trachea no thyromegaly  Chest diminished breath sounds bilaterally no  distress.  Occasional crackles on the bases bilaterally  CVS regular rate and rhythm no murmurs  Abdomen soft nontender no masses felt  Extremities no edema no cyanosis no clubbing  CNS intact        Physician Substantive Portion: Medical Decision Making    Laboratory Data:  Results from last 7 days   Lab Units 07/17/22  0344 07/15/22  0401 07/14/22  0338   WBC 10*3/mm3 19.03* 22.81* 17.20*   HEMOGLOBIN g/dL 11.3* 10.9* 10.6*   PLATELETS 10*3/mm3 317 322 301     Results from last 7 days   Lab Units 07/17/22  0344 07/15/22  0752 07/14/22  0338   SODIUM mmol/L 141 142 143   POTASSIUM mmol/L 4.4 4.1 4.5   BUN mg/dL 28* 32* 29*   CREATININE mg/dL 0.54* 0.69* 0.70*     Results from last 7 days   Lab Units 07/11/22  1056   PH, ARTERIAL pH units 7.499*   PCO2, ARTERIAL mm Hg 42.8   PO2 ART mm Hg 88.1   FIO2 % 90     Blood Culture   Date Value Ref Range Status   06/26/2022 No growth at 5 days  Final   06/26/2022 No growth at 5 days  Final     Results from last 7 days   Lab Units 07/17/22  0344 07/15/22  0752 07/14/22  0338   CRP mg/dL 1.40*  --  6.72*   PROCALCITONIN ng/mL 0.20 0.43*  --       Recent films:  No radiology results from the last 24 hrs   Physician Substantive Portion: Medical Decision Making    Results from last 7 days   Lab Units 07/17/22  0344 07/15/22  0401 07/14/22  0338   WBC 10*3/mm3 19.03* 22.81* 17.20*   HEMOGLOBIN g/dL 11.3* 10.9* 10.6*   PLATELETS 10*3/mm3 317 322 301     Results from last 7 days   Lab Units 07/17/22  0344 07/15/22  0752 07/14/22  0338 07/13/22  0514 07/12/22  0327   SODIUM mmol/L 141 142 143 143 145   POTASSIUM mmol/L 4.4 4.1 4.5 3.5 3.7   CO2 mmol/L 32.0* 33.0* 33.0* 35.0* 37.0*   BUN mg/dL 28* 32* 29* 30* 28*   CREATININE mg/dL 0.54* 0.69* 0.70* 0.70* 0.79   MAGNESIUM mg/dL 2.5*  --   --  2.3 2.3   PHOSPHORUS mg/dL 3.7  --   --  3.2  --    GLUCOSE mg/dL 154* 161* 147* 109* 127*     Results from last 7 days   Lab Units 07/11/22  1056   PH, ARTERIAL pH units 7.499*   PCO2, ARTERIAL  mm Hg 42.8   PO2 ART mm Hg 88.1   FIO2 % 90     Lab Results   Component Value Date    PROBNP 1,299.0 07/01/2022     Blood Culture   Date Value Ref Range Status   06/26/2022 No growth at 5 days  Final   06/26/2022 No growth at 5 days  Final       Recent radiology:   Imaging Results (Last 72 Hours)     ** No results found for the last 72 hours. **        Results for orders placed during the hospital encounter of 07/01/22    Adult Transthoracic Echo Limited W/ Cont if Necessary Per Protocol    Interpretation Summary  · This is a limited echocardiogram.  · Left ventricular systolic function is normal. Left ventricular ejection fraction appears to be 61 - 65%.  · The right ventricular cavity is mildly dilated. Normal right ventricular systolic function noted.      My radiograph interpretation/independent review of imaging: diffuse opacities    Pulmonary Assessment:    1. Organizing pneumonia s/p Covid  2. Acute resp failure with hypoxia, severe, requiring high flow oxygen  3. Leukocytosis, on steroids  4. S/p pulm embolus      Recommend/plan:   · Oxygen requirement is progressively getting worse.  I think he is progressed to fibrotic stage of COVID.  He also has leukocytosis which I feel could be from steroids.  I will start weaning down steroids slowly.  Already on full dose anticoagulation for PE.  Recommend consideration for palliative care discussions although patient stated to me that he wants to continue to fight.  He is a DNR/DNI  · Continue high flow oxygen and wean as tolerated.  Maintain sats greater than 90%, niv if needed  · Currently on full dose anticoagulation history of PE.  · Overall long-term prognosis poor  ·       Electronically signed by Jamarcus Huff MD, 07/16/22, 10:14 AM CDT.

## 2022-07-18 NOTE — PLAN OF CARE
Patient oriented x4 when awake.  Sleeping comfortably overnight.  VSS, afebrile, occasional PVC noted.  Voiding via urinal w/ assist.  Moderate thick pasty BM overnight, patient is continent of urine and stool.  Vapotherm settings remain the same, adjusted by RT.  Patient up to chair after waking up, tolerated fair, did desat to low 80's after standing and getting up to chair.  Fall and safety precautions remain intact.          Goal Outcome Evaluation:           Progress: no change

## 2022-07-18 NOTE — PLAN OF CARE
Goal Outcome Evaluation:      Patient agreeable to sit EOB. Min assist for supine to sit to supine. Good sitting balance. On vapotherm at 40 lpm and 100% Ox. Began st 95%. Dropped to 79% first 2 minutes then 82% for next few minutes. Slowly improved to 90% after working on breathing technique. Also able to work thru inspiratory exercises. Also worked thru a few LE exercises.    Tolerated sitting EOB 20 minutes. Will continue to benefit from strengthening activities as able.

## 2022-07-18 NOTE — PROGRESS NOTES
"INFECTIOUS DISEASES PROGRESS NOTE    Patient:  Adrián Graham  YOB: 1940  MRN: [id number]   Admit date: 2022   Admitting Physician: Jefferson Hernandez MD  Primary Care Physician: Mauro Irizarry DO    Chief Complaint: \"Worn out\"      Interval History: The patient notes that he got up to the chair around 5 AM and got back in the bed after about 3 hours to eat some breakfast.  He said, \"takes 2 people to get me to the chair\".    Allergies: No Known Allergies    Current Scheduled Medications:   ascorbic acid, 500 mg, Oral, Daily  aspirin, 81 mg, Oral, Daily  atorvastatin, 20 mg, Oral, Daily  busPIRone, 10 mg, Oral, TID  cetirizine, 10 mg, Oral, Daily  dexamethasone, 6 mg, Oral, Q12H  diphenhydrAMINE, 25 mg, Oral, Nightly  enoxaparin, 1 mg/kg, Subcutaneous, Q12H  famotidine, 20 mg, Oral, BID AC  finasteride, 5 mg, Oral, Daily  fluticasone, 2 spray, Nasal, Daily  furosemide, 20 mg, Oral, BID  gabapentin, 400 mg, Oral, Q12H  guaiFENesin, 1,200 mg, Oral, BID  ipratropium-albuterol, 3 mL, Nebulization, 4x Daily - RT  montelukast, 10 mg, Oral, Nightly  sodium chloride, 250 mL, Intravenous, Once  sodium chloride, 10 mL, Intravenous, Q12H  vitamin D3, 5,000 Units, Oral, Daily  zinc sulfate, 220 mg, Oral, Daily      Current PRN Medications:  •  acetaminophen  •  albuterol  •  diazePAM  •  Morphine  •  ondansetron  •  sodium chloride  •  traMADol    Review of Systems  General: No fever  Respiratory: Some pleuritic chest pain he attributes to coughing-started yesterday.    :Objective     Vital Signs:  Temp (24hrs), Av.9 °F (36.6 °C), Min:97.5 °F (36.4 °C), Max:98.6 °F (37 °C)      /81   Pulse 86   Temp 97.5 °F (36.4 °C) (Oral)   Resp 23   Ht 185.4 cm (73\")   Wt 83.1 kg (183 lb 3.2 oz)   SpO2 93%   BMI 24.17 kg/m²         Physical Exam  General: Patient 82-year-old male lying in bed appearing somewhat fatigued but in no acute distress.  His daughter from [place] is at bedside.  HEENT: Sclera " anicteric.  Vapotherm in place at 40 L and 100% saturations are 92%.  Respiratory: Diminished breath sounds throughout.  Abdomen: Soft, nontender, nondistended  Neuro: Alert, oriented, speech clear  Psych: Pleasant and cooperative       Results Review:    I reviewed the patient's new clinical results.    Lab Results:  CBC:   Lab Results   Lab 07/12/22  0327 07/13/22  0514 07/14/22  0338 07/15/22  0401 07/17/22  0344   WBC 19.06* 20.01* 17.20* 22.81* 19.03*   HEMOGLOBIN 10.3* 10.5* 10.6* 10.9* 11.3*   HEMATOCRIT 32.4* 34.4* 33.3* 34.9* 36.3*   PLATELETS 336 329 301 322 317     >>>>>>>>>>>>>>>>>>>>>>>>>>>> 92.3% neutrophils/2.6% lymphocytes/2.3% monocytes    CMP:   Lab Results   Lab 07/14/22  0338 07/15/22  0752 07/17/22  0344   SODIUM 143 142 141   POTASSIUM 4.5 4.1 4.4   CHLORIDE 104 103 102   CO2 33.0* 33.0* 32.0*   BUN 29* 32* 28*   CREATININE 0.70* 0.69* 0.54*   CALCIUM 8.7 8.8 8.7   GLUCOSE 147* 161* 154*       Estimated Creatinine Clearance: 121.8 mL/min (A) (by C-G formula based on SCr of 0.54 mg/dL (L)).      COVID LABS:  Results From Last 14 Days   Lab Units 07/17/22  0344 07/15/22  0752 07/14/22  0338 07/11/22  1226 07/11/22  0312 07/10/22  0223 07/07/22  0941   CRP mg/dL 1.40*  --  6.72*  --  16.21*   < > 8.16*   FERRITIN ng/mL  --   --   --   --  1,112.00*  --  1,243.00*   PROCALCITONIN ng/mL 0.20 0.43*  --  0.77*  --   --   --     < > = values in this interval not displayed.         Culture Results:    Microbiology Results (last 10 days)     Procedure Component Value - Date/Time    Blood Culture With KEVAN - Blood, Arm, Right [[id number]]  (Normal) Collected: 07/11/22 1229    Lab Status: Final result Specimen: Blood from Arm, Right Updated: 07/16/22 1248     Blood Culture No growth at 5 days    Blood Culture With KEVAN - Blood, Hand, Left [[id number]]  (Normal) Collected: 07/11/22 1226    Lab Status: Final result Specimen: Blood from Hand, Left Updated: 07/16/22 1248     Blood Culture No growth at 5 days     MRSA Screen, PCR (Inpatient) - Swab, Nares [[id number]]  (Normal) Collected: 07/09/22 1052    Lab Status: Final result Specimen: Swab from Nares Updated: 07/09/22 1210     MRSA PCR No MRSA Detected    Narrative:      The negative predictive value of this diagnostic test is high and should only be used to consider de-escalating anti-MRSA therapy. A positive result may indicate colonization with MRSA and must be correlated clinically.    Blood Culture - Blood, Arm, Right [[id number]]  (Normal) Collected: 07/09/22 1030    Lab Status: Final result Specimen: Blood from Arm, Right Updated: 07/14/22 1049     Blood Culture No growth at 5 days    Blood Culture - Blood, Arm, Left [[id number]]  (Normal) Collected: 07/09/22 1028    Lab Status: Final result Specimen: Blood from Arm, Left Updated: 07/14/22 1049     Blood Culture No growth at 5 days               Radiology:   7/15                                                                 7/11        Imaging Results (Last 72 Hours)     ** No results found for the last 72 hours. **          Assessment & Plan     Active Hospital Problems    Diagnosis    • Moderate malnutrition (HCC)    • Acute respiratory failure with hypoxia (HCC)    • Pulmonary embolism associated with COVID-19 (HCC)    • Bilateral carotid artery disease (HCC)    • Hyperlipidemia    • Benign prostatic hyperplasia without lower urinary tract symptoms    • Essential hypertension        IMPRESSION:  1. COVID-19 infection diagnosed June 26, 2022-unclear when patient actually initially was infected with COVID as he was seen June 7 with cough and shortness of breath, had CT of the chest that showed patchy groundglass infiltrates but does not appear he was tested for COVID.  He received steroids and antibiotics.  It does not appear he received any COVID-related treatments during admission June 26 when pulmonary embolus diagnosed i.e. Paxlovid as he appeared to be quite out of the window for benefit as he was likely  infected earlier in the month if not the month prior (2 urgent care visits for cough and congestion 1 in May 1 in June).  Patient readmitted July 1 and had temp spike to 103 and significant increased O2 requirements July 9 prompting transfer to CCU temperature has been stable.  Unable to wean patient off O2..  2. Fever -blood cultures negative.  Fever resolved no significant change in chest x-ray to support new consolidation in patient who has received IV azithromycin for 5 days and Zosyn for 8 days this admission and then dosed with vancomycin  x1 followed by continuing on cefepime that was completed July 16.  May be related to postacute COVID organizing pneumonia  3. Leukocytosis- on steroids   4. Pulmonary emboli diagnosed June 26  5. Elevated CRP- overall significant improvement.  6. Elevated ferritin  7. Elevated procalcitonin -normalized    RECOMMENDATION:   · Monitor off antibiotic therapy- empiric cefepime discontinued July 16  · Steroid management per pulmonary  · Wean O2 as tolerated  · Continue attempts to increase activity     Discussed with nursing at bedside.    Beatris Eduardo MD  07/18/22  08:36 CDT

## 2022-07-18 NOTE — PLAN OF CARE
Goal Outcome Evaluation:         Patient agreed to exercise in bed. All exercises were active at 15-20 reps. Rests between each set. Continues to c/o pain in center and across chest, especially when coughs.

## 2022-07-18 NOTE — CASE MANAGEMENT/SOCIAL WORK
Continued Stay Note   Jovanna     Patient Name: Adrián Graham  MRN: [id number]  Today's Date: 7/18/2022    Admit Date: 7/1/2022     Discharge Plan     Row Name 07/18/22 5176       Plan    Plan Comments LTACH continuing to follow and plan to admit PT when ready for transfer. PT's daughter would like to meet with LTACH coordinator and SW prior to transfer to make sure she has all the information she needs.               Discharge Codes    No documentation.               Expected Discharge Date and Time     Expected Discharge Date Expected Discharge Time    Jul 22, 2022             JUAN Aponte

## 2022-07-18 NOTE — PROGRESS NOTES
"Pharmacy Dosing Service  Anticoagulant  Enoxaparin    Assessment/Action/Plan:  Patient is currently receiving Enoxaparin 90 mg (1mg/kg) SQ every 12 hours for indication of PE. Labs/dose reviewed. Patient's weight has trended down since admission. Will decrease dose to 80 mg SQ every 12 hours to better reflect current weight of 83 kg. Pharmacy will continue to monitor renal function and adjust dose accordingly.     Subjective:  Adrián Graham is a 82 y.o. male  Objective:  [Ht: 185.4 cm (73\"); Wt: 83.1 kg (183 lb 3.2 oz); BMI: Body mass index is 24.17 kg/m².]  Estimated Creatinine Clearance: 121.8 mL/min (A) (by C-G formula based on SCr of 0.54 mg/dL (L)).   Lab Results   Component Value Date    INR 2.16 (H) 07/01/2022    PROTIME 23.3 (H) 07/01/2022      Lab Results   Component Value Date    HGB 11.3 (L) 07/17/2022    HGB 10.9 (L) 07/15/2022    HGB 10.6 (L) 07/14/2022      Lab Results   Component Value Date     07/17/2022     07/15/2022     07/14/2022         Francis Garces, [email or web address]  07/18/22 14:17 CDT     "

## 2022-07-19 NOTE — PROGRESS NOTES
[facility] Fall Risk Assessment Note    Name: Adrián Graham  MRN: [id number]  CSN: [id number]  Room: [facility]  Admit Date/Time: 7/19/2022 12:46 PM.    Currently ordered medications associated with an increased risk for fall include:    Scheduled Meds:  busPIRone, 10 mg, Oral, TID  cetirizine, 10 mg, Oral, Daily  diphenhydrAMINE, 25 mg, Oral, Nightly  furosemide, 20 mg, Oral, BID  gabapentin, 400 mg, Oral, Q12H  montelukast, 10 mg, Oral, Nightly      PRN Meds:    diazePAM    Morphine    ondansetron     traMADol    Bry Geronimo, Pharmacy Intern  07/19/22 15:27 CDT

## 2022-07-19 NOTE — PROGRESS NOTES
PULMONARY AND CRITICAL CARE PROGRESS NOTE - [facility]    Patient: Adrián Graham  1940   MR# [id number]   Acct# [id number]  07/19/22   07:55 CDT  Referring Provider: Jefferson Hernandez MD    Chief Complaint: Covid-19   Interval history: He is out of covid isolation. He is seen awake and alert resting in bed. He is complaining of a sore chest from coughing. Oxygen saturation 88-90% on vapotherm 35l fio2 0.75. Respiratory rate 23. He continues on dexamethasone daily. He is utilizing IS and aerobic flutter. No documented fevers. No other issues overnight.   Meds:  ascorbic acid, 500 mg, Oral, Daily  aspirin, 81 mg, Oral, Daily  atorvastatin, 20 mg, Oral, Daily  busPIRone, 10 mg, Oral, TID  cetirizine, 10 mg, Oral, Daily  dexamethasone, 6 mg, Oral, Daily With Breakfast  diphenhydrAMINE, 25 mg, Oral, Nightly  enoxaparin, 1 mg/kg, Subcutaneous, Q12H  famotidine, 20 mg, Oral, BID AC  finasteride, 5 mg, Oral, Daily  fluticasone, 2 spray, Nasal, Daily  furosemide, 20 mg, Oral, BID  gabapentin, 400 mg, Oral, Q12H  guaiFENesin, 1,200 mg, Oral, BID  ipratropium-albuterol, 3 mL, Nebulization, 4x Daily - RT  montelukast, 10 mg, Oral, Nightly  sodium chloride, 250 mL, Intravenous, Once  sodium chloride, 10 mL, Intravenous, Q12H  vitamin D3, 5,000 Units, Oral, Daily  zinc sulfate, 220 mg, Oral, Daily         Review of Systems:   Review of Systems   Constitutional: Positive for fatigue. Negative for fever.   HENT: Negative for congestion.    Respiratory: Positive for cough and shortness of breath.    Cardiovascular: Positive for chest pain.   Gastrointestinal: Negative for constipation, diarrhea and nausea.   Musculoskeletal: Positive for arthralgias.   Neurological: Positive for weakness.     Physical Exam:  SpO2 Percentage    07/19/22 0630 07/19/22 0640 07/19/22 0650   SpO2: 94% 90% 90%     Temp:  [97.6 °F (36.4 °C)-98.2 °F (36.8 °C)] 97.6 °F (36.4 °C)  Heart Rate:  [] 75  Resp:  [13-26] 14  BP:  ()/(48-80) 118/69    Intake/Output Summary (Last 24 hours) at 7/19/2022 0755  Last data filed at 7/19/2022 0000  Gross per 24 hour   Intake --   Output 1335 ml   Net -1335 ml     Body mass index is 24.78 kg/m².   GENERAL/CONSTITUTIONAL: no acute distress. Awake and alert  HEENT: atraumatic, normocephalic.   NOSE: normal, vapotherm   NECK: jugular veins nondistended  CHEST: no paradox, no retractions.  No respiratory distress.   CARDIAC: regular rhythm, rate 80  ABDOMEN: nondistended  EXTREMITIES: very mild ble edema.  NEURO:  Awake and alert, oriented, following commands   PSYCH: no agitation  SKIN: no jaundice.  No rash    Electronically signed by Loida Ingram, MEKHI, 7/19/2022, 07:55 CDT        Physician Substantive Portion: Medical Decision Making    Laboratory Data:  Results from last 7 days   Lab Units 07/19/22  0357 07/17/22  0344 07/15/22  0401   WBC 10*3/mm3 16.50* 19.03* 22.81*   HEMOGLOBIN g/dL 13.1 11.3* 10.9*   PLATELETS 10*3/mm3 322 317 322     Results from last 7 days   Lab Units 07/19/22  0357 07/17/22  0344 07/15/22  0752   SODIUM mmol/L 139 141 142   POTASSIUM mmol/L 4.5 4.4 4.1   BUN mg/dL 32* 28* 32*   CREATININE mg/dL 0.62* 0.54* 0.69*         No results found for: BLOODCX, URINECX, WOUNDCX, MRSACX, RESPCX, STOOLCX  Results from last 7 days   Lab Units 07/17/22  0344 07/15/22  0752 07/14/22  0338   CRP mg/dL 1.40*  --  6.72*   PROCALCITONIN ng/mL 0.20 0.43*  --       Recent films:  No radiology results from the last 24 hrs     My radiograph interpretation/independent review of imaging: Reviewed all the imaging studies.    Pulmonary Assessment:    1. Acute hypoxic respiratory failure, currently on Vapotherm  2. Bilateral groundglass pulmonary infiltrate  3. Worsening COVID-19 pneumonia  4. Possible acute lung injury or cryptogenic organizing pneumonia  5. Pulmonary embolism single subsegmental on anticoagulation  6. Essential hypertension  7. Hyperlipidemia  8. Carotid artery  disease  9. Recurrent bronchitis  10. S/p COVID vaccinated status  11. Severe weakness and deconditioning  12. History of fall    Recommend/plan:   · He had oxygen desaturation last night and early this morning and needed Vapotherm bumped up to 40 L with 100% FiO2  · He has a bed available in the LTAC and is transferred to the LTAC today.  · Continue Vapotherm and high flow oxygen and used BiPAP if tolerated.  · ID following.  Cefepime discontinued patient off antibiotics and is afebrile.  · Continue steroids.  On dexamethasone and adjunct treatment for COVID-19  · Bronchodilator treatment routine respiratory care pulmonary toilet.  · Continue incentive spirometry flutter valve.  · Physical therapy speech therapy evaluation and Occupational Therapy to continue.  · DVT stress ulcer prophylaxis and pain and anxiety control.  · Continue anticoagulation.  On full dose Lovenox may switch to Eliquis.  · Repeat labs and imaging studies from time to time.  · CODE STATUS: Full.  Overall prognosis: Guarded.  · We will follow in the LTAC and make further recommendations  · Appreciate consult from hospitalist team.    This visit was performed by both a physician and an Advanced Practice RN.  I personally evaluated and examined the patient.  I performed all aspects of the medical decision making as documented.    Electronically signed by     Miguel Smith MD,  Pulmonologist/Intensivist   7/19/2022, 15:26 CDT

## 2022-07-19 NOTE — DISCHARGE SUMMARY
[facility] Medicine Services  DISCHARGE SUMMARY       Date of Admission: 7/1/2022  Date of Discharge:  7/19/2022  Primary Care Physician: Mauro Irizarry DO    Presenting Problem/Chief Complaint:    Final Discharge Diagnoses:  Active Hospital Problems    Diagnosis    • Moderate malnutrition (HCC)    • Acute respiratory failure with hypoxia (HCC)    • Pulmonary embolism associated with COVID-19 (HCC)    • Bilateral carotid artery disease (HCC)    • Hyperlipidemia    • Benign prostatic hyperplasia without lower urinary tract symptoms    • Essential hypertension        Consults: Palliative care . infectious disease.  Pulmonary .    Pertinent Test Results:   Results for orders placed during the hospital encounter of 07/01/22    Adult Transthoracic Echo Limited W/ Cont if Necessary Per Protocol    Interpretation Summary  · This is a limited echocardiogram.  · Left ventricular systolic function is normal. Left ventricular ejection fraction appears to be 61 - 65%.  · The right ventricular cavity is mildly dilated. Normal right ventricular systolic function noted.      Imaging Results (All)     Procedure Component Value Units Date/Time    XR Chest 1 View [[id number]] Collected: 07/15/22 0725     Updated: 07/15/22 0731    Narrative:      EXAM/TECHNIQUE: XR CHEST 1 VW-     INDICATION: Respiratory failure; R09.02-Hypoxemia; I95.1-Orthostatic  hypotension; U07.1-COVID-19; J12.82-Pneumonia due to coronavirus disease  2019; I26.94-Multiple subsegmental pulmonary emboli without acute cor  pulmonale; Z74.09-Other reduced mobility; Z78.9-Other specified health  status     COMPARISON: 7/11/2022     FINDINGS:     Enlarged but stable cardiac silhouette. No significant change in diffuse  bilateral interstitial and airspace opacity. No large pleural effusion.  No visible pneumothorax. No acute osseous finding.       Impression:         No significant change in diffuse bilateral interstitial and  airspace  opacity.  This report was finalized on 07/15/2022 07:27 by Dr. Helio Shepherd MD.    XR Chest 1 View [[id number]] Collected: 07/11/22 1111     Updated: 07/11/22 1115    Narrative:      EXAM: XR CHEST 1 VW- 7/11/2022 10:54 AM CDT     HISTORY: f/u xray, hypoxia; R09.02-Hypoxemia; I95.1-Orthostatic  hypotension; U07.1-COVID-19; J12.82-Pneumonia due to coronavirus disease  2019; I26.94-Multiple subsegmental pulmonary emboli without acute cor  pulmonale; Z74.09-Other reduced mobility; Z78.9-Other specified health  status       COMPARISON: July 9, 2022.     TECHNIQUE: Frontal radiograph of the chest     FINDINGS:   Bilateral interstitial and airspace filling opacities are noted. This is  been described as pneumonia and is stable and unchanged from July 9, 2022.. Cardiac silhouette is normal..      The osseous structures and surrounding soft tissues demonstrate no acute  abnormality.          Impression:      1. Persistent bilateral interstitial and airspace filling opacities most  consistent with pneumonia. This is similar to July 9, 2022.        This report was finalized on 07/11/2022 11:12 by Dr. Don Delacruz MD.    XR Chest 1 View [[id number]] Collected: 07/09/22 1049     Updated: 07/09/22 1054    Narrative:      XR CHEST 1 VW- 7/9/2022 10:38 AM CDT     HISTORY: hypoxemia pneumonia worsening oxygen level; R09.02-Hypoxemia;  I95.1-Orthostatic hypotension; U07.1-COVID-19; J12.82-Pneumonia due to  coronavirus disease 2019; I26.94-Multiple subsegmental pulmonary emboli  without acute cor pulmonale; Z74.09-Other reduced mobility; Z78.9-Other  specified health status     COMPARISON: Chest exam dated 7/8/2022.     FINDINGS:      Multifocal bilateral patchy infiltrates, appearing stable. No dense  areas of consolidation. The lungs are well expanded. No pleural effusion  or pneumothorax. The cardiomediastinal silhouette and pulmonary  vascularity are within normal limits. The osseous structures and  surrounding soft  tissues demonstrate no acute abnormality.       Impression:      1. Bilateral, essentially diffuse patchy lung infiltrates reflecting  atypical pneumonia. Infiltrates appear stable. Lung volumes are stable.  No new consolidation.        This report was finalized on 07/09/2022 10:51 by Dr Prem Mclaughlin, .    XR Chest 1 View [[id number]] Collected: 07/08/22 1115     Updated: 07/08/22 1119    Narrative:      EXAMINATION: XR CHEST 1 VW-     7/8/2022 11:09 AM CDT     HISTORY: Worsening hypoxemia; R09.02-Hypoxemia; I95.1-Orthostatic  hypotension; U07.1-COVID-19; J12.82-Pneumonia due to coronavirus disease  2019; I26.94-Multiple subsegmental pulmonary emboli without acute cor  pulmonale; Z74.09-Other reduced mobility; Z78.9-Other specified health  status     1 view chest x-ray.     Comparison is made with July 6, 2022.     Patchy infiltrate is again seen throughout both lungs.  There is been no improvement.     No pneumothorax.     Stable heart size.     Summary:  1. Similar appearance of infiltrate throughout both lungs. No  improvement.  This report was finalized on 07/08/2022 11:16 by Dr. Alvaro Wang MD.    XR Chest 1 View [[id number]] Collected: 07/06/22 1553     Updated: 07/06/22 1558    Narrative:      XR CHEST 1 VW- 7/6/2022 2:37 PM CDT     HISTORY: COVID; R09.02-Hypoxemia; I95.1-Orthostatic hypotension;  U07.1-COVID-19; J12.82-Pneumonia due to coronavirus disease 2019;  I26.94-Multiple subsegmental pulmonary emboli without acute cor  pulmonale; Z74.09-Other reduced mobility; Z78.9-Other specified health  status     COMPARISON: 7/1/2022.     FINDINGS:      Reidentified bilateral, asymmetric pulmonary infiltrates. Left lung  patchy infiltrates are nearly diffuse throughout the left lung fields  and perhaps mildly improved from the recent comparison exam. Additional  patchy right basilar infiltrates are very similar. Overall lung volumes  are stable. No new consolidation. No pleural effusion or pneumothorax.  Heart  size is stable. Pulmonary vasculature are nondilated. No acute  bony abnormality.       Impression:      1. Covid pneumonia with bilateral asymmetric pulmonary infiltrate (left  greater than right), perhaps mildly improved from 7/1/2022. Lung volumes  are stable.        This report was finalized on 07/06/2022 15:55 by Dr Prem Mclaughlin, .    US Venous Doppler Lower Extremity Bilateral (duplex) [[id number]] Collected: 07/03/22 1112     Updated: 07/03/22 1116    Narrative:      History: Swelling       Impression:      Impression: There is no evidence of deep venous thrombosis in either  lower extremity.     Comments: Bilateral lower extremity venous duplex exam was performed  using color Doppler flow, Doppler waveform analysis, and grayscale  imaging, with and without compression. There is no evidence of deep  venous thrombosis in the common femoral veins in either lower extremity.  This is a limited exam due to Covid positivity.        This report was finalized on 07/03/2022 11:13 by Dr. Albert Brown MD.    CT Angiogram Chest [[id number]] Collected: 07/01/22 1402     Updated: 07/01/22 1411    Narrative:      CT ANGIOGRAM CHEST- 7/1/2022 1:40 PM CDT      HISTORY: Recent history of pulmonary embolus now with syncope      COMPARISON: CT scan dated 6/26/2022.      DOSE LENGTH PRODUCT: 763 mGy cm. Automated exposure control was also  utilized to decrease patient radiation dose.     TECHNIQUE: Helical tomographic images of the chest were obtained after  the administration of intravenous contrast following angiogram protocol.  Additionally, 3D and multiplanar reformatted images were provided.        FINDINGS:    Pulmonary arteries: There is adequate enhancement of the pulmonary  arteries to evaluate for central and segmental pulmonary emboli. There  are multiple pulmonary emboli identified in the segmental and  subsegmental pulmonary arteries of the right lower lobe.     Aorta and great vessels: Thoracic aorta is normal  in caliber. No  dissection identified. Minimal calcified plaque in the arch.  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      Visualized neck base: The imaged portion of the base of the neck appears  unremarkable.      Lungs: Increasing multifocal bilateral airspace opacities, mixed  groundglass and consolidative and most notably in the lung periphery and  bases. This is increased from the recent CT scan from June 26. No  pleural effusion. Airways are clear.     Heart: The heart is normal in size. There is no pericardial effusion.     Mediastinum and lymph nodes: Mild reactive adenopathy in the tonya and  mediastinum..     Skeletal and soft tissues: Chest wall soft tissues are unremarkable. No  acute bony abnormality. Multilevel bridging spinal osteophytes.      Upper abdomen: The imaged portion of the upper abdomen demonstrates no  acute process. Prior cholecystectomy.       Impression:      1. Worsening Covid pneumonia with increasing bilateral mixed groundglass  and consolidative pulmonary infiltrates.  2. Acute pulmonary embolus identified in the segmental and subsegmental  pulmonary arteries in the right lower lobe. This is mildly increased as  compared with the recent CT scan. No evidence of right heart strain.     This report was finalized on 07/01/2022 14:08 by Dr Prem Mcalughlin, .    XR Chest 1 View [[id number]] Collected: 07/01/22 1250     Updated: 07/01/22 1254    Narrative:      XR CHEST 1 VW- 7/1/2022 12:39 PM CDT     HISTORY: Hypoxemia     COMPARISON: Chest exam dated 6/26/2022.     FINDINGS:      Nearly diffuse opacification of the left lung. Right lung is mostly  completely clear. There is no mediastinal shift noted. No pleural  effusion. Cardiac silhouette is obscured by the left lung consolidation.  Pulmonary vasculature are nondilated. No acute bony abnormality.       Impression:      1. Bilateral, asymmetric pulmonary infiltrates. Left lung in particular  is near completely  opacified, much worse than was seen on the June 26th  exam. Appearance favors a worsening atypical pneumonia.  2. No pleural effusion or pneumothorax.     This report was finalized on 07/01/2022 12:51 by Dr Prem Mclaughlin, .    CT Head Without Contrast [[id number]] Collected: 07/01/22 1155     Updated: 07/01/22 1201    Narrative:      EXAMINATION:   CT HEAD WO CONTRAST-  7/1/2022 11:55 AM CDT     HISTORY: CT BRAIN without contrast 7/1/2022 11:47 AM CDT     HISTORY: Patient fell     COMPARISON: None      DLP: 924 mGy cm. In order to have a CT radiation dose as low as  reasonably achievable, Automated Exposure Control was utilized for  adjustment of the mA and/or KV according to patient size.     TECHNIQUE: Serial axial tomographic images of the brain were obtained  without the use of intravenous contrast.      FINDINGS:   The midline structures are nondisplaced. There is mild cerebral and  cerebellar volume loss, with an associated increase in the prominence of  the ventricles and sulci. The basilar cisterns are normal in size and  configuration. There is no evidence of intracranial hemorrhage or  mass-effect. There is low attenuation in the periventricular white  matter, consistent with chronic ischemic change. There are no abnormal  extra-axial fluid collections. There is no evidence of tonsillar  herniation.      The included orbits and their contents are unremarkable. A mucous cyst  is present left maxillary antrum. Mastoid air cells are pneumatized..  The visualized osseous structures and overlying soft tissues of the  skull and face are intact.        Impression:      Mild cerebral and cerebellar volume loss with chronic microvascular  disease but no evidence of acute intracranial process.  2. Mucous cyst left maxillary sinus        This report was finalized on 07/01/2022 11:58 by Dr. Don Delacruz MD.          LAB RESULTS:      Lab 07/19/22  0357 07/17/22  0344 07/15/22  0752 07/15/22  0401 07/14/22  0338  07/13/22  0514   WBC 16.50* 19.03*  --  22.81* 17.20* 20.01*   HEMOGLOBIN 13.1 11.3*  --  10.9* 10.6* 10.5*   HEMATOCRIT 40.9 36.3*  --  34.9* 33.3* 34.4*   PLATELETS 322 317  --  322 301 329   NEUTROS ABS 13.80* 17.58*  --  21.01* 15.86* 17.77*   IMMATURE GRANS (ABS) 0.55* 0.47*  --  0.33* 0.19* 0.31*   LYMPHS ABS 1.21 0.50*  --  0.85 0.81 1.30   MONOS ABS 0.61 0.43  --  0.57 0.32 0.58   EOS ABS 0.26 0.00  --  0.00 0.00 0.01   MCV 93.4 93.6  --  93.1 93.8 96.1   CRP  --  1.40*  --   --  6.72*  --    PROCALCITONIN  --  0.20 0.43*  --   --   --          Lab 07/19/22  0357 07/17/22  0344 07/15/22  0752 07/14/22  0338 07/13/22  0514   SODIUM 139 141 142 143 143   POTASSIUM 4.5 4.4 4.1 4.5 3.5   CHLORIDE 100 102 103 104 103   CO2 33.0* 32.0* 33.0* 33.0* 35.0*   ANION GAP 6.0 7.0 6.0 6.0 5.0   BUN 32* 28* 32* 29* 30*   CREATININE 0.62* 0.54* 0.69* 0.70* 0.70*   EGFR 94.8 98.9 91.8 91.4 91.4   GLUCOSE 108* 154* 161* 147* 109*   CALCIUM 8.9 8.7 8.8 8.7 8.9   MAGNESIUM  --  2.5*  --   --  2.3   PHOSPHORUS  --  3.7  --   --  3.2                         Brief Urine Lab Results  (Last result in the past 365 days)      Color   Clarity   Blood   Leuk Est   Nitrite   Protein   CREAT   Urine HCG        07/12/22 0727 Yellow   Clear   Negative   Negative   Negative   30 mg/dL (1+)               Microbiology Results (last 10 days)     Procedure Component Value - Date/Time    Blood Culture With KEVAN - Blood, Arm, Right [[id number]]  (Normal) Collected: 07/11/22 1229    Lab Status: Final result Specimen: Blood from Arm, Right Updated: 07/16/22 1248     Blood Culture No growth at 5 days    Blood Culture With KEVAN - Blood, Hand, Left [[id number]]  (Normal) Collected: 07/11/22 1226    Lab Status: Final result Specimen: Blood from Hand, Left Updated: 07/16/22 1248     Blood Culture No growth at 5 days          Chief Complaint on Day of Discharge: Weakness    History of Present Illness on Day of Discharge:   Patient is an 82-year-old male with  known history of carotid disease has been having intermittent episodes of dizziness as an outpatient.  He is following with vascular surgery.  He was also recently admitted to the hospital on 6/26 and discharged on 6/27 and at that time was positive for COVID-19 with right lower lobe segmental and subsegmental pulmonary embolisms.  He had been sick prior to the hospitalization for several weeks failing outpatient therapies and was felt to likely have prior COVID and subsequent PE. He was discharged on Eliquis starter pack which he states he has been taking since discharge.  He was on room air at time of discharge.  Other medical history includes hypertension and BPH.  Last evening he had a dizzy spell and fell on the floor and could not get up.  Denies head strike.  Was also feeling a little short of breath.  He arrives to the ER here for evaluation and was found to be hypoxic and put on 2 L O2.  Eventually titrated up to Vapotherm which she was on when I saw him.  25 L 40%.  His COVID swab is now negative but he had a repeat CTA of his chest which showed questionable mild increase in his prior PEs but diffuse bilateral worsening groundglass opacities consistent with worsening COVID-pneumonia.  He is afebrile.  He has normal CBC.  He was given a small fluid bolus and we were asked to admit for further care.  He was seen with daughter at bedside.  Again looks very comfortable on Vapotherm.  Denies any other recent chest pain, nausea, vomiting, diarrhea.  No focal numbness tingling or weakness.  No urinary symptoms.    Hospital Course:  The patient is a 82 y.o. male who presented to [facility] with post COVID infection/respiratory failure hypoxia/COPD/pulmonary embolus/advanced age.      HPI . Acute respiratory failure with hypoxia -difficult case.  Likely multifactorial.  Recent COVID and PE.  Presented with concern for cryptogenic organizing pneumonia and likely has fibrotic stage of post-COVID.  Was on  steroids and antibiotics.  Initially was getting better and oxygen was weaning to 4 L.  He however worsened again over 7/8 through 7/10.  Ended up in the ICU that Sunday and when I saw him on 7/11 he was on max oxygen with low sats.  We gave him an extra high-dose slug of steroid at 125 and he did better over the next 24 hours with decreasing O2 needs.  He started to slowly drift back up with O2 needs however and then on 7/13 we increased steroids to Solu-Medrol 40mg iv 3 times daily.  CRP trending down after burst of steroids and increased dose.  Also on cefepime due to elevated fever few days ago.  No subsequent fevers.  Blood cultures negative.  White count persists but procalcitonin tending down. Abx now on hold again.  O2 requirements still high but slight improvement from earlier in the week.  Continue respiratory exercises and incentive spirometry.  Up to chair.  Would benefit from LTAC.  Pulmonary and ID following.     Respiratory failure hypoxia/post COVID-pneumonia COVID infection/COPD.  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  Pulmonary consult.  Infectious consult.  Leukocytosis-on steroid.  Vitamin C.  Decadron.  Mucinex.  Duo nebs.  Singulair.  Vitamin D.  Zinc.  Vitamin C . incentive spirometer.Flutter valve .  Chest x-ray-No significant change in diffuse bilateral interstitial and airspace opacity.  Vapotherm 40 L / %.  C-reactive protein improving.  Leukocytosis stable.     Pulmonary embolus.  Lovenox therapeutic.     Hyperlipidemia/edema.  Lipitor . aspirin.  Lasix p.o.  Echocardiogram 7/2/2022- ejection fraction 61 to 65%, right ventricle cavity mildly dilated.     Anxiety.  Valium as needed.  BuSpar.     Allergic rhinitis.  Zyrtec.  Benadryl nightly.  Flonase.     Reflux.  Pepcid.  Zofran as needed.     Pain/neuropathy.  Morphine as needed.  Neurontin.  Ultram as needed.     Benign prostate hypertrophy.  Proscar.     Nutrition . soft texture/ground/cardiac diet.  Boost  "supplement.     Deconditioning.  PT and OT consult     Blood cultures no growth in 5 days.     Vital signs stable, afebrile.  DNR.  DNI.    Plan to discharge patient to LTAC.  Follow-up with pulmonary and infectious and LTAC physician upstairs.     Condition on Discharge: Stable.    Physical Exam on Discharge:  /77   Pulse 98   Temp 98.5 °F (36.9 °C) (Axillary)   Resp 26   Ht 185.4 cm (73\")   Wt 85.2 kg (187 lb 13.3 oz)   SpO2 96%   BMI 24.78 kg/m²   Physical Exam  Vitals and nursing note reviewed.   Constitutional:       Comments: Advanced age.  Extremely weak.   HENT:      Head: Normocephalic.   Eyes:      Conjunctiva/sclera: Conjunctivae normal.      Pupils: Pupils are equal, round, and reactive to light.   Neck:      Vascular: No JVD.   Cardiovascular:      Rate and Rhythm: Normal rate and regular rhythm.      Heart sounds: Normal heart sounds.   Pulmonary:      Effort: No respiratory distress.      Breath sounds: No wheezing or rales.      Comments: On Vapotherm.  Diminished breath sound bilateral, clear.  Chest:      Chest wall: No tenderness.   Abdominal:      General: Bowel sounds are normal. There is no distension.      Palpations: Abdomen is soft.      Tenderness: There is no abdominal tenderness.   Musculoskeletal:         General: No tenderness or deformity.      Cervical back: Neck supple.   Skin:     General: Skin is warm and dry.      Capillary Refill: Capillary refill takes 2 to 3 seconds.      Findings: No rash.   Neurological:      Mental Status: He is alert.      Cranial Nerves: No cranial nerve deficit.      Motor: Weakness present. No abnormal muscle tone.      Coordination: Coordination abnormal.      Gait: Gait abnormal.      Deep Tendon Reflexes: Reflexes normal.   Psychiatric:         Mood and Affect: Mood normal.         Behavior: Behavior normal.     Discharge Disposition: Discharge to LTAC continue medical management      Discharge Medications:     Discharge Medications    "   New Medications      Instructions Start Date   albuterol (2.5 MG/3ML) 0.083% nebulizer solution  Commonly known as: PROVENTIL   2.5 mg, Nebulization, Every 6 Hours PRN      ascorbic acid 500 MG tablet  Commonly known as: VITAMIN C   500 mg, Oral, Daily   Start Date: July 20, 2022     busPIRone 10 MG tablet  Commonly known as: BUSPAR   10 mg, Oral, 3 Times Daily      cetirizine 10 MG tablet  Commonly known as: zyrTEC   10 mg, Oral, Daily   Start Date: July 20, 2022     dexamethasone 6 MG tablet  Commonly known as: DECADRON   6 mg, Oral, Daily With Breakfast      diazePAM 5 MG tablet  Commonly known as: VALIUM   2.5 mg, Oral, Every 6 Hours PRN      diphenhydrAMINE 25 mg capsule  Commonly known as: BENADRYL   25 mg, Oral, Nightly      Enoxaparin Sodium 80 MG/0.8ML solution prefilled syringe syringe  Commonly known as: LOVENOX   1 mg/kg (80 mg), Subcutaneous, Every 12 Hours      famotidine 20 MG tablet  Commonly known as: PEPCID   20 mg, Oral, 2 Times Daily Before Meals      furosemide 20 MG tablet  Commonly known as: LASIX   20 mg, Oral, 2 Times Daily      guaiFENesin 600 MG 12 hr tablet  Commonly known as: MUCINEX   1,200 mg, Oral, 2 Times Daily      ipratropium-albuterol 0.5-2.5 mg/3 ml nebulizer  Commonly known as: DUO-NEB   3 mL, Nebulization, 4 Times Daily - RT      montelukast 10 MG tablet  Commonly known as: SINGULAIR   10 mg, Oral, Nightly      Morphine sulfate (PF) 2 MG/ML solution injection   1 mg, Intravenous, Every 4 Hours PRN      ondansetron 2 mg/mL injection  Commonly known as: ZOFRAN   4 mg, Intravenous, Every 6 Hours PRN      vitamin D3 125 MCG (5000 UT) capsule capsule   5,000 Units, Oral, Daily   Start Date: July 20, 2022     zinc sulfate 220 (50 Zn) MG capsule  Commonly known as: ZINCATE   220 mg, Oral, Daily   Start Date: July 20, 2022        Changes to Medications      Instructions Start Date   gabapentin 400 MG capsule  Commonly known as: NEURONTIN  What changed: when to take this   400 mg,  Oral, Every 12 Hours Scheduled         Continue These Medications      Instructions Start Date   acetaminophen 325 MG tablet  Commonly known as: TYLENOL   650 mg, Oral, Every 4 Hours PRN      aspirin 81 MG EC tablet   81 mg, Oral, Daily      atorvastatin 20 MG tablet  Commonly known as: Lipitor   20 mg, Oral, Daily      finasteride 5 MG tablet  Commonly known as: PROSCAR   5 mg, Oral, Daily      fluticasone 50 MCG/ACT nasal spray  Commonly known as: Flonase   2 sprays, Nasal, Daily      traMADol 50 MG tablet  Commonly known as: ULTRAM   50 mg, Oral, Every 8 Hours PRN         Stop These Medications    Eliquis DVT/PE Starter Pack tablet therapy pack  Generic drug: Apixaban Starter Pack     meclizine 12.5 MG tablet  Commonly known as: ANTIVERT     Olmesartan-amLODIPine-HCTZ 40-10-12.5 MG tablet            Discharge Diet:   Diet Instructions     Advance Diet As Tolerated            Activity at Discharge:   Activity Instructions     Activity as Tolerated            Discharge Care Plan/Instructions: Discharge to LTAC for further medical management.    Follow-up Appointments:   Future Appointments   Date Time Provider Department Center   8/12/2022 10:30 AM Mauro Irizarry DO [facility]   8/15/2022  3:15 PM Saúl Thurston Jr., MD [facility]   8/23/2022  9:15 AM Ruben Moncada MD [facility]  PAD   Follow-up with LTAC physician as soon as able.  Follow-up with pulmonary infectious disease and LTAC.    Electronically signed by Jefferson Hernandez MD, 07/19/22, 10:52 CDT.    Time: Greater than 30 minutes.

## 2022-07-19 NOTE — NURSING NOTE
Pt. O2 requirements today are 35L and 95%. Complaints of pain in his chest from coughing. Addressed with Dr. Hernandez, morphine started and dosed x2, with relief per patient. No other complaints. Pt. Gets dyspneic with exertion. Pt. Appears more relaxed and less labored this evening. Safety maintained.

## 2022-07-19 NOTE — OUTREACH NOTE
Prep Survey    Flowsheet Row Responses   [facility] facility patient discharged from? [facility]   Is LACE score < 7 ? No   Emergency Room discharge w/ pulse ox? No   Eligibility Not Eligible   What are the reasons patient is not eligible? Readmitted   Does the patient have one of the following disease processes/diagnoses(primary or secondary)? Other   Prep survey completed? Yes          MARY BOLTON - Registered Nurse

## 2022-07-19 NOTE — PLAN OF CARE
Goal Outcome Evaluation:      Patient required min assist for supine to sit. Mod assist to stand. Min assist to transfer to bedside chair. Donned non rebreather over vapotherm.  Patient continues to c/o chest pain with heavy breathing and cough. Will benefit from strengthening activities.

## 2022-07-19 NOTE — NURSING NOTE
Pt has had an uneventful night. UOP is low (avg about 30mL/hr). BP soft this morning (MAP 64+). Pt oxygen sat >92% on 35/65 Vapotherm.

## 2022-07-19 NOTE — H&P
Julius Madrid M.D.  [facility]          Internal Medicine History and Physical      Name: Adrián Graham  MRN: [id number]     Acct: [id number]  Room: 570/1    Admit Date: 7/19/2022  PCP: Mauro Irizarry DO    Chief Complaint:     Dyspnea and deconditioning    History Obtained From:     chart review and patient    History of Present Illness:      Adrián Graham is a  82 y.o.  male who presents with shortness of breath to [facility].  The patient has previously been diagnosed with COVID-19 and in his initial time of presentation was not hypoxic however he presented back to the hospital and was found to be hypoxic and to have pulmonary emboli at which point time he was started on anticoagulation and oxygen therapy however in his penumbra of Covid 19 care the patient had increasing groundglass opacities along with increasing pulmonary emboli burden despite being on anticoagulation therapy because of this he had to be admitted to the hospital on Vapotherm along with IV steroids and anticoagulation the patient has done well during the course of his previous hospitalization and has been on antibiotics which are currently on hold now has also been on steroids along with Vapotherm therapy along with nutritional and physical therapy supports.  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  Patient's prehospital scenario at home did include dizziness and presyncope.    Past Medical History:     Past Medical History:   Diagnosis Date   • Abdominal  distention    • Abdominal pain, left upper quadrant    • Allergic rhinitis    • Arthritis    • Bloating    • BPH (benign prostatic hyperplasia)    • Carpal tunnel syndrome    • Change in bowel habits    • Constipation    • Diarrhea    • Erectile dysfunction    • GERD (gastroesophageal reflux disease)    • History of shingles    • Hypertension    • Idiopathic peripheral neuropathy    • Neuropathy    • Pneumonia    • Shingles    • Weight loss         Past Surgical History:     Past Surgical History:   Procedure Laterality Date   • APPENDECTOMY     • BACK SURGERY     • CARPAL TUNNEL RELEASE Right    • CARPAL TUNNEL RELEASE Left 06/2022   • CHOLECYSTECTOMY     • COLONOSCOPY  09/14/2010    5 yr-complete colon not visualized   • COLONOSCOPY  07/27/2004    extremely tortuous redundant colon. BE-mild diverticulosis without diverticulitis. ? Gallstones-Dr Chery.   • COLONOSCOPY N/A 09/10/2020    Procedure: COLONOSCOPY WITH ANESTHESIA;  Surgeon: Mani Sy DO;  Location: [facility] ENDOSCOPY;  Service: Gastroenterology;  Laterality: N/A;  Pre: Positive Colorectal Screening  Post: Diverticulosis  Dr. Mooney  CO2 Inflation Used   • ENDOSCOPY N/A 09/26/2016    Procedure: ESOPHAGOGASTRODUODENOSCOPY WITH ANESTHESIA;  Surgeon: Mani Sy DO;  Location:  [facility] ENDOSCOPY;  Service:    • HIP SURGERY Right    • JOINT REPLACEMENT     • KNEE SURGERY     • OTHER SURGICAL HISTORY      Surgery for Spinal Stenosis   • OTHER SURGICAL HISTORY      Laser Prostate Surgery        Medications Prior to Admission:       Prior to Admission medications    Medication Sig Start Date End Date Taking? Authorizing Provider   acetaminophen (TYLENOL) 325 MG tablet Take 2 tablets by mouth Every 4 (Four) Hours As Needed for Mild Pain  or Fever. 6/27/22   Steven Ross DO   albuterol (PROVENTIL) (2.5 MG/3ML) 0.083% nebulizer solution Take 2.5 mg by nebulization Every 6 (Six) Hours As Needed for Shortness of Air. 7/19/22   Jefferson Hernandez MD    ascorbic acid (VITAMIN C) 500 MG tablet Take 1 tablet by mouth Daily. 7/20/22   Jefferson Hernandez MD   aspirin (aspirin) 81 MG EC tablet Take 1 tablet by mouth Daily. 6/13/22   Fernanda Lockhart APRN   atorvastatin (Lipitor) 20 MG tablet Take 1 tablet by mouth Daily. 6/15/22   Fernanda Lockhart APRN   busPIRone (BUSPAR) 10 MG tablet Take 1 tablet by mouth 3 (Three) Times a Day. 7/19/22   Jefferson Hernanedz MD   cetirizine (zyrTEC) 10 MG tablet Take 1 tablet by mouth Daily. 7/20/22   Jefferson Hernandez MD   dexamethasone (DECADRON) 6 MG tablet Take 1 tablet by mouth Daily With Breakfast. 7/19/22   Jefferson Hernandez MD   diazePAM (VALIUM) 5 MG tablet Take 0.5 tablets by mouth Every 6 (Six) Hours As Needed for Anxiety for up to 6 days. 7/19/22 7/25/22  Jefferson Hernandez MD   diphenhydrAMINE (BENADRYL) 25 mg capsule Take 1 capsule by mouth Every Night. 7/19/22   Jefferson Hernandez MD   Enoxaparin Sodium (LOVENOX) 80 MG/0.8ML solution prefilled syringe syringe Inject 0.8 mL under the skin into the appropriate area as directed Every 12 (Twelve) Hours. Indications: DVT/PE (active thrombosis) 7/19/22   Jefferson Hernandez MD   famotidine (PEPCID) 20 MG tablet Take 1 tablet by mouth 2 (Two) Times a Day Before Meals. 7/19/22   Jefferson Hernandez MD   finasteride (PROSCAR) 5 MG tablet Take 1 tablet by mouth Daily. 4/20/22   Steven Angulo MD   fluticasone (Flonase) 50 MCG/ACT nasal spray 2 sprays into the nostril(s) as directed by provider Daily. 6/7/22   Fernanda Lockhart APRN   furosemide (LASIX) 20 MG tablet Take 1 tablet by mouth 2 (Two) Times a Day. 7/19/22   Jefferson Hernandez MD   gabapentin (NEURONTIN) 400 MG capsule Take 1 capsule by mouth Every 12 (Twelve) Hours. 7/19/22   Jefferson Hernandez MD   guaiFENesin (MUCINEX) 600 MG 12 hr tablet Take 2 tablets by mouth 2 (Two) Times a Day. 7/19/22   Jefferson Hernandez MD   ipratropium-albuterol (DUO-NEB) 0.5-2.5 mg/3 ml nebulizer Take 3 mL by nebulization 4 (Four) Times a Day. 7/19/22   David  Jefferson SHCWARTZ MD   montelukast (SINGULAIR) 10 MG tablet Take 1 tablet by mouth Every Night. 7/19/22   Jefferson Hernandez MD   Morphine sulfate, PF, 2 MG/ML solution injection Infuse 0.5 mL into a venous catheter Every 4 (Four) Hours As Needed (pain) for up to 6 days. 7/19/22 7/25/22  Jefferson Hernandez MD   ondansetron (ZOFRAN) 2 mg/mL injection Infuse 2 mL into a venous catheter Every 6 (Six) Hours As Needed for Nausea or Vomiting. 7/19/22   Jefferson Hernandez MD   traMADol (ULTRAM) 50 MG tablet Take 50 mg by mouth Every 8 (Eight) Hours As Needed. 10/23/21   Emergency, Nurse Epic, RN   vitamin D3 125 MCG (5000 UT) capsule capsule Take 1 capsule by mouth Daily. 7/20/22   Jefferson Hernandez MD   zinc sulfate (ZINCATE) 220 (50 Zn) MG capsule Take 1 capsule by mouth Daily. 7/20/22   Jefferson Hernandez MD   Apixaban Starter Pack tablet therapy pack Take two 5 mg tablets by mouth every 12 hours for 7 days. Followed by one 5 mg tablet every 12 hours. (Dispense starter pack if available) 6/27/22 7/19/22  Steven Ross DO   gabapentin (NEURONTIN) 400 MG capsule Take 1 capsule by mouth 3 (Three) Times a Day. 2/11/22 7/19/22  Mauro Irizarry DO   meclizine (ANTIVERT) 12.5 MG tablet Take 1 tablet by mouth 3 (Three) Times a Day As Needed for Dizziness. 6/7/22 7/19/22  Fernanda Lockhart APRN   Olmesartan-amLODIPine-HCTZ 40-10-12.5 MG tablet Take 1 tablet by mouth Daily. 5/15/22 7/19/22  Mauro Irizarry DO        Allergies:       Patient has no known allergies.    Social History:     Tobacco:    reports that he has never smoked. He has never used smokeless tobacco.  Alcohol:      reports no history of alcohol use.  Drug Use:  reports no history of drug use.    Family History:     Family History   Problem Relation Age of Onset   • Diabetes Mother    • Cancer Mother    • Osteoporosis Mother    • Heart disease Brother    • Colon cancer Neg Hx    • Colon polyps Neg Hx    • Esophageal cancer Neg Hx        Review of Systems:     Review of  "Systems   Constitutional: Positive for malaise/fatigue. Negative for chills, fever and weight loss.   HENT: Negative for congestion, hoarse voice and nosebleeds.    Eyes: Negative for blurred vision and photophobia.   Cardiovascular: Positive for dyspnea on exertion and near-syncope. Negative for chest pain and orthopnea.   Respiratory: Positive for shortness of breath. Negative for cough and sputum production.    Endocrine: Negative for cold intolerance and polyuria.   Hematologic/Lymphatic: Negative for adenopathy and bleeding problem.   Skin: Negative for dry skin, itching and rash.   Musculoskeletal: Negative for arthritis, back pain and joint pain.   Gastrointestinal: Negative for abdominal pain, diarrhea, heartburn, nausea and vomiting.   Genitourinary: Negative for dysuria and flank pain.   Neurological: Positive for dizziness and weakness. Negative for headaches, paresthesias and seizures.   Psychiatric/Behavioral: Negative for altered mental status and memory loss. The patient is not nervous/anxious.    Allergic/Immunologic: Negative for environmental allergies and hives.       Code Status:    DNR    Physical Exam:     Vitals:  Ht 185.4 cm (73\")   Wt 83.5 kg (184 lb 1.4 oz)   BMI 24.29 kg/m²   Temperature 97.8 pulse 74 respiratory 21 blood pressure 119/62 oxygen saturation 87% on Vapotherm  Physical Exam  Vitals and nursing note reviewed.   Constitutional:       Appearance: Normal appearance. He is well-developed.   HENT:      Head: Normocephalic and atraumatic.   Eyes:      Extraocular Movements: Extraocular movements intact.      Conjunctiva/sclera: Conjunctivae normal.      Pupils: Pupils are equal, round, and reactive to light.   Cardiovascular:      Rate and Rhythm: Normal rate and regular rhythm.      Heart sounds: Normal heart sounds.   Pulmonary:      Effort: Respiratory distress present.      Breath sounds: Rhonchi present.      Comments: Labored breathing with minimal exertion  Abdominal:      " General: Bowel sounds are normal.      Palpations: Abdomen is soft.      Tenderness: There is no abdominal tenderness.   Musculoskeletal:         General: Normal range of motion.      Cervical back: Normal range of motion and neck supple.      Comments: Generalized weakness   Skin:     General: Skin is warm and dry.      Findings: No rash.   Neurological:      General: No focal deficit present.      Mental Status: He is alert and oriented to person, place, and time.      Cranial Nerves: No cranial nerve deficit.      Deep Tendon Reflexes: Reflexes are normal and symmetric.   Psychiatric:         Behavior: Behavior normal.         Thought Content: Thought content normal.               Data:     Lab Results (last 7 days)     ** No results found for the last 168 hours. **        Adult Transthoracic Echo Complete w/ Color, Spectral and Contrast if necessary per protocol    Result Date: 6/25/2022  Narrative: · Left ventricular ejection fraction appears to be 56 - 60%. Left ventricular systolic function is normal. · Left ventricular diastolic function was indeterminate. · Mild pulmonary hypertension is present.      CT Head Without Contrast    Result Date: 7/1/2022  Narrative: EXAMINATION:   CT HEAD WO CONTRAST-  7/1/2022 11:55 AM CDT  HISTORY: CT BRAIN without contrast 7/1/2022 11:47 AM CDT  HISTORY: Patient fell  COMPARISON: None  DLP: 924 mGy cm. In order to have a CT radiation dose as low as reasonably achievable, Automated Exposure Control was utilized for adjustment of the mA and/or KV according to patient size.  TECHNIQUE: Serial axial tomographic images of the brain were obtained without the use of intravenous contrast.  FINDINGS: The midline structures are nondisplaced. There is mild cerebral and cerebellar volume loss, with an associated increase in the prominence of the ventricles and sulci. The basilar cisterns are normal in size and configuration. There is no evidence of intracranial hemorrhage or  mass-effect. There is low attenuation in the periventricular white matter, consistent with chronic ischemic change. There are no abnormal extra-axial fluid collections. There is no evidence of tonsillar herniation.  The included orbits and their contents are unremarkable. A mucous cyst is present left maxillary antrum. Mastoid air cells are pneumatized.. The visualized osseous structures and overlying soft tissues of the skull and face are intact.      Impression: Mild cerebral and cerebellar volume loss with chronic microvascular disease but no evidence of acute intracranial process. 2. Mucous cyst left maxillary sinus   This report was finalized on 07/01/2022 11:58 by Dr. Don Delacruz MD.    XR Chest 1 View    Result Date: 7/15/2022  Narrative: EXAM/TECHNIQUE: XR CHEST 1 VW-  INDICATION: Respiratory failure; R09.02-Hypoxemia; I95.1-Orthostatic hypotension; U07.1-COVID-19; J12.82-Pneumonia due to coronavirus disease 2019; I26.94-Multiple subsegmental pulmonary emboli without acute cor pulmonale; Z74.09-Other reduced mobility; Z78.9-Other specified health status  COMPARISON: 7/11/2022  FINDINGS:  Enlarged but stable cardiac silhouette. No significant change in diffuse bilateral interstitial and airspace opacity. No large pleural effusion. No visible pneumothorax. No acute osseous finding.      Impression:  No significant change in diffuse bilateral interstitial and airspace opacity. This report was finalized on 07/15/2022 07:27 by Dr. Helio Shepherd MD.    XR Chest 1 View    Result Date: 7/11/2022  Narrative: EXAM: XR CHEST 1 VW- 7/11/2022 10:54 AM CDT  HISTORY: f/u xray, hypoxia; R09.02-Hypoxemia; I95.1-Orthostatic hypotension; U07.1-COVID-19; J12.82-Pneumonia due to coronavirus disease 2019; I26.94-Multiple subsegmental pulmonary emboli without acute cor pulmonale; Z74.09-Other reduced mobility; Z78.9-Other specified health status   COMPARISON: July 9, 2022.  TECHNIQUE: Frontal radiograph of the chest  FINDINGS:  Bilateral interstitial and airspace filling opacities are noted. This is been described as pneumonia and is stable and unchanged from July 9, 2022.. Cardiac silhouette is normal..  The osseous structures and surrounding soft tissues demonstrate no acute abnormality.       Impression: 1. Persistent bilateral interstitial and airspace filling opacities most consistent with pneumonia. This is similar to July 9, 2022.   This report was finalized on 07/11/2022 11:12 by Dr. Don Delacruz MD.    XR Chest 1 View    Result Date: 7/9/2022  Narrative: XR CHEST 1 VW- 7/9/2022 10:38 AM CDT  HISTORY: hypoxemia pneumonia worsening oxygen level; R09.02-Hypoxemia; I95.1-Orthostatic hypotension; U07.1-COVID-19; J12.82-Pneumonia due to coronavirus disease 2019; I26.94-Multiple subsegmental pulmonary emboli without acute cor pulmonale; Z74.09-Other reduced mobility; Z78.9-Other specified health status  COMPARISON: Chest exam dated 7/8/2022.  FINDINGS:  Multifocal bilateral patchy infiltrates, appearing stable. No dense areas of consolidation. The lungs are well expanded. No pleural effusion or pneumothorax. The cardiomediastinal silhouette and pulmonary vascularity are within normal limits. The osseous structures and surrounding soft tissues demonstrate no acute abnormality.      Impression: 1. Bilateral, essentially diffuse patchy lung infiltrates reflecting atypical pneumonia. Infiltrates appear stable. Lung volumes are stable. No new consolidation.   This report was finalized on 07/09/2022 10:51 by Dr Prem Mclaughlin, .    XR Chest 1 View    Result Date: 7/8/2022  Narrative: EXAMINATION: XR CHEST 1 VW-  7/8/2022 11:09 AM CDT  HISTORY: Worsening hypoxemia; R09.02-Hypoxemia; I95.1-Orthostatic hypotension; U07.1-COVID-19; J12.82-Pneumonia due to coronavirus disease 2019; I26.94-Multiple subsegmental pulmonary emboli without acute cor pulmonale; Z74.09-Other reduced mobility; Z78.9-Other specified health status  1 view chest x-ray.   Comparison is made with July 6, 2022.  Patchy infiltrate is again seen throughout both lungs. There is been no improvement.  No pneumothorax.  Stable heart size.  Summary: 1. Similar appearance of infiltrate throughout both lungs. No improvement. This report was finalized on 07/08/2022 11:16 by Dr. Alvaro Wang MD.    XR Chest 1 View    Result Date: 7/6/2022  Narrative: XR CHEST 1 VW- 7/6/2022 2:37 PM CDT  HISTORY: COVID; R09.02-Hypoxemia; I95.1-Orthostatic hypotension; U07.1-COVID-19; J12.82-Pneumonia due to coronavirus disease 2019; I26.94-Multiple subsegmental pulmonary emboli without acute cor pulmonale; Z74.09-Other reduced mobility; Z78.9-Other specified health status  COMPARISON: 7/1/2022.  FINDINGS:  Reidentified bilateral, asymmetric pulmonary infiltrates. Left lung patchy infiltrates are nearly diffuse throughout the left lung fields and perhaps mildly improved from the recent comparison exam. Additional patchy right basilar infiltrates are very similar. Overall lung volumes are stable. No new consolidation. No pleural effusion or pneumothorax. Heart size is stable. Pulmonary vasculature are nondilated. No acute bony abnormality.      Impression: 1. Covid pneumonia with bilateral asymmetric pulmonary infiltrate (left greater than right), perhaps mildly improved from 7/1/2022. Lung volumes are stable.   This report was finalized on 07/06/2022 15:55 by Dr Prem Mclaughlin, .    XR Chest 1 View    Result Date: 7/1/2022  Narrative: XR CHEST 1 VW- 7/1/2022 12:39 PM CDT  HISTORY: Hypoxemia  COMPARISON: Chest exam dated 6/26/2022.  FINDINGS:  Nearly diffuse opacification of the left lung. Right lung is mostly completely clear. There is no mediastinal shift noted. No pleural effusion. Cardiac silhouette is obscured by the left lung consolidation. Pulmonary vasculature are nondilated. No acute bony abnormality.      Impression: 1. Bilateral, asymmetric pulmonary infiltrates. Left lung in particular is near completely  opacified, much worse than was seen on the June 26th exam. Appearance favors a worsening atypical pneumonia. 2. No pleural effusion or pneumothorax.  This report was finalized on 07/01/2022 12:51 by Dr Prem Mclaughlin, .    XR Chest 1 View    Result Date: 6/26/2022  Narrative: EXAMINATION: Chest 1 view 06/26/2022  HISTORY: Cough and shortness of breath.  FINDINGS: Today's exam is compared to previous study of 06/07/2022. Patchy bilateral predominantly interstitial infiltrates are noted within both lungs. Covid pneumonia should be excluded. There is no effusion or free air present. The heart is normal in size.      Impression: 1. Bilateral predominantly interstitial infiltrates within the mid and lower lung zones for which I would favor interstitial pneumonia. Covid pneumonia should be excluded. This report was finalized on 06/26/2022 12:12 by Dr. Chris Cornelius MD.    Adult Transthoracic Echo Limited W/ Cont if Necessary Per Protocol    Result Date: 7/2/2022  Narrative: · This is a limited echocardiogram. · Left ventricular systolic function is normal. Left ventricular ejection fraction appears to be 61 - 65%. · The right ventricular cavity is mildly dilated. Normal right ventricular systolic function noted.      US Venous Doppler Lower Extremity Bilateral (duplex)    Result Date: 7/3/2022  Narrative: History: Swelling      Impression: Impression: There is no evidence of deep venous thrombosis in either lower extremity.  Comments: Bilateral lower extremity venous duplex exam was performed using color Doppler flow, Doppler waveform analysis, and grayscale imaging, with and without compression. There is no evidence of deep venous thrombosis in the common femoral veins in either lower extremity. This is a limited exam due to Covid positivity.   This report was finalized on 07/03/2022 11:13 by Dr. Albert Brown MD.    US Venous Doppler Lower Extremity Bilateral (duplex)    Result Date: 6/28/2022  Narrative: History:  Swelling      Impression: Impression: There is no evidence of deep venous thrombosis in either lower extremity. This is a limited exam due to Covid positivity.  Comments: Bilateral lower extremity venous duplex exam was performed using color Doppler flow, Doppler waveform analysis, and grayscale imaging, with and without compression. There is no evidence of deep venous thrombosis in the common femoral veins in bilateral lower extremities. There is also no evidence of superficial thrombus in the saphenofemoral junctions bilaterally.   This report was finalized on 06/28/2022 16:26 by Dr. Albert Brown MD.    CT Angiogram Chest    Result Date: 7/1/2022  Narrative: CT ANGIOGRAM CHEST- 7/1/2022 1:40 PM CDT  HISTORY: Recent history of pulmonary embolus now with syncope  COMPARISON: CT scan dated 6/26/2022.  DOSE LENGTH PRODUCT: 763 mGy cm. Automated exposure control was also utilized to decrease patient radiation dose.  TECHNIQUE: Helical tomographic images of the chest were obtained after the administration of intravenous contrast following angiogram protocol. Additionally, 3D and multiplanar reformatted images were provided.    FINDINGS:  Pulmonary arteries: There is adequate enhancement of the pulmonary arteries to evaluate for central and segmental pulmonary emboli. There are multiple pulmonary emboli identified in the segmental and subsegmental pulmonary arteries of the right lower lobe.  Aorta and great vessels: Thoracic aorta is normal in caliber. No dissection identified. Minimal calcified plaque in the arch. 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  Visualized neck base: The imaged portion of the base of the neck appears unremarkable.  Lungs: Increasing multifocal bilateral airspace opacities, mixed groundglass and consolidative and most notably in the lung periphery and bases. This is increased from the recent CT scan from June 26. No pleural effusion. Airways are clear.  Heart: The heart  is normal in size. There is no pericardial effusion.  Mediastinum and lymph nodes: Mild reactive adenopathy in the tonya and mediastinum..  Skeletal and soft tissues: Chest wall soft tissues are unremarkable. No acute bony abnormality. Multilevel bridging spinal osteophytes.  Upper abdomen: The imaged portion of the upper abdomen demonstrates no acute process. Prior cholecystectomy.      Impression: 1. Worsening Covid pneumonia with increasing bilateral mixed groundglass and consolidative pulmonary infiltrates. 2. Acute pulmonary embolus identified in the segmental and subsegmental pulmonary arteries in the right lower lobe. This is mildly increased as compared with the recent CT scan. No evidence of right heart strain.  This report was finalized on 07/01/2022 14:08 by Dr Prem Mclaughlin, .    CT Angiogram Chest    Result Date: 6/26/2022  Narrative: Exam: CT angiogram of the of the chest with intravenous contrast.  CLINICAL HISTORY:  Covid positive. Fatigue with cough and shortness of breath.  DOSE:  340 mGycm. All CT scans are performed using dose optimization techniques as appropriate to the performed exam and including at least one of the following: Automated exposure control, adjustment of the mA and/or kV according to size, and the use of the iterative reconstruction technique.  TECHNIQUE: Contiguous axial images were obtained through the thorax following intravenous contrast administration with reformatted images obtained in the sagittal and coronal projections from the original data set. Three-dimensional reconstructions are also obtained.  COMPARISON:  06/13/2022  FINDINGS:  Pulmonary arteries:  There is thrombus within the right lower lobe segmental and subsegmental pulmonary artery branches. No additional emboli are demonstrated. The main pulmonary artery segment and main pulmonary arteries are normal in size and appearance.  Aorta:  The thoracic aorta and great vessels are remarkable for an aberrant right  subclavian artery. There is mild calcific plaquing involving the aortic arch and proximal great vessels without rate limiting stenosis, aneurysm or dissection.  LUNGS:  Patchy groundglass infiltrates are noted within both lungs consistent with moderate Covid pneumonia. This shows some worsening when compared to the previous exam of 13 days earlier with increasing consolidation within the lower lobes. No effusion is present. There is no pneumothorax.  Pleural spaces: Unremarkable. No evidence of pleural effusion or pneumothorax.  HEART: There is mild cardiomegaly. No coronary artery calcifications are present. There is no evidence of pericardial effusion or right ventricular dysfunction.  Bones: No acute osseous abnormalities are demonstrated.  CHEST WALL: No chest wall abnormalities are demonstrated.  Lymph nodes: There are some mildly enlarged AP window and para-aortic nodes within the middle mediastinum stable from the previous exam. These may be reactive in nature.  Upper abdomen: There is a densely calcified lesion within the right lobe of liver-likely benign and stable from the previous exam. Limited images of the upper abdomen are otherwise unremarkable.      Impression: 1. A small amount of thrombus is noted within the right lower lobe segmental and subsegmental pulmonary artery branches. No additional emboli are demonstrated. No evidence of right ventricular dysfunction or strain. I notified Dr. Gunter in the emergency room at 1:30 PM. 2. 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 There is mild atheromatous calcification of the thoracic aorta and great vessels without evidence of aneurysm or dissection. 3. Predominantly interstitial infiltrates within both lungs showing mild progression from the previous exam. FINDINGS would suggest progressive Covid pneumonia. No effusion is present. There is no pneumothorax. Mildly enlarged para-aortic and AP window nodes are again demonstrated and stable from  the previous exam. This report was finalized on 06/26/2022 13:31 by Dr. Chris Cornelius MD.        Assessment:     Primary Problem  <principal problem not specified>        * No active hospital problems. *    Past Medical History:   Diagnosis Date   • Abdominal distention    • Abdominal pain, left upper quadrant    • Allergic rhinitis    • Arthritis    • Bloating    • BPH (benign prostatic hyperplasia)    • Carpal tunnel syndrome    • Change in bowel habits    • Constipation    • Diarrhea    • Erectile dysfunction    • GERD (gastroesophageal reflux disease)    • History of shingles    • Hypertension    • Idiopathic peripheral neuropathy    • Neuropathy    • Pneumonia    • Shingles    • Weight loss        Plan:     1. Acute hypoxemic respiratory failure  2. Bilateral pulmonary embolism  3. COVID-19 with respiratory failure  4. Bilateral groundglass pulmonary infiltrate  5. Acute lung injury with organizing pneumonia  6. Status post COVID and vaccinated status  7. History of fall  8. Presyncope  9. Chronic cerebral microvascular disease  10. Dilated cardiomyopathy  11. Bilateral carotid artery disease  12. Moderate protein calorie malnutrition  13. Diffuse generalized weakness  14. Hypertension  15. Benign prostatic hypertrophy    The patient will be admitted to the long-term [facility] as a DO NOT RESUSCITATE consistent with his previous wishes he does not want CPR nor does he want to be intubated.  He however is completely okay with continuing Vapotherm therapy and weaning for decreasing overall home oxygen needs and increasing his strength and mobility.  He will continue on steroid taper along with being monitored for the possible intervention of antibiotics as needed but direction of infectious disease along with increasing nutritional support and respiratory along with physical and occupational therapies.      Electronically signed by Rene Madrid MD on 7/20/2022 at 10:19 CDT     Copy sent to  Dr. Irizarry, Mauro Bedoya, DO

## 2022-07-19 NOTE — PROGRESS NOTES
"INFECTIOUS DISEASES PROGRESS NOTE    Patient:  Adrián Graham  YOB: 1940  MRN: [id number]   Admit date: 2022   Admitting Physician: Jefferson Hernandez MD  Primary Care Physician: Mauro Irizarry DO    Chief Complaint: \"Cannot reach anything\"      Interval History: The patient was quite frustrated.  His tray was in front of him but he is unable to reach his Powerade and some of the items on his tray.  We were able to remedy that fairly quickly.    He was on 35 L and 75% O2 and his sats were 79 to 80%.  I turned him up to 40 L and 100%.  YASMEEN Multani came in the room when I was in there to assist.      Allergies: No Known Allergies    Current Scheduled Medications:   ascorbic acid, 500 mg, Oral, Daily  aspirin, 81 mg, Oral, Daily  atorvastatin, 20 mg, Oral, Daily  busPIRone, 10 mg, Oral, TID  cetirizine, 10 mg, Oral, Daily  dexamethasone, 6 mg, Oral, Daily With Breakfast  diphenhydrAMINE, 25 mg, Oral, Nightly  enoxaparin, 1 mg/kg, Subcutaneous, Q12H  famotidine, 20 mg, Oral, BID AC  finasteride, 5 mg, Oral, Daily  fluticasone, 2 spray, Nasal, Daily  furosemide, 20 mg, Oral, BID  gabapentin, 400 mg, Oral, Q12H  guaiFENesin, 1,200 mg, Oral, BID  ipratropium-albuterol, 3 mL, Nebulization, 4x Daily - RT  montelukast, 10 mg, Oral, Nightly  sodium chloride, 250 mL, Intravenous, Once  sodium chloride, 10 mL, Intravenous, Q12H  vitamin D3, 5,000 Units, Oral, Daily  zinc sulfate, 220 mg, Oral, Daily      Current PRN Medications:  •  acetaminophen  •  albuterol  •  diazePAM  •  Morphine  •  ondansetron  •  sodium chloride  •  traMADol    Review of Systems  General: No fever  GI: No vomiting  Neuro: Generalized weakness    Objective     Vital Signs:  Temp (24hrs), Av.8 °F (36.6 °C), Min:97.6 °F (36.4 °C), Max:98.2 °F (36.8 °C)      /69   Pulse 75   Temp 97.6 °F (36.4 °C) (Oral)   Resp 14   Ht 185.4 cm (73\")   Wt 85.2 kg (187 lb 13.3 oz)   SpO2 90%   BMI 24.78 kg/m²         Physical " Exam  General: Patient 82-year-old male  sitting up in bed appearing somewhat globally weak  HEENT: Sclera anicteric.  Vapotherm in place and turned up to 40 L and 100% as saturations were 79 to 80% on 35 L and 75%.  Respiratory: Diminished breath sounds throughout.  Neuro: Alert, oriented, speech clear, globally weak  Psych: Frustrated but pleasant and cooperative     Results Review:    I reviewed the patient's new clinical results.    Lab Results:  CBC:   Lab Results   Lab 07/13/22  0514 07/14/22  0338 07/15/22  0401 07/17/22  0344 07/19/22  0357   WBC 20.01* 17.20* 22.81* 19.03* 16.50*   HEMOGLOBIN 10.5* 10.6* 10.9* 11.3* 13.1   HEMATOCRIT 34.4* 33.3* 34.9* 36.3* 40.9   PLATELETS 329 301 322 317 322         CMP:   Lab Results   Lab 07/15/22  0752 07/17/22  0344 07/19/22  0357   SODIUM 142 141 139   POTASSIUM 4.1 4.4 4.5   CHLORIDE 103 102 100   CO2 33.0* 32.0* 33.0*   BUN 32* 28* 32*   CREATININE 0.69* 0.54* 0.62*   CALCIUM 8.8 8.7 8.9   GLUCOSE 161* 154* 108*       Estimated Creatinine Clearance: 108.8 mL/min (A) (by C-G formula based on SCr of 0.62 mg/dL (L)).      COVID LABS:  Results From Last 14 Days   Lab Units 07/17/22  0344 07/15/22  0752 07/14/22  0338 07/11/22  1226 07/11/22  0312 07/10/22  0223 07/07/22  0941   CRP mg/dL 1.40*  --  6.72*  --  16.21*   < > 8.16*   FERRITIN ng/mL  --   --   --   --  1,112.00*  --  1,243.00*   PROCALCITONIN ng/mL 0.20 0.43*  --  0.77*  --   --   --     < > = values in this interval not displayed.         Culture Results:    Microbiology Results (last 10 days)     Procedure Component Value - Date/Time    Blood Culture With KEVAN - Blood, Arm, Right [[id number]]  (Normal) Collected: 07/11/22 1229    Lab Status: Final result Specimen: Blood from Arm, Right Updated: 07/16/22 1248     Blood Culture No growth at 5 days    Blood Culture With KEVAN - Blood, Hand, Left [[id number]]  (Normal) Collected: 07/11/22 1226    Lab Status: Final result Specimen: Blood from Hand, Left Updated:  07/16/22 1248     Blood Culture No growth at 5 days    MRSA Screen, PCR (Inpatient) - Swab, Nares [[id number]]  (Normal) Collected: 07/09/22 1052    Lab Status: Final result Specimen: Swab from Nares Updated: 07/09/22 1210     MRSA PCR No MRSA Detected    Narrative:      The negative predictive value of this diagnostic test is high and should only be used to consider de-escalating anti-MRSA therapy. A positive result may indicate colonization with MRSA and must be correlated clinically.    Blood Culture - Blood, Arm, Right [[id number]]  (Normal) Collected: 07/09/22 1030    Lab Status: Final result Specimen: Blood from Arm, Right Updated: 07/14/22 1049     Blood Culture No growth at 5 days    Blood Culture - Blood, Arm, Left [[id number]]  (Normal) Collected: 07/09/22 1028    Lab Status: Final result Specimen: Blood from Arm, Left Updated: 07/14/22 1049     Blood Culture No growth at 5 days               Radiology:   7/15                                                                 7/11        Imaging Results (Last 72 Hours)     ** No results found for the last 72 hours. **          Assessment & Plan     Active Hospital Problems    Diagnosis    • Moderate malnutrition (HCC)    • Acute respiratory failure with hypoxia (HCC)    • Pulmonary embolism associated with COVID-19 (HCC)    • Bilateral carotid artery disease (HCC)    • Hyperlipidemia    • Benign prostatic hyperplasia without lower urinary tract symptoms    • Essential hypertension        IMPRESSION:  1. COVID-19 infection diagnosed June 26, 2022-unclear when patient actually initially was infected with COVID as he was seen June 7 with cough and shortness of breath, had CT of the chest that showed patchy groundglass infiltrates but does not appear he was tested for COVID.  He received steroids and antibiotics.  It does not appear he received any COVID-related treatments during admission June 26 when pulmonary embolus diagnosed i.e. Paxlovid as he appeared to be  quite out of the window for benefit as he was likely infected earlier in the month if not the month prior (2 urgent care visits for cough and congestion 1 in May 1 in June).  Patient readmitted July 1 and had temp spike to 103 and significant increased O2 requirements July 9 prompting transfer to CCU temperature has been stable.  Unable to wean patient off O2..  2. Fever -resolved   3. Leukocytosis- improving on steroids   4. Pulmonary emboli diagnosed June 26  5. Elevated CRP- overall significant improvement.  6. Elevated procalcitonin -normalized        RECOMMENDATION:   · Monitor off antibiotic therapy- empiric cefepime discontinued July 16  · Steroid management per pulmonary  · Continue Wean O2 as tolerated  · Continue attempts to increase activity       Beatris Eduardo MD  07/19/22  07:59 CDT

## 2022-07-19 NOTE — THERAPY DISCHARGE NOTE
Acute Care - Physical Therapy Discharge Summary  [facility]       Patient Name: Adrián Graham  : 1940  MRN: [id number]    Today's Date: 2022                 Admit Date: 2022      PT Recommendation and Plan    Visit Dx:    ICD-10-CM ICD-9-CM   1. Hypoxemia  R09.02 799.02   2. Orthostatic hypotension  I95.1 458.0   3. Pneumonia due to COVID-19 virus  U07.1 480.8    J12.82 079.89   4. Multiple subsegmental pulmonary emboli without acute cor pulmonale (HCC)  I26.94 415.19   5. Impaired mobility  Z74.09 799.89   6. Decreased activities of daily living (ADL)  Z78.9 V49.89   7. Moderate malnutrition (HCC)  E44.0 263.0   8. Acute respiratory failure with hypoxia (HCC)  J96.01 518.81   9. Pulmonary embolism associated with COVID-19 (HCC)  U07.1 415.19    I26.99 079.89   10. Single subsegmental pulmonary embolism without acute cor pulmonale (HCC)  I26.93 415.19   11. Preop cardiovascular exam CEA Dr Brown  Z01.810 V72.81   12. Bilateral carotid artery disease, unspecified type (HCC)  I77.9 447.9   13. Carpal tunnel syndrome, left  G56.02 354.0   14. Overweight (BMI 25.0-29.9)  E66.3 278.02   15. Benign prostatic hyperplasia without lower urinary tract symptoms  N40.0 600.00   16. Essential hypertension  I10 401.9   17. Idiopathic peripheral neuropathy  G60.9 356.9   18. Hyperlipidemia, unspecified hyperlipidemia type  E78.5 272.4        Outcome Measures     Row Name 22 0900 22 0900 22 1042       How much help from another person do you currently need...    Turning from your back to your side while in flat bed without using bedrails? 3  -RAYMOND 3  -RAYMOND 3  -MF    Moving from lying on back to sitting on the side of a flat bed without bedrails? 3  -RAYMOND 3  -RAYMOND 3  -MF    Moving to and from a bed to a chair (including a wheelchair)? 3  -RAYMOND 3  -RAYMOND 3  -MF    Standing up from a chair using your arms (e.g., wheelchair, bedside chair)? 3  -RAYMOND 3  -RAYMOND 3  -MF    Climbing 3-5 steps with a railing? 2  -RAYMOND 2  -RAYMOND  2  -MF    To walk in hospital room? 2  -RAYMOND 2  -RAYMOND 2  -MF    AM-PAC 6 Clicks Score (PT) 16  -RAYMOND 16  -RAYMOND 16  -MF       Functional Assessment    Outcome Measure Options -- -- AM-PAC 6 Clicks Basic Mobility (PT)  -MF          User Key  (r) = Recorded By, (t) = Taken By, (c) = Cosigned By    Initials Name Provider Type    Breanna Guy, [facility] Physical Therapist Assistant     Nadia Denis, [facility] Physical Therapist Assistant                 PT Charges     Row Name 07/19/22 1001             Time Calculation    Start Time 0916  -RAYMOND      Stop Time 0944  -RAYMOND      Time Calculation (min) 28 min  -RAYMOND              Timed Charges    55950 - PT Therapeutic Activity Minutes 28  -RAYMOND              Total Minutes    Timed Charges Total Minutes 28  -RAYMOND       Total Minutes 28  -RAYMOND            User Key  (r) = Recorded By, (t) = Taken By, (c) = Cosigned By    Initials Name Provider Type    Breanna Guy, [facility] Physical Therapist Assistant                 PT Rehab Goals     Row Name 07/19/22 1100             Bed Mobility Goal 1 (PT)    [place] Level/Cues Needed (Bed Mobility Goal 1, PT) minimum assist (75% or more patient effort)  Goal:independent  -RAYMOND      Progress/Outcomes (Bed Mobility Goal 1, PT) goal not met  -RAYMOND              Transfer Goal 1 (PT)    [place] Level/Cues Needed (Transfer Goal 1, PT) moderate assist (50-74% patient effort)  Goal:independent  -RAYMOND      Progress/Outcome (Transfer Goal 1, PT) goal not met  -RAYMOND              Gait Training Goal 1 (PT)    [place] Level (Gait Training Goal 1, PT) minimum assist (75% or more patient effort)  Goal:independent  -RAYMOND      Distance (Gait Training Goal 1, PT) 0  only able to transfer to chair at this time.  -RAYMOND      Progress/Outcome (Gait Training Goal 1, PT) goal not met  -RAYMOND            User Key  (r) = Recorded By, (t) = Taken By, (c) = Cosigned By    Initials Name Provider Type Discipline    Breanna Guy, [facility] Physical Therapist Assistant PT                 Therapy Charges for Today     Code Description Service Date Service Provider Modifiers Qty    18578784226  PT THERAPEUTIC ACT EA 15 MIN 7/18/2022 Breanna Agrawal, [facility] GP 2    52689851148 HC PT THER PROC EA 15 MIN 7/18/2022 Breanna Agrawal PTA GP 1    [id number]  PT THERAPEUTIC ACT EA 15 MIN 7/19/2022 Breanna Agrawal, [facility] GP 2          PT Discharge Summary  Anticipated Discharge Disposition (PT): [facility] ([facility] care hospital)  Reason for Discharge: Discharge from facility  Outcomes Achieved: Unable to make functional progress toward goals at this time  Discharge Destination: [facility]      Breanna Agrawal PTA   7/19/2022

## 2022-07-19 NOTE — CONSULTS
Palliative Care Initial Consult   Attending Physician: Jefferson Hernandez MD  Referring Provider: Jefferson Hernandez MD     Date of Admission: 7/1/2022   Date of Consult: 7/18/2022    Reason for Referral: Goals of Care/Advance Care Planning    Code Status and Medical Interventions:   Ordered at: 07/01/22 1524     Medical Intervention Limits:    NO intubation (DNI)     Level Of Support Discussed With:    Patient     Code Status (Patient has no pulse and is not breathing):    No CPR (Do Not Attempt to Resuscitate)     Medical Interventions (Patient has pulse or is breathing):    Limited Support      Subjective     HPI: 82 y.o. male with past medical history of abdominal pain, allergic rhinitis, arthritis, benign prostatic hyperplasia, carpal tunnel syndrome, constipation, diarrhea, erectile dysfunction, and gastroesophageal reflux disease, shingles, hypertension, idiopathic peripheral neuropathy, pneumonia and weight loss.  Per chart review he was recently admitted from 6/26/2022 to 6/27/2022 with shortness of breath.  Appears during hospital stay he was found to be positive for COVID-19 and diagnosed with pulmonary embolism without acute cor pulmonale and discharged home with Eliquis per chart review. Patient presented to [facility] on 7/1/2022 related to fall, weakness, dizziness, shortness of breath and inability to ambulate.  CT of head obtained and revealed mild cerebral and cerebellar volume loss with chronic microvascular disease and mucous cyst of left maxillary sinus however no evidence of acute intracranial process visualized.  Chest x-ray revealed bilateral asymmetric pulmonary infiltrates with left lung nearly completely opacified and much worse when compared to previous exam on 6/26/2022.  CTA of chest revealed worsening pneumonia with increasing bilateral mixed groundglass and consolidative pulmonary infiltrates in addition to acute pulmonary embolus identified in the segmental and subsegmental  "pulmonary arteries in the right lower lobe with no evidence of right heart strain.  Venous Doppler ultrasound completed of bilateral lower extremities and negative for findings of DVTs.  Per chart review he was hypoxic while in ED and placed on Vapotherm support.  He was admitted to the medical floor for further work-up and treatment.  Appears he developed decline in respiratory status and required transfer to CCU on 7/9/2022 per chart review and has intermittently required BiPAP support.  Pulmonology and infectious disease have been following.  Labs collected this morning reveal elevation in BUN 32, WBC elevated at 16.50 however has been receiving steroids.  He is sitting up in the chair at time of exam, alert and in no apparent distress. Remains on Vapotherm support at 40 L with FiO2 100% with oxygen saturation in the 90's.  Reports pain in sternum that worsens with coughing episodes. States tramadol and morphine have made pain manageable however the pain is \"still there.\" He shared he has been taught how to splint his chest and is aware coughing is \"encouraged.\" Reports he has been working on his incentive spirometer as well.  Shared he has \"aches and pains\" intermittently all over and has lost weight and now has a \"bony butt\" that also causes pain however the waffle cushion in his chair has helped relieve some of this pain.  Mr. Graham also shared he has peripheral neuropathy however reports he takes 400 mg of gabapentin TID and this usually helps manage this.  No visitors at bedside.     Advance Care Planning   Advanced Directives: Advance Directive Status: Patient has advance directive, copy requested.    Advance Care Planning Discussion: Reports he has had conversations in the past and has completed ACP document in the past. He states, \"I don't want to be resuscitated or have to put my family through that.\" Explored interventions to clarify meaning and reports he does not wish to have CPR or be placed on " "ventilator. States he is agreeable to continuing current measures at this time and remains hopeful for some improvement. Reports he is planning to go to LTACH for further efforts at weaning oxygen and rehabilitation. Appears to have good prognostic awareness however also remains optimistic.      The patient receives support from his children.  POA/Healthcare Surrogate - Patient's children are his next of kin.    Due to the palliative care, goals of care and treatment options we will establish an advance care plan.      Review of Systems   Unable to perform ROS: acuity of condition   Constitutional: Positive for decreased appetite, malaise/fatigue and weight loss.   Cardiovascular: Positive for chest pain.   Respiratory: Positive for cough and shortness of breath.    Gastrointestinal: Negative for abdominal pain, nausea and vomiting.       Pain Assessment  Nonverbal Indicators of Pain: grimace  CPOT Facial Expression: 0-->relaxed, neutral  CPOT Body Movements: 0-->absence of movements  CPOT Muscle Tension: 0-->relaxed  Ventilator Compliance/Vocalization: 0-->talking in normal tone or no sound  CPOT Score: 0  PAINAD Breathin-->normal  PAINAD Negative Vocalization: 0-->none  PAINAD Facial Expression: 0-->smiling or inexpressive  PAINAD Body Language: 0-->relaxed  PAINAD Consolability: 0-->no need to console  PAINAD Score: 0  Pain Location: chest (Pt states \"worse when I cough.\")  Pain Description: intermittent      Past Medical History:   Diagnosis Date   • Abdominal distention    • Abdominal pain, left upper quadrant    • Allergic rhinitis    • Arthritis    • Bloating    • BPH (benign prostatic hyperplasia)    • Carpal tunnel syndrome    • Change in bowel habits    • Constipation    • Diarrhea    • Erectile dysfunction    • GERD (gastroesophageal reflux disease)    • History of shingles    • Hypertension    • Idiopathic peripheral neuropathy    • Neuropathy    • Pneumonia    • Shingles    • Weight loss       Past " Surgical History:   Procedure Laterality Date   • APPENDECTOMY     • BACK SURGERY     • CARPAL TUNNEL RELEASE Right    • CARPAL TUNNEL RELEASE Left 06/2022   • CHOLECYSTECTOMY     • COLONOSCOPY  09/14/2010    5 yr-complete colon not visualized   • COLONOSCOPY  07/27/2004    extremely tortuous redundant colon. BE-mild diverticulosis without diverticulitis. ? Gallstones-Dr Chery.   • COLONOSCOPY N/A 09/10/2020    Procedure: COLONOSCOPY WITH ANESTHESIA;  Surgeon: Mani Sy DO;  Location: [facility] ENDOSCOPY;  Service: Gastroenterology;  Laterality: N/A;  Pre: Positive Colorectal Screening  Post: Diverticulosis  Dr. Mooney  CO2 Inflation Used   • ENDOSCOPY N/A 09/26/2016    Procedure: ESOPHAGOGASTRODUODENOSCOPY WITH ANESTHESIA;  Surgeon: Mani Sy DO;  Location:  [facility] ENDOSCOPY;  Service:    • HIP SURGERY Right    • JOINT REPLACEMENT     • KNEE SURGERY     • OTHER SURGICAL HISTORY      Surgery for Spinal Stenosis   • OTHER SURGICAL HISTORY      Laser Prostate Surgery      Social History     Socioeconomic History   • Marital status:    Tobacco Use   • Smoking status: Never Smoker   • Smokeless tobacco: Never Used   Vaping Use   • Vaping Use: Never used   Substance and Sexual Activity   • Alcohol use: No   • Drug use: No   • Sexual activity: Defer     No Known Allergies    Objective   Diagnostics: Reviewed    Intake/Output Summary (Last 24 hours) at 7/19/2022 0922  Last data filed at 7/19/2022 0800  Gross per 24 hour   Intake --   Output 1400 ml   Net -1400 ml       Current medication reviewed for route, type, dose and frequency and are current per MAR at time of dictation.  Current Facility-Administered Medications   Medication Dose Route Frequency Provider Last Rate Last Admin   • acetaminophen (TYLENOL) tablet 650 mg  650 mg Oral Q4H PRN Steven Ross DO   650 mg at 07/17/22 1013   • albuterol (PROVENTIL) nebulizer solution 0.083% 2.5 mg/3mL  2.5 mg Nebulization Q6H PRN Victor Manuel Dorado,  MD       • ascorbic acid (VITAMIN C) tablet 500 mg  500 mg Oral Daily Miguel Smith MD   500 mg at 07/19/22 0830   • aspirin EC tablet 81 mg  81 mg Oral Daily Steven Ross DO   81 mg at 07/19/22 0831   • atorvastatin (LIPITOR) tablet 20 mg  20 mg Oral Daily Steven Ross DO   20 mg at 07/19/22 0827   • busPIRone (BUSPAR) tablet 10 mg  10 mg Oral TID Miguel Smith MD   10 mg at 07/19/22 0823   • cetirizine (zyrTEC) tablet 10 mg  10 mg Oral Daily Miguel Smith MD   10 mg at 07/19/22 0827   • dexamethasone (DECADRON) tablet 6 mg  6 mg Oral Daily With Breakfast Jamarcus Huff MD       • diazePAM (VALIUM) tablet 2.5 mg  2.5 mg Oral Q6H PRN Adrián Gomez DO       • diphenhydrAMINE (BENADRYL) capsule 25 mg  25 mg Oral Nightly Victor Manuel Dorado MD   25 mg at 07/17/22 2009   • Enoxaparin Sodium (LOVENOX) syringe 80 mg  1 mg/kg Subcutaneous Q12H Steven Ross DO   80 mg at 07/19/22 0527   • famotidine (PEPCID) tablet 20 mg  20 mg Oral BID AC Steven Ross DO   20 mg at 07/19/22 0841   • finasteride (PROSCAR) tablet 5 mg  5 mg Oral Daily Steven Ross DO   5 mg at 07/18/22 0831   • fluticasone (FLONASE) 50 MCG/ACT nasal spray 2 spray  2 spray Nasal Daily Steven Ross DO   2 spray at 07/19/22 0836   • furosemide (LASIX) tablet 20 mg  20 mg Oral BID Victor Manuel Dorado MD   20 mg at 07/19/22 0827   • gabapentin (NEURONTIN) capsule 400 mg  400 mg Oral Q12H Steven Ross DO   400 mg at 07/19/22 0832   • guaiFENesin (MUCINEX) 12 hr tablet 1,200 mg  1,200 mg Oral BID Carlos Grajeda APRN   1,200 mg at 07/19/22 0830   • ipratropium-albuterol (DUO-NEB) nebulizer solution 3 mL  3 mL Nebulization 4x Daily - RT Victor Manuel Dorado MD   3 mL at 07/19/22 0629   • montelukast (SINGULAIR) tablet 10 mg  10 mg Oral Nightly Miguel Smith MD   10 mg at 07/18/22 2135   • Morphine sulfate (PF) injection 1 mg  1 mg Intravenous Q4H PRN Jefferson Hernandez MD   1 mg at 07/18/22  "1651   • ondansetron (ZOFRAN) injection 4 mg  4 mg Intravenous Q6H PRN Steven Ross, DO       • sodium chloride 0.9 % bolus 250 mL  250 mL Intravenous Once Marla Saenz, DO       • sodium chloride 0.9 % flush 10 mL  10 mL Intravenous Q12H Steven Ross, DO   10 mL at 07/19/22 0836   • sodium chloride 0.9 % flush 10 mL  10 mL Intravenous PRN Steven Ross DO   10 mL at 07/13/22 0636   • traMADol (ULTRAM) tablet 50 mg  50 mg Oral Q8H PRN Steven Ross DO   50 mg at 07/19/22 0841   • vitamin D3 capsule 5,000 Units  5,000 Units Oral Daily Miguel Smith MD   5,000 Units at 07/19/22 0834   • zinc sulfate (ZINCATE) capsule 220 mg  220 mg Oral Daily Steven Ross DO   220 mg at 07/19/22 0834        •  acetaminophen  •  albuterol  •  diazePAM  •  Morphine  •  ondansetron  •  sodium chloride  •  traMADol    Assessment   /63   Pulse 90   Temp 98.5 °F (36.9 °C) (Axillary)   Resp 25   Ht 185.4 cm (73\")   Wt 85.2 kg (187 lb 13.3 oz)   SpO2 93%   BMI 24.78 kg/m²     Physical Exam  Vitals and nursing note reviewed.   Constitutional:       General: He is not in acute distress.     Appearance: He is ill-appearing.      Interventions: Nasal cannula in place.   HENT:      Head: Normocephalic and atraumatic.   Eyes:      General: Lids are normal.      Extraocular Movements: Extraocular movements intact.   Neck:      Vascular: No JVD.      Trachea: Trachea normal.   Cardiovascular:      Rate and Rhythm: Tachycardia present.      Heart sounds: Normal heart sounds.   Pulmonary:      Effort: Pulmonary effort is normal.      Breath sounds: Decreased breath sounds present.      Comments: Vapotherm 40 L, FiO2 100%.  Chest:      Chest wall: Tenderness (\"not on surface but not deep\") present. No swelling.   Abdominal:      Palpations: Abdomen is soft.      Tenderness: There is no abdominal tenderness.   Musculoskeletal:      Cervical back: Neck supple.      Right lower leg: No edema.      Left lower leg: " No edema.   Skin:     General: Skin is warm and dry.      Findings: Bruising (bilateral arms) present.   Neurological:      Mental Status: He is alert and oriented to person, place, and time.      Motor: Weakness present.   Psychiatric:         Behavior: Behavior is cooperative.         Cognition and Memory: Cognition normal.        Functional status: Palliative Performance Scale Score: Performance 50% based on the following measures: Ambulation: Mainly sit or lie down, Activity and Evidence of Disease: Unable to do any work, extensive evidence of disease, Self-Care: Considerable assistance required,  Intake: Normal or reduced, LOC: Full or confusion.  Nutritional status: Albumin 2.20. Body mass index is 24.78 kg/m²..  Patient status: Disease state: Controlled with current treatments.    Impression/Problem List:  1.   Pneumonia / Recent COVID-19 infection   2.   Acute respiratory failure with hypoxia   3.   Impaired mobility  4.   Decreased activities of living   5.   Moderate malnutrition / Weight loss  6.   Pulmonary embolism - Segmental and subsegmental RLL   7.   Pleuritic chest pain   8.   Peripheral neuropathy  9.   Leukocytosis  10. Chronic cerebral microvascular disease   11. Bilateral carotid artery disease  12. Elevated CRP  13. Essential hypertension  14. Elevated BUN  15. Benign prostatic hyperplasia  16. Advanced age    Plan/Recommendations     1. Goals of care include CODE STATUS NO CPR with limited support interventions.    2. Palliative care encounter  - Prognosis is poor to guarded long-term secondary to numerous recent COVID-19 infection, acute respiratory failure with hypoxia, impaired mobility and decreased ADLs, moderate malnutrition, pulmonary embolism, pleuritic chest pain, peripheral neuropathy, chronic cerebral microvascular disease, advanced age and other comorbidities listed above.    - Explored goals of care and medical priorities. Reflected on previous conversations and expressed wishes  to remain NO CPR and DO NOT INTUBATE.     - Discussed additional interventions however expressed he is agreeable to continuing at this time as he is hopeful for improvement however appears to understand severity of illness and prognosis.     - Plans to discharge to [facility]ACH today for further efforts at weaning oxygen and rehabilitation.     3.  Peripheral neuropathy  - Consider increasing gabapentin from BID to home dosage of 400 mg TID.     4.  Pleuritic chest pain  - Gabapentin may provide some benefit if pain related to nerves.     - Reports tramadol and morphine have been beneficial.     - Discussed possibility of adding Lidoderm however he does not wish to try at this time.     - Encouraged to continue splinting with deep cough and breathing and expressed understanding.     - Thank you for allowing us to participate in patient's plan of care. Palliative Care Team will continue to follow patient if he remains hospitalized.     Time spent:70 minutes spent reviewing medical and medication records, assessing and examining patient, discussing with family, answering questions, providing some guidance about a plan and documentation of care, and coordinating care with other healthcare members, with > 50% time spent face to face.   20 minutes spent on advance care planning.          Becca Phelps, APRN  7/19/2022

## 2022-07-20 NOTE — PROGRESS NOTES
Julius Madrid M.D.  MEKHI Dockery        Internal Medicine Progress Note    7/20/2022   09:49 CDT    Name:  Adrián Graham  MRN:    [id number]     Acct:     [id number]   Room:  [address] [place] [address] Day: 0     Admit Date: 7/19/2022 12:46 PM  PCP: Mauro Irizarry DO    Subjective:     C/C: shortness of breath, weakness    Interval History: Status:  Improved. Resting in bed. No family at bedside. Afebrile. Had increased hypoxia and shortness of breath with sats in the 70s while attempting to eat breakfast this morning. Currently sats in the high 90s at rest with 02 at 35 lpm/85%. Denies pain. Slow progress with therapies.     Review of Systems   Constitutional: Positive for malaise/fatigue. Negative for chills, decreased appetite, weight gain and weight loss.   HENT: Negative for congestion, ear discharge, hoarse voice and tinnitus.    Eyes: Negative for blurred vision, discharge, visual disturbance and visual halos.   Cardiovascular: Positive for chest pain and dyspnea on exertion. Negative for claudication, irregular heartbeat, leg swelling, orthopnea and paroxysmal nocturnal dyspnea.   Respiratory: Positive for cough and shortness of breath. Negative for sputum production and wheezing.    Endocrine: Negative for cold intolerance, heat intolerance and polyuria.   Hematologic/Lymphatic: Negative for adenopathy. Does not bruise/bleed easily.   Skin: Negative for dry skin, itching and suspicious lesions.   Musculoskeletal: Negative for arthritis, back pain, falls, joint pain, muscle weakness and myalgias.   Gastrointestinal: Negative for abdominal pain, constipation, diarrhea, dysphagia and hematemesis.   Genitourinary: Negative for bladder incontinence, dysuria and frequency.   Neurological: Positive for weakness. Negative for aphonia, disturbances in coordination and dizziness.   Psychiatric/Behavioral: Negative for altered mental status, depression, memory loss and substance abuse. The patient  does not have insomnia and is not nervous/anxious.          Medications:     Allergies: No Known Allergies    Current Meds:   Current Facility-Administered Medications:   •  acetaminophen (TYLENOL) tablet 650 mg, 650 mg, Oral, Q4H PRN **OR** acetaminophen (TYLENOL) suppository 650 mg, 650 mg, Rectal, Q4H PRN, Rene Madrid MD  •  albuterol (PROVENTIL) nebulizer solution 0.083% 2.5 mg/3mL, 2.5 mg, Nebulization, Q6H PRN, Rene Madrid MD  •  ascorbic acid (VITAMIN C) tablet 500 mg, 500 mg, Oral, Daily, Rene Madrid MD  •  aspirin EC tablet 81 mg, 81 mg, Oral, Daily, Rene Madrid MD  •  atorvastatin (LIPITOR) tablet 20 mg, 20 mg, Oral, Daily, Rene Madrid MD  •  busPIRone (BUSPAR) tablet 10 mg, 10 mg, Oral, TID, Rene Madrid MD  •  cetirizine (zyrTEC) tablet 10 mg, 10 mg, Oral, Daily, Rene Madrid MD  •  dexamethasone (DECADRON) tablet 6 mg, 6 mg, Oral, Daily With Breakfast, Rene Madrid MD  •  diazePAM (VALIUM) tablet 2.5 mg, 2.5 mg, Oral, Q6H PRN, Rene Madrid MD  •  diphenhydrAMINE (BENADRYL) capsule 25 mg, 25 mg, Oral, Nightly, Rene Madrid MD  •  Enoxaparin Sodium (LOVENOX) syringe 80 mg, 1 mg/kg, Subcutaneous, Q12H, Rene Madrid MD  •  famotidine (PEPCID) tablet 20 mg, 20 mg, Oral, BID AC, Rene Madrid MD  •  finasteride (PROSCAR) tablet 5 mg, 5 mg, Oral, Daily, Rene Madrid MD  •  fluticasone (FLONASE) 50 MCG/ACT nasal spray 2 spray, 2 spray, Nasal, Daily, Rene Madrid MD  •  furosemide (LASIX) tablet 20 mg, 20 mg, Oral, BID, Rene Madrid MD  •  gabapentin (NEURONTIN) capsule 400 mg, 400 mg, Oral, Q12H, Rene Madrid MD  •  guaiFENesin (MUCINEX) 12 hr tablet 1,200 mg, 1,200 mg, Oral, BID, Rene Madrid MD  •  ipratropium-albuterol (DUO-NEB) nebulizer solution 3 mL, 3 mL, Nebulization, 4x Daily - RT, Rene Madrid MD  •  montelukast  "(SINGULAIR) tablet 10 mg, 10 mg, Oral, Nightly, Rene Madrid MD  •  Morphine sulfate (PF) injection 1 mg, 1 mg, Intravenous, Q4H PRN, Rene Madrid MD  •  ondansetron (ZOFRAN) tablet 4 mg, 4 mg, Oral, Q6H PRN **OR** ondansetron (ZOFRAN) injection 4 mg, 4 mg, Intravenous, Q6H PRN, Rene Madrid MD  •  sodium chloride 0.9 % flush 10 mL, 10 mL, Intravenous, Q12H, Rene Madrid MD  •  sodium chloride 0.9 % flush 10 mL, 10 mL, Intravenous, PRN, Rene Madrid MD  •  traMADol (ULTRAM) tablet 50 mg, 50 mg, Oral, Q8H PRN, Rene Madrid MD  •  vitamin D3 tablet 5,000 Units, 5,000 Units, Oral, Daily, Rene Madrid MD  •  zinc sulfate (ZINCATE) capsule 220 mg, 220 mg, Oral, Daily, Rene Madrid MD    Data:     Code Status:    There are no questions and answers to display.       Family History   Problem Relation Age of Onset   • Diabetes Mother    • Cancer Mother    • Osteoporosis Mother    • Heart disease Brother    • Colon cancer Neg Hx    • Colon polyps Neg Hx    • Esophageal cancer Neg Hx        Social History     Socioeconomic History   • Marital status:    Tobacco Use   • Smoking status: Never Smoker   • Smokeless tobacco: Never Used   Vaping Use   • Vaping Use: Never used   Substance and Sexual Activity   • Alcohol use: No   • Drug use: No   • Sexual activity: Defer       Vitals:  Ht 185.4 cm (73\")   Wt 83.5 kg (184 lb 1.4 oz)   BMI 24.29 kg/m²     T 97.7 P 74 R 24 /62 Sp02 96% (35 lpm/85%)        I/O (24Hr):  No intake or output data in the 24 hours ending 07/20/22 0949    Labs and imaging:      No results found for this or any previous visit (from the past 12 hour(s)).                            Physical Examination:        Physical Exam  Vitals and nursing note reviewed.   Constitutional:       Appearance: He is well-developed.   HENT:      Head: Normocephalic and atraumatic.      Right Ear: External ear normal.      Left Ear: " External ear normal.      Nose: Nose normal.      Mouth/Throat:      Mouth: Mucous membranes are moist.      Pharynx: Oropharynx is clear.   Eyes:      Pupils: Pupils are equal, round, and reactive to light.   Cardiovascular:      Rate and Rhythm: Normal rate and regular rhythm.      Heart sounds: Normal heart sounds.   Pulmonary:      Effort: Pulmonary effort is normal.      Breath sounds: Normal breath sounds.   Abdominal:      General: Bowel sounds are normal.      Palpations: Abdomen is soft.   Musculoskeletal:         General: Normal range of motion.      Cervical back: Normal range of motion and neck supple.      Comments: Generalized weakness   Skin:     General: Skin is warm and dry.   Neurological:      Mental Status: He is alert and oriented to person, place, and time.      Deep Tendon Reflexes: Reflexes are normal and symmetric.   Psychiatric:         Behavior: Behavior normal.           Assessment:            * No active hospital problems. *    Past Medical History:   Diagnosis Date   • Abdominal distention    • Abdominal pain, left upper quadrant    • Allergic rhinitis    • Arthritis    • Bloating    • BPH (benign prostatic hyperplasia)    • Carpal tunnel syndrome    • Change in bowel habits    • Constipation    • Diarrhea    • Erectile dysfunction    • GERD (gastroesophageal reflux disease)    • History of shingles    • Hypertension    • Idiopathic peripheral neuropathy    • Neuropathy    • Pneumonia    • Shingles    • Weight loss         Plan:        1. Acute hypoxic respiratory failure s/p COVID  2. Bilateral pulmonary embolus  3. COVID-19 with respiratory failure  4. Bilateral groundglass pulmonary infiltrate  5. Acute lung injury with organizing pneumonia  6. Status post COVID and vaccinated status  7. History of fall  8. Presyncope  9. Chronic cerebral microvascular disease  10. Dilated cardiomyopathy  11. Bilateral carotid artery disease  12. Moderate protein calorie malnutrition  13. Diffuse  generalized weakness  14. Leukocytosis  15. BPH  16. Anxiety  17. Peripheral neuropathy  18. GERD  19. Rhinitis    Continue current treatment. Monitor counts. Increase activity. Labs in am. Oxygen weaning as tolerated under direction of pulmonology. Maintain patient safety. Optimize nutrition. Continue anticoagulation.       Electronically signed by MEKHI Lamar on 7/20/2022 at 09:49 CDT       I have discussed the care of Adrián Graham, including pertinent history and exam findings, with the nurse practitioner.    I have seen and examined the patient and the key elements of all parts of the encounter have been performed by me.  I agree with the assessment, plan and orders as documented by MEKHI Dockery, after I modified the exam findings and the plan of treatments and the final version is my approved version of the assessment.        Electronically signed by Rene Madrid MD on 7/20/2022 at 10:23 CDT

## 2022-07-20 NOTE — PROGRESS NOTES
"INFECTIOUS DISEASES CONSULT NOTE    Patient:  Adrián Graham 82 y.o. male  ROOM # 570/1  YOB: 1940  MRN: [id number]  [facility]:  [id number]  Admit date: 7/19/2022   Admitting Physician: Rene Madrid MD  Primary Care Physician: Mauro Irizarry DO  REFERRING PROVIDER: Rene Madrid, *      REASON FOR CONSULTATION : \"Continue care post COVID\"      HISTORY OF PRESENT ILLNESS: The patient is an 82-year-old gentleman who I followed in CCU after transferring from acute care for worsening hypoxia.    Its unclear when patient originally had COVID-19 infection.  He was diagnosed June 26 when he presented with pulmonary emboli.  He had been seeing as an outpatient on 3 occasions with some clients of cough and congestion and was given antibiotics and steroids.  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    Patient initially had been treated empirically with Zosyn after having courses of antibiotics as an outpatient.  He was switched to cefepime after a fever spike.  Cultures remained negative and cefepime was discontinued after empiric course on July 16.    He was started on higher dose steroids for the concern for post-COVID organizing pneumonia.    He is required Vapotherm at 40 L and 100% for several days and is able to wean down some but generally requires higher levels and even nonrebreather mask when getting up to the chair or exerting himself.      Past Medical History:   Diagnosis Date   • Abdominal distention    • Abdominal pain, left upper quadrant    • Allergic rhinitis    • Arthritis    • Bloating    • BPH (benign prostatic hyperplasia)    • Carpal tunnel syndrome    • Change in bowel habits    • Constipation    • Diarrhea    • Erectile dysfunction    • GERD (gastroesophageal reflux disease)    • History of shingles    • Hypertension    • Idiopathic peripheral neuropathy    • Neuropathy    • Pneumonia    • Shingles    • Weight loss      Past Surgical " History:   Procedure Laterality Date   • APPENDECTOMY     • BACK SURGERY     • CARPAL TUNNEL RELEASE Right    • CARPAL TUNNEL RELEASE Left 06/2022   • CHOLECYSTECTOMY     • COLONOSCOPY  09/14/2010    5 yr-complete colon not visualized   • COLONOSCOPY  07/27/2004    extremely tortuous redundant colon. BE-mild diverticulosis without diverticulitis. ? Gallstones-Dr Chery.   • COLONOSCOPY N/A 09/10/2020    Procedure: COLONOSCOPY WITH ANESTHESIA;  Surgeon: Mani Sy DO;  Location: [facility] ENDOSCOPY;  Service: Gastroenterology;  Laterality: N/A;  Pre: Positive Colorectal Screening  Post: Diverticulosis  Dr. Mooney  CO2 Inflation Used   • ENDOSCOPY N/A 09/26/2016    Procedure: ESOPHAGOGASTRODUODENOSCOPY WITH ANESTHESIA;  Surgeon: Mani Sy DO;  Location: [facility] ENDOSCOPY;  Service:    • HIP SURGERY Right    • JOINT REPLACEMENT     • KNEE SURGERY     • OTHER SURGICAL HISTORY      Surgery for Spinal Stenosis   • OTHER SURGICAL HISTORY      Laser Prostate Surgery     Family History   Problem Relation Age of Onset   • Diabetes Mother    • Cancer Mother    • Osteoporosis Mother    • Heart disease Brother    • Colon cancer Neg Hx    • Colon polyps Neg Hx    • Esophageal cancer Neg Hx      Social History     Socioeconomic History   • Marital status:    Tobacco Use   • Smoking status: Never Smoker   • Smokeless tobacco: Never Used   Vaping Use   • Vaping Use: Never used   Substance and Sexual Activity   • Alcohol use: No   • Drug use: No   • Sexual activity: Defer       Current Scheduled Medications:   apixaban, 5 mg, Oral, Q12H  ascorbic acid, 500 mg, Oral, Daily  aspirin, 81 mg, Oral, Daily  atorvastatin, 20 mg, Oral, Daily  busPIRone, 10 mg, Oral, TID  cetirizine, 10 mg, Oral, Daily  dexamethasone, 6 mg, Oral, Daily With Breakfast  diphenhydrAMINE, 25 mg, Oral, Nightly  famotidine, 20 mg, Oral, BID AC  finasteride, 5 mg, Oral, Daily  fluticasone, 2 spray, Nasal, Daily  furosemide, 20 mg, Oral,  "BID  gabapentin, 400 mg, Oral, Q12H  guaiFENesin, 1,200 mg, Oral, BID  ipratropium-albuterol, 3 mL, Nebulization, 4x Daily - RT  montelukast, 10 mg, Oral, Nightly  sodium chloride, 10 mL, Intravenous, Q12H  vitamin D3, 5,000 Units, Oral, Daily  zinc sulfate, 220 mg, Oral, Daily      Current PRN Medications:  •  acetaminophen **OR** acetaminophen  •  albuterol  •  diazePAM  •  ondansetron **OR** ondansetron  •  sodium chloride  •  traMADol     No Known Allergies    Review of Systems   Constitutional: Positive for fatigue. Negative for fever.   HENT: Negative for congestion.    Eyes: Negative for photophobia.   Respiratory: Positive for shortness of breath.    Cardiovascular: Negative for leg swelling.   Gastrointestinal: Negative for vomiting.   Genitourinary: Negative for dysuria.   Musculoskeletal: Positive for neck pain (Chronic).   Allergic/Immunologic: Negative for immunocompromised state.   Neurological: Positive for weakness.         Vital Signs:  Ht 185.4 cm (73\")   Wt 83.5 kg (184 lb 1.4 oz)   BMI 24.29 kg/m²   No data recorded.      97.7    74   21    119/62    Physical Exam   General: Patient is in 82-year-old gentleman sitting up in bed appearing  fatigued  HEENT: Sclera anicteric.  Vapotherm nasal cannula in place  Respiratory: Diminished breath sounds throughout.  Vapotherm set at 40 L and 80% and he saturating 91 to 93%  Neuro: Alert and oriented, speech clear  Psych: Pleasant cooperative        Results Review:    I reviewed the patient's new clinical results.    Lab Results:  CBC:   Lab Results   Lab 07/15/22  0401 07/17/22  0344 07/19/22  0357   WBC 22.81* 19.03* 16.50*   HEMOGLOBIN 10.9* 11.3* 13.1   HEMATOCRIT 34.9* 36.3* 40.9   PLATELETS 322 317 322       CMP:   Lab Results   Lab 07/15/22  0752 07/17/22  0344 07/19/22  0357   SODIUM 142 141 139   POTASSIUM 4.1 4.4 4.5   CHLORIDE 103 102 100   CO2 33.0* 32.0* 33.0*   BUN 32* 28* 32*   CREATININE 0.69* 0.54* 0.62*   CALCIUM 8.8 8.7 8.9   GLUCOSE " 161* 154* 108*       Lab Results (last 72 hours)     ** No results found for the last 72 hours. **          Estimated Creatinine Clearance: 106.6 mL/min (A) (by C-G formula based on SCr of 0.62 mg/dL (L)).    Culture Results:    Microbiology Results (last 10 days)     Procedure Component Value - Date/Time    Blood Culture With KEVAN - Blood, Arm, Right [[id number]]  (Normal) Collected: 07/11/22 1229    Lab Status: Final result Specimen: Blood from Arm, Right Updated: 07/16/22 1248     Blood Culture No growth at 5 days    Blood Culture With KEVAN - Blood, Hand, Left [[id number]]  (Normal) Collected: 07/11/22 1226    Lab Status: Final result Specimen: Blood from Hand, Left Updated: 07/16/22 1248     Blood Culture No growth at 5 days             Radiology:   Imaging Results (Last 72 Hours)     ** No results found for the last 72 hours. **            Assessment & Plan       * No active hospital problems. *      IMPRESSION:  1. COVID-19 infection initially diagnosed June 26 possibly infected in May based on review of walk-in clinic notes and patient's history.  Acute infection resolved  2. Acute respiratory failure requiring heated high flow oxygen  3. Fever-resolved  4. Leukocytosis-actually improving on steroids  5. Pulmonary emboli diagnosed June 26  6. Significant deconditioning    RECOMMENDATION:   · Continue to monitor off antibiotic therapy  · Steroid management per pulmonary  · Continue to wean O2 as tolerated        Beatris Eduardo MD  07/20/22  18:22 CDT

## 2022-07-20 NOTE — PROGRESS NOTES
Adult Nutrition  Assessment/PES    Patient Name:  Adrián Graham  YOB: 1940  MRN: [id number]  Admit Date:  7/19/2022    Assessment Date:  7/19/2022    Comments:  Initial LTAC nutrition assessment. Pt admitted from [facility] to LT for further efforts to wean oxygen and for further rehab. Pt with chronic poor intake that has worsened with recent a/c respiratory failure secondary to recent COVID-19 infection.  Diet consistency recently downgraded to soft texture, ground meat d/t fatigue with chewing and breathing. Specific food preferences noted. Pt has been receiving yogurt with breakfast. He has been getting Boost Plus, vanilla flavor TID, will resume supplement orders. Has dx of moderate malnutrition from 7/13 based on NFPE completed 7/12, po intake, and weight loss. Will cont to follow and adjust nutrition POC as necessary.     Reason for Assessment     Row Name 07/19/22 1707          Reason for Assessment    Reason For Assessment physician consult     Diagnosis pulmonary disease;infection/sepsis;cardiac disease;nutrition related history  recent hx of COVID-19, PE                Nutrition/Diet History     Row Name 07/19/22 1700          Nutrition/Diet History    Typical Intake (Food/Fluid/EN/PN) Pt has been transferred from [facility] CCU to LTAC for further efforts of weaning O2 and further rehab. He cont to report a fairly good appetite to this RD. Specific food preferences remain in place. Nutrition supplement orders have been resumed.     Supplemental Drinks/Foods/Additives He cont to drink Boost Plus, vanilla flavor.     Typical Activity Patterns sedentary     Factors Affecting Nutritional Intake appetite;respiratory difficulty/therapies                Anthropometrics     Row Name 07/19/22 4993          Anthropometrics    Weight for Calculation 83.5 kg (184 lb 1.4 oz)            Usual Body Weight (UBW)    Usual Body Weight 97.5 kg (215 lb)  per pt report, ~1 year ago     Weight Change (Amount and Duration)  weights have varied up and down. He has had weight loss over the last year.                Labs/Tests/Procedures/Meds     Row Name 07/19/22 1730          Labs/Procedures/Meds    Lab Results Reviewed reviewed, pertinent     Lab Results Comments BUN, Cr, WBC            Diagnostic Tests/Procedures    Diagnostic Test/Procedure Reviewed reviewed            Medications    Pertinent Medications Reviewed reviewed                Physical Findings     Row Name 07/19/22 1730          Physical Findings    Overall Physical Appearance heated, humidified HFNC O2, biPaP as needed, BM 7/16, manisha score 18                Estimated/Assessed Needs - Anthropometrics     Row Name 07/19/22 1735          Anthropometrics    Weight for Calculation 83.5 kg (184 lb 1.4 oz)            Usual Body Weight (UBW)    Usual Body Weight 97.5 kg (215 lb)  per pt report, ~1 year ago     Weight Change (Amount and Duration) weights have varied up and down. He has had weight loss over the last year.            Estimated/Assessed Needs    Additional Documentation KCAL/KG (Group);Protein Requirements (Group);Fluid Requirements (Group)            KCAL/KG    KCAL/KG 25 Kcal/Kg (kcal)     25 Kcal/Kg (kcal) 2087.5            Protein Requirements    Weight Used For Protein Calculations 83.5 kg (184 lb 1.4 oz)     Est Protein Requirement Amount (gms/kg) 1.2 gm protein     Estimated Protein Requirements (gms/day) 100.2            Fluid Requirements    Fluid Requirements (mL/day) 2088     Estimated Fluid Requirement Method RDA Method     RDA Method (mL) 2088                Nutrition Prescription Ordered     Row Name 07/19/22 1735          Nutrition Prescription PO    Current PO Diet Soft Texture     Texture Ground     Fluid Consistency Thin     Supplement Yogurt     Supplement Frequency Daily  with breakfast     Common Modifiers Cardiac                       Problem/Interventions:   Problem 1     Row Name 07/19/22 1745          Nutrition Diagnoses Problem 1     Problem 1 Malnutrition     Etiology (related to) Medical Diagnosis;Factors Affecting Nutrition     Infectious Disease Other (comment)  recent covid infection in June     Pulmonary/Critical Care Pulmonary emboli;A/C respiratory failure  PE associated with COVID infection     Appetite Fair     Oral Chewing Difficulty  difficulty with fatigue with breathing and chewing     Signs/Symptoms (evidenced by) PO Intake;Unintended Weight Change;Report/Observation     Percent (%) intake recorded 50 %     Over number of meals 2     Unintended Weight Change Loss     Number of Pounds Lost 18#; 21#; ~31#     Weight loss time period 1 month, 3 months, ~1 year per pt report     Other Comment moderate muscle and fat wasting per NFPE completed 7/12, generalized weakness, requiring a mech altered diet for ease of breathing and chewing                      Intervention Goal     Row Name 07/19/22 1750          Intervention Goal    General Meet nutritional needs for age/condition;Reduce/improve symptoms;Disease management/therapy     PO Tolerate PO;Increase intake;Meet estimated needs     Weight Maintain weight                Nutrition Intervention     Row Name 07/19/22 1750          Nutrition Intervention    RD/Tech Action Advise alternate selection;Interview for preference;Menu adjusted;Encourage intake;Recommend/ordered;Supplement provided;Follow Tx progress;Care plan reviewd     Recommended/Ordered Supplement  Will resume ONS that he has been [facility] at [facility]                Nutrition Prescription     Row Name 07/19/22 1750          Nutrition Prescription PO    PO Prescription Begin/change supplement     Supplement Boost Plus     Supplement Frequency 3 times a day     New PO Prescription Ordered? Yes                Education/Evaluation     Row Name 07/19/22 1750          Education    Education No discharge needs identified at this time            Monitor/Evaluation    Monitor Per protocol                 Electronically signed by:  Magali  NURIS Lynne RDN, LD

## 2022-07-20 NOTE — CONSULTS
PULMONARY AND CRITICAL CARE CONSULT - [facility]    Adrián Graham   MR# [id number]  Acct# [id number]  7/20/2022   07:08 CDT    Referring Provider: Rene Madrid MD    Chief Complaint: Post Covid respiratory failure     HPI: We are consulted by Rene Madrid MD to see this 82 y.o. male born on 1940.  We have been following the patient for the past 3 weeks and he has now been transferred to Continue Care for ongoing hypoxia and need for Vapotherm. He was initially admitted to [facility] last month for management of PE in the setting of COVID.  He has been unable to titrate off of Vapotherm and continues on 35 L, 75%.  He has had a nonrebreather mask placed this morning because he desatted to the 70s with movement.  During my time in the room, his O2 sats recovered to 91%.  He is tachypneic but reports that his chest feels less heavy than it has in previous days.  He reports that he slept well last night.  He is anticipating breakfast but will need some more time to recover.  Prior to this admission, the patient had no previous pulmonary history.    Past Medical History   has a past medical history of Abdominal distention, Abdominal pain, left upper quadrant, Allergic rhinitis, Arthritis, Bloating, BPH (benign prostatic hyperplasia), Carpal tunnel syndrome, Change in bowel habits, Constipation, Diarrhea, Erectile dysfunction, GERD (gastroesophageal reflux disease), History of shingles, Hypertension, Idiopathic peripheral neuropathy, Neuropathy, Pneumonia, Shingles, and Weight loss.   has a past surgical history that includes Cholecystectomy; Colonoscopy (09/14/2010); Other surgical history; Colonoscopy (07/27/2004); Other surgical history; Knee surgery; Appendectomy; Hip surgery (Right); Carpal tunnel release (Right); Back surgery; Esophagogastroduodenoscopy (N/A, 09/26/2016); Colonoscopy (N/A, 09/10/2020); Carpal tunnel release (Left, 06/2022); and Joint replacement.  No  Known Allergies  Medications  ascorbic acid, 500 mg, Oral, Daily  aspirin, 81 mg, Oral, Daily  atorvastatin, 20 mg, Oral, Daily  busPIRone, 10 mg, Oral, TID  cetirizine, 10 mg, Oral, Daily  dexamethasone, 6 mg, Oral, Daily With Breakfast  diphenhydrAMINE, 25 mg, Oral, Nightly  enoxaparin, 1 mg/kg, Subcutaneous, Q12H  famotidine, 20 mg, Oral, BID AC  finasteride, 5 mg, Oral, Daily  fluticasone, 2 spray, Nasal, Daily  furosemide, 20 mg, Oral, BID  gabapentin, 400 mg, Oral, Q12H  guaiFENesin, 1,200 mg, Oral, BID  ipratropium-albuterol, 3 mL, Nebulization, 4x Daily - RT  montelukast, 10 mg, Oral, Nightly  sodium chloride, 10 mL, Intravenous, Q12H  vitamin D3, 5,000 Units, Oral, Daily  zinc sulfate, 220 mg, Oral, Daily         Social History   reports that he has never smoked. He has never used smokeless tobacco. He reports that he does not drink alcohol and does not use drugs.  Family History  family history includes Cancer in his mother; Diabetes in his mother; Heart disease in his brother; Osteoporosis in his mother.  Review of Systems:  Review of Systems   Constitutional: Positive for unexpected weight change.   HENT: Negative.    Respiratory: Positive for chest tightness and shortness of breath.    Cardiovascular: Negative.    Gastrointestinal: Negative.    Genitourinary: Negative.    Musculoskeletal: Negative.    Neurological: Positive for weakness.   Psychiatric/Behavioral: Negative.      Physical Exam:  Vital signs: T:97.7   BP:119/62   P:74   R:21   Sat:91%  Physical Exam  Vitals reviewed.   Constitutional:       Appearance: He is well-developed.      Comments: Vapotherm   HENT:      Head: Normocephalic and atraumatic.   Eyes:      General: No scleral icterus.  Cardiovascular:      Rate and Rhythm: Normal rate and regular rhythm.   Pulmonary:      Effort: Tachypnea and accessory muscle usage present.      Breath sounds: Decreased breath sounds present. No wheezing, rhonchi or rales.   Abdominal:      General:  There is no distension.   Musculoskeletal:      Cervical back: Neck supple.      Right lower leg: No edema.      Left lower leg: No edema.   Skin:     General: Skin is warm and dry.   Neurological:      Mental Status: He is alert and oriented to person, place, and time.      Motor: Weakness present.   Psychiatric:         Mood and Affect: Mood normal.         Behavior: Behavior normal.     Electronically signed by MEKHI Calvert, 7/20/2022, 07:08 CDT      Physician Substantive Portion: Medical Decision Making    Results from last 7 days   Lab Units 07/19/22  0357 07/17/22  0344 07/15/22  0401   WBC 10*3/mm3 16.50* 19.03* 22.81*   HEMOGLOBIN g/dL 13.1 11.3* 10.9*   PLATELETS 10*3/mm3 322 317 322     Results from last 7 days   Lab Units 07/19/22  0357 07/17/22  0344 07/15/22  0752   SODIUM mmol/L 139 141 142   POTASSIUM mmol/L 4.5 4.4 4.1   CO2 mmol/L 33.0* 32.0* 33.0*   BUN mg/dL 32* 28* 32*   CREATININE mg/dL 0.62* 0.54* 0.69*   MAGNESIUM mg/dL  --  2.5*  --    PHOSPHORUS mg/dL  --  3.7  --    GLUCOSE mg/dL 108* 154* 161*         Lab Results   Component Value Date    PROBNP 1,299.0 07/01/2022     No results found for: BLOODCX, URINECX, WOUNDCX, MRSACX, RESPCX, STOOLCX    Recent radiology:   Imaging Results (Last 72 Hours)     ** No results found for the last 72 hours. **        My radiograph interpretation/independent review of imaging: Reviewed recent imaging studies and agree with interpretation.  Other test results (not lab or imaging): Results for orders placed during the hospital encounter of 07/01/22    Adult Transthoracic Echo Limited W/ Cont if Necessary Per Protocol    Interpretation Summary  · This is a limited echocardiogram.  · Left ventricular systolic function is normal. Left ventricular ejection fraction appears to be 61 - 65%.  · The right ventricular cavity is mildly dilated. Normal right ventricular systolic function noted.  Reviewed  Independent review of ekg: Done  Problem List as  identified by Epic (may contain historical, inactive problems)  Patient Active Problem List   Diagnosis   • Idiopathic peripheral neuropathy   • Essential hypertension   • Benign prostatic hyperplasia without lower urinary tract symptoms   • Overweight (BMI 25.0-29.9)   • Carpal tunnel syndrome, left   • Bilateral carotid artery disease (HCC)   • Hyperlipidemia   • Preop cardiovascular exam [facility] Dr Brown   • Single subsegmental pulmonary embolism without acute cor pulmonale (HCC)   • Pulmonary embolism associated with COVID-19 (HCC)   • Acute respiratory failure with hypoxia (HCC)   • Moderate malnutrition (HCC)     Pulmonary Assessment:  New problem (to me), with additional workup planned: Acute hypoxic respiratory failure with high oxygen demand, COVID-19 pneumonia, cryptogenic organizing pneumonia and interstitial pneumonitis, history of pulmonary embolism  New problem (to me), no additional workup planned: Coronary artery disease, carotid artery disease, history of recurrent bronchitis, deconditioning, hyperlipidemia, history of fall, vaccinated status.  Other problems either stable, failing to improve or worsenin. Acute hypoxic respiratory failure, currently on Vapotherm  2. Bilateral groundglass pulmonary infiltrate  3. Worsening COVID-19 pneumonia  4. Possible acute lung injury or cryptogenic organizing pneumonia  5. Pulmonary embolism single subsegmental on anticoagulation  6. Essential hypertension  7. Hyperlipidemia  8. Carotid artery disease  9. Recurrent bronchitis  10. S/p COVID vaccinated status  11. Severe weakness and deconditioning  12. History of fall    Recommend/plan:   · Patient well-known to me and I did follow him closely during recent hospitalization.  He is a very pleasant elderly  gentleman who had a prolonged stay in the hospital during recent hospitalization.  · He had no history of tobacco abuse or prior lung disease and was not on oxygen or CPAP before.  · He is  vaccinated for COVID and presented with recurrent tracheobronchitis and pneumonia  · He was diagnosed with recurrence of COVID and needed prolonged stay in the hospital and in the ICU and was treated with antibiotics.  · Oxygen requirement remains high and is currently requiring Vapotherm and BiPAP.  Patient has subsegmental pulm embolism and is on full dose anticoagulation.  · He did stay in the intensive clinic but his oxygen requirement remains high although he appears comfortable he was moved to the LTAC for further care.  · This morning he was little tachypneic but currently tolerating Vapotherm 35 L flow with 75% FiO2 which is getting titrated down.  He is working with physical therapy.  · Continue current treatment plan he is already on steroid which has been increased due to recent worsening pulmonary status and will be tapered slowly as tolerated.  · Encourage incentive spirometry and flutter valve.  PT OT nutritional support, DVT and ulcer prophylaxis and pain and anxiety control.  · Labs and imaging studies from time to time.  Patient need outpatient pulmonary clinic follow-up with PFT when he is more stable.  · Discussed care plan with RT.  We will continue following him.  He is out of COVID isolation now.  · He is a limited code and did not want to be intubated.  Overall prognosis: Guarded.  · We appreciate the consult and will continue following.    This visit was performed by both a physician and an [facility] RN.  I personally evaluated and examined the patient.  I performed all aspects of the medical decision making as documented.    Electronically signed by     Miguel Smith MD,  Pulmonologist/Intensivist   7/20/2022, 20:35 CDT

## 2022-07-20 NOTE — THERAPY DISCHARGE NOTE
Acute Care - Occupational Therapy Discharge Summary  [facility]     Patient Name: Adrián Graham  : 1940  MRN: [id number]    Today's Date: 2022                 Admit Date: 2022        OT Recommendation and Plan    Visit Dx:    ICD-10-CM ICD-9-CM   1. Hypoxemia  R09.02 799.02   2. Orthostatic hypotension  I95.1 458.0   3. Pneumonia due to COVID-19 virus  U07.1 480.8    J12.82 079.89   4. Multiple subsegmental pulmonary emboli without acute cor pulmonale (HCC)  I26.94 415.19   5. Impaired mobility  Z74.09 799.89   6. Decreased activities of daily living (ADL)  Z78.9 V49.89   7. Moderate malnutrition (HCC)  E44.0 263.0   8. Acute respiratory failure with hypoxia (HCC)  J96.01 518.81   9. Pulmonary embolism associated with COVID-19 (HCC)  U07.1 415.19    I26.99 079.89   10. Single subsegmental pulmonary embolism without acute cor pulmonale (HCC)  I26.93 415.19   11. Preop cardiovascular exam CEA Dr Brown  Z01.810 V72.81   12. Bilateral carotid artery disease, unspecified type (HCC)  I77.9 447.9   13. Carpal tunnel syndrome, left  G56.02 354.0   14. Overweight (BMI 25.0-29.9)  E66.3 278.02   15. Benign prostatic hyperplasia without lower urinary tract symptoms  N40.0 600.00   16. Essential hypertension  I10 401.9   17. Idiopathic peripheral neuropathy  G60.9 356.9   18. Hyperlipidemia, unspecified hyperlipidemia type  E78.5 272.4                OT Rehab Goals     Row Name 22 0800             Transfer Goal 1 (OT)    Activity/Assistive Device (Transfer Goal 1, OT) sit-to-stand/stand-to-sit;commode, bedside without drop arms  -TS      [place] Level/Cues Needed (Transfer Goal 1, OT) minimum assist (75% or more patient effort);moderate assist (50-74% patient effort)  -TS      Time Frame (Transfer Goal 1, OT) long term goal (LTG);by discharge  -TS      Strategies/Barriers (Transfers Goal 1, OT) O2 >90  -TS      Progress/Outcome (Transfer Goal 1, OT) goal not met  -TS              Bathing Goal 1  (OT)    Activity/Device (Bathing Goal 1, OT) lower body bathing  -TS      [place] Level/Cues Needed (Bathing Goal 1, OT) verbal cues required;minimum assist (75% or more patient effort)  -TS      Time Frame (Bathing Goal 1, OT) long term goal (LTG);by discharge  -TS      Strategies/Barriers (Bathing Goal 1, OT) Sitting EOB, O2 >90  -TS      Progress/Outcomes (Bathing Goal 1, OT) goal not met  -TS              Dressing Goal 1 (OT)    Activity/Device (Dressing Goal 1, OT) lower body dressing  -TS      [place]/Cues Needed (Dressing Goal 1, OT) minimum assist (75% or more patient effort)  -TS      Time Frame (Dressing Goal 1, OT) long term goal (LTG);by discharge  -TS      Strategies/Barriers (Dressing Goal 1, OT) sitting EOB, O2 >90  -TS      Progress/Outcome (Dressing Goal 1, OT) goal not met  -TS              Toileting Goal 1 (OT)    Activity/Device (Toileting Goal 1, OT) toileting skills, all  -TS      [place] Level/Cues Needed (Toileting Goal 1, OT) minimum assist (75% or more patient effort)  -TS      Time Frame (Toileting Goal 1, OT) long term goal (LTG);by discharge  -TS      Progress/Outcome (Toileting Goal 1, OT) goal not met  -TS              Problem Specific Goal 1 (OT)    Problem Specific Goal 1 (OT) Pt will sit EOB to complete 5 in grooming tasks with O2 sats >90%  -TS      Time Frame (Problem Specific Goal 1, OT) long term goal (LTG);by discharge  -TS      Progress/Outcome (Problem Specific Goal 1, OT) goal not met  -TS            User Key  (r) = Recorded By, (t) = Taken By, (c) = Cosigned By    Initials Name Provider Type Discipline    TS Jes Matos COTA Occupational Therapist Assistant OT                 Outcome Measures     Row Name 07/19/22 0900 07/18/22 0900 07/17/22 1042       How much help from another person do you currently need...    Turning from your back to your side while in flat bed without using bedrails? 3  -RAYMOND 3  -RAYMOND 3  -MF    Moving from lying on back to sitting  on the side of a flat bed without bedrails? 3  -RAYMOND 3  -RAYMOND 3  -MF    Moving to and from a bed to a chair (including a wheelchair)? 3  -RAYMOND 3  -RAYMOND 3  -MF    Standing up from a chair using your arms (e.g., wheelchair, bedside chair)? 3  -RAYMOND 3  -RAYMOND 3  -MF    Climbing 3-5 steps with a railing? 2  -RAYMOND 2  -RAYMOND 2  -MF    To walk in hospital room? 2  -RAYMOND 2  -RAYMOND 2  -MF    AM-PAC 6 Clicks Score (PT) 16  -RAYMOND 16  -RAYMOND 16  -MF       Functional Assessment    Outcome Measure Options -- -- AM-PAC 6 Clicks Basic Mobility (PT)  -MF          User Key  (r) = Recorded By, (t) = Taken By, (c) = Cosigned By    Initials Name Provider Type    Breanna Guy, PTA Physical Therapist Assistant     Nadia Denis, ZION Physical Therapist Assistant                Timed Therapy Charges  Total Units: 3    Charges  Total Units: 3    Procedure Name Documented Minutes Units Code    HC OT SELF CARE/MGMT/TRAIN EA 15 MIN 29  2    66271 (CPT®)      HC OT THER PROC EA 15 MIN 10  1    68910 (CPT®)               Documented Minutes  Total Minutes: 39    Therapy Provided Minutes    73436 - OT Self Care/Mgmt Minutes 29    84016 - OT Therapeutic Exercise Minutes 10                    OT Discharge Summary  Anticipated Discharge Disposition (OT): home with assist  Reason for Discharge: Discharge from facility  Outcomes Achieved: Refer to plan of care for updates on goals achieved  Discharge Destination: Home with assist      LEOPOLDO Rueda  7/20/2022

## 2022-07-21 NOTE — PROGRESS NOTES
PULMONARY AND CRITICAL CARE PROGRESS NOTE - [facility]    Patient: Adrián Graham  1940   MR# [id number]   Acct# [id number]  07/21/22   07:25 CDT  Referring Provider: Rene Madrid MD    Chief Complaint: Ongoing respiratory failure    Interval history: He had significant desaturations again after breakfast.  O2 sats are in the mid 80s.  He is on 35 L 80%.  He is discouraged and appears fatigued.  He has been encouraged to rest in order for his oxygen to rebound.  There are no labs today.  No overnight events reported.    Meds:  apixaban, 5 mg, Oral, Q12H  ascorbic acid, 500 mg, Oral, Daily  aspirin, 81 mg, Oral, Daily  atorvastatin, 20 mg, Oral, Daily  busPIRone, 10 mg, Oral, TID  cetirizine, 10 mg, Oral, Daily  dexamethasone, 6 mg, Oral, BID With Meals  diphenhydrAMINE, 25 mg, Oral, Nightly  famotidine, 20 mg, Oral, BID AC  finasteride, 5 mg, Oral, Daily  fluticasone, 2 spray, Nasal, Daily  furosemide, 20 mg, Oral, BID  gabapentin, 400 mg, Oral, Q12H  guaiFENesin, 1,200 mg, Oral, BID  ipratropium-albuterol, 3 mL, Nebulization, 4x Daily - RT  montelukast, 10 mg, Oral, Nightly  sodium chloride, 10 mL, Intravenous, Q12H  vitamin D3, 5,000 Units, Oral, Daily  zinc sulfate, 220 mg, Oral, Daily         Review of Systems:   Review of Systems   Respiratory: Positive for chest tightness and shortness of breath.    Neurological: Positive for weakness.     Physical Exam:  There were no vitals filed for this visit.  Body mass index is 24.29 kg/m².    No intake or output data in the 24 hours ending 07/21/22 0725  Physical Exam  Vitals and nursing note reviewed.   Constitutional:       Appearance: He is well-developed.      Interventions: Nasal cannula in place.      Comments: Vapotherm in place, appears fatigued   HENT:      Head: Normocephalic and atraumatic.   Eyes:      Pupils: Pupils are equal, round, and reactive to light.   Cardiovascular:      Rate and Rhythm: Normal rate and regular  rhythm.   Pulmonary:      Effort: Tachypnea present.      Breath sounds: Decreased breath sounds present. No wheezing, rhonchi or rales.   Abdominal:      General: There is no distension.   Musculoskeletal:      Cervical back: Normal range of motion and neck supple.   Skin:     General: Skin is warm and dry.   Neurological:      Mental Status: He is alert and oriented to person, place, and time.      Motor: Weakness present.       Electronically signed by MEKHI Calvert, 7/21/2022, 07:25 CDT        Physician Substantive Portion: Medical Decision Making    Laboratory Data:  Results from last 7 days   Lab Units 07/19/22  0357 07/17/22  0344 07/15/22  0401   WBC 10*3/mm3 16.50* 19.03* 22.81*   HEMOGLOBIN g/dL 13.1 11.3* 10.9*   PLATELETS 10*3/mm3 322 317 322     Results from last 7 days   Lab Units 07/19/22  0357 07/17/22  0344 07/15/22  0752   SODIUM mmol/L 139 141 142   POTASSIUM mmol/L 4.5 4.4 4.1   BUN mg/dL 32* 28* 32*   CREATININE mg/dL 0.62* 0.54* 0.69*         No results found for: BLOODCX, URINECX, WOUNDCX, MRSACX, RESPCX, STOOLCX  Recent films:  No radiology results from the last 24 hrs

## 2022-07-21 NOTE — PROGRESS NOTES
"INFECTIOUS DISEASES PROGRESS NOTE    Patient:  Adrián Graham  YOB: 1940  MRN: [id number]   Admit date: 7/19/2022   Admitting Physician: Rene Madrid MD  Primary Care Physician: Mauro Irizarry DO    Chief Complaint:  \"not too berrios\"        Interval History: Patient continues to hilliard with desaturation with much activity.  Required nonrebreather to get in the chair today.    Vapotherm settings now at 40 L and 90%.  Saturations are 93% when I was in the room and drifted down to 90 to 91% as he was talking    Allergies: No Known Allergies    Current Scheduled Medications:   apixaban, 5 mg, Oral, Q12H  ascorbic acid, 500 mg, Oral, Daily  aspirin, 81 mg, Oral, Daily  atorvastatin, 20 mg, Oral, Daily  busPIRone, 10 mg, Oral, TID  cetirizine, 10 mg, Oral, Daily  dexamethasone, 6 mg, Oral, BID With Meals  diphenhydrAMINE, 25 mg, Oral, Nightly  famotidine, 20 mg, Oral, BID AC  finasteride, 5 mg, Oral, Daily  fluticasone, 2 spray, Nasal, Daily  furosemide, 20 mg, Oral, BID  gabapentin, 400 mg, Oral, Q12H  guaiFENesin, 1,200 mg, Oral, BID  ipratropium-albuterol, 3 mL, Nebulization, 4x Daily - RT  montelukast, 10 mg, Oral, Nightly  sodium chloride, 10 mL, Intravenous, Q12H  vitamin D3, 5,000 Units, Oral, Daily  zinc sulfate, 220 mg, Oral, Daily      Current PRN Medications:  •  acetaminophen **OR** acetaminophen  •  albuterol  •  diazePAM  •  ondansetron **OR** ondansetron  •  sodium chloride  •  traMADol    Review of Systems  General: No fever  Respiratory: Shortness of breath    Objective     Vital Signs:  No data recorded.      Ht 185.4 cm (73\")   Wt 83.5 kg (184 lb 1.4 oz)   BMI 24.29 kg/m²     Temperature 98.1 pulse 72 respirate 23 /74    Physical Exam     General: Patient is chronically ill-appearing lying in bed appearing fatigued  HEENT: Vapotherm in place per nasal cannula in respiratory treatment per facemask in place  Lungs: Fairly shallow respirations.  Diminished at the " bases.  No wheezing or crackles  Neuro: Alert, oriented, speech clear  Psych: Pleasant and cooperative although somewhat frustrated that his medical condition      Results Review:    I reviewed the patient's new clinical results.    Lab Results:  CBC:   Lab Results   Lab 07/15/22  0401 07/17/22 0344 07/19/22  0357   WBC 22.81* 19.03* 16.50*   HEMOGLOBIN 10.9* 11.3* 13.1   HEMATOCRIT 34.9* 36.3* 40.9   PLATELETS 322 317 322         CMP:   Lab Results   Lab 07/15/22  0752 07/17/22  0344 07/19/22  0357   SODIUM 142 141 139   POTASSIUM 4.1 4.4 4.5   CHLORIDE 103 102 100   CO2 33.0* 32.0* 33.0*   BUN 32* 28* 32*   CREATININE 0.69* 0.54* 0.62*   CALCIUM 8.8 8.7 8.9   GLUCOSE 161* 154* 108*       Estimated Creatinine Clearance: 106.6 mL/min (A) (by C-G formula based on SCr of 0.62 mg/dL (L)).    Culture Results:    Microbiology Results (last 10 days)     ** No results found for the last 240 hours. **               Radiology:   Imaging Results (Last 72 Hours)     ** No results found for the last 72 hours. **          Assessment & Plan     There are no active hospital problems to display for this patient.      IMPRESSION:  1. COVID-19 infection initially diagnosed June 26 possibly infected in May based on review of walk-in clinic notes and patient's history.  Acute infection resolved  2. Acute respiratory failure requiring heated high flow oxygen and occasionally requiring nonrebreather facemask  3. Fever-resolved  4. Leukocytosis- begin downward trend after starting on steroids  5. Pulmonary emboli diagnosed June 26  6. Significant deconditioning        RECOMMENDATION:   · Continue to monitor off antibiotic therapy  · Continue encourage increase activity with patient.  Encouraged him to try to turn side to side meaning more completely on his right side or left side as opposed to just a slight turn to offload his backside.  Reminded him he would need to asked staff to help him get in a comfortable position  · Steroid  management per pulmonary  · Continue to wean O2 as tolerated      Infectious diseases will sign off.  Please reconsult if needed        Beatris Eduardo MD  07/21/22  15:27 CDT

## 2022-07-21 NOTE — PROGRESS NOTES
Julius Madrid M.D.  MEKHI Dockery        Internal Medicine Progress Note    7/21/2022   15:06 CDT    Name:  Adrián Graham  MRN:    [id number]     Acct:     [id number]   Room:  [address] [place] [address] Day: 0     Admit Date: 7/19/2022 12:46 PM  PCP: Mauro Irizarry DO    Subjective:     C/C: shortness of breath, weakness    Interval History: Status:  Improved. Resting in bed. No family at bedside. Afebrile. Had increased hypoxia and shortness of breath with sats in the 80s again while attempting to eat breakfast this morning. Currently sats in the high 90s at rest with 02 at 35 lpm/80%. Denies pain. Slow progress with therapies. WBC trending toward normal. Counts otherwise stable.     Review of Systems   Constitutional: Positive for malaise/fatigue. Negative for chills, decreased appetite, weight gain and weight loss.   HENT: Negative for congestion, ear discharge, hoarse voice and tinnitus.    Eyes: Negative for blurred vision, discharge, visual disturbance and visual halos.   Cardiovascular: Positive for chest pain and dyspnea on exertion. Negative for claudication, irregular heartbeat, leg swelling, orthopnea and paroxysmal nocturnal dyspnea.   Respiratory: Positive for cough and shortness of breath. Negative for sputum production and wheezing.    Endocrine: Negative for cold intolerance, heat intolerance and polyuria.   Hematologic/Lymphatic: Negative for adenopathy. Does not bruise/bleed easily.   Skin: Negative for dry skin, itching and suspicious lesions.   Musculoskeletal: Negative for arthritis, back pain, falls, joint pain, muscle weakness and myalgias.   Gastrointestinal: Negative for abdominal pain, constipation, diarrhea, dysphagia and hematemesis.   Genitourinary: Negative for bladder incontinence, dysuria and frequency.   Neurological: Positive for weakness. Negative for aphonia, disturbances in coordination and dizziness.   Psychiatric/Behavioral: Negative for altered mental  status, depression, memory loss and substance abuse. The patient does not have insomnia and is not nervous/anxious.          Medications:     Allergies: No Known Allergies    Current Meds:   Current Facility-Administered Medications:   •  acetaminophen (TYLENOL) tablet 650 mg, 650 mg, Oral, Q4H PRN **OR** acetaminophen (TYLENOL) suppository 650 mg, 650 mg, Rectal, Q4H PRN, Rene Madrid MD  •  albuterol (PROVENTIL) nebulizer solution 0.083% 2.5 mg/3mL, 2.5 mg, Nebulization, Q6H PRN, Rene Madrid MD  •  apixaban (ELIQUIS) tablet 5 mg, 5 mg, Oral, Q12H, Cassandra Byrnes APRN  •  ascorbic acid (VITAMIN C) tablet 500 mg, 500 mg, Oral, Daily, Rene Madrid MD  •  aspirin EC tablet 81 mg, 81 mg, Oral, Daily, Rene Madrid MD  •  atorvastatin (LIPITOR) tablet 20 mg, 20 mg, Oral, Daily, Rene Madrid MD  •  busPIRone (BUSPAR) tablet 10 mg, 10 mg, Oral, TID, Rene Madrid MD  •  cetirizine (zyrTEC) tablet 10 mg, 10 mg, Oral, Daily, Rene Madrid MD  •  dexamethasone (DECADRON) tablet 6 mg, 6 mg, Oral, BID With Meals, Rene Madrid MD  •  diazePAM (VALIUM) tablet 2.5 mg, 2.5 mg, Oral, Q6H PRN, Rene Madrid MD  •  diphenhydrAMINE (BENADRYL) capsule 25 mg, 25 mg, Oral, Nightly, Rene Madrid MD  •  famotidine (PEPCID) tablet 20 mg, 20 mg, Oral, BID AC, Rene Madrid MD  •  finasteride (PROSCAR) tablet 5 mg, 5 mg, Oral, Daily, Rene Madrid MD  •  fluticasone (FLONASE) 50 MCG/ACT nasal spray 2 spray, 2 spray, Nasal, Daily, Rene Madrid MD  •  furosemide (LASIX) tablet 20 mg, 20 mg, Oral, BID, Rene Madrid MD  •  gabapentin (NEURONTIN) capsule 400 mg, 400 mg, Oral, Q12H, Rene Madrid MD  •  guaiFENesin (MUCINEX) 12 hr tablet 1,200 mg, 1,200 mg, Oral, BID, Rene Madrid MD  •  ipratropium-albuterol (DUO-NEB) nebulizer solution 3 mL, 3 mL, Nebulization, 4x Daily - RT, Julius  "Rene Cadet MD  •  montelukast (SINGULAIR) tablet 10 mg, 10 mg, Oral, Nightly, Rene Madrid MD  •  ondansetron (ZOFRAN) tablet 4 mg, 4 mg, Oral, Q6H PRN **OR** ondansetron (ZOFRAN) injection 4 mg, 4 mg, Intravenous, Q6H PRN, Rene Madrid MD  •  sodium chloride 0.9 % flush 10 mL, 10 mL, Intravenous, Q12H, Rene Madrid MD  •  sodium chloride 0.9 % flush 10 mL, 10 mL, Intravenous, PRN, Rene Madrid MD  •  traMADol (ULTRAM) tablet 50 mg, 50 mg, Oral, Q8H PRN, Rene Madrid MD  •  vitamin D3 tablet 5,000 Units, 5,000 Units, Oral, Daily, Rene Madrid MD  •  zinc sulfate (ZINCATE) capsule 220 mg, 220 mg, Oral, Daily, Rene Madrid MD    Data:     Code Status:    There are no questions and answers to display.       Family History   Problem Relation Age of Onset   • Diabetes Mother    • Cancer Mother    • Osteoporosis Mother    • Heart disease Brother    • Colon cancer Neg Hx    • Colon polyps Neg Hx    • Esophageal cancer Neg Hx        Social History     Socioeconomic History   • Marital status:    Tobacco Use   • Smoking status: Never Smoker   • Smokeless tobacco: Never Used   Vaping Use   • Vaping Use: Never used   Substance and Sexual Activity   • Alcohol use: No   • Drug use: No   • Sexual activity: Defer       Vitals:  Ht 185.4 cm (73\")   Wt 83.5 kg (184 lb 1.4 oz)   BMI 24.29 kg/m²     T 98.1 P 72 R 23 /74 Sp02 90% (35 lpm/80%)        I/O (24Hr):  No intake or output data in the 24 hours ending 07/21/22 1506    Labs and imaging:      No results found for this or any previous visit (from the past 12 hour(s)).                  Physical Examination:        Physical Exam  Vitals and nursing note reviewed.   Constitutional:       Appearance: He is well-developed.   HENT:      Head: Normocephalic and atraumatic.      Right Ear: External ear normal.      Left Ear: External ear normal.      Nose: Nose normal.      Mouth/Throat:      Mouth: " Mucous membranes are moist.      Pharynx: Oropharynx is clear.   Eyes:      Pupils: Pupils are equal, round, and reactive to light.   Cardiovascular:      Rate and Rhythm: Normal rate and regular rhythm.      Heart sounds: Normal heart sounds.   Pulmonary:      Effort: Pulmonary effort is normal.      Breath sounds: Normal breath sounds.   Abdominal:      General: Bowel sounds are normal.      Palpations: Abdomen is soft.   Musculoskeletal:         General: Normal range of motion.      Cervical back: Normal range of motion and neck supple.      Comments: Generalized weakness   Skin:     General: Skin is warm and dry.   Neurological:      Mental Status: He is alert and oriented to person, place, and time.      Deep Tendon Reflexes: Reflexes are normal and symmetric.   Psychiatric:         Behavior: Behavior normal.           Assessment:            * No active hospital problems. *    Past Medical History:   Diagnosis Date   • Abdominal distention    • Abdominal pain, left upper quadrant    • Allergic rhinitis    • Arthritis    • Bloating    • BPH (benign prostatic hyperplasia)    • Carpal tunnel syndrome    • Change in bowel habits    • Constipation    • Diarrhea    • Erectile dysfunction    • GERD (gastroesophageal reflux disease)    • History of shingles    • Hypertension    • Idiopathic peripheral neuropathy    • Neuropathy    • Pneumonia    • Shingles    • Weight loss         Plan:        1. Acute hypoxic respiratory failure s/p COVID  2. Bilateral pulmonary embolus  3. COVID-19 with respiratory failure  4. Bilateral groundglass pulmonary infiltrate  5. Acute lung injury with organizing pneumonia  6. Status post COVID and vaccinated status  7. History of fall  8. Presyncope  9. Chronic cerebral microvascular disease  10. Dilated cardiomyopathy  11. Bilateral carotid artery disease  12. Moderate protein calorie malnutrition  13. Diffuse generalized weakness  14. Leukocytosis  15. BPH  16. Anxiety  17. Peripheral  neuropathy  18. GERD  19. Rhinitis    Continue current treatment. Monitor counts. Increase activity. Labs Monday. Oxygen weaning as tolerated under direction of pulmonology. Maintain patient safety. Optimize nutrition. Continue anticoagulation. Aggressive therapies as tolerated.       Electronically signed by MEKHI Lamar on 7/21/2022 at 15:06 CDT

## 2022-07-21 NOTE — PROGRESS NOTES
PULMONARY AND CRITICAL CARE PROGRESS NOTE - [facility]    Patient: Adrián Graham    1940   [id number]  07/21/22   09:24 CDT  Referring Provider: Rene Madrid MD    Chief Complaint: Ongoing respiratory failure    Interval history: He had significant desaturations again after breakfast.  O2 sats are in the mid 80s.  He is on 35 L 80%.  He is discouraged and appears fatigued.  He has been encouraged to rest in order for his oxygen to rebound.  There are no labs today.  No overnight events reported.    Review of Systems: Review of Systems   Constitutional: Positive for fatigue.   Respiratory: Positive for chest tightness and shortness of breath.    Neurological: Positive for weakness.      Physical Exam:  Vital signs: T:98.1   BP:113/74   P:72   R:23  Sat:90%  Physical Exam  Vitals reviewed.   Constitutional:       Appearance: He is well-developed.      Comments: Vapotherm in place, appears fatigued   HENT:      Head: Normocephalic and atraumatic.   Eyes:      General: No scleral icterus.  Cardiovascular:      Rate and Rhythm: Normal rate and regular rhythm.   Pulmonary:      Effort: Tachypnea present.      Breath sounds: Decreased breath sounds present.   Abdominal:      General: Bowel sounds are normal. There is no distension.      Palpations: Abdomen is soft.   Musculoskeletal:      Cervical back: Neck supple.      Right lower leg: No edema.      Left lower leg: No edema.   Skin:     General: Skin is warm and dry.   Neurological:      Mental Status: He is alert and oriented to person, place, and time.      Motor: Weakness present.   Psychiatric:         Behavior: Behavior normal.     Electronically signed by MEKHI Calvert, 7/21/2022, 09:24 CDT      Physician Substantive Portion: Medical Decision Making:    Laboratory Data:  Results from last 7 days   Lab Units 07/19/22  0357 07/17/22  0344 07/15/22  0401   WBC 10*3/mm3 16.50* 19.03* 22.81*   HEMOGLOBIN g/dL  13.1 11.3* 10.9*   PLATELETS 10*3/mm3 322 317 322     Results from last 7 days   Lab Units 07/19/22  0357 07/17/22  0344 07/15/22  0752   SODIUM mmol/L 139 141 142   POTASSIUM mmol/L 4.5 4.4 4.1   BUN mg/dL 32* 28* 32*   CREATININE mg/dL 0.62* 0.54* 0.69*   CRP mg/dL  --  1.40*  --    PROCALCITONIN ng/mL  --  0.20 0.43*         No results found for: BLOODCX, URINECX, WOUNDCX, MRSACX, RESPCX, STOOLCX    Recent films:  No radiology results for the last day     My radiograph interpretation/independent review of imaging: Reviewed and agree with current interpretation    Pulmonary Assessment:    1. Acute hypoxic respiratory failure, currently on Vapotherm  2. Bilateral groundglass pulmonary infiltrate  3. Worsening COVID-19 pneumonia  4. Possible acute lung injury or cryptogenic organizing pneumonia  5. Pulmonary embolism single subsegmental on anticoagulation  6. Essential hypertension  7. Hyperlipidemia  8. Carotid artery disease  9. Recurrent bronchitis  10. S/p COVID vaccinated status  11. Severe weakness and deconditioning  12. History of fall    Recommend/plan:   · Patient was seen in the LTAC unit today.  His daughter was at bedside  · He is sitting up in the chair but not doing much physical therapy.  His oxygen requirement remains high.  · He was on 35 L FiO2 and 90% FiO2 and was having supper at the time of my visit.  · He is afebrile.  No other acute events overnight overall he is tired and weak.  · He is vaccinated but had COVID associated with cryptogenic organizing pneumonia.  · Has extensive interstitial changes in the lung noted in the imaging studies.  · He received antibiotic for secondary bacterial infection.  We will continue titrating Vapotherm and try to transition it to high flow oxygen if tolerated  · Continue physical activity.  Patient is encouraged to do more incentive spirometry and flutter valve and to participate in the physical therapy for his severe deconditioning.  · Continue steroid the  dose has been increased.  Continue DVT and ulcer prophylaxis and pain and anxiety control.  · Nutritional support.  Plan for outpatient pulmonary clinic follow-up when he is more stable.  · Repeat labs and imaging studies and monitor inflammatory markers.  He is out of COVID isolation now.  · CODE STATUS: Limited code overall prognosis: Guarded.    · We will follow.    This visit was performed by both a physician and an [facility] Practice RN.  I personally evaluated and examined the patient.  I performed all aspects of the medical decision making as documented.    Electronically signed by     Miguel Smith MD,  Pulmonologist/Intensivist   7/21/2022, 19:26 CDT

## 2022-07-21 NOTE — CONSULTS
"Beatris Eduardo MD   Physician   Infectious Disease   Progress Notes       Signed   Date of Service:  07/20/22 1822   Creation Time:  07/20/22 1822              Signed        Expand All Collapse All      INFECTIOUS DISEASES CONSULT NOTE     Patient:  Adrián Graham 82 y.o. male  ROOM # 570/1  YOB: 1940  MRN: [id number]  CSN:    [id number]  Admit date: 7/19/2022             Admitting Physician: Rene Madrid MD  Primary Care Physician: Mauro Irizarry DO  REFERRING PROVIDER: Rene Madrid, *        REASON FOR CONSULTATION : \"Continue care post COVID\"        HISTORY OF PRESENT ILLNESS: The patient is an 82-year-old gentleman who I followed in CCU after transferring from acute care for worsening hypoxia.     Its unclear when patient originally had COVID-19 infection.  He was diagnosed June 26 when he presented with pulmonary emboli.  He had been seeing as an outpatient on 3 occasions with some clients of cough and congestion and was given antibiotics and steroids.  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     Patient initially had been treated empirically with Zosyn after having courses of antibiotics as an outpatient.  He was switched to cefepime after a fever spike.  Cultures remained negative and cefepime was discontinued after empiric course on July 16.     He was started on higher dose steroids for the concern for post-COVID organizing pneumonia.     He is required Vapotherm at 40 L and 100% for several days and is able to wean down some but generally requires higher levels and even nonrebreather mask when getting up to the chair or exerting himself.              Past Medical History:   Diagnosis Date   • Abdominal distention     • Abdominal pain, left upper quadrant     • Allergic rhinitis     • Arthritis     • Bloating     • BPH (benign prostatic hyperplasia)     • Carpal tunnel syndrome     • Change in bowel habits     • Constipation "     • Diarrhea     • Erectile dysfunction     • GERD (gastroesophageal reflux disease)     • History of shingles     • Hypertension     • Idiopathic peripheral neuropathy     • Neuropathy     • Pneumonia     • Shingles     • Weight loss           · COVID-19 infection with pneumonia  · DVT/PE      Surgical History[]Expand by Default         Past Surgical History:   Procedure Laterality Date   • APPENDECTOMY       • BACK SURGERY       • CARPAL TUNNEL RELEASE Right     • CARPAL TUNNEL RELEASE Left 06/2022   • CHOLECYSTECTOMY       • COLONOSCOPY   09/14/2010     5 yr-complete colon not visualized   • COLONOSCOPY   07/27/2004     extremely tortuous redundant colon. BE-mild diverticulosis without diverticulitis. ? Gallstones-Dr Chery.   • COLONOSCOPY N/A 09/10/2020     Procedure: COLONOSCOPY WITH ANESTHESIA;  Surgeon: Mani Sy DO;  Location: [facility] ENDOSCOPY;  Service: Gastroenterology;  Laterality: N/A;  Pre: Positive Colorectal Screening  Post: Diverticulosis  Dr. Mooney  CO2 Inflation Used   • ENDOSCOPY N/A 09/26/2016     Procedure: ESOPHAGOGASTRODUODENOSCOPY WITH ANESTHESIA;  Surgeon: Mani Sy DO;  Location:  [facility] ENDOSCOPY;  Service:    • HIP SURGERY Right     • JOINT REPLACEMENT       • KNEE SURGERY       • OTHER SURGICAL HISTORY         Surgery for Spinal Stenosis   • OTHER SURGICAL HISTORY         Laser Prostate Surgery               Family History   Problem Relation Age of Onset   • Diabetes Mother     • Cancer Mother     • Osteoporosis Mother     • Heart disease Brother     • Colon cancer Neg Hx     • Colon polyps Neg Hx     • Esophageal cancer Neg Hx             Social History           Socioeconomic History   • Marital status:    Tobacco Use   • Smoking status: Never Smoker   • Smokeless tobacco: Never Used   Vaping Use   • Vaping Use: Never used   Substance and Sexual Activity   • Alcohol use: No   • Drug use: No   • Sexual activity: Defer          Current Scheduled Medications:  "  apixaban, 5 mg, Oral, Q12H  ascorbic acid, 500 mg, Oral, Daily  aspirin, 81 mg, Oral, Daily  atorvastatin, 20 mg, Oral, Daily  busPIRone, 10 mg, Oral, TID  cetirizine, 10 mg, Oral, Daily  dexamethasone, 6 mg, Oral, Daily With Breakfast  diphenhydrAMINE, 25 mg, Oral, Nightly  famotidine, 20 mg, Oral, BID AC  finasteride, 5 mg, Oral, Daily  fluticasone, 2 spray, Nasal, Daily  furosemide, 20 mg, Oral, BID  gabapentin, 400 mg, Oral, Q12H  guaiFENesin, 1,200 mg, Oral, BID  ipratropium-albuterol, 3 mL, Nebulization, 4x Daily - RT  montelukast, 10 mg, Oral, Nightly  sodium chloride, 10 mL, Intravenous, Q12H  vitamin D3, 5,000 Units, Oral, Daily  zinc sulfate, 220 mg, Oral, Daily        Current PRN Medications:  •  acetaminophen **OR** acetaminophen  •  albuterol  •  diazePAM  •  ondansetron **OR** ondansetron  •  sodium chloride  •  traMADol      No Known Allergies     Review of Systems   Constitutional: Positive for fatigue. Negative for fever.   HENT: Negative for congestion.    Eyes: Negative for photophobia.   Respiratory: Positive for shortness of breath.    Cardiovascular: Negative for leg swelling.   Gastrointestinal: Negative for vomiting.   Genitourinary: Negative for dysuria.   Musculoskeletal: Positive for neck pain (Chronic).   Allergic/Immunologic: Negative for immunocompromised state.   Neurological: Positive for weakness.            Vital Signs:  Ht 185.4 cm (73\")   Wt 83.5 kg (184 lb 1.4 oz)   BMI 24.29 kg/m²   No data recorded.        97.7    74   21    119/62     Physical Exam   General: Patient is in 82-year-old gentleman sitting up in bed appearing  fatigued  HEENT: Sclera anicteric.  Vapotherm nasal cannula in place  Respiratory: Diminished breath sounds throughout.  Vapotherm set at 40 L and 80% and he saturating 91 to 93%  Neuro: Alert and oriented, speech clear  Psych: Pleasant cooperative         Results Review:    I reviewed the patient's new clinical results.     Lab Results:  CBC:       "   Lab Results   Lab 07/15/22  0401 07/17/22  0344 07/19/22  0357   WBC 22.81* 19.03* 16.50*   HEMOGLOBIN 10.9* 11.3* 13.1   HEMATOCRIT 34.9* 36.3* 40.9   PLATELETS 322 317 322         CMP:         Lab Results   Lab 07/15/22  0752 07/17/22  0344 07/19/22  0357   SODIUM 142 141 139   POTASSIUM 4.1 4.4 4.5   CHLORIDE 103 102 100   CO2 33.0* 32.0* 33.0*   BUN 32* 28* 32*   CREATININE 0.69* 0.54* 0.62*   CALCIUM 8.8 8.7 8.9   GLUCOSE 161* 154* 108*             Lab Results (last 72 hours)      ** No results found for the last 72 hours. **             Estimated Creatinine Clearance: 106.6 mL/min (A) (by C-G formula based on SCr of 0.62 mg/dL (L)).     Culture Results:              Microbiology Results (last 10 days)      Procedure Component Value - Date/Time     Blood Culture With KEVAN - Blood, Arm, Right [[id number]]  (Normal) Collected: 07/11/22 1229     Lab Status: Final result Specimen: Blood from Arm, Right Updated: 07/16/22 1248       Blood Culture No growth at 5 days     Blood Culture With KEVAN - Blood, Hand, Left [[id number]]  (Normal) Collected: 07/11/22 1226     Lab Status: Final result Specimen: Blood from Hand, Left Updated: 07/16/22 1248       Blood Culture No growth at 5 days                Radiology:       Imaging Results (Last 72 Hours)      ** No results found for the last 72 hours. **                   Assessment & Plan          * No active hospital problems. *        IMPRESSION:  1. COVID-19 infection initially diagnosed June 26 possibly infected in May based on review of walk-in clinic notes and patient's history.  Acute infection resolved  2. Acute respiratory failure requiring heated high flow oxygen  3. Fever-resolved  4. Leukocytosis-actually improving on steroids  5. Pulmonary emboli diagnosed June 26  6. Significant deconditioning     RECOMMENDATION:   · Continue to monitor off antibiotic therapy  · Steroid management per pulmonary  · Continue to wean O2 as tolerated           Beatris Eduardo,  MD  07/20/22  18:22 CDT

## 2022-07-22 NOTE — PROGRESS NOTES
[facility]  JONI Madrid M.D.  MEKHI Dockery        Internal Medicine Progress Note    7/22/2022   10:31 CDT    Name:  Adrián Graham  MRN:    [id number]     Acct:     [id number]   Room:  [address] [place] [address] Day: 0     Admit Date: 7/19/2022 12:46 PM  PCP: Mauro Irizarry DO    Subjective:     C/C: shortness of breath, weakness    Interval History: Status:  Improved. Resting in bed. No family at bedside. Afebrile. Had increased hypoxia and shortness of breath with sats in the 80s again while attempting to eat breakfast this morning. Currently sats in the high 90s at rest with 02 at 35 lpm/90% with 100% NRB. Denies pain. Slow progress with therapies. C/o constipation.     Review of Systems   Constitutional: Positive for malaise/fatigue. Negative for chills, decreased appetite, weight gain and weight loss.   HENT: Negative for congestion, ear discharge, hoarse voice and tinnitus.    Eyes: Negative for blurred vision, discharge, visual disturbance and visual halos.   Cardiovascular: Positive for chest pain and dyspnea on exertion. Negative for claudication, irregular heartbeat, leg swelling, orthopnea and paroxysmal nocturnal dyspnea.   Respiratory: Positive for cough and shortness of breath. Negative for sputum production and wheezing.    Endocrine: Negative for cold intolerance, heat intolerance and polyuria.   Hematologic/Lymphatic: Negative for adenopathy. Does not bruise/bleed easily.   Skin: Negative for dry skin, itching and suspicious lesions.   Musculoskeletal: Negative for arthritis, back pain, falls, joint pain, muscle weakness and myalgias.   Gastrointestinal: Negative for abdominal pain, constipation, diarrhea, dysphagia and hematemesis.   Genitourinary: Negative for bladder incontinence, dysuria and frequency.   Neurological: Positive for weakness. Negative for aphonia, disturbances in coordination and dizziness.   Psychiatric/Behavioral: Negative for altered mental status, depression, memory  loss and substance abuse. The patient does not have insomnia and is not nervous/anxious.          Medications:     Allergies: No Known Allergies    Current Meds:   Current Facility-Administered Medications:   •  acetaminophen (TYLENOL) tablet 650 mg, 650 mg, Oral, Q4H PRN **OR** acetaminophen (TYLENOL) suppository 650 mg, 650 mg, Rectal, Q4H PRN, Rene Madrid MD  •  albuterol (PROVENTIL) nebulizer solution 0.083% 2.5 mg/3mL, 2.5 mg, Nebulization, Q6H PRN, Rene Madrid MD  •  apixaban (ELIQUIS) tablet 5 mg, 5 mg, Oral, Q12H, Cassandra Byrnes APRN  •  ascorbic acid (VITAMIN C) tablet 500 mg, 500 mg, Oral, Daily, Rene Madrid MD  •  aspirin EC tablet 81 mg, 81 mg, Oral, Daily, Rene Madrid MD  •  atorvastatin (LIPITOR) tablet 20 mg, 20 mg, Oral, Daily, Rene Madrid MD  •  busPIRone (BUSPAR) tablet 10 mg, 10 mg, Oral, TID, Rene Madrid MD  •  cetirizine (zyrTEC) tablet 10 mg, 10 mg, Oral, Daily, Rene Madrid MD  •  dexamethasone (DECADRON) tablet 6 mg, 6 mg, Oral, BID With Meals, Rene Madrid MD  •  diazePAM (VALIUM) tablet 2.5 mg, 2.5 mg, Oral, Q6H PRN, Rene Madrid MD  •  diphenhydrAMINE (BENADRYL) capsule 25 mg, 25 mg, Oral, Nightly, Rene Madrid MD  •  famotidine (PEPCID) tablet 20 mg, 20 mg, Oral, BID AC, Rene Madrid MD  •  finasteride (PROSCAR) tablet 5 mg, 5 mg, Oral, Daily, Rene Madrid MD  •  fluticasone (FLONASE) 50 MCG/ACT nasal spray 2 spray, 2 spray, Nasal, Daily, Rene Madrid MD  •  furosemide (LASIX) tablet 20 mg, 20 mg, Oral, BID, Rene Madrid MD  •  gabapentin (NEURONTIN) capsule 400 mg, 400 mg, Oral, Q12H, Rene Madrid MD  •  guaiFENesin (MUCINEX) 12 hr tablet 1,200 mg, 1,200 mg, Oral, BID, Rene Madrid MD  •  ipratropium-albuterol (DUO-NEB) nebulizer solution 3 mL, 3 mL, Nebulization, 4x Daily - RT, Rene Madrid MD  •   "montelukast (SINGULAIR) tablet 10 mg, 10 mg, Oral, Nightly, Rene Madrid MD  •  ondansetron (ZOFRAN) tablet 4 mg, 4 mg, Oral, Q6H PRN **OR** ondansetron (ZOFRAN) injection 4 mg, 4 mg, Intravenous, Q6H PRN, Rene Madrid MD  •  sodium chloride 0.9 % flush 10 mL, 10 mL, Intravenous, Q12H, Rene Madrid MD  •  sodium chloride 0.9 % flush 10 mL, 10 mL, Intravenous, PRN, Rene Madrid MD  •  traMADol (ULTRAM) tablet 50 mg, 50 mg, Oral, Q8H PRN, Rene Madrid MD  •  vitamin D3 tablet 5,000 Units, 5,000 Units, Oral, Daily, Rene Madrid MD  •  zinc sulfate (ZINCATE) capsule 220 mg, 220 mg, Oral, Daily, Rene Madrid MD    Data:     Code Status:    There are no questions and answers to display.       Family History   Problem Relation Age of Onset   • Diabetes Mother    • Cancer Mother    • Osteoporosis Mother    • Heart disease Brother    • Colon cancer Neg Hx    • Colon polyps Neg Hx    • Esophageal cancer Neg Hx        Social History     Socioeconomic History   • Marital status:    Tobacco Use   • Smoking status: Never Smoker   • Smokeless tobacco: Never Used   Vaping Use   • Vaping Use: Never used   Substance and Sexual Activity   • Alcohol use: No   • Drug use: No   • Sexual activity: Defer       Vitals:  Ht 185.4 cm (73\")   Wt 83.5 kg (184 lb 1.4 oz)   BMI 24.29 kg/m²     T 98.7 P 71 R 12 /63 Sp02 96% (35 lpm/90%)        I/O (24Hr):  No intake or output data in the 24 hours ending 07/22/22 1031    Labs and imaging:      No results found for this or any previous visit (from the past 12 hour(s)).                  Physical Examination:        Physical Exam  Vitals and nursing note reviewed.   Constitutional:       Appearance: He is well-developed.   HENT:      Head: Normocephalic and atraumatic.      Right Ear: External ear normal.      Left Ear: External ear normal.      Nose: Nose normal.      Mouth/Throat:      Mouth: Mucous membranes are " moist.      Pharynx: Oropharynx is clear.   Eyes:      Pupils: Pupils are equal, round, and reactive to light.   Cardiovascular:      Rate and Rhythm: Normal rate and regular rhythm.      Heart sounds: Normal heart sounds.   Pulmonary:      Effort: Pulmonary effort is normal.      Breath sounds: Normal breath sounds.   Abdominal:      General: Bowel sounds are normal.      Palpations: Abdomen is soft.   Musculoskeletal:         General: Normal range of motion.      Cervical back: Normal range of motion and neck supple.      Comments: Generalized weakness   Skin:     General: Skin is warm and dry.   Neurological:      Mental Status: He is alert and oriented to person, place, and time.      Deep Tendon Reflexes: Reflexes are normal and symmetric.   Psychiatric:         Behavior: Behavior normal.           Assessment:            * No active hospital problems. *    Past Medical History:   Diagnosis Date   • Abdominal distention    • Abdominal pain, left upper quadrant    • Allergic rhinitis    • Arthritis    • Bloating    • BPH (benign prostatic hyperplasia)    • Carpal tunnel syndrome    • Change in bowel habits    • Constipation    • Diarrhea    • Erectile dysfunction    • GERD (gastroesophageal reflux disease)    • History of shingles    • Hypertension    • Idiopathic peripheral neuropathy    • Neuropathy    • Pneumonia    • Shingles    • Weight loss         Plan:        1. Acute hypoxic respiratory failure s/p COVID  2. Bilateral pulmonary embolus  3. COVID-19 with respiratory failure  4. Bilateral groundglass pulmonary infiltrate  5. Acute lung injury with organizing pneumonia  6. Status post COVID and vaccinated status  7. History of fall  8. Presyncope  9. Chronic cerebral microvascular disease  10. Dilated cardiomyopathy  11. Bilateral carotid artery disease  12. Moderate protein calorie malnutrition  13. Diffuse generalized weakness  14. Leukocytosis  15. BPH  16. Anxiety  17. Peripheral  neuropathy  18. GERD  19. Rhinitis    Continue current treatment. Monitor counts. Increase activity. Labs Monday. Oxygen weaning as tolerated under direction of pulmonology. Maintain patient safety. Optimize nutrition. Continue anticoagulation. Aggressive therapies as tolerated. Add colace and miralax.       Electronically signed by MEKHI Lamar on 7/22/2022 at 10:31 CDT     I have discussed the care of Adrián Graham, including pertinent history and exam findings, with the nurse practitioner.    I have seen and examined the patient and the key elements of all parts of the encounter have been performed by me.  I agree with the assessment, plan and orders as documented by MEKHI Dockery, after I modified the exam findings and the plan of treatments and the final version is my approved version of the assessment.        Electronically signed by Rene Madrid MD on 7/22/2022 at 22:12 CDT

## 2022-07-22 NOTE — PROGRESS NOTES
PULMONARY AND CRITICAL CARE PROGRESS NOTE - [facility]    Patient: Adrián Graham    1940   Acct# [id number]  07/22/22   07:23 CDT  Referring Provider: Rene Madrid MD    Chief Complaint: Ongoing respiratory failure    Interval history: His sats are in the low 80s this morning while he is eating breakfast.  I have increased Vapotherm from 35 L 80% to 40 L 90%.  I recommend that staff increase Vapotherm prior to and during mealtimes to keep sats up.  He reports that he slept well again last night.  There are no labs today.  No overnight events reported.    Review of Systems: Review of Systems   Constitutional: Positive for fatigue.   Respiratory: Positive for chest tightness and shortness of breath.    Neurological: Positive for weakness.      Physical Exam:  Vital signs: T:98.7   BP:136/63   P:71   R:22  Sat:96%  Physical Exam  Vitals reviewed.   Constitutional:       Appearance: He is well-developed.      Comments: Vapotherm in place, appears fatigued   HENT:      Head: Normocephalic and atraumatic.   Eyes:      General: No scleral icterus.  Cardiovascular:      Rate and Rhythm: Normal rate and regular rhythm.   Pulmonary:      Effort: Tachypnea present.      Breath sounds: Decreased breath sounds present.   Abdominal:      General: Bowel sounds are normal. There is no distension.      Palpations: Abdomen is soft.   Musculoskeletal:      Cervical back: Neck supple.      Right lower leg: No edema.      Left lower leg: No edema.   Skin:     General: Skin is warm and dry.   Neurological:      Mental Status: He is alert and oriented to person, place, and time.      Motor: Weakness present.   Psychiatric:         Behavior: Behavior normal.     Electronically signed by MEKHI Calvert, 7/22/2022, 07:23 CDT      Physician Substantive Portion: Medical Decision Making:    Laboratory Data:  Results from last 7 days   Lab Units 07/19/22  0357 07/17/22  0344   WBC 10*3/mm3 16.50*  19.03*   HEMOGLOBIN g/dL 13.1 11.3*   PLATELETS 10*3/mm3 322 317     Results from last 7 days   Lab Units 07/19/22  0357 07/17/22  0344 07/15/22  0752   SODIUM mmol/L 139 141 142   POTASSIUM mmol/L 4.5 4.4 4.1   BUN mg/dL 32* 28* 32*   CREATININE mg/dL 0.62* 0.54* 0.69*   CRP mg/dL  --  1.40*  --    PROCALCITONIN ng/mL  --  0.20 0.43*         No results found for: BLOODCX, URINECX, WOUNDCX, MRSACX, RESPCX, STOOLCX    Recent films:  No radiology results for the last day     My radiograph interpretation/independent review of imaging: Reviewed and agree with current interpretation      Pulmonary Assessment:    1. Acute hypoxic respiratory failure, currently on Vapotherm  2. Bilateral groundglass pulmonary infiltrate  3. Worsening COVID-19 pneumonia  4. Possible acute lung injury or cryptogenic organizing pneumonia  5. Pulmonary embolism single subsegmental on anticoagulation  6. Essential hypertension  7. Hyperlipidemia  8. Carotid artery disease  9. Recurrent bronchitis  10. S/p COVID vaccinated status  11. Severe weakness and deconditioning  12. History of fall    Recommend/plan:   · Patient stable and alert at the time of my visit.  He was having supper.  Daughter at bedside.  · He is currently on Vapotherm 35 L flow and 85% FiO2.  His oxygen requirement remains high.  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  · Continue bronchodilator treatment, routine respiratory care steroids and pulmonary toilet.  · Patient will need to increase physical activity to improve pulmonary compliance.   ·  Encourage PT/OT incentive spirometry and flutter valve.  · DVT/stress ulcer prophylaxis/pain and anxiety control.  · He may benefit from BiPAP usage at night but he has claustrophobia and he did not want to use it.  · Cussed with RT Vanessa and she is going to try to get the patient back on BiPAP if possible.  · CODE STATUS Limited code.   Overall prognosis: Guarded.  · We will follow.    This visit was performed by both a physician and an [facility] RN.  I personally evaluated and examined the patient.  I performed all aspects of the medical decision making as documented.    Electronically signed by     Miguel Smith MD,  Pulmonologist/Intensivist   7/22/2022, 20:07 CDT

## 2022-07-23 NOTE — PROGRESS NOTES
Julius Madrid M.D.  MEKHI Dockery        Internal Medicine Progress Note    7/23/2022   11:27 CDT    Name:  Adrián Graham  MRN:    [id number]     Acct:     [id number]   Room:  [address] [place] [address] Day: 0     Admit Date: 7/19/2022 12:46 PM  PCP: Mauro Irizarry DO    Subjective:     C/C: shortness of breath, weakness    Interval History: Status:  Improved. Resting in bed. No family at bedside. Afebrile. No complaint of SOB this AM per patient. Currently sats in the high 90s at rest with 02 at 35 lpm/85% with 100% NRB. Denies pain. Slow progress with therapies. No C/o constipation at this time.     Review of Systems   Constitutional: Positive for malaise/fatigue. Negative for chills, decreased appetite, weight gain and weight loss.   HENT: Negative for congestion, ear discharge, hoarse voice and tinnitus.    Eyes: Negative for blurred vision, discharge, visual disturbance and visual halos.   Cardiovascular: Positive for chest pain and dyspnea on exertion, none noted this AM. Negative for claudication, irregular heartbeat, leg swelling, orthopnea and paroxysmal nocturnal dyspnea.   Respiratory: Positive for cough and shortness of breath. Negative for sputum production and wheezing.    Endocrine: Negative for cold intolerance, heat intolerance and polyuria.   Hematologic/Lymphatic: Negative for adenopathy. Does not bruise/bleed easily.   Skin: Negative for dry skin, itching and suspicious lesions.   Musculoskeletal: Negative for arthritis, back pain, falls, joint pain, muscle weakness and myalgias.   Gastrointestinal: Negative for abdominal pain, constipation, diarrhea, dysphagia and hematemesis.   Genitourinary: Negative for bladder incontinence, dysuria and frequency.   Neurological: Positive for weakness. Negative for aphonia, disturbances in coordination and dizziness.   Psychiatric/Behavioral: Negative for altered mental status, depression, memory loss and substance abuse. The patient does  not have insomnia and is not nervous/anxious.          Medications:     Allergies: No Known Allergies    Current Meds:   Current Facility-Administered Medications:   •  acetaminophen (TYLENOL) tablet 650 mg, 650 mg, Oral, Q4H PRN **OR** acetaminophen (TYLENOL) suppository 650 mg, 650 mg, Rectal, Q4H PRN, Rene Madrid MD  •  albuterol (PROVENTIL) nebulizer solution 0.083% 2.5 mg/3mL, 2.5 mg, Nebulization, Q6H PRN, Rene Madrid MD  •  apixaban (ELIQUIS) tablet 5 mg, 5 mg, Oral, Q12H, Cassandra Byrnes, APRN  •  ascorbic acid (VITAMIN C) tablet 500 mg, 500 mg, Oral, Daily, Rene Madrid MD  •  aspirin EC tablet 81 mg, 81 mg, Oral, Daily, Rene Madrid MD  •  atorvastatin (LIPITOR) tablet 20 mg, 20 mg, Oral, Daily, Rene Madrid MD  •  busPIRone (BUSPAR) tablet 10 mg, 10 mg, Oral, TID, Rene Madrid MD  •  cetirizine (zyrTEC) tablet 10 mg, 10 mg, Oral, Daily, Rene Madrid MD  •  dexamethasone (DECADRON) tablet 6 mg, 6 mg, Oral, BID With Meals, Rene Madrid MD  •  diazePAM (VALIUM) tablet 2.5 mg, 2.5 mg, Oral, Q6H PRN, Rene Madrid MD  •  diphenhydrAMINE (BENADRYL) capsule 25 mg, 25 mg, Oral, Nightly, Rene Madrid MD  •  docusate sodium (COLACE) capsule 100 mg, 100 mg, Oral, BID, Cassandra Byrnes, APRN  •  famotidine (PEPCID) tablet 20 mg, 20 mg, Oral, BID AC, Rene Madrid MD  •  finasteride (PROSCAR) tablet 5 mg, 5 mg, Oral, Daily, Rene Madrid MD  •  fluticasone (FLONASE) 50 MCG/ACT nasal spray 2 spray, 2 spray, Nasal, Daily, Rene Madrid MD  •  furosemide (LASIX) tablet 20 mg, 20 mg, Oral, BID, Rene Madrid MD  •  gabapentin (NEURONTIN) capsule 400 mg, 400 mg, Oral, Q12H, Rene Madrid MD  •  guaiFENesin (MUCINEX) 12 hr tablet 1,200 mg, 1,200 mg, Oral, BID, Rene Madrid MD  •  ipratropium-albuterol (DUO-NEB) nebulizer solution 3 mL, 3 mL, Nebulization, 4x  "Daily - RT, Rene Madrid MD  •  montelukast (SINGULAIR) tablet 10 mg, 10 mg, Oral, Nightly, Rene Madrid MD  •  ondansetron (ZOFRAN) tablet 4 mg, 4 mg, Oral, Q6H PRN **OR** ondansetron (ZOFRAN) injection 4 mg, 4 mg, Intravenous, Q6H PRN, Rene Madrid MD  •  polyethylene glycol (MIRALAX) packet 17 g, 17 g, Oral, Daily, Cassandra Byrnes APRN  •  sodium chloride 0.9 % flush 10 mL, 10 mL, Intravenous, Q12H, Rene Madrid MD  •  sodium chloride 0.9 % flush 10 mL, 10 mL, Intravenous, PRN, Rene Madrid MD  •  traMADol (ULTRAM) tablet 50 mg, 50 mg, Oral, Q8H PRN, Rene Madrid MD  •  vitamin D3 tablet 5,000 Units, 5,000 Units, Oral, Daily, Rene Madrid MD  •  zinc sulfate (ZINCATE) capsule 220 mg, 220 mg, Oral, Daily, Rene Madrid MD    Data:     Code Status:    There are no questions and answers to display.       Family History   Problem Relation Age of Onset   • Diabetes Mother    • Cancer Mother    • Osteoporosis Mother    • Heart disease Brother    • Colon cancer Neg Hx    • Colon polyps Neg Hx    • Esophageal cancer Neg Hx        Social History     Socioeconomic History   • Marital status:    Tobacco Use   • Smoking status: Never Smoker   • Smokeless tobacco: Never Used   Vaping Use   • Vaping Use: Never used   Substance and Sexual Activity   • Alcohol use: No   • Drug use: No   • Sexual activity: Defer       Vitals:  Ht 185.4 cm (73\")   Wt 83.5 kg (184 lb 1.4 oz)   BMI 24.29 kg/m²     T 97.4 P 72 R 14 /67 Sp02 96% (35 lpm/85%)    I/O (24Hr):  No intake or output data in the 24 hours ending 07/23/22 1127    Labs and imaging:      No results found for this or any previous visit (from the past 12 hour(s)).      Physical Examination:        Physical Exam  Vitals and nursing note reviewed.   Constitutional:       Appearance: He is well-developed.   HENT:      Head: Normocephalic and atraumatic.      Right Ear: External ear " normal.      Left Ear: External ear normal.      Nose: Nose normal.      Mouth/Throat:      Mouth: Mucous membranes are moist.      Pharynx: Oropharynx is clear.   Eyes:      Pupils: Pupils are equal, round, and reactive to light.   Cardiovascular:      Rate and Rhythm: Normal rate and regular rhythm.      Heart sounds: Normal heart sounds.   Pulmonary:      Effort: Pulmonary effort is normal.      Breath sounds: Normal breath sounds. Decreased in lower lobes.  Abdominal:      General: Bowel sounds are normal.      Palpations: Abdomen is soft.   Musculoskeletal:         General: Normal range of motion.      Cervical back: Normal range of motion and neck supple.      Comments: Generalized weakness   Skin:     General: Skin is warm and dry.   Neurological:      Mental Status: He is alert and oriented to person, place, and time.      Deep Tendon Reflexes: Reflexes are normal and symmetric.   Psychiatric:         Behavior: Behavior normal.           Assessment:            * No active hospital problems. *    Past Medical History:   Diagnosis Date   • Abdominal distention    • Abdominal pain, left upper quadrant    • Allergic rhinitis    • Arthritis    • Bloating    • BPH (benign prostatic hyperplasia)    • Carpal tunnel syndrome    • Change in bowel habits    • Constipation    • Diarrhea    • Erectile dysfunction    • GERD (gastroesophageal reflux disease)    • History of shingles    • Hypertension    • Idiopathic peripheral neuropathy    • Neuropathy    • Pneumonia    • Shingles    • Weight loss         Plan:        1. Acute hypoxic respiratory failure s/p COVID  2. Bilateral pulmonary embolus  3. COVID-19 with respiratory failure  4. Bilateral groundglass pulmonary infiltrate  5. Acute lung injury with organizing pneumonia  6. Status post COVID and vaccinated status  7. History of fall  8. Presyncope  9. Chronic cerebral microvascular disease  10. Dilated cardiomyopathy  11. Bilateral carotid artery  disease  12. Moderate protein calorie malnutrition  13. Diffuse generalized weakness  14. Leukocytosis  15. BPH  16. Anxiety  17. Peripheral neuropathy  18. GERD  19. Rhinitis    Continue current treatment. Monitor counts. Increase activity. Labs Monday. Oxygen weaning as tolerated under direction of pulmonology. Maintain patient safety. Optimize nutrition. Continue anticoagulation. Aggressive therapies as tolerated. Add colace and miralax.       Electronically signed by MEKHI Calix on 7/23/2022 at 11:27 CDT

## 2022-07-23 NOTE — PROGRESS NOTES
PULMONARY AND CRITICAL CARE PROGRESS NOTE - [facility]    Patient: Adrián Graham    1940   [id number]  07/23/22   07:47 CDT  Referring Provider: Rene Madrid MD    Chief Complaint: Ongoing respiratory failure    Interval history: He remains on Vapotherm 35 L 85% with sats in the upper 80s.  He is sitting up eating breakfast.  He continues to have ongoing dyspnea with any activity.  He is focusing on his nutritional intake particularly the calories and protein of what he is eating and drinking.  There are no labs today.  We will plan for an updated chest x-ray to be done on Monday.  No overnight events reported.    Review of Systems: Review of Systems   Constitutional: Positive for fatigue.   Respiratory: Positive for chest tightness and shortness of breath.    Neurological: Positive for weakness.      Physical Exam:  Vital signs: T:97.4   BP:135/67   P:72   R:20  Sat:96%  Physical Exam  Vitals reviewed.   Constitutional:       Appearance: He is well-developed.      Comments: Vapotherm in place, appears fatigued   HENT:      Head: Normocephalic and atraumatic.   Eyes:      General: No scleral icterus.  Cardiovascular:      Rate and Rhythm: Normal rate and regular rhythm.   Pulmonary:      Effort: Tachypnea present.      Breath sounds: Decreased breath sounds present.   Abdominal:      General: Bowel sounds are normal. There is no distension.      Palpations: Abdomen is soft.   Musculoskeletal:      Cervical back: Neck supple.      Right lower leg: No edema.      Left lower leg: No edema.   Skin:     General: Skin is warm and dry.   Neurological:      Mental Status: He is alert and oriented to person, place, and time.      Motor: Weakness present.   Psychiatric:         Behavior: Behavior normal.     Electronically signed by MEKHI Calvert, 7/23/2022, 07:47 CDT      Physician Substantive Portion: Medical Decision Making:    Laboratory Data:  Results from last 7  days   Lab Units 07/19/22  0357 07/17/22  0344   WBC 10*3/mm3 16.50* 19.03*   HEMOGLOBIN g/dL 13.1 11.3*   PLATELETS 10*3/mm3 322 317     Results from last 7 days   Lab Units 07/19/22  0357 07/17/22  0344   SODIUM mmol/L 139 141   POTASSIUM mmol/L 4.5 4.4   BUN mg/dL 32* 28*   CREATININE mg/dL 0.62* 0.54*   CRP mg/dL  --  1.40*   PROCALCITONIN ng/mL  --  0.20         No results found for: BLOODCX, URINECX, WOUNDCX, MRSACX, RESPCX, STOOLCX    Recent films:  No radiology results for the last day     My radiograph interpretation/independent review of imaging: Reviewed and agree with current interpretation      Pulmonary Assessment:    1. Acute hypoxic respiratory failure, currently on Vapotherm  2. Bilateral groundglass pulmonary infiltrate  3. Worsening COVID-19 pneumonia  4. Possible acute lung injury or cryptogenic organizing pneumonia  5. Pulmonary embolism single subsegmental on anticoagulation  6. Essential hypertension  7. Hyperlipidemia  8. Carotid artery disease  9. Recurrent bronchitis  10. S/p COVID vaccinated status  11. Severe weakness and deconditioning  12. History of fall    Recommend/plan:   · Patient was sitting up in the bedside chair at the time of my visit.  No family present in the room today.  · He was tired and complaining of leg pain and back pain for sitting long time  · Currently on Vapotherm 35 L flow and 90% FiO2.  Oxygen requirement remains high and patient did not tolerate BiPAP.  · He has interstitial changes in the lung suggesting COVID-pneumonia complicated by pulmonary fibrosis and cryptogenic organizing pneumonia.  · Continue bronchodilator treatment routine respiratory care may need some prolonged steroid treatment  · Incentive spirometry and flutter valve.  DVT and stress ulcer prophylaxis and pain and anxiety .  · Nutritional support.  To continue.  He has some hearing issues.  He is otherwise stable.  · Repeat labs and imaging studies from time to time.  · Repeat labs and imaging  studies from time to time.  · CODE STATUS: Limited code.  Overall prognosis: Guarded.  · We will follow.  We will continue working with Vapotherm.   · Titrate oxygen down and transition to high flow oxygen through nasal cannula when possible.      This visit was performed by both a physician and an [facility] RN.  I personally evaluated and examined the patient.  I performed all aspects of the medical decision making as documented.    Electronically signed by     Miguel Smith MD,  Pulmonologist/Intensivist   7/23/2022, 15:05 CDT

## 2022-07-24 NOTE — PROGRESS NOTES
PULMONARY AND CRITICAL CARE PROGRESS NOTE - [facility]    Patient: Adrián Graham    1940   Acct# [id number]  07/24/22   10:27 CDT  Referring Provider: Rene Madrid MD    Chief Complaint: Ongoing respiratory failure    Interval history: Vapotherm has been decreased to 35 L 70%.  He currently has a sat of 91%.  No labs to review today.  Chest x-ray from this morning shows stable but persistent bilateral infiltrates.  He is resting comfortably after breakfast this morning.  No overnight events.    Review of Systems: Review of Systems   Constitutional: Positive for fatigue.   Respiratory: Positive for chest tightness and shortness of breath.    Neurological: Positive for weakness.      Physical Exam:  Vital signs: T:97.2   BP:135/70   P:69   R:14  Sat:99%  Physical Exam  Vitals reviewed.   Constitutional:       Appearance: He is well-developed.      Comments: Vapotherm in place, appears fatigued   HENT:      Head: Normocephalic and atraumatic.   Eyes:      General: No scleral icterus.  Cardiovascular:      Rate and Rhythm: Normal rate and regular rhythm.   Pulmonary:      Effort: Tachypnea present.      Breath sounds: Decreased breath sounds present.   Abdominal:      General: Bowel sounds are normal. There is no distension.      Palpations: Abdomen is soft.   Musculoskeletal:      Cervical back: Neck supple.      Right lower leg: No edema.      Left lower leg: No edema.   Skin:     General: Skin is warm and dry.   Neurological:      Mental Status: He is alert and oriented to person, place, and time.      Motor: Weakness present.   Psychiatric:         Behavior: Behavior normal.     Electronically signed by MEKHI Calvert, 7/24/2022, 10:27 CDT      Physician Substantive Portion: Medical Decision Making:    Laboratory Data:  Results from last 7 days   Lab Units 07/19/22  0357   WBC 10*3/mm3 16.50*   HEMOGLOBIN g/dL 13.1   PLATELETS 10*3/mm3 322     Results from last 7  days   Lab Units 07/19/22  0357   SODIUM mmol/L 139   POTASSIUM mmol/L 4.5   BUN mg/dL 32*   CREATININE mg/dL 0.62*         No results found for: BLOODCX, URINECX, WOUNDCX, MRSACX, RESPCX, STOOLCX    Recent films:  XR Chest 1 View    Result Date: 7/24/2022  1. Persistent bilateral interstitial and airspace filling infiltrates similar to July 15, 2022.   This report was finalized on 07/24/2022 08:39 by Dr. Don Delacruz MD.     My radiograph interpretation/independent review of imaging: Reviewed and agree with current interpretation      Pulmonary Assessment:    1. Acute hypoxic respiratory failure, currently on Vapotherm  2. Bilateral groundglass pulmonary infiltrate  3. Worsening COVID-19 pneumonia  4. Possible acute lung injury or cryptogenic organizing pneumonia  5. Pulmonary embolism single subsegmental on anticoagulation  6. Essential hypertension  7. Hyperlipidemia  8. Carotid artery disease  9. Recurrent bronchitis  10. S/p COVID vaccinated status  11. Severe weakness and deconditioning  12. History of fall    Recommend/plan:   · Patient was seen in follow-up visit in LTAC unit.  He is doing better.  Respiratory status stable.  · He is Vapotherm has been titrated down to 35 L and 65% FiO2.  His oxygen saturation 96%.  · He complains of back pain otherwise did not have any respiratory complaints.  · Continue Vapotherm.  Patient did not tolerate BiPAP.  Titrate and transition to high flow oxygen and regular oxygen when possible.  · Bronchodilator treatment, routine respiratory care and pulmonary toilet.  · Continue current treatment plan and supportive care.    · Received steroid for COVID-19 pneumonia with cryptogenic organizing pneumonia and interstitial lung disease.  · PT/OT/nutritional support.  ID adjusted antibiotics.  No fever and no other acute events.  · Repeat labs and imaging studies from time to time  · Plan for outpatient pulmonary clinic follow-up when stable.  Discussed care plan with  RT  · CODE STATUS: Limited.  Overall prognosis: Guarded  · We will follow.    This visit was performed by both a physician and an [facility] RN.  I personally evaluated and examined the patient.  I performed all aspects of the medical decision making as documented.    Electronically signed by     Miguel Smith MD,  Pulmonologist/Intensivist   7/24/2022, 15:11 CDT

## 2022-07-24 NOTE — PROGRESS NOTES
Julius Madrid M.D.  MEKHI Dockery        Internal Medicine Progress Note    7/24/2022   11:37 CDT    Name:  Adrián Graham  MRN:    [id number]     Acct:     [id number]   Room:  [address] [place] [address] Day: 0     Admit Date: 7/19/2022 12:46 PM  PCP: Mauro Irizarry,     Subjective:     C/C: shortness of breath, weakness    Interval History: Status:  Improved. Resting in bed, getting bath. No family at bedside. Afebrile. No complaint of SOB this AM per patient. Currently sats in the high 90s at rest with 02 at 35 lpm/80% with 100% NRB. Denies pain. Slow progress with therapies. No C/o constipation at this time.     Review of Systems   Constitutional: Positive for malaise/fatigue. Negative for chills, decreased appetite, weight gain and weight loss.   HENT: Negative for congestion, ear discharge, hoarse voice and tinnitus.    Eyes: Negative for blurred vision, discharge, visual disturbance and visual halos.   Cardiovascular: Positive for chest pain and dyspnea on exertion, none noted this AM. Negative for claudication, irregular heartbeat, leg swelling, orthopnea and paroxysmal nocturnal dyspnea.   Respiratory: Positive for cough and shortness of breath. Negative for sputum production and wheezing.    Endocrine: Negative for cold intolerance, heat intolerance and polyuria.   Hematologic/Lymphatic: Negative for adenopathy. Does not bruise/bleed easily.   Skin: Negative for dry skin, itching and suspicious lesions.   Musculoskeletal: Negative for arthritis, back pain, falls, joint pain, muscle weakness and myalgias.   Gastrointestinal: Negative for abdominal pain, constipation, diarrhea, dysphagia and hematemesis.   Genitourinary: Negative for bladder incontinence, dysuria and frequency.   Neurological: Positive for weakness. Negative for aphonia, disturbances in coordination and dizziness.   Psychiatric/Behavioral: Negative for altered mental status, depression, memory loss and substance abuse. The  patient does not have insomnia and is not nervous/anxious.          Medications:     Allergies: No Known Allergies    Current Meds:   Current Facility-Administered Medications:   •  acetaminophen (TYLENOL) tablet 650 mg, 650 mg, Oral, Q4H PRN **OR** acetaminophen (TYLENOL) suppository 650 mg, 650 mg, Rectal, Q4H PRN, Rene Madrid MD  •  albuterol (PROVENTIL) nebulizer solution 0.083% 2.5 mg/3mL, 2.5 mg, Nebulization, Q6H PRN, Rene Madrid MD  •  apixaban (ELIQUIS) tablet 5 mg, 5 mg, Oral, Q12H, Cassandra Byrnes, APRN  •  ascorbic acid (VITAMIN C) tablet 500 mg, 500 mg, Oral, Daily, Rene Madrid MD  •  aspirin EC tablet 81 mg, 81 mg, Oral, Daily, Rene Madrid MD  •  atorvastatin (LIPITOR) tablet 20 mg, 20 mg, Oral, Daily, Rene Madrid MD  •  busPIRone (BUSPAR) tablet 10 mg, 10 mg, Oral, TID, Rene Madrid MD  •  cetirizine (zyrTEC) tablet 10 mg, 10 mg, Oral, Daily, Rene Madrid MD  •  dexamethasone (DECADRON) tablet 6 mg, 6 mg, Oral, BID With Meals, Rene Madrid MD  •  diazePAM (VALIUM) tablet 2.5 mg, 2.5 mg, Oral, Q6H PRN, Rene Madrid MD  •  diphenhydrAMINE (BENADRYL) capsule 25 mg, 25 mg, Oral, Nightly, Rene Madrid MD  •  docusate sodium (COLACE) capsule 100 mg, 100 mg, Oral, BID, Cassandra Byrnes, APRN  •  famotidine (PEPCID) tablet 20 mg, 20 mg, Oral, BID AC, Rene Madrid MD  •  finasteride (PROSCAR) tablet 5 mg, 5 mg, Oral, Daily, Rene Madrid MD  •  fluticasone (FLONASE) 50 MCG/ACT nasal spray 2 spray, 2 spray, Nasal, Daily, Rene Madrid MD  •  furosemide (LASIX) tablet 20 mg, 20 mg, Oral, BID, Rene Madrid MD  •  gabapentin (NEURONTIN) capsule 400 mg, 400 mg, Oral, Q12H, Rene Madrid MD  •  guaiFENesin (MUCINEX) 12 hr tablet 1,200 mg, 1,200 mg, Oral, BID, Rene Madrid MD  •  ipratropium-albuterol (DUO-NEB) nebulizer solution 3 mL, 3 mL,  "Nebulization, 4x Daily - RT, Rene Madrid MD  •  montelukast (SINGULAIR) tablet 10 mg, 10 mg, Oral, Nightly, Rene Madrid MD  •  ondansetron (ZOFRAN) tablet 4 mg, 4 mg, Oral, Q6H PRN **OR** ondansetron (ZOFRAN) injection 4 mg, 4 mg, Intravenous, Q6H PRN, Rene Madrid MD  •  polyethylene glycol (MIRALAX) packet 17 g, 17 g, Oral, Daily, Cassandra Byrnes, MEKHI  •  sodium chloride 0.9 % flush 10 mL, 10 mL, Intravenous, Q12H, Rene Madrid MD  •  sodium chloride 0.9 % flush 10 mL, 10 mL, Intravenous, PRN, Rene Madrid MD  •  traMADol (ULTRAM) tablet 50 mg, 50 mg, Oral, Q8H PRN, Rene Madrid MD  •  vitamin D3 tablet 5,000 Units, 5,000 Units, Oral, Daily, Rene Madrid MD  •  zinc sulfate (ZINCATE) capsule 220 mg, 220 mg, Oral, Daily, Rene Madrid MD    Data:     Code Status:    There are no questions and answers to display.       Family History   Problem Relation Age of Onset   • Diabetes Mother    • Cancer Mother    • Osteoporosis Mother    • Heart disease Brother    • Colon cancer Neg Hx    • Colon polyps Neg Hx    • Esophageal cancer Neg Hx        Social History     Socioeconomic History   • Marital status:    Tobacco Use   • Smoking status: Never Smoker   • Smokeless tobacco: Never Used   Vaping Use   • Vaping Use: Never used   Substance and Sexual Activity   • Alcohol use: No   • Drug use: No   • Sexual activity: Defer       Vitals:  Ht 185.4 cm (73\")   Wt 83.5 kg (184 lb 1.4 oz)   BMI 24.29 kg/m²     T 97.2 P 69 R 14 /70 Sp02 99% (35 lpm/80%)    I/O (24Hr):  No intake or output data in the 24 hours ending 07/24/22 1137    Labs and imaging:      No results found for this or any previous visit (from the past 12 hour(s)).      Physical Examination:        Physical Exam  Vitals and nursing note reviewed.   Constitutional:       Appearance: He is well-developed.   HENT:      Head: Normocephalic and atraumatic.      Right Ear: " External ear normal.      Left Ear: External ear normal.      Nose: Nose normal.      Mouth/Throat:      Mouth: Mucous membranes are moist.      Pharynx: Oropharynx is clear.   Eyes:      Pupils: Pupils are equal, round, and reactive to light.   Cardiovascular:      Rate and Rhythm: Normal rate and regular rhythm.      Heart sounds: Normal heart sounds.   Pulmonary:      Effort: Pulmonary effort is normal.      Breath sounds: Normal breath sounds. Decreased in lower lobes.  Abdominal:      General: Bowel sounds are normal.      Palpations: Abdomen is soft.   Musculoskeletal:         General: Normal range of motion.      Cervical back: Normal range of motion and neck supple.      Comments: Generalized weakness   Skin:     General: Skin is warm and dry.   Neurological:      Mental Status: He is alert and oriented to person, place, and time.      Deep Tendon Reflexes: Reflexes are normal and symmetric.   Psychiatric:         Behavior: Behavior normal.           Assessment:            * No active hospital problems. *    Past Medical History:   Diagnosis Date   • Abdominal distention    • Abdominal pain, left upper quadrant    • Allergic rhinitis    • Arthritis    • Bloating    • BPH (benign prostatic hyperplasia)    • Carpal tunnel syndrome    • Change in bowel habits    • Constipation    • Diarrhea    • Erectile dysfunction    • GERD (gastroesophageal reflux disease)    • History of shingles    • Hypertension    • Idiopathic peripheral neuropathy    • Neuropathy    • Pneumonia    • Shingles    • Weight loss         Plan:        1. Acute hypoxic respiratory failure s/p COVID  2. Bilateral pulmonary embolus  3. COVID-19 with respiratory failure  4. Bilateral groundglass pulmonary infiltrate  5. Acute lung injury with organizing pneumonia  6. Status post COVID and vaccinated status  7. History of fall  8. Presyncope  9. Chronic cerebral microvascular disease  10. Dilated cardiomyopathy  11. Bilateral carotid artery  disease  12. Moderate protein calorie malnutrition  13. Diffuse generalized weakness  14. Leukocytosis  15. BPH  16. Anxiety  17. Peripheral neuropathy  18. GERD  19. Rhinitis    Continue current treatment. Monitor counts. Increase activity. Labs Monday. Oxygen weaning as tolerated under direction of pulmonology. Maintain patient safety. Optimize nutrition. Continue anticoagulation. Aggressive therapies as tolerated. Add colace and miralax.       Electronically signed by MEKHI Calix on 7/24/2022 at 11:37 CDT

## 2022-07-25 NOTE — PROGRESS NOTES
[facility]  JONI Madrid M.D.  MEKHI Dockery        Internal Medicine Progress Note    7/25/2022   12:25 CDT    Name:  Adrián Graham  MRN:    [id number]     Acct:     [id number]   Room:  [address] [place] [address] Day: 0     Admit Date: 7/19/2022 12:46 PM  PCP: Mauro Irizarry DO    Subjective:     C/C: shortness of breath, weakness    Interval History: Status:  Improved. Up to chair. No family at bedside. Afebrile. Had increased hypoxia and shortness of breath with sats in the 80s with transferring to the chair - required 100% 02. Currently sats in the high 90s at rest with 02 at 35 lpm/65% with 100% NRB. Denies pain. Slow progress with therapies. WBC trending toward normal. Counts otherwise stable.     Review of Systems   Constitutional: Positive for malaise/fatigue. Negative for chills, decreased appetite, weight gain and weight loss.   HENT: Negative for congestion, ear discharge, hoarse voice and tinnitus.    Eyes: Negative for blurred vision, discharge, visual disturbance and visual halos.   Cardiovascular: Positive for chest pain and dyspnea on exertion. Negative for claudication, irregular heartbeat, leg swelling, orthopnea and paroxysmal nocturnal dyspnea.   Respiratory: Positive for cough and shortness of breath. Negative for sputum production and wheezing.    Endocrine: Negative for cold intolerance, heat intolerance and polyuria.   Hematologic/Lymphatic: Negative for adenopathy. Does not bruise/bleed easily.   Skin: Negative for dry skin, itching and suspicious lesions.   Musculoskeletal: Negative for arthritis, back pain, falls, joint pain, muscle weakness and myalgias.   Gastrointestinal: Negative for abdominal pain, constipation, diarrhea, dysphagia and hematemesis.   Genitourinary: Negative for bladder incontinence, dysuria and frequency.   Neurological: Positive for weakness. Negative for aphonia, disturbances in coordination and dizziness.   Psychiatric/Behavioral: Negative for altered  mental status, depression, memory loss and substance abuse. The patient does not have insomnia and is not nervous/anxious.          Medications:     Allergies: No Known Allergies    Current Meds:   Current Facility-Administered Medications:   •  acetaminophen (TYLENOL) tablet 650 mg, 650 mg, Oral, Q4H PRN **OR** acetaminophen (TYLENOL) suppository 650 mg, 650 mg, Rectal, Q4H PRN, Rene Madrid MD  •  albuterol (PROVENTIL) nebulizer solution 0.083% 2.5 mg/3mL, 2.5 mg, Nebulization, Q6H PRN, Rene Madrid MD  •  apixaban (ELIQUIS) tablet 5 mg, 5 mg, Oral, Q12H, Cassandra Byrnes, MEKHI  •  ascorbic acid (VITAMIN C) tablet 500 mg, 500 mg, Oral, Daily, Rene Madrid MD  •  aspirin EC tablet 81 mg, 81 mg, Oral, Daily, Rene Madrid MD  •  atorvastatin (LIPITOR) tablet 20 mg, 20 mg, Oral, Daily, Rene Madrid MD  •  busPIRone (BUSPAR) tablet 10 mg, 10 mg, Oral, TID, Rene Madrid MD  •  cetirizine (zyrTEC) tablet 10 mg, 10 mg, Oral, Daily, Rene Madrid MD  •  dexamethasone (DECADRON) tablet 6 mg, 6 mg, Oral, BID With Meals, Rene Madrid MD  •  diazePAM (VALIUM) tablet 2.5 mg, 2.5 mg, Oral, Q6H PRN, Rene Madrid MD  •  diphenhydrAMINE (BENADRYL) capsule 25 mg, 25 mg, Oral, Nightly, Rene Madrid MD  •  docusate sodium (COLACE) capsule 100 mg, 100 mg, Oral, BID, Cassandra Byrnes, APRN  •  famotidine (PEPCID) tablet 20 mg, 20 mg, Oral, BID AC, Rene Madrid MD  •  finasteride (PROSCAR) tablet 5 mg, 5 mg, Oral, Daily, Rene Madrid MD  •  fluticasone (FLONASE) 50 MCG/ACT nasal spray 2 spray, 2 spray, Nasal, Daily, Rene Madrid MD  •  furosemide (LASIX) tablet 20 mg, 20 mg, Oral, BID, Rene Madrid MD  •  gabapentin (NEURONTIN) capsule 400 mg, 400 mg, Oral, Q12H, Rene Madrid MD  •  guaiFENesin (MUCINEX) 12 hr tablet 1,200 mg, 1,200 mg, Oral, BID, Rene Madrid MD  •   "ipratropium-albuterol (DUO-NEB) nebulizer solution 3 mL, 3 mL, Nebulization, 4x Daily - RT, Rene Madrid MD  •  montelukast (SINGULAIR) tablet 10 mg, 10 mg, Oral, Nightly, Rene Madrid MD  •  ondansetron (ZOFRAN) tablet 4 mg, 4 mg, Oral, Q6H PRN **OR** ondansetron (ZOFRAN) injection 4 mg, 4 mg, Intravenous, Q6H PRN, Rene Madrid MD  •  polyethylene glycol (MIRALAX) packet 17 g, 17 g, Oral, Daily, Cassandra Byrnes, MEKHI  •  sodium chloride 0.9 % flush 10 mL, 10 mL, Intravenous, Q12H, Rene Madrid MD  •  sodium chloride 0.9 % flush 10 mL, 10 mL, Intravenous, PRN, Rene Madrid MD  •  traMADol (ULTRAM) tablet 50 mg, 50 mg, Oral, Q8H PRN, Rene Madrid MD  •  vitamin D3 tablet 5,000 Units, 5,000 Units, Oral, Daily, Rene Madrid MD  •  zinc sulfate (ZINCATE) capsule 220 mg, 220 mg, Oral, Daily, Rene Madrid MD    Data:     Code Status:    There are no questions and answers to display.       Family History   Problem Relation Age of Onset   • Diabetes Mother    • Cancer Mother    • Osteoporosis Mother    • Heart disease Brother    • Colon cancer Neg Hx    • Colon polyps Neg Hx    • Esophageal cancer Neg Hx        Social History     Socioeconomic History   • Marital status:    Tobacco Use   • Smoking status: Never Smoker   • Smokeless tobacco: Never Used   Vaping Use   • Vaping Use: Never used   Substance and Sexual Activity   • Alcohol use: No   • Drug use: No   • Sexual activity: Defer       Vitals:  Ht 185.4 cm (73\")   Wt 84.8 kg (186 lb 14.4 oz)   BMI 24.66 kg/m²     T 98.0P 73 R 31 /72 Sp02 95% (35 lpm/65%)        I/O (24Hr):  No intake or output data in the 24 hours ending 07/25/22 1225    Labs and imaging:      Recent Results (from the past 12 hour(s))   CBC (No Diff)    Collection Time: 07/25/22  4:59 AM    Specimen: Blood   Result Value Ref Range    WBC 13.24 (H) 3.40 - 10.80 10*3/mm3    RBC 4.46 4.14 - 5.80 10*6/mm3    " Hemoglobin 13.1 13.0 - 17.7 g/dL    Hematocrit 42.9 37.5 - 51.0 %    MCV 96.2 79.0 - 97.0 fL    MCH 29.4 26.6 - 33.0 pg    MCHC 30.5 (L) 31.5 - 35.7 g/dL    RDW 15.0 12.3 - 15.4 %    RDW-SD 52.5 37.0 - 54.0 fl    MPV 9.0 6.0 - 12.0 fL    Platelets 313 140 - 450 10*3/mm3   Basic Metabolic Panel    Collection Time: 07/25/22  4:59 AM    Specimen: Blood   Result Value Ref Range    Glucose 101 (H) 65 - 99 mg/dL    BUN 32 (H) 8 - 23 mg/dL    Creatinine 0.62 (L) 0.76 - 1.27 mg/dL    Sodium 139 136 - 145 mmol/L    Potassium 4.4 3.5 - 5.2 mmol/L    Chloride 99 98 - 107 mmol/L    CO2 37.0 (H) 22.0 - 29.0 mmol/L    Calcium 8.7 8.6 - 10.5 mg/dL    BUN/Creatinine Ratio 51.6 (H) 7.0 - 25.0    Anion Gap 3.0 (L) 5.0 - 15.0 mmol/L    eGFR 94.8 >60.0 mL/min/1.73   BNP    Collection Time: 07/25/22  4:59 AM    Specimen: Blood   Result Value Ref Range    proBNP 421.5 0.0 - 1,800.0 pg/mL             Physical Examination:        Physical Exam  Vitals and nursing note reviewed.   Constitutional:       Appearance: He is well-developed.   HENT:      Head: Normocephalic and atraumatic.      Right Ear: External ear normal.      Left Ear: External ear normal.      Nose: Nose normal.      Mouth/Throat:      Mouth: Mucous membranes are moist.      Pharynx: Oropharynx is clear.   Eyes:      Pupils: Pupils are equal, round, and reactive to light.   Cardiovascular:      Rate and Rhythm: Normal rate and regular rhythm.      Heart sounds: Normal heart sounds.   Pulmonary:      Effort: Pulmonary effort is normal.      Breath sounds: Normal breath sounds.   Abdominal:      General: Bowel sounds are normal.      Palpations: Abdomen is soft.   Musculoskeletal:         General: Normal range of motion.      Cervical back: Normal range of motion and neck supple.      Comments: Generalized weakness   Skin:     General: Skin is warm and dry.   Neurological:      Mental Status: He is alert and oriented to person, place, and time.      Deep Tendon Reflexes:  Reflexes are normal and symmetric.   Psychiatric:         Behavior: Behavior normal.           Assessment:            * No active hospital problems. *    Past Medical History:   Diagnosis Date   • Abdominal distention    • Abdominal pain, left upper quadrant    • Allergic rhinitis    • Arthritis    • Bloating    • BPH (benign prostatic hyperplasia)    • Carpal tunnel syndrome    • Change in bowel habits    • Constipation    • Diarrhea    • Erectile dysfunction    • GERD (gastroesophageal reflux disease)    • History of shingles    • Hypertension    • Idiopathic peripheral neuropathy    • Neuropathy    • Pneumonia    • Shingles    • Weight loss         Plan:        1. Acute hypoxic respiratory failure s/p COVID  2. Bilateral pulmonary embolus  3. COVID-19 with respiratory failure  4. Bilateral groundglass pulmonary infiltrate  5. Acute lung injury with organizing pneumonia  6. Status post COVID and vaccinated status  7. History of fall  8. Presyncope  9. Chronic cerebral microvascular disease  10. Dilated cardiomyopathy  11. Bilateral carotid artery disease  12. Moderate protein calorie malnutrition  13. Diffuse generalized weakness  14. Leukocytosis  15. BPH  16. Anxiety  17. Peripheral neuropathy  18. GERD  19. Rhinitis    Continue current treatment. Monitor counts. Increase activity. Labs Thursday. Oxygen weaning as tolerated under direction of pulmonology. Maintain patient safety. Optimize nutrition. Continue anticoagulation. Aggressive therapies as tolerated.       Electronically signed by MEKHI Lamar on 7/25/2022 at 12:25 CDT     I have discussed the care of Adrián Graham, including pertinent history and exam findings, with the nurse practitioner.    I have seen and examined the patient and the key elements of all parts of the encounter have been performed by me.  I agree with the assessment, plan and orders as documented by MEKHI Dockery, after I modified the exam findings and the plan of  treatments and the final version is my approved version of the assessment.        Electronically signed by Rene Madrid MD on 7/25/2022 at 21:56 CDT

## 2022-07-25 NOTE — PROGRESS NOTES
PULMONARY AND CRITICAL CARE PROGRESS NOTE - [facility]    Patient: Adrián Graham    1940   [facility]# [id number]  07/25/22   08:00 CDT  Referring Provider: Rene Madrid MD    Chief Complaint: Ongoing respiratory failure    Interval history: He is seen awake and alert resting in bed.  Oxygen saturation stable on Vapotherm 35 L FiO2 0.65.  He continues utilizing incentive spirometer and Aerobika flutter.  He reports he slept well overnight.  WBC trending down.  He remains on dexamethasone twice a day, we will consider tapering this tomorrow.  He tells me he has not been out of bed over the weekend but is hopeful to get back in the chair today.  Afebrile overnight.  No other issues reported.    Review of Systems: Review of Systems   Constitutional: Positive for fatigue.   Respiratory: Positive for cough and shortness of breath.    Gastrointestinal: Negative for diarrhea and nausea.   Neurological: Positive for weakness.      Physical Exam:  Vital signs: T: 98   BP: 133/72   P: 73 R:30  Sat:95%  Physical Exam  Vitals reviewed.   Constitutional:       Appearance: He is well-developed.      Comments: Vapotherm in place   HENT:      Head: Normocephalic and atraumatic.   Eyes:      General: No scleral icterus.  Cardiovascular:      Rate and Rhythm: Normal rate and regular rhythm.   Pulmonary:      Effort: Tachypnea present.      Breath sounds: Decreased breath sounds present.   Abdominal:      General: Bowel sounds are normal. There is no distension.      Palpations: Abdomen is soft.   Musculoskeletal:      Cervical back: Neck supple.      Right lower leg: No edema.      Left lower leg: No edema.   Skin:     General: Skin is warm and dry.   Neurological:      Mental Status: He is alert and oriented to person, place, and time.      Motor: Weakness present.   Psychiatric:         Behavior: Behavior normal.     Electronically signed by MEKHI Villavicencio, 7/25/2022, 08:00 CDT      Physician  Substantive Portion: Medical Decision Making:    Laboratory Data:  Results from last 7 days   Lab Units 07/25/22  0459 07/19/22  0357   WBC 10*3/mm3 13.24* 16.50*   HEMOGLOBIN g/dL 13.1 13.1   PLATELETS 10*3/mm3 313 322     Results from last 7 days   Lab Units 07/25/22  0459 07/19/22  0357   SODIUM mmol/L 139 139   POTASSIUM mmol/L 4.4 4.5   BUN mg/dL 32* 32*   CREATININE mg/dL 0.62* 0.62*         No results found for: BLOODCX, URINECX, WOUNDCX, MRSACX, RESPCX, STOOLCX    Recent films:  XR Chest 1 View    Result Date: 7/24/2022  1. Persistent bilateral interstitial and airspace filling infiltrates similar to July 15, 2022.   This report was finalized on 07/24/2022 08:39 by Dr. Don Delacruz MD.        My radiograph interpretation/independent review of imaging: Reviewed and agree with current interpretation    Pulmonary Assessment:  1. Acute hypoxic respiratory failure, currently on Vapotherm  2. Bilateral groundglass pulmonary infiltrate  3. Worsening COVID-19 pneumonia  4. Possible acute lung injury or cryptogenic organizing pneumonia  5. Pulmonary embolism single subsegmental on anticoagulation  6. Essential hypertension  7. Hyperlipidemia  8. Carotid artery disease  9. Recurrent bronchitis  10. S/p COVID vaccinated status  11. Severe weakness and deconditioning  12. History of fall    Recommend/plan:   · Patient resting comfortably sitting up in the chair on Vapotherm 35 L / 65% FiO2.  · No acute events overnight and he is afebrile.  · Continue routine respiratory care bronchodilator treatment  · Incentive spirometry and flutter valve.  · Patient completed antibiotic.  Decrease dexamethasone and start steroid taper.  · Increase physical activity as tolerated.  Nutritional support.  · DVT and stress ulcer prophylaxis pain and anxiety control.  · Plan for outpatient pulmonary clinic follow-up with me after discharge.  · CODE STATUS: Full.  Overall prognosis: Guarded.  · We will follow.    This visit was performed  by both a physician and an [facility] RN.  I personally evaluated and examined the patient.  I performed all aspects of the medical decision making as documented.    Electronically signed by     Miguel Smith MD,  Pulmonologist/Intensivist   7/25/2022, 12:27 CDT

## 2022-07-26 NOTE — PROGRESS NOTES
[facility]  JONI Madrid M.D.  MEKHI Dockery        Internal Medicine Progress Note    7/26/2022   11:04 CDT    Name:  Adrián Graham  MRN:    [id number]     Acct:     [id number]   Room:  [address] [place] [address] Day: 0     Admit Date: 7/19/2022 12:46 PM  PCP: Mauro Irizarry DO    Subjective:     C/C: shortness of breath, weakness    Interval History: Status:  Improved. Resting in bed. Daughter at bedside. Afebrile. Continues to desat with any exertion.  Currently sats in the high 90s at rest with 02 at 30 lpm/70% with 100% NRB prn. Denies pain. Slow progress with therapies. Patient and daughter are opting to cease further aggressive treatment in favor of pursuing comfort measures/end of life care. They are requesting transfer to [facility].     Review of Systems   Constitutional: Positive for malaise/fatigue. Negative for chills, decreased appetite, weight gain and weight loss.   HENT: Negative for congestion, ear discharge, hoarse voice and tinnitus.    Eyes: Negative for blurred vision, discharge, visual disturbance and visual halos.   Cardiovascular: Positive for chest pain and dyspnea on exertion. Negative for claudication, irregular heartbeat, leg swelling, orthopnea and paroxysmal nocturnal dyspnea.   Respiratory: Positive for cough and shortness of breath. Negative for sputum production and wheezing.    Endocrine: Negative for cold intolerance, heat intolerance and polyuria.   Hematologic/Lymphatic: Negative for adenopathy. Does not bruise/bleed easily.   Skin: Negative for dry skin, itching and suspicious lesions.   Musculoskeletal: Negative for arthritis, back pain, falls, joint pain, muscle weakness and myalgias.   Gastrointestinal: Negative for abdominal pain, constipation, diarrhea, dysphagia and hematemesis.   Genitourinary: Negative for bladder incontinence, dysuria and frequency.   Neurological: Positive for weakness. Negative for aphonia, disturbances in coordination and  dizziness.   Psychiatric/Behavioral: Negative for altered mental status, depression, memory loss and substance abuse. The patient does not have insomnia and is not nervous/anxious.          Medications:     Allergies: No Known Allergies    Current Meds:   Current Facility-Administered Medications:   •  acetaminophen (TYLENOL) tablet 650 mg, 650 mg, Oral, Q4H PRN **OR** acetaminophen (TYLENOL) suppository 650 mg, 650 mg, Rectal, Q4H PRN, Rene Madrid MD  •  albuterol (PROVENTIL) nebulizer solution 0.083% 2.5 mg/3mL, 2.5 mg, Nebulization, Q6H PRN, Rene Madrid MD  •  apixaban (ELIQUIS) tablet 5 mg, 5 mg, Oral, Q12H, Cassandra Byrnes APRN  •  ascorbic acid (VITAMIN C) tablet 500 mg, 500 mg, Oral, Daily, Rene Madrid MD  •  aspirin EC tablet 81 mg, 81 mg, Oral, Daily, Rene Madrid MD  •  atorvastatin (LIPITOR) tablet 20 mg, 20 mg, Oral, Daily, Rene Madrid MD  •  busPIRone (BUSPAR) tablet 10 mg, 10 mg, Oral, TID, Rene Madrid MD  •  cetirizine (zyrTEC) tablet 10 mg, 10 mg, Oral, Daily, Rene Madrid MD  •  dexamethasone (DECADRON) tablet 6 mg, 6 mg, Oral, BID With Meals, Rene Madrid MD  •  diazePAM (VALIUM) tablet 2.5 mg, 2.5 mg, Oral, Q6H PRN, Rene Madrid MD  •  diphenhydrAMINE (BENADRYL) capsule 25 mg, 25 mg, Oral, Nightly, Rene Madrid MD  •  docusate sodium (COLACE) capsule 100 mg, 100 mg, Oral, BID, Cassandra Byrnes, MEKHI  •  famotidine (PEPCID) tablet 20 mg, 20 mg, Oral, BID AC, Rene Madrid MD  •  finasteride (PROSCAR) tablet 5 mg, 5 mg, Oral, Daily, Rene Madrid MD  •  fluticasone (FLONASE) 50 MCG/ACT nasal spray 2 spray, 2 spray, Nasal, Daily, Rene Madrid MD  •  furosemide (LASIX) tablet 20 mg, 20 mg, Oral, BID, Rene Madrid MD  •  gabapentin (NEURONTIN) capsule 400 mg, 400 mg, Oral, Q12H, Rene Madrid MD  •  guaiFENesin (MUCINEX) 12 hr tablet 1,200 mg,  "1,200 mg, Oral, BID, Rene Madrid MD  •  ipratropium-albuterol (DUO-NEB) nebulizer solution 3 mL, 3 mL, Nebulization, 4x Daily - RT, Rene Madrid MD  •  montelukast (SINGULAIR) tablet 10 mg, 10 mg, Oral, Nightly, Rene Madrid MD  •  ondansetron (ZOFRAN) tablet 4 mg, 4 mg, Oral, Q6H PRN **OR** ondansetron (ZOFRAN) injection 4 mg, 4 mg, Intravenous, Q6H PRN, Rene Madrid MD  •  polyethylene glycol (MIRALAX) packet 17 g, 17 g, Oral, Daily, Cassandra Byrnes APRN  •  sodium chloride 0.9 % flush 10 mL, 10 mL, Intravenous, Q12H, Rene Madrid MD  •  sodium chloride 0.9 % flush 10 mL, 10 mL, Intravenous, PRN, Rene Madrid MD  •  traMADol (ULTRAM) tablet 50 mg, 50 mg, Oral, Q8H PRN, Rene Madrid MD  •  vitamin D3 tablet 5,000 Units, 5,000 Units, Oral, Daily, Rene Madrid MD  •  zinc sulfate (ZINCATE) capsule 220 mg, 220 mg, Oral, Daily, Rene Madrid MD    Data:     Code Status:    There are no questions and answers to display.       Family History   Problem Relation Age of Onset   • Diabetes Mother    • Cancer Mother    • Osteoporosis Mother    • Heart disease Brother    • Colon cancer Neg Hx    • Colon polyps Neg Hx    • Esophageal cancer Neg Hx        Social History     Socioeconomic History   • Marital status:    Tobacco Use   • Smoking status: Never Smoker   • Smokeless tobacco: Never Used   Vaping Use   • Vaping Use: Never used   Substance and Sexual Activity   • Alcohol use: No   • Drug use: No   • Sexual activity: Defer       Vitals:  Ht 185.4 cm (73\")   Wt 84.8 kg (186 lb 14.4 oz)   BMI 24.66 kg/m²     T 97.5 P 82 R 15 /78 Sp02 97% (30 lpm/70%)        I/O (24Hr):  No intake or output data in the 24 hours ending 07/26/22 1104    Labs and imaging:      No results found for this or any previous visit (from the past 12 hour(s)).          Physical Examination:        Physical Exam  Vitals and nursing note reviewed. "   Constitutional:       Appearance: He is well-developed.   HENT:      Head: Normocephalic and atraumatic.      Right Ear: External ear normal.      Left Ear: External ear normal.      Nose: Nose normal.      Mouth/Throat:      Mouth: Mucous membranes are moist.      Pharynx: Oropharynx is clear.   Eyes:      Pupils: Pupils are equal, round, and reactive to light.   Cardiovascular:      Rate and Rhythm: Normal rate and regular rhythm.      Heart sounds: Normal heart sounds.   Pulmonary:      Effort: Pulmonary effort is normal.      Breath sounds: Normal breath sounds.   Abdominal:      General: Bowel sounds are normal.      Palpations: Abdomen is soft.   Musculoskeletal:         General: Normal range of motion.      Cervical back: Normal range of motion and neck supple.      Comments: Generalized weakness   Skin:     General: Skin is warm and dry.   Neurological:      Mental Status: He is alert and oriented to person, place, and time.      Deep Tendon Reflexes: Reflexes are normal and symmetric.   Psychiatric:         Behavior: Behavior normal.           Assessment:            * No active hospital problems. *    Past Medical History:   Diagnosis Date   • Abdominal distention    • Abdominal pain, left upper quadrant    • Allergic rhinitis    • Arthritis    • Bloating    • BPH (benign prostatic hyperplasia)    • Carpal tunnel syndrome    • Change in bowel habits    • Constipation    • Diarrhea    • Erectile dysfunction    • GERD (gastroesophageal reflux disease)    • History of shingles    • Hypertension    • Idiopathic peripheral neuropathy    • Neuropathy    • Pneumonia    • Shingles    • Weight loss         Plan:        1. Acute hypoxic respiratory failure s/p COVID  2. Bilateral pulmonary embolus  3. COVID-19 with respiratory failure  4. Bilateral groundglass pulmonary infiltrate  5. Acute lung injury with organizing pneumonia  6. Status post COVID and vaccinated status  7. History of  fall  8. Presyncope  9. Chronic cerebral microvascular disease  10. Dilated cardiomyopathy  11. Bilateral carotid artery disease  12. Moderate protein calorie malnutrition  13. Diffuse generalized weakness  14. Leukocytosis  15. BPH  16. Anxiety  17. Peripheral neuropathy  18. GERD  19. Rhinitis    Continue current treatment. Monitor counts. Increase activity.  Oxygen weaning as tolerated under direction of pulmonology. Maintain patient safety. Optimize nutrition. Continue anticoagulation. Aggressive therapies as tolerated. Discharge planning - discussed with  who will reach out to [facility].       Electronically signed by MEKHI Lamar on 7/26/2022 at 11:04 CDT

## 2022-07-26 NOTE — PROGRESS NOTES
PULMONARY AND CRITICAL CARE PROGRESS NOTE - [facility]    Patient: Adrián Graham    1940   Acct# [id number]  07/26/22   08:05 CDT  Referring Provider: Rene Madrid MD    Chief Complaint: Ongoing respiratory failure    Interval history: He is seen awake and alert sitting up in bed eating breakfast.  Oxygen saturation stable on Vapotherm 30 L and FiO2 0.6.  He has some burning in his chest with deep inspiration.  He continues to utilize incentive spirometer and Aerobika flutter.  He is working with therapy.  No documented fevers.  No new labs or films for review.  No issues reported overnight.    Review of Systems: Review of Systems   Constitutional: Positive for fatigue.   Respiratory: Positive for cough and shortness of breath.    Gastrointestinal: Negative for diarrhea and nausea.   Genitourinary: Negative for difficulty urinating.   Neurological: Positive for weakness.      Physical Exam:  Vital signs: T: 97.5   BP: 141/78   P: 82 R: 15 sat:97%  Physical Exam  Vitals reviewed.   Constitutional:       Appearance: He is well-developed. He is ill-appearing.      Comments: Vapotherm in place   HENT:      Head: Normocephalic and atraumatic.   Eyes:      General: No scleral icterus.  Cardiovascular:      Rate and Rhythm: Normal rate and regular rhythm.   Pulmonary:      Effort: Tachypnea present. No respiratory distress.      Breath sounds: Decreased breath sounds present.   Abdominal:      General: Bowel sounds are normal. There is no distension.      Palpations: Abdomen is soft.   Musculoskeletal:      Cervical back: Neck supple.      Right lower leg: No edema.      Left lower leg: No edema.   Skin:     General: Skin is warm.   Neurological:      Mental Status: He is alert and oriented to person, place, and time.      Motor: Weakness present.   Psychiatric:         Behavior: Behavior normal.     Electronically signed by MEKHI Villavicencio, 7/26/2022, 08:05 CDT      Physician  Substantive Portion: Medical Decision Making:    Laboratory Data:  Results from last 7 days   Lab Units 07/25/22  0459   WBC 10*3/mm3 13.24*   HEMOGLOBIN g/dL 13.1   PLATELETS 10*3/mm3 313     Results from last 7 days   Lab Units 07/25/22  0459   SODIUM mmol/L 139   POTASSIUM mmol/L 4.4   BUN mg/dL 32*   CREATININE mg/dL 0.62*         No results found for: BLOODCX, URINECX, WOUNDCX, MRSACX, RESPCX, STOOLCX    Recent films:  No radiology results for the last day    My radiograph interpretation/independent review of imaging: Reviewed and agree with current interpretation.    Pulmonary Assessment:    1. Acute hypoxic respiratory failure, currently on Vapotherm  2. Bilateral groundglass pulmonary infiltrate  3. Worsening COVID-19 pneumonia  4. Possible acute lung injury or cryptogenic organizing pneumonia  5. Pulmonary embolism single subsegmental on anticoagulation  6. Essential hypertension  7. Hyperlipidemia  8. Carotid artery disease  9. Recurrent bronchitis  10. S/p COVID vaccinated status  11. Severe weakness and deconditioning  12. History of fall    Recommend/plan:   · Patient was seen in the follow-up visit in LTAC unit.  He is resting in the bed with 35 L flow and 60% FiO2  · Still requiring nonrebreather mask from time to time.    · Although his respiratory status is stable he appears little more depressed today  · No acute events overnight and he is afebrile.  · Continue routine respiratory care bronchodilator treatment  · Incentive spirometry and flutter valve per pulmonary compliance and clearance.  · Patient completed antibiotic.  Decrease dexamethasone and start steroid taper.  · Increase physical activity as tolerated.  Nutritional support.  · DVT and stress ulcer prophylaxis pain and anxiety control.  · Plan for outpatient pulmonary clinic follow-up with me after discharge.  · CODE STATUS: Full.  Overall prognosis: Guarded.  · Patient's daughter is going to talk to him about possible palliative care  approach.  · We will follow.    This visit was performed by both a physician and an Advanced Practice RN.  I personally evaluated and examined the patient.  I performed all aspects of the medical decision making as documented.    Electronically signed by     Miguel Smith MD,  Pulmonologist/Intensivist   7/26/2022, 18:03 CDT

## 2022-07-26 NOTE — PROGRESS NOTES
"Adult Nutrition  Assessment/PES    Patient Name:  Adrián Graham  YOB: 1940  MRN: [id number]  Admit Date:  7/19/2022    Assessment Date:  7/26/2022    Comments:  Observe SLP recommendations and continue ONS.     Reason for Assessment     Row Name 07/26/22 1017          Reason for Assessment    Reason For Assessment follow-up protocol     Diagnosis pulmonary disease;infection/sepsis;nutrition related history                Nutrition/Diet History     Row Name 07/26/22 1034          Nutrition/Diet History    Typical Intake (Food/Fluid/EN/PN) Pt resting upon visit, per RN pt has a fair appetite and is enjoying the ntn supplements, current intake is 52%x7 meals per 72-hr recall, increase in intake is desired.     Factors Affecting Nutritional Intake respiratory difficulty/therapies                Anthropometrics     Row Name 07/26/22 1042          Anthropometrics    Age for Calculations 82     Height for Calculation 1.854 m (6' 0.99\")     Weight for Calculation 84.8 kg (186 lb 15.2 oz)                Labs/Tests/Procedures/Meds     Row Name 07/26/22 1038          Labs/Procedures/Meds    Lab Results Reviewed reviewed     Lab Results Comments BUN, Cr, WBC            Diagnostic Tests/Procedures    Diagnostic Test/Procedure Reviewed reviewed            Medications    Pertinent Medications Reviewed reviewed     Pertinent Medications Comments See MAR                Physical Findings     Row Name 07/26/22 1041          Physical Findings    Overall Physical Appearance Vapotherm 30 L and FiO2, last BM 7/25, yellow UOP, skin intact, Odc score 15                Estimated/Assessed Needs - Anthropometrics     Row Name 07/26/22 1042          Anthropometrics    Age for Calculations 82     Height for Calculation 1.854 m (6' 0.99\")     Weight for Calculation 84.8 kg (186 lb 15.2 oz)            Estimated/Assessed Needs    Additional Documentation Fluid Requirements (Group);KCAL/KG (Group);Protein Requirements (Group)      "       KCAL/KG    KCAL/KG 25 Kcal/Kg (kcal);30 Kcal/Kg (kcal)     25 Kcal/Kg (kcal) 2120     30 Kcal/Kg (kcal) 2544            Protein Requirements    Weight Used For Protein Calculations 84.8 kg (186 lb 15.2 oz)     Est Protein Requirement Amount (gms/kg) 1.2 gm protein     Estimated Protein Requirements (gms/day) 101.76            Fluid Requirements    Fluid Requirements (mL/day) 2120     RDA Method (mL) 2120                Nutrition Prescription Ordered     Row Name 07/26/22 1046          Nutrition Prescription PO    Current PO Diet Soft Texture     Texture Ground     Fluid Consistency Thin     Supplement Yogurt;Boost Plus (Ensure Enlive, Ensure Plus)     Supplement Frequency Daily;3 times a day     Common Modifiers Cardiac                Evaluation of Received Nutrient/Fluid Intake     Row Name 07/26/22 1047          Nutrient/Fluid Evaluation    Number of Days Evaluated 3 days     Additional Documentation Fluid Intake Evaluation (Group)            Fluid Intake Evaluation    Oral Fluid (mL) 836     Other Fluid (mL) 10  IVF            PO Evaluation    Number of Days PO Intake Evaluated 3 days     Number of Meals 7     % PO Intake 52                     Problem/Interventions:   Problem 1     Row Name 07/26/22 1049          Nutrition Diagnoses Problem 1    Problem 1 Malnutrition     Etiology (related to) Medical Diagnosis;Factors Affecting Nutrition     Infectious Disease Other (comment)  recent covid infection in June     Pulmonary/Critical Care Pulmonary emboli;A/C respiratory failure  PE associated with COVID infection     Appetite Fair     Oral Chewing Difficulty  difficulty with fatigue with breathing and chewing     Signs/Symptoms (evidenced by) PO Intake;Unintended Weight Change;Report/Observation     Percent (%) intake recorded 52 %     Over number of meals 7     Unintended Weight Change Loss     Number of Pounds Lost 18#; 21#; ~31#     Weight loss time period 1 month, 3 months, ~1 year per pt report      Other Comment moderate muscle and fat wasting per NFPE completed 7/12, generalized weakness                      Intervention Goal     Row Name 07/26/22 1051          Intervention Goal    General Meet nutritional needs for age/condition;Maintain nutrition;Disease management/therapy     PO Increase intake;PO intake (%)     PO Intake % 75 %  or>     Weight Maintain weight                Nutrition Intervention     Row Name 07/26/22 1051          Nutrition Intervention    RD/Tech Action Follow Tx progress;Care plan reviewd                Nutrition Prescription     Row Name 07/26/22 1051          Nutrition Prescription PO    PO Prescription Other (comment)  Continue Same Protocol                Education/Evaluation     Row Name 07/26/22 1052          Education    Education No discharge needs identified at this time            Monitor/Evaluation    Monitor Per protocol;PO intake                 Electronically signed by:  Larisa Camp RD  07/26/22 15:16 CDT

## 2022-07-27 NOTE — PROGRESS NOTES
PULMONARY AND CRITICAL CARE PROGRESS NOTE - [facility]    Patient: Adrián Graham    1940   Acct# [id number]  07/27/22   07:16 CDT  Referring Provider: Rene Madrid MD    Chief Complaint: Ongoing respiratory failure    Interval history: He is seen awake and alert resting in bed. He has Vapotherm 30L with Fio2 increase to .70 and sat of 92%. Rt states he has had to use the NRB with this frequently and will often still desat into the 70s. He has air hunger at times. RN has stated this am that he has refused his medications except for his neurontin, Ultram and a lower dose of Buspar. He still complains of pain from his head to toes. RT is at bedside about to initiate a breathing treatment. He is adamant with me that he wishes to be transferred to [facility]. Talks about this were initiated with attending yesterday and process has been started to see if he will qualify. He is very clear on his wishes at this time. He continues to utilize incentive spirometer and Aerobika flutter.  No documented fevers.  No new labs or films for review.  No issues reported overnight.    Review of Systems: Review of Systems   Constitutional: Positive for fatigue.   Respiratory: Positive for cough and shortness of breath.    Gastrointestinal: Negative for diarrhea and nausea.   Genitourinary: Negative for difficulty urinating.   Neurological: Positive for weakness.      Physical Exam:  Vital signs: T: 97.1   BP: 114/71   P: 77 R: 19 sat:90%  Physical Exam  Vitals reviewed.   Constitutional:       Appearance: He is well-developed. He is ill-appearing.      Comments: Vapotherm in place   HENT:      Head: Normocephalic and atraumatic.   Eyes:      General: No scleral icterus.  Cardiovascular:      Rate and Rhythm: Normal rate and regular rhythm.   Pulmonary:      Effort: No respiratory distress.      Breath sounds: Decreased breath sounds present.   Abdominal:      General: Bowel sounds are normal. There  is no distension.      Palpations: Abdomen is soft.   Musculoskeletal:      Cervical back: Neck supple.      Right lower leg: No edema.      Left lower leg: No edema.   Skin:     General: Skin is warm.   Neurological:      Mental Status: He is alert and oriented to person, place, and time.      Motor: Weakness present.   Psychiatric:         Behavior: Behavior normal.     Electronically signed by MEKHI Williamson, 7/27/2022, 07:16 CDT      Physician Substantive Portion: Medical Decision Making:    Laboratory Data:  Results from last 7 days   Lab Units 07/25/22  0459   WBC 10*3/mm3 13.24*   HEMOGLOBIN g/dL 13.1   PLATELETS 10*3/mm3 313     Results from last 7 days   Lab Units 07/25/22  0459   SODIUM mmol/L 139   POTASSIUM mmol/L 4.4   BUN mg/dL 32*   CREATININE mg/dL 0.62*         No results found for: BLOODCX, URINECX, WOUNDCX, MRSACX, RESPCX, STOOLCX    Recent films:  No radiology results for the last day     My radiograph interpretation/independent review of imaging: Reviewed and agree with current interpretation    Pulmonary Assessment:    1. Acute hypoxic respiratory failure, currently on Vapotherm  2. Bilateral groundglass pulmonary infiltrate  3. Worsening COVID-19 pneumonia  4. Acute lung injury or cryptogenic organizing pneumonia  5. Pulmonary embolism single subsegmental on anticoagulation  6. Essential hypertension  7. Hyperlipidemia  8. Carotid artery disease  9. Recurrent bronchitis  10. S/p COVID vaccinated status  11. Severe weakness and deconditioning  12. History of fall  13. Anxiety and depression    Recommend/plan:   · Patient was seen in follow-up visit in pulmonary rounds today and I discussed with patient's daughter and Dr. Madrid.  · He is currently on 35 L flow and 70% FiO2 on Vapotherm and is still feeling poorly and complains of shortness of breath and lots of pain.  · He clearly expressed his wish he wanted to be transition to hospice and hospice team has been consulted and  palliative care was in the room.  · By the time we get hospice care set up we will continue current treatment plan.  · Patient is willing [facility].  · Continue routine respiratory care bronchodilator treatment  · Incentive spirometry and flutter valve per pulmonary compliance and clearance.  · Patient completed antibiotic.  Decrease dexamethasone and start steroid taper.  · Increase physical activity as tolerated.  Nutritional support.  · DVT and stress ulcer prophylaxis pain and anxiety control.  · Plan for outpatient pulmonary clinic follow-up with me after discharge.  · CODE STATUS: Full.  Possibly will be changed.  Overall prognosis: Guarded.  · As he is going for hospice we will sign off.  Please call us if needed.  · We appreciate the consult.  We like to thank Dr. Madrid for the referral.    This visit was performed by both a physician and an Advanced Practice RN.  I personally evaluated and examined the patient.  I performed all aspects of the medical decision making as documented.    Electronically signed by     Miguel Smith MD,  Pulmonologist/Intensivist   7/27/2022, 11:45 CDT

## 2022-07-28 NOTE — PROGRESS NOTES
BONG Alcala APRN        Internal Medicine Progress Note    7/28/2022   08:24 CDT    Name:  Adrián Graham  MRN:    [id number]     Acct:     [id number]   Room:  [address] [place] [address] Day: 0     Admit Date: 7/19/2022 12:46 PM  PCP: Mauro Irizarry DO    Subjective:     C/C: shortness of breath, weakness    Interval History: Status: stayed the same. Resting in bed. Daughter at bedside. Afebrile. Rested well overnight. Plans to transition to comfort/end of life care today.       Review of Systems   Constitutional: Positive for malaise/fatigue. Negative for chills, decreased appetite, weight gain and weight loss.   HENT: Negative for congestion, ear discharge, hoarse voice and tinnitus.    Eyes: Negative for blurred vision, discharge, visual disturbance and visual halos.   Cardiovascular: Positive for chest pain and dyspnea on exertion. Negative for claudication, irregular heartbeat, leg swelling, orthopnea and paroxysmal nocturnal dyspnea.   Respiratory: Positive for cough and shortness of breath. Negative for sputum production and wheezing.    Endocrine: Negative for cold intolerance, heat intolerance and polyuria.   Hematologic/Lymphatic: Negative for adenopathy. Does not bruise/bleed easily.   Skin: Negative for dry skin, itching and suspicious lesions.   Musculoskeletal: Negative for arthritis, back pain, falls, joint pain, muscle weakness and myalgias.   Gastrointestinal: Negative for abdominal pain, constipation, diarrhea, dysphagia and hematemesis.   Genitourinary: Negative for bladder incontinence, dysuria and frequency.   Neurological: Positive for weakness. Negative for aphonia, disturbances in coordination and dizziness.   Psychiatric/Behavioral: Negative for altered mental status, depression, memory loss and substance abuse. The patient does not have insomnia and is not nervous/anxious.          Medications:     Allergies: No Known Allergies    Current Meds:   Current  Facility-Administered Medications:   •  acetaminophen (TYLENOL) tablet 650 mg, 650 mg, Oral, Q4H PRN **OR** acetaminophen (TYLENOL) suppository 650 mg, 650 mg, Rectal, Q4H PRN, Rene Madrid MD  •  albuterol (PROVENTIL) nebulizer solution 0.083% 2.5 mg/3mL, 2.5 mg, Nebulization, Q6H PRN, Rene Madrid MD  •  apixaban (ELIQUIS) tablet 5 mg, 5 mg, Oral, Q12H, Cassandra Byrnes, APRN  •  ascorbic acid (VITAMIN C) tablet 500 mg, 500 mg, Oral, Daily, Rene Madrid MD  •  aspirin EC tablet 81 mg, 81 mg, Oral, Daily, Rene Mardid MD  •  atorvastatin (LIPITOR) tablet 20 mg, 20 mg, Oral, Daily, Rene Madrid MD  •  busPIRone (BUSPAR) tablet 10 mg, 10 mg, Oral, TID, Rene Madrid MD  •  cetirizine (zyrTEC) tablet 10 mg, 10 mg, Oral, Daily, Rene Madrid MD  •  dexamethasone (DECADRON) tablet 6 mg, 6 mg, Oral, BID With Meals, Rene Madrid MD  •  diazePAM (VALIUM) tablet 2.5 mg, 2.5 mg, Oral, Q6H PRN, Rene Madrid MD  •  diphenhydrAMINE (BENADRYL) capsule 25 mg, 25 mg, Oral, Nightly, Rene Madrid MD  •  docusate sodium (COLACE) capsule 100 mg, 100 mg, Oral, BID, Cassandra Byrnes, APRN  •  famotidine (PEPCID) tablet 20 mg, 20 mg, Oral, BID AC, Rene Madrid MD  •  finasteride (PROSCAR) tablet 5 mg, 5 mg, Oral, Daily, Rene Madrid MD  •  fluticasone (FLONASE) 50 MCG/ACT nasal spray 2 spray, 2 spray, Nasal, Daily, Rene Madrid MD  •  furosemide (LASIX) tablet 20 mg, 20 mg, Oral, BID, Rene Madrid MD  •  gabapentin (NEURONTIN) capsule 400 mg, 400 mg, Oral, Q12H, Rene Madrid MD  •  guaiFENesin (MUCINEX) 12 hr tablet 1,200 mg, 1,200 mg, Oral, BID, Rene Madrid MD  •  ipratropium-albuterol (DUO-NEB) nebulizer solution 3 mL, 3 mL, Nebulization, 4x Daily - RT, Rene Madrid MD  •  montelukast (SINGULAIR) tablet 10 mg, 10 mg, Oral, Nightly, Rene Madrid MD  •   "morphine injection 4 mg, 4 mg, Intravenous, Q2H PRN, Rene Madrid MD  •  ondansetron (ZOFRAN) tablet 4 mg, 4 mg, Oral, Q6H PRN **OR** ondansetron (ZOFRAN) injection 4 mg, 4 mg, Intravenous, Q6H PRN, Rene Madrid MD  •  polyethylene glycol (MIRALAX) packet 17 g, 17 g, Oral, Daily, Cassandra Byrnes APRN  •  sodium chloride 0.9 % flush 10 mL, 10 mL, Intravenous, Q12H, Rene Madrid MD  •  sodium chloride 0.9 % flush 10 mL, 10 mL, Intravenous, PRN, Rene Madrid MD  •  traMADol (ULTRAM) tablet 50 mg, 50 mg, Oral, Q8H PRN, Rene Madrid MD  •  vitamin D3 tablet 5,000 Units, 5,000 Units, Oral, Daily, Rene Madrid MD  •  zinc sulfate (ZINCATE) capsule 220 mg, 220 mg, Oral, Daily, Rene Madrid MD    Data:     Code Status:    There are no questions and answers to display.       Family History   Problem Relation Age of Onset   • Diabetes Mother    • Cancer Mother    • Osteoporosis Mother    • Heart disease Brother    • Colon cancer Neg Hx    • Colon polyps Neg Hx    • Esophageal cancer Neg Hx        Social History     Socioeconomic History   • Marital status:    Tobacco Use   • Smoking status: Never Smoker   • Smokeless tobacco: Never Used   Vaping Use   • Vaping Use: Never used   Substance and Sexual Activity   • Alcohol use: No   • Drug use: No   • Sexual activity: Defer       Vitals:  Ht 185.4 cm (73\")   Wt 84.8 kg (186 lb 14.4 oz)   BMI 24.66 kg/m²     T 98.2 P 102 R 29 /93 Sp02 97% (30 lpm/70%)        I/O (24Hr):  No intake or output data in the 24 hours ending 07/28/22 0824    Labs and imaging:      No results found for this or any previous visit (from the past 12 hour(s)).          Physical Examination:        Physical Exam  Vitals and nursing note reviewed.   Constitutional:       Appearance: He is well-developed.   HENT:      Head: Normocephalic and atraumatic.      Right Ear: External ear normal.      Left Ear: External ear " normal.      Nose: Nose normal.      Mouth/Throat:      Mouth: Mucous membranes are moist.      Pharynx: Oropharynx is clear.   Eyes:      Pupils: Pupils are equal, round, and reactive to light.   Cardiovascular:      Rate and Rhythm: Normal rate and regular rhythm.      Heart sounds: Normal heart sounds.   Pulmonary:      Effort: Pulmonary effort is normal.      Breath sounds: Normal breath sounds.   Abdominal:      General: Bowel sounds are normal.      Palpations: Abdomen is soft.   Musculoskeletal:         General: Normal range of motion.      Cervical back: Normal range of motion and neck supple.      Comments: Generalized weakness   Skin:     General: Skin is warm and dry.   Neurological:      Mental Status: He is alert and oriented to person, place, and time.      Deep Tendon Reflexes: Reflexes are normal and symmetric.   Psychiatric:         Behavior: Behavior normal.           Assessment:            * No active hospital problems. *    Past Medical History:   Diagnosis Date   • Abdominal distention    • Abdominal pain, left upper quadrant    • Allergic rhinitis    • Arthritis    • Bloating    • BPH (benign prostatic hyperplasia)    • Carpal tunnel syndrome    • Change in bowel habits    • Constipation    • Diarrhea    • Erectile dysfunction    • GERD (gastroesophageal reflux disease)    • History of shingles    • Hypertension    • Idiopathic peripheral neuropathy    • Neuropathy    • Pneumonia    • Shingles    • Weight loss         Plan:        1. Acute hypoxic respiratory failure s/p COVID  2. Bilateral pulmonary embolus  3. COVID-19 with respiratory failure  4. Bilateral groundglass pulmonary infiltrate  5. Acute lung injury with organizing pneumonia  6. Status post COVID and vaccinated status  7. History of fall  8. Presyncope  9. Chronic cerebral microvascular disease  10. Dilated cardiomyopathy  11. Bilateral carotid artery disease  12. Moderate protein calorie malnutrition  13. Diffuse generalized  weakness  14. Leukocytosis  15. BPH  16. Anxiety  17. Peripheral neuropathy  18. GERD  19. Rhinitis    Continue current treatment. Support extended to family. Plans to transition to comfort/end of life care later today.     Electronically signed by MEKHI Lamar on 7/28/2022 at 08:24 CDT

## 2022-07-28 NOTE — PROGRESS NOTES
[facility] Fall Risk Assessment Note    Name: Adrián Graham  MRN: [id number]  CSN: [id number]  Room: [facility]  Admit Date/Time: 7/19/2022 12:46 PM.    Currently ordered medications associated with an increased risk for fall include:    Scheduled Meds:  busPIRone, 10 mg, Oral, TID  cetirizine, 10 mg, Oral, Daily  diphenhydrAMINE, 25 mg, Oral, Nightly  docusate sodium, 100 mg, Oral, BID  furosemide, 20 mg, Oral, BID  gabapentin, 400 mg, Oral, Q12H  polyethylene glycol, 17 g, Oral, Daily          Continuous Infusions:     PRN Meds:  •  diazePAM  •  Morphine  •  ondansetron   •  traMADol    Bry Geronimo, Pharmacy Intern  07/28/22 09:40 CDT

## 2022-07-28 NOTE — PROGRESS NOTES
BONG Alcala APRN        Internal Medicine Progress Note    7/27/2022   21:29 CDT    Name:  Adrián Graham  MRN:    [id number]     Acct:     [id number]   Room:  [address] [place] [address] Day: 0     Admit Date: 7/19/2022 12:46 PM  PCP: Mauro Irizarry DO    Subjective:     C/C: shortness of breath, weakness    Interval History: Status: Discussed with pt/ daughter/ Dr Smith and myself all at bedside and pt wants to proceed to comfort care. All are in agreement and will start later today.      Review of Systems   Constitutional: Positive for malaise/fatigue. Negative for chills, decreased appetite, weight gain and weight loss.   HENT: Negative for congestion, ear discharge, hoarse voice and tinnitus.    Eyes: Negative for blurred vision, discharge, visual disturbance and visual halos.   Cardiovascular: Positive for chest pain and dyspnea on exertion. Negative for claudication, irregular heartbeat, leg swelling, orthopnea and paroxysmal nocturnal dyspnea.   Respiratory: Positive for cough and shortness of breath. Negative for sputum production and wheezing.    Endocrine: Negative for cold intolerance, heat intolerance and polyuria.   Hematologic/Lymphatic: Negative for adenopathy. Does not bruise/bleed easily.   Skin: Negative for dry skin, itching and suspicious lesions.   Musculoskeletal: Negative for arthritis, back pain, falls, joint pain, muscle weakness and myalgias.   Gastrointestinal: Negative for abdominal pain, constipation, diarrhea, dysphagia and hematemesis.   Genitourinary: Negative for bladder incontinence, dysuria and frequency.   Neurological: Positive for weakness. Negative for aphonia, disturbances in coordination and dizziness.   Psychiatric/Behavioral: Negative for altered mental status, depression, memory loss and substance abuse. The patient does not have insomnia and is not nervous/anxious.          Medications:     Allergies: No Known Allergies    Current  Meds:   Current Facility-Administered Medications:   •  acetaminophen (TYLENOL) tablet 650 mg, 650 mg, Oral, Q4H PRN **OR** acetaminophen (TYLENOL) suppository 650 mg, 650 mg, Rectal, Q4H PRN, Rene Madrid MD  •  albuterol (PROVENTIL) nebulizer solution 0.083% 2.5 mg/3mL, 2.5 mg, Nebulization, Q6H PRN, Rene Madrid MD  •  apixaban (ELIQUIS) tablet 5 mg, 5 mg, Oral, Q12H, Cassandra Byrnes, APRN  •  ascorbic acid (VITAMIN C) tablet 500 mg, 500 mg, Oral, Daily, Rene Madrid MD  •  aspirin EC tablet 81 mg, 81 mg, Oral, Daily, Rene Madrid MD  •  atorvastatin (LIPITOR) tablet 20 mg, 20 mg, Oral, Daily, Rene Madrid MD  •  busPIRone (BUSPAR) tablet 10 mg, 10 mg, Oral, TID, Rene Madrid MD  •  cetirizine (zyrTEC) tablet 10 mg, 10 mg, Oral, Daily, Rene Madrid MD  •  dexamethasone (DECADRON) tablet 6 mg, 6 mg, Oral, BID With Meals, Rene Madrid MD  •  diazePAM (VALIUM) tablet 2.5 mg, 2.5 mg, Oral, Q6H PRN, Rene Madrid MD  •  diphenhydrAMINE (BENADRYL) capsule 25 mg, 25 mg, Oral, Nightly, Rene Madrid MD  •  docusate sodium (COLACE) capsule 100 mg, 100 mg, Oral, BID, Cassandra Byrnes, APRN  •  famotidine (PEPCID) tablet 20 mg, 20 mg, Oral, BID AC, Rene Madrid MD  •  finasteride (PROSCAR) tablet 5 mg, 5 mg, Oral, Daily, Rene Madrid MD  •  fluticasone (FLONASE) 50 MCG/ACT nasal spray 2 spray, 2 spray, Nasal, Daily, Rene Madrid MD  •  furosemide (LASIX) tablet 20 mg, 20 mg, Oral, BID, Rene Madrid MD  •  gabapentin (NEURONTIN) capsule 400 mg, 400 mg, Oral, Q12H, Rene Madrid MD  •  guaiFENesin (MUCINEX) 12 hr tablet 1,200 mg, 1,200 mg, Oral, BID, Rene Madrid MD  •  ipratropium-albuterol (DUO-NEB) nebulizer solution 3 mL, 3 mL, Nebulization, 4x Daily - RT, Rene Madrid MD  •  montelukast (SINGULAIR) tablet 10 mg, 10 mg, Oral, Nightly, Julius  "Rene Cadet MD  •  morphine injection 4 mg, 4 mg, Intravenous, Q2H PRN, Rene Madrid MD  •  ondansetron (ZOFRAN) tablet 4 mg, 4 mg, Oral, Q6H PRN **OR** ondansetron (ZOFRAN) injection 4 mg, 4 mg, Intravenous, Q6H PRN, Rene Madrid MD  •  polyethylene glycol (MIRALAX) packet 17 g, 17 g, Oral, Daily, Cassandra Byrnes APRN  •  sodium chloride 0.9 % flush 10 mL, 10 mL, Intravenous, Q12H, Rene Madrid MD  •  sodium chloride 0.9 % flush 10 mL, 10 mL, Intravenous, PRN, Rene Madrid MD  •  traMADol (ULTRAM) tablet 50 mg, 50 mg, Oral, Q8H PRN, Rene Madrid MD  •  vitamin D3 tablet 5,000 Units, 5,000 Units, Oral, Daily, Rene Madrid MD  •  zinc sulfate (ZINCATE) capsule 220 mg, 220 mg, Oral, Daily, Rene Madrid MD    Data:     Code Status:    There are no questions and answers to display.       Family History   Problem Relation Age of Onset   • Diabetes Mother    • Cancer Mother    • Osteoporosis Mother    • Heart disease Brother    • Colon cancer Neg Hx    • Colon polyps Neg Hx    • Esophageal cancer Neg Hx        Social History     Socioeconomic History   • Marital status:    Tobacco Use   • Smoking status: Never Smoker   • Smokeless tobacco: Never Used   Vaping Use   • Vaping Use: Never used   Substance and Sexual Activity   • Alcohol use: No   • Drug use: No   • Sexual activity: Defer       Vitals:  Ht 185.4 cm (73\")   Wt 84.8 kg (186 lb 14.4 oz)   BMI 24.66 kg/m²     T 97.5 P 82 R 15 /78 Sp02 97% (30 lpm/70%)        I/O (24Hr):  No intake or output data in the 24 hours ending 07/27/22 2129    Labs and imaging:      No results found for this or any previous visit (from the past 12 hour(s)).          Physical Examination:        Physical Exam  Vitals and nursing note reviewed.   Constitutional:       Appearance: He is well-developed.   HENT:      Head: Normocephalic and atraumatic.      Right Ear: External ear normal.      Left " Ear: External ear normal.      Nose: Nose normal.      Mouth/Throat:      Mouth: Mucous membranes are moist.      Pharynx: Oropharynx is clear.   Eyes:      Pupils: Pupils are equal, round, and reactive to light.   Cardiovascular:      Rate and Rhythm: Normal rate and regular rhythm.      Heart sounds: Normal heart sounds.   Pulmonary:      Effort: Pulmonary effort is normal.      Breath sounds: Normal breath sounds.   Abdominal:      General: Bowel sounds are normal.      Palpations: Abdomen is soft.   Musculoskeletal:         General: Normal range of motion.      Cervical back: Normal range of motion and neck supple.      Comments: Generalized weakness   Skin:     General: Skin is warm and dry.   Neurological:      Mental Status: He is alert and oriented to person, place, and time.      Deep Tendon Reflexes: Reflexes are normal and symmetric.   Psychiatric:         Behavior: Behavior normal.           Assessment:            * No active hospital problems. *    Past Medical History:   Diagnosis Date   • Abdominal distention    • Abdominal pain, left upper quadrant    • Allergic rhinitis    • Arthritis    • Bloating    • BPH (benign prostatic hyperplasia)    • Carpal tunnel syndrome    • Change in bowel habits    • Constipation    • Diarrhea    • Erectile dysfunction    • GERD (gastroesophageal reflux disease)    • History of shingles    • Hypertension    • Idiopathic peripheral neuropathy    • Neuropathy    • Pneumonia    • Shingles    • Weight loss         Plan:        1. Acute hypoxic respiratory failure s/p COVID  2. Bilateral pulmonary embolus  3. COVID-19 with respiratory failure  4. Bilateral groundglass pulmonary infiltrate  5. Acute lung injury with organizing pneumonia  6. Status post COVID and vaccinated status  7. History of fall  8. Presyncope  9. Chronic cerebral microvascular disease  10. Dilated cardiomyopathy  11. Bilateral carotid artery disease  12. Moderate protein calorie  malnutrition  13. Diffuse generalized weakness  14. Leukocytosis  15. BPH  16. Anxiety  17. Peripheral neuropathy  18. GERD  19. Rhinitis    Discussed with pt/ daughter/ Dr Smith and myself all at bedside and pt wants to proceed to comfort care. All are in agreement and will start later today.      Electronically signed by Rene Madrid MD on 7/27/2022 at 21:29 CDT

## 2022-07-29 DIAGNOSIS — J06.9 ACUTE URI: ICD-10-CM

## 2022-07-29 RX ORDER — FLUTICASONE PROPIONATE 50 MCG
SPRAY, SUSPENSION (ML) NASAL
Qty: 16 ML | OUTPATIENT
Start: 2022-07-29

## 2022-07-29 NOTE — DISCHARGE SUMMARY
[facility]  BONG Cassidy APRN      Internal Medicine Death Summary    Patient ID: Adrián Graham  MRN: [id number]     Acct:  [id number]       Patient's PCP: Mauro Irizarry DO    Admit Date: 7/19/2022     Discharge Date: 7/28/2022      Admitting Physician: No admitting provider for patient encounter.    Discharge Physician: MEKHI Lamar     Final Diagnoses  Acute hypoxic respiratory failure s/p COVID  Bilateral pulmonary embolus  COVID-19 with respiratory failure  Bilateral groundglass pulmonary infiltrate  Acute lung injury with organizing pneumonia  Status post COVID and vaccinated status  History of fall  Presyncope  Chronic cerebral microvascular disease  Dilated cardiomyopathy  Bilateral carotid artery disease  Moderate protein calorie malnutrition  Diffuse generalized weakness  Leukocytosis  BPH  Anxiety  Peripheral neuropathy  GERD  Rhinitis        Hospital Problems    * No active hospital problems. *     Past Medical History:   Diagnosis Date    Abdominal distention     Abdominal pain, left upper quadrant     Allergic rhinitis     Arthritis     Bloating     BPH (benign prostatic hyperplasia)     Carpal tunnel syndrome     Change in bowel habits     Constipation     Diarrhea     Erectile dysfunction     GERD (gastroesophageal reflux disease)     History of shingles     Hypertension     Idiopathic peripheral neuropathy     Neuropathy     Pneumonia     Shingles     Weight loss          Code Status:    There are no questions and answers to display.       Hospital Course: The patient had been in his usual state of health when he developed increased dizziness and progressive weakness with a fall. He had previously been hospitalized due to COVID pneumonia and pulmonary emboli. He presented to ER on 7/1 with his complaints. On intake he was found to be hypoxic and was placed on supplemental oxygen and quickly transitioned to Vapotherm. Repeat COVID negative. Repeat  CTA of the chest showed questional mild increased in his prior pulmonary emboli and groundglass opacities consistent with COVID pneumonia. He was admitted to the service of the hosptialists. Unfortunately, he had further worsening of respiratory status and transferred to ICU. He was treated with IV steroids and IV antibiotics. 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 Due to his need for continued oxygen weaning and rehabilitation, he transferred to our facility.   Upon transfer, he required Vapotherm with 35lpm/85%. He was seen in consultation by ID who recommended to watch off antibiotic therapy. He continued to have desaturations with any activity including eating and would require 100% NRB at times. He was able to transfer to the chair, but was otherwise slow to progress with therapies. Patient and family opted for forego further aggressive evaluation and treatment in favor of pursuing comfort/end of life care. This was initiated and the patient succumbed to his many acute and chronic illnesses on 7/28 at 2025.     Consults:  Dr. Eduardo (ID)  Dr. Smith (pulmonology)          Electronically signed by MEKHI Lamar on 7/29/2022 at 10:36 CDT     I have discussed the care of Adrián Graham, including pertinent history and exam findings, with the nurse practitioner.    I have seen and examined the patient and the key elements of all parts of the encounter have been performed by me.  I agree with the assessment, plan and orders as documented by MEKHI Dockery, after I modified the exam findings and the plan of treatments and the final version is my approved version of the assessment.        Electronically signed by Rene Madrid MD on 7/30/2022 at 22:21 CDT

## 2024-11-07 NOTE — PROGRESS NOTES
"Infectious Diseases Progress Note    Patient:  Adrián Graham  YOB: 1940  MRN: [id number]   Admit date: 7/1/2022   Admitting Physician: Steven Ross DO  Primary Care Physician: Mauro Irizarry DO    Chief Complaint/Interval History: He indicates he feels a little more rested today.  He indicates he was able to rest some.  He has not done much today.  He had had an increase in FiO2 overnight, but it is tapering back down currently.  No productive cough.  No sputum production.  No fever.    Intake/Output Summary (Last 24 hours) at 7/17/2022 1537  Last data filed at 7/17/2022 1000  Gross per 24 hour   Intake 200 ml   Output 1925 ml   Net -1725 ml     Allergies: No Known Allergies  Current Scheduled Medications:   ascorbic acid, 500 mg, Oral, Daily  aspirin, 81 mg, Oral, Daily  atorvastatin, 20 mg, Oral, Daily  busPIRone, 10 mg, Oral, TID  cetirizine, 10 mg, Oral, Daily  dexamethasone, 6 mg, Oral, Q12H  diphenhydrAMINE, 25 mg, Oral, Nightly  enoxaparin, 1 mg/kg, Subcutaneous, Q12H  famotidine, 20 mg, Oral, BID AC  finasteride, 5 mg, Oral, Daily  fluticasone, 2 spray, Nasal, Daily  furosemide, 20 mg, Oral, BID  gabapentin, 400 mg, Oral, Q12H  guaiFENesin, 1,200 mg, Oral, BID  ipratropium-albuterol, 3 mL, Nebulization, 4x Daily - RT  montelukast, 10 mg, Oral, Nightly  sodium chloride, 250 mL, Intravenous, Once  sodium chloride, 10 mL, Intravenous, Q12H  vitamin D3, 5,000 Units, Oral, Daily  zinc sulfate, 220 mg, Oral, Daily      Current PRN Medications:  •  acetaminophen  •  albuterol  •  diazePAM  •  Morphine  •  ondansetron  •  sodium chloride  •  traMADol    Review of Systems see HPI    Vital Signs:  /53   Pulse 98   Temp 98.6 °F (37 °C) (Axillary)   Resp 27   Ht 185.4 cm (73\")   Wt 84.9 kg (187 lb 2.7 oz)   SpO2 94%   BMI 24.69 kg/m²     Physical Exam  Vital signs - reviewed.  Line/IV site - No erythema, warmth, induration, or tenderness.  Lungs without wheezing    Lab " Pt with n/v zofran given. Pt threw up soon after taking pills   Results:  CBC:   Results from last 7 days   Lab Units 07/17/22  0344 07/15/22  0401 07/14/22  0338 07/13/22  0514 07/12/22 0327 07/11/22 0312   WBC 10*3/mm3 19.03* 22.81* 17.20* 20.01* 19.06* 17.30*   HEMOGLOBIN g/dL 11.3* 10.9* 10.6* 10.5* 10.3* 10.3*   HEMATOCRIT % 36.3* 34.9* 33.3* 34.4* 32.4* 33.7*   PLATELETS 10*3/mm3 317 322 301 329 336 368     BMP:  Results from last 7 days   Lab Units 07/17/22  0344 07/15/22  0752 07/14/22  0338 07/13/22 0514 07/12/22 0327 07/11/22 0312   SODIUM mmol/L 141 142 143 143 145 143   POTASSIUM mmol/L 4.4 4.1 4.5 3.5 3.7 3.9   CHLORIDE mmol/L 102 103 104 103 101 102   CO2 mmol/L 32.0* 33.0* 33.0* 35.0* 37.0* 34.0*   BUN mg/dL 28* 32* 29* 30* 28* 23   CREATININE mg/dL 0.54* 0.69* 0.70* 0.70* 0.79 0.83   GLUCOSE mg/dL 154* 161* 147* 109* 127* 113*   CALCIUM mg/dL 8.7 8.8 8.7 8.9 9.1 9.0     Component   Ref Range & Units 03:44 3 d ago 6 d ago 7 d ago 10 d ago 13 d ago 2 wk ago   C-Reactive Protein   0.00 - 0.50 mg/dL 1.40 High   6.72 High   16.21 High   18.76 High   8.16 High   6.46 High   22.42 High      Culture Results:   Blood Culture   Date Value Ref Range Status   07/11/2022 No growth at 5 days  Final   07/11/2022 No growth at 5 days  Final     Radiology: None  Additional Studies Reviewed: None    Impression:   1.  COVID-19 infection/organizing pneumonia post COVID  2.  Fever-resolved  3.  Leukocytosis  4.  Pulmonary embolism  5.  Elevated CRP-continued improvement    Recommendations:   Continue off antibiotic treatment at present  Steroid management per pulmonary for probable fibrotic stage/organizing pneumonia  No new recommendations at present  Continue to follow    Saúl Howard MD

## (undated) DEVICE — Device: Brand: DEFENDO AIR/WATER/SUCTION AND BIOPSY VALVE

## (undated) DEVICE — SENSR O2 OXIMAX FNGR A/ 18IN NONSTR

## (undated) DEVICE — THE CHANNEL CLEANING BRUSH IS A NYLON FLEXI BRUSH ATTACHED TO A FLEXIBLE PLASTIC SHEATH DESIGNED TO SAFELY REMOVE DEBRIS FROM FLEXIBLE ENDOSCOPES.

## (undated) DEVICE — TBG SMPL FLTR LINE NASL 02/C02 A/ BX/100

## (undated) DEVICE — YANKAUER,BULB TIP WITH VENT: Brand: ARGYLE

## (undated) DEVICE — MASK,OXYGEN,MED CONC,ADLT,7' TUB, UC: Brand: PENDING